# Patient Record
Sex: FEMALE | Race: WHITE | NOT HISPANIC OR LATINO | Employment: OTHER | URBAN - METROPOLITAN AREA
[De-identification: names, ages, dates, MRNs, and addresses within clinical notes are randomized per-mention and may not be internally consistent; named-entity substitution may affect disease eponyms.]

---

## 2017-01-04 DIAGNOSIS — K51.90 ULCERATIVE COLITIS WITHOUT COMPLICATIONS, UNSPECIFIED LOCATION (HCC): Primary | ICD-10-CM

## 2017-01-25 ENCOUNTER — OFFICE VISIT (OUTPATIENT)
Dept: ENDOCRINOLOGY | Facility: CLINIC | Age: 52
End: 2017-01-25

## 2017-01-25 ENCOUNTER — OFFICE VISIT (OUTPATIENT)
Dept: INTERNAL MEDICINE | Facility: CLINIC | Age: 52
End: 2017-01-25

## 2017-01-25 VITALS
SYSTOLIC BLOOD PRESSURE: 100 MMHG | HEIGHT: 67 IN | OXYGEN SATURATION: 96 % | HEART RATE: 88 BPM | DIASTOLIC BLOOD PRESSURE: 80 MMHG | WEIGHT: 254 LBS | BODY MASS INDEX: 39.87 KG/M2

## 2017-01-25 VITALS
SYSTOLIC BLOOD PRESSURE: 100 MMHG | BODY MASS INDEX: 39.91 KG/M2 | HEART RATE: 88 BPM | OXYGEN SATURATION: 96 % | WEIGHT: 254.8 LBS | DIASTOLIC BLOOD PRESSURE: 80 MMHG

## 2017-01-25 DIAGNOSIS — E06.3 HYPOTHYROIDISM DUE TO HASHIMOTO'S THYROIDITIS: ICD-10-CM

## 2017-01-25 DIAGNOSIS — E04.0 SIMPLE GOITER: ICD-10-CM

## 2017-01-25 DIAGNOSIS — E06.3 LYMPHOCYTIC THYROIDITIS: Primary | ICD-10-CM

## 2017-01-25 DIAGNOSIS — E55.9 VITAMIN D DEFICIENCY: ICD-10-CM

## 2017-01-25 DIAGNOSIS — F41.1 GENERALIZED ANXIETY DISORDER: ICD-10-CM

## 2017-01-25 DIAGNOSIS — R73.03 PREDIABETES: Primary | ICD-10-CM

## 2017-01-25 DIAGNOSIS — E03.8 HYPOTHYROIDISM DUE TO HASHIMOTO'S THYROIDITIS: ICD-10-CM

## 2017-01-25 DIAGNOSIS — Z11.59 NEED FOR HEPATITIS C SCREENING TEST: ICD-10-CM

## 2017-01-25 DIAGNOSIS — R73.9 HYPERGLYCEMIA: ICD-10-CM

## 2017-01-25 DIAGNOSIS — K51.20 ULCERATIVE PROCTITIS WITHOUT COMPLICATION (HCC): ICD-10-CM

## 2017-01-25 DIAGNOSIS — E66.01 MORBID OBESITY DUE TO EXCESS CALORIES (HCC): ICD-10-CM

## 2017-01-25 DIAGNOSIS — K21.9 GASTROESOPHAGEAL REFLUX DISEASE, ESOPHAGITIS PRESENCE NOT SPECIFIED: ICD-10-CM

## 2017-01-25 DIAGNOSIS — R73.03 PREDIABETES: ICD-10-CM

## 2017-01-25 LAB
GLUCOSE BLDC GLUCOMTR-MCNC: 115 MG/DL (ref 70–130)
HBA1C MFR BLD: 5.7 %

## 2017-01-25 PROCEDURE — 99213 OFFICE O/P EST LOW 20 MIN: CPT | Performed by: INTERNAL MEDICINE

## 2017-01-25 PROCEDURE — 90714 TD VACC NO PRESV 7 YRS+ IM: CPT | Performed by: INTERNAL MEDICINE

## 2017-01-25 PROCEDURE — 82947 ASSAY GLUCOSE BLOOD QUANT: CPT | Performed by: INTERNAL MEDICINE

## 2017-01-25 PROCEDURE — 76536 US EXAM OF HEAD AND NECK: CPT | Performed by: INTERNAL MEDICINE

## 2017-01-25 PROCEDURE — 83036 HEMOGLOBIN GLYCOSYLATED A1C: CPT | Performed by: INTERNAL MEDICINE

## 2017-01-25 PROCEDURE — 90471 IMMUNIZATION ADMIN: CPT | Performed by: INTERNAL MEDICINE

## 2017-01-25 NOTE — MR AVS SNAPSHOT
Brigid Coelho   1/25/2017 8:45 AM   Office Visit    Dept Phone:  [phone]   Encounter #:  [id number]    Provider:  Shellie Moore DO   Department:  [facility] INTERNAL MEDICINE AND ENDOCRINOLOGY [place]                Your Full Care Plan              Today's Medication Changes          These changes are accurate as of: 1/25/17  9:33 AM.  If you have any questions, ask your nurse or doctor.               Medication(s)that have changed:     * VENTOLIN  (90 BASE) MCG/ACT inhaler   Generic drug:  albuterol   Ventolin  (90 Base) MCG/ACT Inhalation Aerosol Solution; Patient Sig: Ventolin  (90 Base) MCG/ACT Inhalation Aerosol Solution INL 2 PUFFS PO Q 4 H PRN; 18; 0; 11-Apr-2012; Active   What changed:  Another medication with the same name was removed. Continue taking this medication, and follow the directions you see here.   Changed by:  Shellie Moore DO       * albuterol 108 (90 BASE) MCG/ACT inhaler   Commonly known as:  PROVENTIL HFA;VENTOLIN HFA   Inhale 2 puffs Every 6 (Six) Hours As Needed for wheezing.   What changed:  Another medication with the same name was removed. Continue taking this medication, and follow the directions you see here.       XANAX 0.25 MG tablet   Generic drug:  ALPRAZolam   Take  by mouth.   What changed:  Another medication with the same name was removed. Continue taking this medication, and follow the directions you see here.   Changed by:  Shellie Moore DO       * Notice:  This list has 2 medication(s) that are the same as other medications prescribed for you. Read the directions carefully, and ask your doctor or other care provider to review them with you.      Stop taking medication(s)listed here:     benzonatate 200 MG capsule   Commonly known as:  TESSALON   Stopped by:  Shellie Moore DO           cefdinir 300 MG capsule   Commonly known as:  OMNICEF   Stopped by:  Shellie Moore DO           LIALDA 1.2 G EC  tablet   Generic drug:  mesalamine   Stopped by:  Shellie Moore, DO           promethazine-dextromethorphan 6.25-15 MG/5ML syrup   Commonly known as:  PROMETHAZINE-DM   Stopped by:  Shellie Moore DO                      Your Updated Medication List          This list is accurate as of: 1/25/17  9:33 AM.  Always use your most recent med list.                balsalazide 750 MG capsule   Commonly known as:  COLAZAL       CLARITIN 10 MG capsule   Generic drug:  Loratadine       doxycycline 100 MG capsule   Commonly known as:  MONODOX   Take 1 capsule by mouth 2 (Two) Times a Day.       levothyroxine 50 MCG tablet   Commonly known as:  SYNTHROID, LEVOTHROID   Take 1 tablet by mouth daily.       metFORMIN  MG 24 hr tablet   Commonly known as:  GLUCOPHAGE-XR   TAKE ONE TABLET BY MOUTH TWICE A DAY       pantoprazole 40 MG EC tablet   Commonly known as:  PROTONIX       SINGULAIR 10 MG tablet   Generic drug:  montelukast       * VENTOLIN  (90 BASE) MCG/ACT inhaler   Generic drug:  albuterol       * albuterol 108 (90 BASE) MCG/ACT inhaler   Commonly known as:  PROVENTIL HFA;VENTOLIN HFA   Inhale 2 puffs Every 6 (Six) Hours As Needed for wheezing.       XANAX 0.25 MG tablet   Generic drug:  ALPRAZolam       ZOLOFT 50 MG tablet   Generic drug:  sertraline       * Notice:  This list has 2 medication(s) that are the same as other medications prescribed for you. Read the directions carefully, and ask your doctor or other care provider to review them with you.            We Performed the Following     Ambulatory Referral to Gastroenterology     Hepatitis C Antibody     POC Glucose Fingerstick     POC Glycated Hemoglobin, Total     Td Vaccine Greater Than or Equal To 6yo With Preservative IM       You Were Diagnosed With        Codes Comments    Prediabetes    -  Primary ICD-10-CM: R73.03  ICD-9-CM: 790.29     Hyperglycemia     ICD-10-CM: R73.9  ICD-9-CM: 790.29     Ulcerative proctitis without complication      ICD-10-CM: K51.20  ICD-9-CM: 556.2     Gastroesophageal reflux disease, esophagitis presence not specified     ICD-10-CM: K21.9  ICD-9-CM: 530.81     Generalized anxiety disorder     ICD-10-CM: F41.1  ICD-9-CM: 300.02     Need for hepatitis C screening test     ICD-10-CM: Z11.59  ICD-9-CM: V73.89       Instructions     None    Patient Instructions History      Upcoming Appointments     Visit Type Date Time Department    FOLLOW UP + US 2017 10:30 AM [facility]    FOLLOW UP 2017  8:45 AM [facility] PC [facility]    SUBSEQUENT MEDICARE WELLNESS 2017 10:00 AM [facility]  [facility]      MyChart Signup     [facility] [employer] allows you to send messages to your doctor, view your test results, renew your prescriptions, schedule appointments, and more. To sign up, go to [employer] and click on the Sign Up Now link in the New User? box. Enter your [facility] Activation Code exactly as it appears below along with the last four digits of your Social Security Number and your Date of Birth () to complete the sign-up process. If you do not sign up before the expiration date, you must request a new code.    [facility] Activation Code: [email or web address]  Expires: 2017  9:33 AM    If you have questions, you can email [email or web address] or call [phone] to talk to our [facility] staff. Remember, [facility] is NOT to be used for urgent needs. For medical emergencies, dial 911.               Other Info from Your Visit           Your Appointments     2017 10:30 AM EST   Follow up + US with Leann BA MD   [facility] INTERNAL MEDICINE AND ENDOCRINOLOGY (--)    [address] [facility] [place]. [address] [place] [address]   [phone]            2017 10:00 AM EDT   Subsequent Medicare Wellness with Shellie Moore DO   [facility] INTERNAL MEDICINE AND ENDOCRINOLOGY [place] (--)    [address] [address] [phone]              Allergies      Epinephrine      Erythromycin      Nitroglycerin        Reason for Visit     Heartburn     Prediabetes     Anemia           Vital Signs     Blood Pressure Pulse Weight Oxygen Saturation Body Mass Index Smoking Status    100/80 88 254 lb 12.8 oz (116 kg) 96% 39.91 kg/m2 Never Smoker      Problems and Diagnoses Noted     Acid reflux disease    Generalized anxiety disorder    Prediabetes    Inflammatory bowel disease (ulcerative colitis)    Hyperglycemia        Need for hepatitis C screening test          Results     POC Glucose Fingerstick      Component Value Standard Range & Units    Glucose 115 70 - 130 mg/dL                POC Glycated Hemoglobin, Total      Component Value Standard Range & Units    Hemoglobin A1C 5.7 %

## 2017-01-25 NOTE — PROGRESS NOTES
Subjective   Brigid Coelho is a 51 y.o. female.   Chief Complaint   Patient presents with   • Heartburn   • Prediabetes   • Anemia     Chief Complaint   Patient presents with   • Heartburn   • Prediabetes   • Anemia       Heartburn   She reports no abdominal pain, no chest pain, no coughing, no nausea, no sore throat or no wheezing. This is a chronic problem. Pertinent negatives include no fatigue.   Anemia   Presents for follow-up visit. There has been no abdominal pain, confusion, fever, pallor or palpitations.      Prediabetes has been stable.   Needs ref to new GI doctor for UC  The following portions of the patient's history were reviewed and updated as appropriate: allergies, current medications, past family history, past medical history, past social history, past surgical history and problem list.    Review of Systems   Constitutional: Negative for activity change, appetite change, chills, diaphoresis, fatigue, fever and unexpected weight change.   HENT: Negative for congestion, ear discharge, ear pain, mouth sores, nosebleeds, sinus pressure, sneezing and sore throat.    Eyes: Negative for pain, discharge and itching.   Respiratory: Negative for cough, chest tightness, shortness of breath and wheezing.    Cardiovascular: Negative for chest pain, palpitations and leg swelling.   Gastrointestinal: Negative for abdominal pain, constipation, diarrhea, nausea and vomiting.   Endocrine: Negative for cold intolerance, heat intolerance, polydipsia and polyphagia.   Genitourinary: Negative for dysuria, flank pain, frequency, hematuria and urgency.   Musculoskeletal: Negative for arthralgias, back pain, gait problem, myalgias, neck pain and neck stiffness.   Skin: Negative for color change, pallor and rash.   Neurological: Negative for seizures, speech difficulty, numbness and headaches.   Psychiatric/Behavioral: Negative for agitation, confusion, decreased concentration and sleep disturbance. The patient is not  nervous/anxious.      Visit Vitals   • /80   • Pulse 88   • Wt 254 lb 12.8 oz (116 kg)   • SpO2 96%   • BMI 39.91 kg/m2       Objective   Physical Exam   Constitutional: She is oriented to person, place, and time. She appears well-developed.   HENT:   Head: Normocephalic.   Right Ear: External ear normal.   Left Ear: External ear normal.   Nose: Nose normal.   Mouth/Throat: Oropharynx is clear and moist.   Eyes: Conjunctivae are normal. Pupils are equal, round, and reactive to light.   Neck: No JVD present. No thyromegaly present.   Cardiovascular: Normal rate, regular rhythm and normal heart sounds.  Exam reveals no friction rub.    No murmur heard.  Pulmonary/Chest: Effort normal and breath sounds normal. No respiratory distress. She has no wheezes. She has no rales.   Abdominal: Soft. Bowel sounds are normal. She exhibits no distension. There is no tenderness. There is no guarding.   Musculoskeletal: She exhibits no edema or tenderness.   Lymphadenopathy:     She has no cervical adenopathy.   Neurological: She is alert and oriented to person, place, and time. She has normal reflexes. She displays normal reflexes. No cranial nerve deficit.   Skin: No rash noted.   Psychiatric: She has a normal mood and affect. Her behavior is normal.   Nursing note and vitals reviewed.      Assessment/Plan   Brigid was seen today for heartburn, prediabetes and anemia.    Diagnoses and all orders for this visit:    Prediabetes  5.7 today  Hyperglycemia  -     POC Glucose Fingerstick  -     POC Glycated Hemoglobin, Total    Ulcerative proctitis without complication  -     Ambulatory Referral to Gastroenterology    Gastroesophageal reflux disease, esophagitis presence not specified  stable  Generalized anxiety disorder  stable  Need for hepatitis C screening test  -     Hepatitis C Antibody

## 2017-01-25 NOTE — MR AVS SNAPSHOT
Brigid Coelho   1/25/2017 10:30 AM   Office Visit    Dept Phone:  [phone]   Encounter #:  [id number]    Provider:  Leann BA MD   Department:  [facility] INTERNAL MEDICINE AND ENDOCRINOLOGY                Your Full Care Plan              Today's Medication Changes          These changes are accurate as of: 1/25/17 10:01 AM.  If you have any questions, ask your nurse or doctor.               Medication(s)that have changed:     * VENTOLIN  (90 BASE) MCG/ACT inhaler   Generic drug:  albuterol   Ventolin  (90 Base) MCG/ACT Inhalation Aerosol Solution; Patient Sig: Ventolin  (90 Base) MCG/ACT Inhalation Aerosol Solution INL 2 PUFFS PO Q 4 H PRN; 18; 0; 11-Apr-2012; Active   What changed:  Another medication with the same name was removed. Continue taking this medication, and follow the directions you see here.   Changed by:  Shellie Moore DO       * albuterol 108 (90 BASE) MCG/ACT inhaler   Commonly known as:  PROVENTIL HFA;VENTOLIN HFA   Inhale 2 puffs Every 6 (Six) Hours As Needed for wheezing.   What changed:  Another medication with the same name was removed. Continue taking this medication, and follow the directions you see here.       XANAX 0.25 MG tablet   Generic drug:  ALPRAZolam   Take  by mouth.   What changed:  Another medication with the same name was removed. Continue taking this medication, and follow the directions you see here.   Changed by:  Shellie Moore DO       * Notice:  This list has 2 medication(s) that are the same as other medications prescribed for you. Read the directions carefully, and ask your doctor or other care provider to review them with you.      Stop taking medication(s)listed here:     benzonatate 200 MG capsule   Commonly known as:  TESSALON   Stopped by:  Shellie Moore DO           cefdinir 300 MG capsule   Commonly known as:  OMNICEF   Stopped by:  Shellie Moore DO           LIALDA  1.2 G EC tablet   Generic drug:  mesalamine   Stopped by:  Shellie Moore DO           promethazine-dextromethorphan 6.25-15 MG/5ML syrup   Commonly known as:  PROMETHAZINE-DM   Stopped by:  Shellie Moore DO                      Your Updated Medication List          This list is accurate as of: 1/25/17 10:01 AM.  Always use your most recent med list.                balsalazide 750 MG capsule   Commonly known as:  COLAZAL       CLARITIN 10 MG capsule   Generic drug:  Loratadine       doxycycline 100 MG capsule   Commonly known as:  MONODOX   Take 1 capsule by mouth 2 (Two) Times a Day.       levothyroxine 50 MCG tablet   Commonly known as:  SYNTHROID, LEVOTHROID   Take 1 tablet by mouth daily.       metFORMIN  MG 24 hr tablet   Commonly known as:  GLUCOPHAGE-XR   TAKE ONE TABLET BY MOUTH TWICE A DAY       pantoprazole 40 MG EC tablet   Commonly known as:  PROTONIX       SINGULAIR 10 MG tablet   Generic drug:  montelukast       * VENTOLIN  (90 BASE) MCG/ACT inhaler   Generic drug:  albuterol       * albuterol 108 (90 BASE) MCG/ACT inhaler   Commonly known as:  PROVENTIL HFA;VENTOLIN HFA   Inhale 2 puffs Every 6 (Six) Hours As Needed for wheezing.       XANAX 0.25 MG tablet   Generic drug:  ALPRAZolam       ZOLOFT 50 MG tablet   Generic drug:  sertraline       * Notice:  This list has 2 medication(s) that are the same as other medications prescribed for you. Read the directions carefully, and ask your doctor or other care provider to review them with you.            We Performed the Following     Comprehensive Metabolic Panel     Hepatitis C Antibody     T4, Free     TSH     US Thyroid     Vitamin D 25 Hydroxy       You Were Diagnosed With        Codes Comments    Lymphocytic thyroiditis    -  Primary ICD-10-CM: E06.3  ICD-9-CM: 245.2     Simple goiter     ICD-10-CM: E04.0  ICD-9-CM: 240.0     Hypothyroidism due to Hashimoto's thyroiditis     ICD-10-CM: E03.8, E06.3  ICD-9-CM: 244.8, 245.2      Morbid obesity due to excess calories     ICD-10-CM: E66.01  ICD-9-CM: 278.01     Prediabetes     ICD-10-CM: R73.03  ICD-9-CM: 790.29     Vitamin D deficiency     ICD-10-CM: E55.9  ICD-9-CM: 268.9       Instructions     None    Patient Instructions History      Upcoming Appointments     Visit Type Date Time Department    FOLLOW UP + US 2017 10:30 AM [facility]    FOLLOW UP 2017  8:45 AM [facility]    US THYROID 2017  8:00 AM [facility]    FOLLOW UP 2017 10:45 AM [facility]    FOLLOW UP 2017 11:15 AM [facility]    SUBSEQUENT MEDICARE WELLNESS 2017 10:00 AM [facility] PC [facility]      MyChart Signup     [facility] [employer] allows you to send messages to your doctor, view your test results, renew your prescriptions, schedule appointments, and more. To sign up, go to [employer] and click on the Sign Up Now link in the New User? box. Enter your [employer] Activation Code exactly as it appears below along with the last four digits of your Social Security Number and your Date of Birth () to complete the sign-up process. If you do not sign up before the expiration date, you must request a new code.    [employer] Activation Code: [email or web address]  Expires: 2017  9:33 AM    If you have questions, you can email [email or web address] or call [phone] to talk to our [employer] staff. Remember, [employer] is NOT to be used for urgent needs. For medical emergencies, dial 911.               Other Info from Your Visit           Your Appointments     2017 10:30 AM EST   Follow up + US with Leann BA MD   [facility] INTERNAL MEDICINE AND ENDOCRINOLOGY (--)    [address] [place] [address]   [phone]            2017 10:45 AM EDT   Follow Up with Shellie Moore DO   [facility] INTERNAL MEDICINE AND ENDOCRINOLOGY [place] (--)    [address] [address] [phone]        "    Arrive 15 minutes prior to appointment.            Jun 27, 2017 11:15 AM EDT   Follow Up with Leann BA MD   [facility] INTERNAL MEDICINE AND ENDOCRINOLOGY (--)    [address] [place] [address]. [address] [place] [id number]   [phone]           Arrive 15 minutes prior to appointment.            Jul 05, 2017 10:00 AM EDT   Subsequent Medicare Wellness with Shellie Moore DO   [facility] INTERNAL MEDICINE AND ENDOCRINOLOGY [facility] (--)    [address] [address]   [phone]              Allergies     Epinephrine      Erythromycin      Nitroglycerin        Reason for Visit     Follow-up thyroid ultrasound      Vital Signs     Blood Pressure Pulse Height Weight Oxygen Saturation Body Mass Index    100/80 88 67\" (170.2 cm) 254 lb (115 kg) 96% 39.78 kg/m2    Smoking Status                   Never Smoker           Problems and Diagnoses Noted     Hypothyroidism due to Hashimoto's thyroiditis    Severe obesity    Prediabetes    Goiter    Vitamin D deficiency      Immunizations Administered     Name Date    Td       Results     US Thyroid               "

## 2017-01-25 NOTE — PROGRESS NOTES
Chief complaint  Follow-up (thyroid ultrasound)    Subjective   Brigid Coelho is a 51 y.o. female is here today for follow-up.  Follow-up for hypothyroidism,  goiter and small thyroid nodules, discovered on the ultrasound in 05/2013. Jan 2016 her TFT were low and she was started on levothyroxine 50 mcg a day with symptomatic improvement.   Thyroid u/s 1/2016 -Heterogeneous thyroid gland with small 6 mm isthmus nodule.     Patient also has a history of ulcerative colitis and iron deficiency anemia, anxiety. At initial endocrine evaluation she was found to have elevated Hgb A1C, I have started her on metformin.     She has cravings for food and stress eating. She complains of fatigue, cold and heat intolerance, dysphagia, weight gain and neck swelling. Contrave in the past tried and triggered increased anxiety.     Medications    Current Outpatient Prescriptions:   •  albuterol (PROVENTIL HFA;VENTOLIN HFA) 108 (90 BASE) MCG/ACT inhaler, Inhale 2 puffs Every 6 (Six) Hours As Needed for wheezing., Disp: 1 inhaler, Rfl: 0  •  albuterol (VENTOLIN HFA) 108 (90 BASE) MCG/ACT inhaler, Ventolin  (90 Base) MCG/ACT Inhalation Aerosol Solution; Patient Sig: Ventolin  (90 Base) MCG/ACT Inhalation Aerosol Solution INL 2 PUFFS PO Q 4 H PRN; 18; 0; 11-Apr-2012; Active, Disp: , Rfl:   •  ALPRAZolam (XANAX) 0.25 MG tablet, Take  by mouth., Disp: , Rfl:   •  balsalazide (COLAZAL) 750 MG capsule, Take 2,250 mg by mouth 3 (Three) Times a Day., Disp: , Rfl:   •  doxycycline (MONODOX) 100 MG capsule, Take 1 capsule by mouth 2 (Two) Times a Day., Disp: 14 capsule, Rfl: 0  •  levothyroxine (SYNTHROID, LEVOTHROID) 50 MCG tablet, Take 1 tablet by mouth daily., Disp: 30 tablet, Rfl: 11  •  Loratadine (CLARITIN) 10 MG capsule, Take 1 tablet by mouth daily., Disp: , Rfl:   •  metFORMIN XR (GLUCOPHAGE-XR) 500 MG 24 hr tablet, TAKE ONE TABLET BY MOUTH TWICE A DAY (Patient taking differently: TAKE ONE TABLET BY MOUTH DAILY), Disp:  "60 tablet, Rfl: 3  •  montelukast (SINGULAIR) 10 MG tablet, Take  by mouth., Disp: , Rfl:   •  pantoprazole (PROTONIX) 40 MG EC tablet, Take  by mouth., Disp: , Rfl:   •  sertraline (ZOLOFT) 50 MG tablet, Take  by mouth daily., Disp: , Rfl:     PMH  The following portions of the patient's history were reviewed and updated as appropriate: allergies, current medications, past family history, past medical history, past social history, past surgical history and problem list.    Review of systems  Review of Systems   Constitutional: Positive for fatigue and unexpected weight change (weight gain). Negative for activity change, appetite change, chills and diaphoresis.   HENT: Positive for trouble swallowing and voice change. Negative for congestion, ear pain, facial swelling, hearing loss, postnasal drip and sore throat.    Eyes: Negative.  Negative for redness, itching and visual disturbance.   Respiratory: Negative for cough and shortness of breath.    Cardiovascular: Negative for chest pain, palpitations and leg swelling.   Gastrointestinal: Negative for abdominal distention, abdominal pain, constipation, diarrhea, nausea and vomiting.   Endocrine:        As listed in HPI   Genitourinary: Negative.    Musculoskeletal: Positive for arthralgias. Negative for back pain, joint swelling, myalgias, neck pain and neck stiffness.   Skin: Negative.    Allergic/Immunologic: Negative.    Neurological: Negative for dizziness, tremors, syncope, weakness, light-headedness and headaches.   Hematological: Negative.    Psychiatric/Behavioral: Negative.    All other systems reviewed and are negative.      Physical exam  Objective   Blood pressure 100/80, pulse 88, height 67\" (170.2 cm), weight 254 lb (115 kg), SpO2 96 %.   Physical Exam   Constitutional: She is oriented to person, place, and time. She appears well-developed and well-nourished.   HENT:   Head: Normocephalic and atraumatic.   Eyes: Conjunctivae are normal.   Neck: Normal " range of motion. No muscular tenderness present. Carotid bruit is not present. Thyromegaly present. No thyroid mass present.   The neck is supple and no assimetry visualized   Cardiovascular: Normal rate, regular rhythm and normal heart sounds.    Pulmonary/Chest: Effort normal and breath sounds normal.   Lymphadenopathy:     She has no cervical adenopathy.   Neurological: She is alert and oriented to person, place, and time.   Skin: Skin is warm and dry.   Psychiatric: She has a normal mood and affect. Thought content normal.   Vitals reviewed.        LABS AND IMAGING  Office Visit on 01/25/2017   Component Date Value Ref Range Status   • Glucose 01/25/2017 115  70 - 130 mg/dL Final   • Hemoglobin A1C 01/25/2017 5.7  % Final   Thyroid ultrasound      Real time high resolution imaging of the thyroid gland was performed in transverse and longitudinal planes.      The right lobe measured 4.81 cm L x 1.42 cm AP x 2.54 cm in TV dimension.    The isthmus measured 0.19 cm in thickness.    The left thyroid lobe measured 4.48 cm L x 1.35 cm AP x 1.72 cm in TV dimension.    Thyroid gland is heterogeneous and contains multiple small pseudonodules and cysts. No dominant nodule appreciated. Previously seen isthmus nodule is no longer visualized.     No pathologic lymph nodes were seen.      Assessment: Heterogeneous thyroid gland, typical for Hashimoto thyroiditis.         Assessment  Assessment/Plan   Problem List Items Addressed This Visit        Digestive    Morbid obesity    Relevant Orders    US Thyroid (Completed)    Comprehensive Metabolic Panel    TSH    T4, Free    Vitamin D 25 Hydroxy    Hepatitis C Antibody    Vitamin D deficiency    Relevant Orders    US Thyroid (Completed)    Comprehensive Metabolic Panel    TSH    T4, Free    Vitamin D 25 Hydroxy    Hepatitis C Antibody       Endocrine    Simple goiter    Relevant Orders    US Thyroid (Completed)    Comprehensive Metabolic Panel    TSH    T4, Free    Vitamin D 25  Hydroxy    Hepatitis C Antibody    Hypothyroidism due to Hashimoto's thyroiditis - Primary    Relevant Orders    US Thyroid (Completed)    Comprehensive Metabolic Panel    TSH    T4, Free    Vitamin D 25 Hydroxy    Hepatitis C Antibody       Other    Prediabetes    Relevant Orders    US Thyroid (Completed)    Comprehensive Metabolic Panel    TSH    T4, Free    Vitamin D 25 Hydroxy    Hepatitis C Antibody          Plan  Thyroid u/s 1/2017 - heterogeneous gland with no nodules.     -repeat thyroid function tests on levothyroxine 50 mcg, adjust the dose accordingly.      -Continue metformin 500 mg a day is beneficial for insulin resistance opposition.      The healthy eating instructions and tips on weight loss were provided to the patient and all questions were answered. Patient was recommended to start exercise activity at least 30 min a day and monitor calorie intake. I recommended to try using [employer] or other high protein low glycemic index diet program. She achieved some success already with decreasing the portions and trying to avoid snacking when not hungry. I would reserve med initiation until she started the program    Cont with the same and consider Victoza if glucose worsens in the future.     F/u in 4-6 months.

## 2017-01-26 LAB
25(OH)D3+25(OH)D2 SERPL-MCNC: 34 NG/ML
ALBUMIN SERPL-MCNC: 4.1 G/DL (ref 3.2–4.8)
ALBUMIN/GLOB SERPL: 1.4 G/DL (ref 1.5–2.5)
ALP SERPL-CCNC: 79 U/L (ref 25–100)
ALT SERPL-CCNC: 47 U/L (ref 7–40)
AST SERPL-CCNC: 46 U/L (ref 0–33)
BILIRUB SERPL-MCNC: 0.2 MG/DL (ref 0.3–1.2)
BUN SERPL-MCNC: 9 MG/DL (ref 9–23)
BUN/CREAT SERPL: 11.3 (ref 7–25)
CALCIUM SERPL-MCNC: 9.4 MG/DL (ref 8.7–10.4)
CHLORIDE SERPL-SCNC: 108 MMOL/L (ref 99–109)
CO2 SERPL-SCNC: 29 MMOL/L (ref 20–31)
CREAT SERPL-MCNC: 0.8 MG/DL (ref 0.6–1.3)
GLOBULIN SER CALC-MCNC: 2.9 GM/DL
GLUCOSE SERPL-MCNC: 93 MG/DL (ref 70–100)
HCV AB S/CO SERPL IA: <0.1 S/CO RATIO (ref 0–0.9)
POTASSIUM SERPL-SCNC: 4.2 MMOL/L (ref 3.5–5.5)
PROT SERPL-MCNC: 7 G/DL (ref 5.7–8.2)
SODIUM SERPL-SCNC: 144 MMOL/L (ref 132–146)
T4 FREE SERPL-MCNC: 1.06 NG/DL (ref 0.89–1.76)
TSH SERPL DL<=0.005 MIU/L-ACNC: 2.12 MIU/ML (ref 0.35–5.35)

## 2017-03-23 DIAGNOSIS — R73.03 PREDIABETES: Primary | ICD-10-CM

## 2017-03-23 RX ORDER — METFORMIN HYDROCHLORIDE 500 MG/1
500 TABLET, EXTENDED RELEASE ORAL 2 TIMES DAILY
Qty: 60 TABLET | Refills: 3 | Status: SHIPPED | OUTPATIENT
Start: 2017-03-23 | End: 2017-09-22 | Stop reason: SDUPTHER

## 2017-04-07 RX ORDER — PANTOPRAZOLE SODIUM 40 MG/1
40 TABLET, DELAYED RELEASE ORAL DAILY
Qty: 30 TABLET | Refills: 3 | Status: SHIPPED | OUTPATIENT
Start: 2017-04-07 | End: 2017-04-20 | Stop reason: SDUPTHER

## 2017-04-20 RX ORDER — PANTOPRAZOLE SODIUM 40 MG/1
40 TABLET, DELAYED RELEASE ORAL DAILY
Qty: 90 TABLET | Refills: 0 | Status: SHIPPED | OUTPATIENT
Start: 2017-04-20 | End: 2017-09-22 | Stop reason: SDUPTHER

## 2017-04-21 ENCOUNTER — TELEPHONE (OUTPATIENT)
Dept: INTERNAL MEDICINE | Facility: CLINIC | Age: 52
End: 2017-04-21

## 2017-04-21 NOTE — TELEPHONE ENCOUNTER
LVM FOR PT THAT SHE NEEDS TO BE SEEN  OR AT LEAST DO A UA, LVM THAT SHE MAY NEED TO GO TO [facility] SINCE IT IS GETTING LATE IN THE DAY

## 2017-04-21 NOTE — TELEPHONE ENCOUNTER
----- Message from Shellie Moore DO sent at 4/21/2017 12:45 PM EDT -----  Contact: PT  Needs to be seen or at least a ua  ----- Message -----     From: Zenia BA MA     Sent: 4/21/2017  10:45 AM       To: DO Dr. Oscar Whittington pls advise.    Didi Benítez MA       Caller: PATIENT (Today,  9:49 AM)        PATIENT JUST CALLED ABOUT URINARY TRACK INFECTION ISSUES AND YEAST INFECTION. SHE WANTED TO REMIND US SHE CANT TAKE ANYTHING WITH ERYTHROMYCIN. SHE IS ALLERGIC TO THIS TYPE OF MEDICATION. IF YOU HAVE QUESTIONS YOU CAN CALL HER BACK [phone]       ----- Message -----     From: Kristin Mckinley     Sent: 4/21/2017   9:29 AM       To: Maryann Benítez MA    PT HAS AN APPT IN June, BUT SHE HAS DEVELOPED A UTI AND YEAST INFECTION AND SHE WANTS TO KNOW IF DR MOORE WILL CALL HER IN A PRESCRIPTION TO TAKE CARE OF THOSE ISSUES.  SHE IS PRETTY SURE HER METFORMIN HAS CAUSED THE UTI AND YEAST INFECTION.   IF NEEDED, PLEASE CALL THE PATIENT [phone]    [facility]RE[facility] ON [facility]

## 2017-04-26 RX ORDER — LEVOTHYROXINE SODIUM 0.05 MG/1
50 TABLET ORAL DAILY
Qty: 90 TABLET | Refills: 0 | Status: SHIPPED | OUTPATIENT
Start: 2017-04-26 | End: 2017-09-22 | Stop reason: SDUPTHER

## 2017-04-28 ENCOUNTER — OFFICE VISIT (OUTPATIENT)
Dept: INTERNAL MEDICINE | Facility: CLINIC | Age: 52
End: 2017-04-28

## 2017-04-28 VITALS
BODY MASS INDEX: 39.69 KG/M2 | HEART RATE: 90 BPM | SYSTOLIC BLOOD PRESSURE: 108 MMHG | OXYGEN SATURATION: 99 % | WEIGHT: 253.4 LBS | DIASTOLIC BLOOD PRESSURE: 76 MMHG

## 2017-04-28 DIAGNOSIS — R31.9 URINARY TRACT INFECTION WITH HEMATURIA, SITE UNSPECIFIED: Primary | ICD-10-CM

## 2017-04-28 DIAGNOSIS — N39.0 URINARY TRACT INFECTION WITH HEMATURIA, SITE UNSPECIFIED: Primary | ICD-10-CM

## 2017-04-28 DIAGNOSIS — I83.93 VARICOSE VEINS OF BOTH LOWER EXTREMITIES: ICD-10-CM

## 2017-04-28 DIAGNOSIS — N39.0 ACUTE UTI: ICD-10-CM

## 2017-04-28 DIAGNOSIS — M79.671 RIGHT FOOT PAIN: ICD-10-CM

## 2017-04-28 LAB
BILIRUB BLD-MCNC: ABNORMAL MG/DL
CLARITY, POC: ABNORMAL
COLOR UR: YELLOW
GLUCOSE UR STRIP-MCNC: NEGATIVE MG/DL
KETONES UR QL: NEGATIVE
LEUKOCYTE EST, POC: ABNORMAL
NITRITE UR-MCNC: NEGATIVE MG/ML
PH UR: 5 [PH] (ref 5–8)
PROT UR STRIP-MCNC: NEGATIVE MG/DL
RBC # UR STRIP: ABNORMAL /UL
SP GR UR: 1.03 (ref 1–1.03)
UROBILINOGEN UR QL: NORMAL

## 2017-04-28 PROCEDURE — 81003 URINALYSIS AUTO W/O SCOPE: CPT | Performed by: INTERNAL MEDICINE

## 2017-04-28 PROCEDURE — 99213 OFFICE O/P EST LOW 20 MIN: CPT | Performed by: INTERNAL MEDICINE

## 2017-04-28 RX ORDER — FLUCONAZOLE 150 MG/1
150 TABLET ORAL ONCE
Qty: 2 TABLET | Refills: 1 | Status: SHIPPED | OUTPATIENT
Start: 2017-04-28 | End: 2017-04-28

## 2017-04-28 RX ORDER — SULFAMETHOXAZOLE AND TRIMETHOPRIM 800; 160 MG/1; MG/1
1 TABLET ORAL 2 TIMES DAILY
Qty: 10 TABLET | Refills: 0 | Status: SHIPPED | OUTPATIENT
Start: 2017-04-28 | End: 2017-05-24 | Stop reason: ALTCHOICE

## 2017-04-28 NOTE — PROGRESS NOTES
Subjective   Brigid Coelho is a 51 y.o. female.   Chief Complaint   Patient presents with   • Same Day     Yeast Infection       History of Present Illness   Painful urination and frequency 1 week. No fever or chills. No discharge. No itching. Not sexually active  The following portions of the patient's history were reviewed and updated as appropriate: allergies, current medications, past family history, past medical history, past social history, past surgical history and problem list.    Review of Systems   Constitutional: Negative for activity change, appetite change, chills, diaphoresis, fatigue, fever and unexpected weight change.   HENT: Negative for congestion, ear discharge, ear pain, mouth sores, nosebleeds, sinus pressure, sneezing and sore throat.    Eyes: Negative for pain, discharge and itching.   Respiratory: Negative for cough, chest tightness, shortness of breath and wheezing.    Cardiovascular: Negative for chest pain, palpitations and leg swelling.   Gastrointestinal: Negative for abdominal pain, constipation, diarrhea, nausea and vomiting.   Endocrine: Negative for cold intolerance, heat intolerance, polydipsia and polyphagia.   Genitourinary: Positive for dysuria and frequency. Negative for flank pain, hematuria and urgency.   Musculoskeletal: Negative for arthralgias, back pain, gait problem, myalgias, neck pain and neck stiffness.        Right foot pain   Skin: Negative for color change, pallor and rash.   Neurological: Negative for seizures, speech difficulty, numbness and headaches.   Psychiatric/Behavioral: Negative for agitation, confusion, decreased concentration and sleep disturbance. The patient is not nervous/anxious.      /76  Pulse 90  Wt 253 lb 6.4 oz (115 kg)  SpO2 99%  BMI 39.69 kg/m2    Objective   Physical Exam   Constitutional: She is oriented to person, place, and time. She appears well-developed.   HENT:   Head: Normocephalic.   Right Ear: External ear normal.    Left Ear: External ear normal.   Nose: Nose normal.   Mouth/Throat: Oropharynx is clear and moist.   Eyes: Conjunctivae are normal. Pupils are equal, round, and reactive to light.   Neck: No JVD present. No thyromegaly present.   Cardiovascular: Normal rate, regular rhythm and normal heart sounds.  Exam reveals no friction rub.    No murmur heard.  Pulmonary/Chest: Effort normal and breath sounds normal. No respiratory distress. She has no wheezes. She has no rales.   Abdominal: Soft. She exhibits no distension. There is no tenderness. There is no guarding.   Musculoskeletal: She exhibits no edema or tenderness.   Varicose veins b/l   Lymphadenopathy:     She has no cervical adenopathy.   Neurological: She is oriented to person, place, and time. She displays normal reflexes. No cranial nerve deficit.   Skin: No rash noted.   Psychiatric: Her behavior is normal.   Nursing note and vitals reviewed.      Assessment/Plan   Brigid was seen today for same day.    Diagnoses and all orders for this visit:    Urinary tract infection with hematuria, site unspecified  -     POCT urinalysis dipstick, automated  -     Urine Culture    Acute UTI  -     sulfamethoxazole-trimethoprim (BACTRIM DS) 800-160 MG per tablet; Take 1 tablet by mouth 2 (Two) Times a Day.    50% of visit spent counseling.

## 2017-05-02 ENCOUNTER — TELEPHONE (OUTPATIENT)
Dept: INTERNAL MEDICINE | Facility: CLINIC | Age: 52
End: 2017-05-02

## 2017-05-02 RX ORDER — AMOXICILLIN 875 MG/1
875 TABLET, COATED ORAL 2 TIMES DAILY
Qty: 20 TABLET | Refills: 0 | Status: SHIPPED | OUTPATIENT
Start: 2017-05-02 | End: 2017-05-24 | Stop reason: ALTCHOICE

## 2017-05-24 ENCOUNTER — HOSPITAL ENCOUNTER (EMERGENCY)
Facility: HOSPITAL | Age: 52
Discharge: HOME | End: 2017-05-24
Attending: EMERGENCY MEDICINE | Admitting: EMERGENCY MEDICINE

## 2017-05-24 ENCOUNTER — APPOINTMENT (OUTPATIENT)
Dept: GENERAL RADIOLOGY | Facility: HOSPITAL | Age: 52
End: 2017-05-24

## 2017-05-24 ENCOUNTER — TELEPHONE (OUTPATIENT)
Dept: INTERNAL MEDICINE | Facility: CLINIC | Age: 52
End: 2017-05-24

## 2017-05-24 ENCOUNTER — OFFICE VISIT (OUTPATIENT)
Dept: GASTROENTEROLOGY | Facility: CLINIC | Age: 52
End: 2017-05-24

## 2017-05-24 VITALS
OXYGEN SATURATION: 99 % | DIASTOLIC BLOOD PRESSURE: 98 MMHG | HEIGHT: 67 IN | TEMPERATURE: 97.2 F | HEART RATE: 86 BPM | WEIGHT: 268.8 LBS | BODY MASS INDEX: 42.19 KG/M2 | SYSTOLIC BLOOD PRESSURE: 128 MMHG

## 2017-05-24 VITALS
OXYGEN SATURATION: 96 % | HEIGHT: 67 IN | DIASTOLIC BLOOD PRESSURE: 95 MMHG | WEIGHT: 268 LBS | BODY MASS INDEX: 42.06 KG/M2 | HEART RATE: 81 BPM | RESPIRATION RATE: 20 BRPM | TEMPERATURE: 97.8 F | SYSTOLIC BLOOD PRESSURE: 138 MMHG

## 2017-05-24 DIAGNOSIS — J20.9 ACUTE BRONCHITIS, UNSPECIFIED ORGANISM: ICD-10-CM

## 2017-05-24 DIAGNOSIS — K51.80 OTHER ULCERATIVE COLITIS WITHOUT COMPLICATION (HCC): Primary | ICD-10-CM

## 2017-05-24 DIAGNOSIS — J40 BRONCHITIS: Primary | ICD-10-CM

## 2017-05-24 LAB
ALBUMIN SERPL-MCNC: 4.1 G/DL (ref 3.2–4.8)
ALBUMIN/GLOB SERPL: 1.2 G/DL (ref 1.5–2.5)
ALP SERPL-CCNC: 94 U/L (ref 25–100)
ALT SERPL W P-5'-P-CCNC: 27 U/L (ref 7–40)
ANION GAP SERPL CALCULATED.3IONS-SCNC: 2 MMOL/L (ref 3–11)
AST SERPL-CCNC: 21 U/L (ref 0–33)
BASOPHILS # BLD AUTO: 0.09 10*3/MM3 (ref 0–0.2)
BASOPHILS NFR BLD AUTO: 1 % (ref 0–1)
BILIRUB SERPL-MCNC: 0.1 MG/DL (ref 0.3–1.2)
BNP SERPL-MCNC: 14 PG/ML (ref 0–100)
BUN BLD-MCNC: 10 MG/DL (ref 9–23)
BUN/CREAT SERPL: 16.7 (ref 7–25)
CALCIUM SPEC-SCNC: 9.3 MG/DL (ref 8.7–10.4)
CHLORIDE SERPL-SCNC: 106 MMOL/L (ref 99–109)
CO2 SERPL-SCNC: 31 MMOL/L (ref 20–31)
CREAT BLD-MCNC: 0.6 MG/DL (ref 0.6–1.3)
DEPRECATED RDW RBC AUTO: 47.4 FL (ref 37–54)
EOSINOPHIL # BLD AUTO: 1.05 10*3/MM3 (ref 0.1–0.3)
EOSINOPHIL NFR BLD AUTO: 11.4 % (ref 0–3)
ERYTHROCYTE [DISTWIDTH] IN BLOOD BY AUTOMATED COUNT: 15.2 % (ref 11.3–14.5)
GFR SERPL CREATININE-BSD FRML MDRD: 105 ML/MIN/1.73
GLOBULIN UR ELPH-MCNC: 3.3 GM/DL
GLUCOSE BLD-MCNC: 98 MG/DL (ref 70–100)
HCT VFR BLD AUTO: 39 % (ref 34.5–44)
HGB BLD-MCNC: 12.2 G/DL (ref 11.5–15.5)
HOLD SPECIMEN: NORMAL
HOLD SPECIMEN: NORMAL
IMM GRANULOCYTES # BLD: 0.03 10*3/MM3 (ref 0–0.03)
IMM GRANULOCYTES NFR BLD: 0.3 % (ref 0–0.6)
LYMPHOCYTES # BLD AUTO: 1.72 10*3/MM3 (ref 0.6–4.8)
LYMPHOCYTES NFR BLD AUTO: 18.7 % (ref 24–44)
MCH RBC QN AUTO: 26.7 PG (ref 27–31)
MCHC RBC AUTO-ENTMCNC: 31.3 G/DL (ref 32–36)
MCV RBC AUTO: 85.3 FL (ref 80–99)
MONOCYTES # BLD AUTO: 0.68 10*3/MM3 (ref 0–1)
MONOCYTES NFR BLD AUTO: 7.4 % (ref 0–12)
NEUTROPHILS # BLD AUTO: 5.62 10*3/MM3 (ref 1.5–8.3)
NEUTROPHILS NFR BLD AUTO: 61.2 % (ref 41–71)
PLATELET # BLD AUTO: 339 10*3/MM3 (ref 150–450)
PMV BLD AUTO: 9.9 FL (ref 6–12)
POTASSIUM BLD-SCNC: 3.8 MMOL/L (ref 3.5–5.5)
PROT SERPL-MCNC: 7.4 G/DL (ref 5.7–8.2)
RBC # BLD AUTO: 4.57 10*6/MM3 (ref 3.89–5.14)
SODIUM BLD-SCNC: 139 MMOL/L (ref 132–146)
TROPONIN I SERPL-MCNC: 0 NG/ML (ref 0–0.07)
WBC NRBC COR # BLD: 9.19 10*3/MM3 (ref 3.5–10.8)
WHOLE BLOOD HOLD SPECIMEN: NORMAL
WHOLE BLOOD HOLD SPECIMEN: NORMAL

## 2017-05-24 PROCEDURE — 85025 COMPLETE CBC W/AUTO DIFF WBC: CPT | Performed by: EMERGENCY MEDICINE

## 2017-05-24 PROCEDURE — 94760 N-INVAS EAR/PLS OXIMETRY 1: CPT

## 2017-05-24 PROCEDURE — 83880 ASSAY OF NATRIURETIC PEPTIDE: CPT | Performed by: EMERGENCY MEDICINE

## 2017-05-24 PROCEDURE — 84484 ASSAY OF TROPONIN QUANT: CPT

## 2017-05-24 PROCEDURE — 99204 OFFICE O/P NEW MOD 45 MIN: CPT | Performed by: INTERNAL MEDICINE

## 2017-05-24 PROCEDURE — 93005 ELECTROCARDIOGRAM TRACING: CPT | Performed by: EMERGENCY MEDICINE

## 2017-05-24 PROCEDURE — 63710000001 PREDNISONE PER 1 MG: Performed by: NURSE PRACTITIONER

## 2017-05-24 PROCEDURE — 80053 COMPREHEN METABOLIC PANEL: CPT | Performed by: EMERGENCY MEDICINE

## 2017-05-24 PROCEDURE — 94640 AIRWAY INHALATION TREATMENT: CPT

## 2017-05-24 PROCEDURE — 71010 HC CHEST PA OR AP: CPT

## 2017-05-24 PROCEDURE — 99284 EMERGENCY DEPT VISIT MOD MDM: CPT

## 2017-05-24 RX ORDER — SODIUM CHLORIDE 0.9 % (FLUSH) 0.9 %
10 SYRINGE (ML) INJECTION AS NEEDED
Status: DISCONTINUED | OUTPATIENT
Start: 2017-05-24 | End: 2017-05-24 | Stop reason: HOSPADM

## 2017-05-24 RX ORDER — CETIRIZINE HYDROCHLORIDE 10 MG/1
10 TABLET ORAL DAILY
Qty: 20 TABLET | Refills: 0 | Status: SHIPPED | OUTPATIENT
Start: 2017-05-24 | End: 2017-09-22 | Stop reason: SDUPTHER

## 2017-05-24 RX ORDER — IPRATROPIUM BROMIDE AND ALBUTEROL SULFATE 2.5; .5 MG/3ML; MG/3ML
3 SOLUTION RESPIRATORY (INHALATION) ONCE
Status: COMPLETED | OUTPATIENT
Start: 2017-05-24 | End: 2017-05-24

## 2017-05-24 RX ORDER — PREDNISONE 20 MG/1
60 TABLET ORAL ONCE
Status: COMPLETED | OUTPATIENT
Start: 2017-05-24 | End: 2017-05-24

## 2017-05-24 RX ORDER — ALBUTEROL SULFATE 90 UG/1
2 AEROSOL, METERED RESPIRATORY (INHALATION) EVERY 6 HOURS PRN
Qty: 1 INHALER | Refills: 1 | Status: SHIPPED | OUTPATIENT
Start: 2017-05-24 | End: 2018-10-18 | Stop reason: SDUPTHER

## 2017-05-24 RX ORDER — PREDNISONE 20 MG/1
60 TABLET ORAL DAILY
Qty: 12 TABLET | Refills: 0 | Status: SHIPPED | OUTPATIENT
Start: 2017-05-24 | End: 2017-06-16

## 2017-05-24 RX ADMIN — PREDNISONE 60 MG: 20 TABLET ORAL at 10:06

## 2017-05-24 RX ADMIN — IPRATROPIUM BROMIDE AND ALBUTEROL SULFATE 3 ML: .5; 3 SOLUTION RESPIRATORY (INHALATION) at 10:43

## 2017-05-25 LAB — CRP SERPL-MCNC: 1.04 MG/DL (ref 0–1)

## 2017-05-26 ENCOUNTER — TELEPHONE (OUTPATIENT)
Dept: GASTROENTEROLOGY | Facility: CLINIC | Age: 52
End: 2017-05-26

## 2017-06-06 ENCOUNTER — TELEPHONE (OUTPATIENT)
Dept: INTERNAL MEDICINE | Facility: CLINIC | Age: 52
End: 2017-06-06

## 2017-06-06 NOTE — TELEPHONE ENCOUNTER
PATIENT CALLED BACK TO REQUEST THAT SHE BE CALLED ONCE THIS LETTER HAS BEEN SENT TO [facility]    CALL BACK #[phone]

## 2017-06-06 NOTE — TELEPHONE ENCOUNTER
PATIENT NEEDS A LETTER SENT TO  TO KEEP HER ELECTRICITY. IT HAS TO SAY THAT IT WILL AGGRAVATE HER CONDITION. HER ACCOUNT # [id number]

## 2017-06-06 NOTE — TELEPHONE ENCOUNTER
Pt called back to make sure someone would be calling her today about this letter she needs for [facility] she also wanted to say that we could fax letter in the AM to [facility]    Please call [phone]

## 2017-06-16 ENCOUNTER — OFFICE VISIT (OUTPATIENT)
Dept: INTERNAL MEDICINE | Facility: CLINIC | Age: 52
End: 2017-06-16

## 2017-06-16 VITALS
OXYGEN SATURATION: 97 % | WEIGHT: 269 LBS | DIASTOLIC BLOOD PRESSURE: 72 MMHG | BODY MASS INDEX: 42.22 KG/M2 | HEART RATE: 76 BPM | SYSTOLIC BLOOD PRESSURE: 114 MMHG | HEIGHT: 67 IN

## 2017-06-16 DIAGNOSIS — K21.9 GASTROESOPHAGEAL REFLUX DISEASE, ESOPHAGITIS PRESENCE NOT SPECIFIED: ICD-10-CM

## 2017-06-16 DIAGNOSIS — F41.9 ANXIETY: ICD-10-CM

## 2017-06-16 DIAGNOSIS — R73.03 PREDIABETES: Primary | ICD-10-CM

## 2017-06-16 LAB
ALBUMIN SERPL-MCNC: 4.2 G/DL (ref 3.2–4.8)
ALBUMIN/GLOB SERPL: 1.4 G/DL (ref 1.5–2.5)
ALP SERPL-CCNC: 87 U/L (ref 25–100)
ALT SERPL-CCNC: 26 U/L (ref 7–40)
AST SERPL-CCNC: 26 U/L (ref 0–33)
BILIRUB SERPL-MCNC: 0.2 MG/DL (ref 0.3–1.2)
BUN SERPL-MCNC: 11 MG/DL (ref 9–23)
BUN/CREAT SERPL: 15.7 (ref 7–25)
CALCIUM SERPL-MCNC: 9.8 MG/DL (ref 8.7–10.4)
CHLORIDE SERPL-SCNC: 104 MMOL/L (ref 99–109)
CO2 SERPL-SCNC: 30 MMOL/L (ref 20–31)
CREAT SERPL-MCNC: 0.7 MG/DL (ref 0.6–1.3)
GLOBULIN SER CALC-MCNC: 2.9 GM/DL
GLUCOSE BLDC GLUCOMTR-MCNC: 132 MG/DL (ref 70–130)
GLUCOSE SERPL-MCNC: 102 MG/DL (ref 70–100)
HBA1C MFR BLD: 5.8 %
POTASSIUM SERPL-SCNC: 4.4 MMOL/L (ref 3.5–5.5)
PROT SERPL-MCNC: 7.1 G/DL (ref 5.7–8.2)
SODIUM SERPL-SCNC: 141 MMOL/L (ref 132–146)

## 2017-06-16 PROCEDURE — 83036 HEMOGLOBIN GLYCOSYLATED A1C: CPT | Performed by: INTERNAL MEDICINE

## 2017-06-16 PROCEDURE — 99214 OFFICE O/P EST MOD 30 MIN: CPT | Performed by: INTERNAL MEDICINE

## 2017-06-16 PROCEDURE — 82947 ASSAY GLUCOSE BLOOD QUANT: CPT | Performed by: INTERNAL MEDICINE

## 2017-06-16 RX ORDER — ALPRAZOLAM 0.25 MG/1
0.25 TABLET ORAL NIGHTLY PRN
Qty: 60 TABLET | Refills: 0 | Status: SHIPPED | OUTPATIENT
Start: 2017-06-16 | End: 2017-09-22 | Stop reason: SDUPTHER

## 2017-06-16 NOTE — PROGRESS NOTES
Subjective   Brigid Coelho is a 52 y.o. female.   Chief Complaint   Patient presents with   • Follow-up   • psychiatrist referral       Heartburn   She reports no abdominal pain, no chest pain, no coughing, no nausea, no sore throat or no wheezing. This is a chronic problem. The current episode started more than 1 year ago. The symptoms are aggravated by certain foods. Pertinent negatives include no fatigue.   Anxiety   Presents for follow-up visit. Patient reports no chest pain, confusion, decreased concentration, nausea, nervous/anxious behavior, palpitations or shortness of breath.     Compliance with medications is %.      Prediabetes x years and is stable.  The following portions of the patient's history were reviewed and updated as appropriate: allergies, current medications, past family history, past medical history, past social history, past surgical history and problem list.    Review of Systems   Constitutional: Negative for activity change, appetite change, chills, diaphoresis, fatigue, fever and unexpected weight change.   HENT: Negative for congestion, ear discharge, ear pain, mouth sores, nosebleeds, sinus pressure, sneezing and sore throat.    Eyes: Negative for pain, discharge and itching.   Respiratory: Negative for cough, chest tightness, shortness of breath and wheezing.    Cardiovascular: Negative for chest pain, palpitations and leg swelling.   Gastrointestinal: Negative for abdominal pain, constipation, diarrhea, nausea and vomiting.   Endocrine: Negative for cold intolerance, heat intolerance, polydipsia and polyphagia.   Genitourinary: Negative for dysuria, flank pain, frequency, hematuria and urgency.   Musculoskeletal: Negative for arthralgias, back pain, gait problem, myalgias, neck pain and neck stiffness.   Skin: Negative for color change, pallor and rash.   Neurological: Negative for seizures, speech difficulty, numbness and headaches.   Psychiatric/Behavioral: Negative for  "agitation, confusion, decreased concentration and sleep disturbance. The patient is not nervous/anxious.      /72  Pulse 76  Ht 67\" (170.2 cm)  Wt 269 lb (122 kg)  SpO2 97%  BMI 42.13 kg/m2    Objective   Physical Exam   Constitutional: She is oriented to person, place, and time. She appears well-developed.   HENT:   Head: Normocephalic.   Right Ear: External ear normal.   Left Ear: External ear normal.   Nose: Nose normal.   Mouth/Throat: Oropharynx is clear and moist.   Eyes: Conjunctivae are normal. Pupils are equal, round, and reactive to light.   Neck: No JVD present. No thyromegaly present.   Cardiovascular: Normal rate, regular rhythm and normal heart sounds.  Exam reveals no friction rub.    No murmur heard.  Pulmonary/Chest: Effort normal and breath sounds normal. No respiratory distress. She has no wheezes. She has no rales.   Abdominal: Soft. Bowel sounds are normal. She exhibits no distension. There is no tenderness. There is no guarding.   Musculoskeletal: She exhibits no edema or tenderness.   Lymphadenopathy:     She has no cervical adenopathy.   Neurological: She is oriented to person, place, and time. She displays normal reflexes. No cranial nerve deficit.   Skin: No rash noted.   Psychiatric: Her behavior is normal.   Nursing note and vitals reviewed.      Assessment/Plan   Brigid was seen today for follow-up and psychiatrist referral.    Diagnoses and all orders for this visit:    Prediabetes  -     POC Glycosylated Hemoglobin (Hb A1C)  -     POC Glucose Fingerstick  -     Comprehensive Metabolic Panel    Anxiety  -     Ambulatory Referral to Psychiatry  -     ALPRAZolam (XANAX) 0.25 MG tablet; Take 1 tablet by mouth At Night As Needed for Anxiety.  Xanax one time script to bridge to new [facility]. Her last one just moved out of [place]. Refilled zoloft 50 mg one  A day  50% of visit spent counseling  Gastroesophageal reflux disease, esophagitis presence not specified  Stable  The patient " has read and signed the [facility] Controlled Substance Contract.  I will continue to see patient for regular follow up appointments.  They are well controlled on their medication.  ES is updated every 3 months. The patient is aware of the potential for addiction and dependence.    The patient has read and signed the [facility] Controlled Substance Contract.  I will continue to see patient for regular follow up appointments.  They are well controlled on their medication.  ES is updated every 3 months. The patient is aware of the potential for addiction. The patient has read and signed the [facility] Controlled Substance Contract.  I will continue to see patient for regular follow up appointments.  They are well controlled on their medication.  ES is updated every 3 months. The patient is aware of the potential for addiction and dependence.on and dependence.

## 2017-06-20 ENCOUNTER — OUTSIDE FACILITY SERVICE (OUTPATIENT)
Dept: GASTROENTEROLOGY | Facility: CLINIC | Age: 52
End: 2017-06-20

## 2017-06-20 ENCOUNTER — LAB REQUISITION (OUTPATIENT)
Dept: LAB | Facility: HOSPITAL | Age: 52
End: 2017-06-20

## 2017-06-20 DIAGNOSIS — K51.00 ULCERATIVE CHRONIC PANCOLITIS WITHOUT COMPLICATIONS (HCC): ICD-10-CM

## 2017-06-20 PROCEDURE — 45380 COLONOSCOPY AND BIOPSY: CPT | Performed by: INTERNAL MEDICINE

## 2017-06-20 PROCEDURE — 88305 TISSUE EXAM BY PATHOLOGIST: CPT | Performed by: INTERNAL MEDICINE

## 2017-06-21 LAB
CYTO UR: NORMAL
LAB AP CASE REPORT: NORMAL
LAB AP CLINICAL INFORMATION: NORMAL
LAB AP DIAGNOSIS COMMENT: NORMAL
Lab: NORMAL
PATH REPORT.FINAL DX SPEC: NORMAL
PATH REPORT.GROSS SPEC: NORMAL

## 2017-06-23 ENCOUNTER — TELEPHONE (OUTPATIENT)
Dept: GASTROENTEROLOGY | Facility: CLINIC | Age: 52
End: 2017-06-23

## 2017-06-23 NOTE — TELEPHONE ENCOUNTER
I called and left a message regarding the pathology report.  There was no microscopic evidence of active colitis.

## 2017-07-14 ENCOUNTER — TELEPHONE (OUTPATIENT)
Dept: INTERNAL MEDICINE | Facility: CLINIC | Age: 52
End: 2017-07-14

## 2017-07-14 NOTE — TELEPHONE ENCOUNTER
FAX: [phone]  PATIENT NEEDS ANOTHER LETTER LIKE LAST MONTH SO THEY WONT TURN OF HER ELECTRIC.

## 2017-07-18 NOTE — TELEPHONE ENCOUNTER
Faxed letter to provided number below, called and informed pt via detailed vm, left call back number if any more questions or concerns.

## 2017-07-18 NOTE — TELEPHONE ENCOUNTER
MS ANDERSON CALLED TO FOLLOW UP ON THE REQUEST  FOR A LETTER TO KEEP HER ELECTRICITY FROM BEING TURNED OFF. SHE STATES THAT TODAY IS THE LAST DAY SHE HAS TO GET IT TURNED IN.

## 2017-09-15 DIAGNOSIS — F41.9 ANXIETY: ICD-10-CM

## 2017-09-15 NOTE — TELEPHONE ENCOUNTER
PATIENT CALLED TO REQUEST A REFILL ON XANAX. SHE STATES THAT DR JESUS FILLED THIS FOR HER SEVERAL MONTHS AGO AFTER HER THERAPIST LEFT. SHE IS HAVING TROUBLE FINDING A REPLACEMENT DUE TO HER INSURANCE AND IS ALMOST OUT OF THE MEDICATION.    CALL BACK [phone]

## 2017-09-15 NOTE — TELEPHONE ENCOUNTER
LOV: 6/16  LR: 6/16    Has canceled last 4 appointment with you.     Mitesh gagnon  Does not have CSA.

## 2017-09-18 RX ORDER — ALPRAZOLAM 0.25 MG/1
0.25 TABLET ORAL NIGHTLY PRN
Qty: 60 TABLET | Refills: 0 | OUTPATIENT
Start: 2017-09-18

## 2017-09-22 ENCOUNTER — OFFICE VISIT (OUTPATIENT)
Dept: INTERNAL MEDICINE | Facility: CLINIC | Age: 52
End: 2017-09-22

## 2017-09-22 VITALS
HEIGHT: 67 IN | SYSTOLIC BLOOD PRESSURE: 120 MMHG | OXYGEN SATURATION: 99 % | WEIGHT: 260 LBS | BODY MASS INDEX: 40.81 KG/M2 | RESPIRATION RATE: 16 BRPM | DIASTOLIC BLOOD PRESSURE: 82 MMHG | HEART RATE: 67 BPM

## 2017-09-22 DIAGNOSIS — J20.9 ACUTE BRONCHITIS, UNSPECIFIED ORGANISM: ICD-10-CM

## 2017-09-22 DIAGNOSIS — E06.3 HYPOTHYROIDISM DUE TO HASHIMOTO'S THYROIDITIS: ICD-10-CM

## 2017-09-22 DIAGNOSIS — R73.03 PREDIABETES: ICD-10-CM

## 2017-09-22 DIAGNOSIS — J30.2 SEASONAL ALLERGIC RHINITIS, UNSPECIFIED ALLERGIC RHINITIS TRIGGER: Primary | ICD-10-CM

## 2017-09-22 DIAGNOSIS — E03.8 HYPOTHYROIDISM DUE TO HASHIMOTO'S THYROIDITIS: ICD-10-CM

## 2017-09-22 DIAGNOSIS — Z79.899 MEDICATION MANAGEMENT: ICD-10-CM

## 2017-09-22 DIAGNOSIS — K21.9 GASTROESOPHAGEAL REFLUX DISEASE, ESOPHAGITIS PRESENCE NOT SPECIFIED: ICD-10-CM

## 2017-09-22 DIAGNOSIS — F41.9 ANXIETY: ICD-10-CM

## 2017-09-22 PROCEDURE — 90686 IIV4 VACC NO PRSV 0.5 ML IM: CPT | Performed by: NURSE PRACTITIONER

## 2017-09-22 PROCEDURE — 90471 IMMUNIZATION ADMIN: CPT | Performed by: NURSE PRACTITIONER

## 2017-09-22 PROCEDURE — 99214 OFFICE O/P EST MOD 30 MIN: CPT | Performed by: NURSE PRACTITIONER

## 2017-09-22 RX ORDER — LEVOTHYROXINE SODIUM 0.05 MG/1
50 TABLET ORAL DAILY
Qty: 90 TABLET | Refills: 0 | Status: SHIPPED | OUTPATIENT
Start: 2017-09-22 | End: 2017-11-17 | Stop reason: SDUPTHER

## 2017-09-22 RX ORDER — METFORMIN HYDROCHLORIDE 500 MG/1
500 TABLET, EXTENDED RELEASE ORAL 2 TIMES DAILY
Qty: 60 TABLET | Refills: 3 | Status: SHIPPED | OUTPATIENT
Start: 2017-09-22 | End: 2017-11-17 | Stop reason: SDUPTHER

## 2017-09-22 RX ORDER — ALPRAZOLAM 0.25 MG/1
TABLET ORAL
Qty: 60 TABLET | Refills: 0 | Status: SHIPPED | OUTPATIENT
Start: 2017-09-22 | End: 2018-10-12

## 2017-09-22 RX ORDER — PANTOPRAZOLE SODIUM 40 MG/1
40 TABLET, DELAYED RELEASE ORAL DAILY
Qty: 90 TABLET | Refills: 0 | Status: SHIPPED | OUTPATIENT
Start: 2017-09-22 | End: 2017-12-15 | Stop reason: SDUPTHER

## 2017-09-22 RX ORDER — CETIRIZINE HYDROCHLORIDE 10 MG/1
10 TABLET ORAL DAILY
Qty: 20 TABLET | Refills: 0 | Status: SHIPPED | OUTPATIENT
Start: 2017-09-22 | End: 2018-02-23 | Stop reason: HOSPADM

## 2017-09-22 NOTE — PROGRESS NOTES
Chief complaint  Med Refill (All medications, xanax)      Subjective   Brigid Coelho is a 52 y.o. female is here today for follow-up for anxiety which is uncontrolled.  She has lost her therapist, who usually prescribes her anxiety medication, but she is having trouble finding one that takes insurance.  She would like to get a refill on her alprazolam and sertraline while she continues to search for someone who accepts her insurance.    Prediabetes, controlled, needs refills on medication; denies increased thirst, hunger, urination.    Hypothyroidism is controlled on levothyroxine; denies heat/cold intolerance, palpitations    GERD is controlled on pantoprazole; denies symptoms of reflux, heartburn, epigastric pain, nausea.        Past Medical History:   Diagnosis Date   • Acute bronchitis    • Anxiety    • Chest pain    • Cholelithiasis    • GERD (gastroesophageal reflux disease)    • Hashimoto's thyroiditis    • Hemorrhoids    • Hypothyroidism    • Metrorrhagia    • Palpitations    • Polycystic ovary syndrome    • Polyp of sigmoid colon    • Upper respiratory infection    • UTI (urinary tract infection)        Past Surgical History:   Procedure Laterality Date   • APPENDECTOMY     •  SECTION     • CHOLECYSTECTOMY     • DILATATION AND CURETTAGE      Of Cervical Stump   • MYOMECTOMY     • SMALL INTESTINE SURGERY     • TUBAL ABDOMINAL LIGATION         Family History   Problem Relation Age of Onset   • Hyperlipidemia Mother    • CHAD disease Mother    • Rheum arthritis Father    • Hyperlipidemia Father    • Diabetes Father    • Ovarian cancer Maternal Aunt    • Diabetes Paternal Grandmother    • Hypertension Paternal Grandmother    • Obesity Paternal Grandmother    • Colon cancer Other    • Arthritis Other    • Cancer Other      uncle; liver, lung    • Thyroid disease Cousin    • Thyroid cancer Cousin    • CHAD disease Daughter        Social History     Social History   • Marital status:      Spouse  name: N/A   • Number of children: N/A   • Years of education: N/A     Occupational History   • Not on file.     Social History Main Topics   • Smoking status: Never Smoker   • Smokeless tobacco: Not on file   • Alcohol use No   • Drug use: No   • Sexual activity: Not on file     Other Topics Concern   • Not on file     Social History Narrative    single       Medications    Current Outpatient Prescriptions:   •  albuterol (PROVENTIL HFA;VENTOLIN HFA) 108 (90 BASE) MCG/ACT inhaler, Inhale 2 puffs Every 6 (Six) Hours As Needed for Wheezing., Disp: 1 inhaler, Rfl: 1  •  ALPRAZolam (XANAX) 0.25 MG tablet, Take twice daily as needed, Disp: 60 tablet, Rfl: 0  •  balsalazide (COLAZAL) 750 MG capsule, Take 2,250 mg by mouth 3 (Three) Times a Day., Disp: , Rfl:   •  cetirizine (zyrTEC) 10 MG tablet, Take 1 tablet by mouth Daily., Disp: 20 tablet, Rfl: 0  •  levothyroxine (SYNTHROID, LEVOTHROID) 50 MCG tablet, Take 1 tablet by mouth Daily., Disp: 90 tablet, Rfl: 0  •  Loratadine (CLARITIN) 10 MG capsule, Take 1 tablet by mouth daily., Disp: , Rfl:   •  metFORMIN ER (GLUCOPHAGE-XR) 500 MG 24 hr tablet, Take 1 tablet by mouth 2 (Two) Times a Day., Disp: 60 tablet, Rfl: 3  •  montelukast (SINGULAIR) 10 MG tablet, Take  by mouth., Disp: , Rfl:   •  pantoprazole (PROTONIX) 40 MG EC tablet, Take 1 tablet by mouth Daily., Disp: 90 tablet, Rfl: 0  •  sertraline (ZOLOFT) 50 MG tablet, Take 1 tablet by mouth Daily., Disp: 30 tablet, Rfl: 0    PMH  The following portions of the patient's history were reviewed and updated as appropriate: allergies, current medications, past family history, past medical history, past social history, past surgical history and problem list.    Review of Systems   Constitutional: Negative for activity change, appetite change and fatigue.   Neurological: Negative for dizziness and headaches.   Psychiatric/Behavioral: The patient is nervous/anxious.    All other systems reviewed and are negative.      BP  "120/82  Pulse 67  Resp 16  Ht 67\" (170.2 cm)  Wt 260 lb (118 kg)  SpO2 99%  BMI 40.72 kg/m2      Physical Exam   Constitutional: She is oriented to person, place, and time. She appears well-developed and well-nourished.   HENT:   Head: Normocephalic and atraumatic.   Right Ear: Hearing and external ear normal.   Left Ear: Hearing and external ear normal. Left ear middle ear effusion: mild clear; light reflex present.   Nose: Nose normal.   Mouth/Throat: Uvula is midline.   Eyes: Conjunctivae are normal.   Neck: Trachea normal and normal range of motion. Neck supple. No JVD present. No thyromegaly present.   Cardiovascular: Normal rate, regular rhythm and normal heart sounds.    No murmur heard.  Pulmonary/Chest: Effort normal.   Abdominal: There is no tenderness.   Neurological: She is alert and oriented to person, place, and time.   Skin: Skin is warm, dry and intact.   Psychiatric: Her speech is normal and behavior is normal. Thought content normal. Her mood appears anxious.   Vitals reviewed.    Judicious and appropriate use of alprazolam.  ES was reviewed and appropriate.  The patient has read and signed the [facility] Controlled Substance Contract on 9/22/17. Patient has been counseled and is aware of side effects, risks, potential for addiction/tolerance, interactions, and how to take medication correctly. Patient verbalizes understanding of all instructions and contract.     1. Anxiety  -continue medication  - ALPRAZolam (XANAX) 0.25 MG tablet; Take twice daily as needed  Dispense: 60 tablet; Refill: 0  - sertraline (ZOLOFT) 50 MG tablet; Take 1 tablet by mouth Daily.  Dispense: 30 tablet; Refill: 0    -Will need to see Dr. Moore for continued prescription    2. Prediabetes  -continue medication   - metFORMIN ER (GLUCOPHAGE-XR) 500 MG 24 hr tablet; Take 1 tablet by mouth 2 (Two) Times a Day.  Dispense: 60 tablet; Refill: 3    4. Seasonal allergic rhinitis, unspecified allergic rhinitis " trigger  -Continue medication  - cetirizine (zyrTEC) 10 MG tablet; Take 1 tablet by mouth Daily.  Dispense: 20 tablet; Refill: 0    5. Hypothyroidism due to Hashimoto's thyroiditis  -continue medication and regular f/u with Dr. Chavez  - levothyroxine (SYNTHROID, LEVOTHROID) 50 MCG tablet; Take 1 tablet by mouth Daily.  Dispense: 90 tablet; Refill: 0    6. Gastroesophageal reflux disease, esophagitis presence not specified  -continue medication  - pantoprazole (PROTONIX) 40 MG EC tablet; Take 1 tablet by mouth Daily.  Dispense: 90 tablet; Refill: 0    F/U with Dr. Moore at next scheduled f/u in December or sooner if needed.    Plan of care reviewed with patient at the conclusion of today's visit. Education was provided in regards to diagnosis, management and any prescribed or recommended OTC medications.  Patient verbalizes Understanding of and agreement with management plan.    JATIN Ramey

## 2017-10-27 ENCOUNTER — TELEPHONE (OUTPATIENT)
Dept: INTERNAL MEDICINE | Facility: CLINIC | Age: 52
End: 2017-10-27

## 2017-11-17 ENCOUNTER — OFFICE VISIT (OUTPATIENT)
Dept: ENDOCRINOLOGY | Facility: CLINIC | Age: 52
End: 2017-11-17

## 2017-11-17 VITALS
DIASTOLIC BLOOD PRESSURE: 76 MMHG | WEIGHT: 254.8 LBS | SYSTOLIC BLOOD PRESSURE: 122 MMHG | BODY MASS INDEX: 39.99 KG/M2 | HEIGHT: 67 IN | HEART RATE: 70 BPM | OXYGEN SATURATION: 97 %

## 2017-11-17 DIAGNOSIS — E03.8 HYPOTHYROIDISM DUE TO HASHIMOTO'S THYROIDITIS: ICD-10-CM

## 2017-11-17 DIAGNOSIS — R73.03 PREDIABETES: Primary | ICD-10-CM

## 2017-11-17 DIAGNOSIS — E06.3 HYPOTHYROIDISM DUE TO HASHIMOTO'S THYROIDITIS: ICD-10-CM

## 2017-11-17 LAB
ALBUMIN SERPL-MCNC: 4.3 G/DL (ref 3.2–4.8)
ALBUMIN/GLOB SERPL: 1.6 G/DL (ref 1.5–2.5)
ALP SERPL-CCNC: 92 U/L (ref 25–100)
ALT SERPL-CCNC: 30 U/L (ref 7–40)
AST SERPL-CCNC: 21 U/L (ref 0–33)
BILIRUB SERPL-MCNC: 0.2 MG/DL (ref 0.3–1.2)
BUN SERPL-MCNC: 12 MG/DL (ref 9–23)
BUN/CREAT SERPL: 17.1 (ref 7–25)
CALCIUM SERPL-MCNC: 9.4 MG/DL (ref 8.7–10.4)
CHLORIDE SERPL-SCNC: 103 MMOL/L (ref 99–109)
CO2 SERPL-SCNC: 27 MMOL/L (ref 20–31)
CREAT SERPL-MCNC: 0.7 MG/DL (ref 0.6–1.3)
GFR SERPLBLD CREATININE-BSD FMLA CKD-EPI: 106 ML/MIN/1.73
GFR SERPLBLD CREATININE-BSD FMLA CKD-EPI: 88 ML/MIN/1.73
GLOBULIN SER CALC-MCNC: 2.7 GM/DL
GLUCOSE BLDC GLUCOMTR-MCNC: 92 MG/DL (ref 70–130)
GLUCOSE SERPL-MCNC: 86 MG/DL (ref 70–100)
HBA1C MFR BLD: 5.8 %
POTASSIUM SERPL-SCNC: 4.2 MMOL/L (ref 3.5–5.5)
PROT SERPL-MCNC: 7 G/DL (ref 5.7–8.2)
SODIUM SERPL-SCNC: 139 MMOL/L (ref 132–146)
T4 FREE SERPL-MCNC: 0.84 NG/DL (ref 0.89–1.76)
TSH SERPL DL<=0.005 MIU/L-ACNC: 5.42 MIU/ML (ref 0.35–5.35)

## 2017-11-17 PROCEDURE — 82947 ASSAY GLUCOSE BLOOD QUANT: CPT | Performed by: INTERNAL MEDICINE

## 2017-11-17 PROCEDURE — 83036 HEMOGLOBIN GLYCOSYLATED A1C: CPT | Performed by: INTERNAL MEDICINE

## 2017-11-17 PROCEDURE — 99213 OFFICE O/P EST LOW 20 MIN: CPT | Performed by: INTERNAL MEDICINE

## 2017-11-17 RX ORDER — LEVOTHYROXINE SODIUM 0.05 MG/1
50 TABLET ORAL DAILY
Qty: 30 TABLET | Refills: 11 | Status: SHIPPED | OUTPATIENT
Start: 2017-11-17 | End: 2017-11-20 | Stop reason: ALTCHOICE

## 2017-11-17 RX ORDER — METFORMIN HYDROCHLORIDE 500 MG/1
500 TABLET, EXTENDED RELEASE ORAL 2 TIMES DAILY
Qty: 60 TABLET | Refills: 11 | Status: SHIPPED | OUTPATIENT
Start: 2017-11-17 | End: 2018-02-21 | Stop reason: SDUPTHER

## 2017-11-17 NOTE — PROGRESS NOTES
Chief complaint  Hypothyroidism (F/u for Hashimoto's Thyroiditis. No recent labs, no new sx's)    Subjective   Brigid Coelho is a 52 y.o. female is here today for follow-up.  Follow-up for hypothyroidism,  goiter and small thyroid nodules, discovered on the ultrasound in 05/2013. Jan 2016 her TFT were low and she was started on levothyroxine 50 mcg a day with symptomatic improvement.   Thyroid u/s 1/2016 -Heterogeneous thyroid gland with small 6 mm isthmus nodule.     Patient also has a history of ulcerative colitis and iron deficiency anemia, anxiety. At initial endocrine evaluation she was found to have elevated Hgb A1C, I have started her on metformin.     She has cravings for food and stress eating. She complains of fatigue, cold and heat intolerance, dysphagia, weight gain and neck swelling. Contrave and phentermine tried in the past and triggered increased anxiety.   She would like to be referred for bariatric surgery. Her daughter started process with bariatric surgeon in [facility].     Medications    Current Outpatient Prescriptions:   •  albuterol (PROVENTIL HFA;VENTOLIN HFA) 108 (90 BASE) MCG/ACT inhaler, Inhale 2 puffs Every 6 (Six) Hours As Needed for Wheezing., Disp: 1 inhaler, Rfl: 1  •  ALPRAZolam (XANAX) 0.25 MG tablet, Take twice daily as needed, Disp: 60 tablet, Rfl: 0  •  balsalazide (COLAZAL) 750 MG capsule, Take 2,250 mg by mouth 3 (Three) Times a Day., Disp: , Rfl:   •  cetirizine (zyrTEC) 10 MG tablet, Take 1 tablet by mouth Daily., Disp: 20 tablet, Rfl: 0  •  cyclobenzaprine (FLEXERIL) 10 MG tablet, Take 1 tablet by mouth 3 (Three) Times a Day As Needed for Muscle Spasms., Disp: 20 tablet, Rfl: 0  •  levothyroxine (SYNTHROID, LEVOTHROID) 50 MCG tablet, Take 1 tablet by mouth Daily., Disp: 30 tablet, Rfl: 11  •  Loratadine (CLARITIN) 10 MG capsule, Take 1 tablet by mouth daily., Disp: , Rfl:   •  metFORMIN ER (GLUCOPHAGE-XR) 500 MG 24 hr tablet, Take 1 tablet by mouth 2 (Two)  "Times a Day., Disp: 60 tablet, Rfl: 11  •  montelukast (SINGULAIR) 10 MG tablet, Take  by mouth., Disp: , Rfl:   •  pantoprazole (PROTONIX) 40 MG EC tablet, Take 1 tablet by mouth Daily., Disp: 90 tablet, Rfl: 0  •  sertraline (ZOLOFT) 50 MG tablet, Take 1 tablet by mouth Daily., Disp: 30 tablet, Rfl: 0    PMH  The following portions of the patient's history were reviewed and updated as appropriate: allergies, current medications, past family history, past medical history, past social history, past surgical history and problem list.    Review of systems  Review of Systems   Constitutional: Positive for fatigue and unexpected weight change (weight gain). Negative for activity change, appetite change, chills and diaphoresis.   HENT: Positive for trouble swallowing and voice change. Negative for congestion, ear pain, facial swelling, hearing loss, postnasal drip and sore throat.    Eyes: Negative.  Negative for redness, itching and visual disturbance.   Respiratory: Negative for cough and shortness of breath.    Cardiovascular: Negative for chest pain, palpitations and leg swelling.   Gastrointestinal: Negative for abdominal distention, abdominal pain, constipation, diarrhea, nausea and vomiting.   Endocrine:        As listed in HPI   Genitourinary: Negative.    Musculoskeletal: Positive for arthralgias. Negative for back pain, joint swelling, myalgias, neck pain and neck stiffness.   Skin: Negative.    Allergic/Immunologic: Negative.    Neurological: Negative for dizziness, tremors, syncope, weakness, light-headedness and headaches.   Hematological: Negative.    Psychiatric/Behavioral: Negative.    All other systems reviewed and are negative.      Physical exam  Objective   Blood pressure 122/76, pulse 70, height 67\" (170.2 cm), weight 254 lb 12.8 oz (116 kg), SpO2 97 %. Body mass index is 39.91 kg/(m^2).    Physical Exam   Constitutional: She is oriented to person, place, and time. She appears well-developed and " well-nourished.   HENT:   Head: Normocephalic and atraumatic.   Eyes: Conjunctivae are normal.   Neck: Normal range of motion. No muscular tenderness present. Carotid bruit is not present. Thyromegaly present. No thyroid mass present.   The neck is supple and no assimetry visualized   Cardiovascular: Normal rate, regular rhythm and normal heart sounds.    Pulmonary/Chest: Effort normal and breath sounds normal.   Lymphadenopathy:     She has no cervical adenopathy.   Neurological: She is alert and oriented to person, place, and time.   Skin: Skin is warm and dry.   Psychiatric: She has a normal mood and affect. Thought content normal.   Vitals reviewed.        LABS AND IMAGING  Office Visit on 11/17/2017   Component Date Value Ref Range Status   • Glucose 11/17/2017 92  70 - 130 mg/dL Final   • Hemoglobin A1C 11/17/2017 5.8  % Final   Admission on 10/31/2017, Discharged on 10/31/2017   Component Date Value Ref Range Status   • Color 10/31/2017 Yellow  Yellow, Straw, Dark Yellow, [place] Final   • Clarity, UA 10/31/2017 Clear  Clear Final   • Glucose, UA 10/31/2017 Negative  Negative, 1000 mg/dL (3+) mg/dL Final   • Bilirubin 10/31/2017 Negative  Negative Final   • Ketones, UA 10/31/2017 Negative  Negative Final   • Specific Gravity  10/31/2017 1.020  1.005 - 1.030 Final   • Blood, UA 10/31/2017 Negative  Negative Final   • pH, Urine 10/31/2017 6.5  5.0 - 8.0 Final   • Protein, POC 10/31/2017 Negative  Negative mg/dL Final   • Urobilinogen, UA 10/31/2017 Normal  Normal Final   • Leukocytes 10/31/2017 Negative  Negative Final   • Nitrite, UA 10/31/2017 Negative  Negative Final       Assessment  Assessment/Plan   Problem List Items Addressed This Visit        Endocrine    Hypothyroidism due to Hashimoto's thyroiditis    Relevant Medications    levothyroxine (SYNTHROID, LEVOTHROID) 50 MCG tablet       Other    Prediabetes - Primary    Relevant Medications    metFORMIN ER (GLUCOPHAGE-XR) 500 MG 24 hr tablet    Other  Relevant Orders    POC Glucose Fingerstick (Completed)    POC Glycosylated Hemoglobin (Hb A1C) (Completed)    Ambulatory Referral to Bariatric Surgery    Comprehensive Metabolic Panel    TSH    T4, Free          Plan  Thyroid u/s 1/2017 - heterogeneous gland with no nodules.     -repeat thyroid function tests on levothyroxine 50 mcg, adjust the dose accordingly.      -Continue metformin 500 mg a day is beneficial for insulin resistance opposition.      -refer to bariatric surgeon.     F/u in 4-6 months.

## 2017-11-20 ENCOUNTER — TELEPHONE (OUTPATIENT)
Dept: ENDOCRINOLOGY | Facility: CLINIC | Age: 52
End: 2017-11-20

## 2017-11-20 RX ORDER — LEVOTHYROXINE SODIUM 0.07 MG/1
75 TABLET ORAL DAILY
Qty: 30 TABLET | Refills: 5 | Status: SHIPPED | OUTPATIENT
Start: 2017-11-20 | End: 2018-02-21 | Stop reason: SDUPTHER

## 2017-11-20 NOTE — TELEPHONE ENCOUNTER
----- Message from Leann BA MD sent at 11/17/2017  4:47 PM EST -----  Please schedule her for f/u in 6 months

## 2017-11-20 NOTE — TELEPHONE ENCOUNTER
----- Message from Leann BA MD sent at 11/19/2017 10:56 PM EST -----  Please call the patient regarding her lab results. THyroid function is low - increase levothyroxine dose to 75 mcg, change rx. ELectrolytes and liver enzymes, renal function are normal.

## 2017-12-15 ENCOUNTER — OFFICE VISIT (OUTPATIENT)
Dept: INTERNAL MEDICINE | Facility: CLINIC | Age: 52
End: 2017-12-15

## 2017-12-15 ENCOUNTER — TRANSCRIBE ORDERS (OUTPATIENT)
Dept: ADMINISTRATIVE | Facility: HOSPITAL | Age: 52
End: 2017-12-15

## 2017-12-15 VITALS
OXYGEN SATURATION: 98 % | BODY MASS INDEX: 39.91 KG/M2 | HEART RATE: 77 BPM | WEIGHT: 254.8 LBS | DIASTOLIC BLOOD PRESSURE: 80 MMHG | SYSTOLIC BLOOD PRESSURE: 100 MMHG

## 2017-12-15 DIAGNOSIS — Z00.00 MEDICARE ANNUAL WELLNESS VISIT, SUBSEQUENT: Primary | ICD-10-CM

## 2017-12-15 DIAGNOSIS — Z12.31 VISIT FOR SCREENING MAMMOGRAM: Primary | ICD-10-CM

## 2017-12-15 DIAGNOSIS — K21.9 GASTROESOPHAGEAL REFLUX DISEASE, ESOPHAGITIS PRESENCE NOT SPECIFIED: ICD-10-CM

## 2017-12-15 LAB
BASOPHILS # BLD AUTO: 0.08 10*3/MM3 (ref 0–0.2)
BASOPHILS NFR BLD AUTO: 0.9 % (ref 0–1)
CHOLEST SERPL-MCNC: 187 MG/DL (ref 0–200)
EOSINOPHIL # BLD AUTO: 0.21 10*3/MM3 (ref 0–0.3)
EOSINOPHIL NFR BLD AUTO: 2.3 % (ref 0–3)
ERYTHROCYTE [DISTWIDTH] IN BLOOD BY AUTOMATED COUNT: 14.5 % (ref 11.3–14.5)
HCT VFR BLD AUTO: 43.1 % (ref 34.5–44)
HDLC SERPL-MCNC: 40 MG/DL (ref 40–60)
HGB BLD-MCNC: 13.2 G/DL (ref 11.5–15.5)
IMM GRANULOCYTES # BLD: 0.03 10*3/MM3 (ref 0–0.03)
IMM GRANULOCYTES NFR BLD: 0.3 % (ref 0–0.6)
LDLC SERPL CALC-MCNC: 93 MG/DL (ref 0–100)
LYMPHOCYTES # BLD AUTO: 1.58 10*3/MM3 (ref 0.6–4.8)
LYMPHOCYTES NFR BLD AUTO: 17.4 % (ref 24–44)
MCH RBC QN AUTO: 26.3 PG (ref 27–31)
MCHC RBC AUTO-ENTMCNC: 30.6 G/DL (ref 32–36)
MCV RBC AUTO: 86 FL (ref 80–99)
MONOCYTES # BLD AUTO: 0.69 10*3/MM3 (ref 0–1)
MONOCYTES NFR BLD AUTO: 7.6 % (ref 0–12)
NEUTROPHILS # BLD AUTO: 6.47 10*3/MM3 (ref 1.5–8.3)
NEUTROPHILS NFR BLD AUTO: 71.5 % (ref 41–71)
PLATELET # BLD AUTO: 327 10*3/MM3 (ref 150–450)
RBC # BLD AUTO: 5.01 10*6/MM3 (ref 3.89–5.14)
TRIGL SERPL-MCNC: 272 MG/DL (ref 0–150)
VLDLC SERPL CALC-MCNC: 54.4 MG/DL
WBC # BLD AUTO: 9.06 10*3/MM3 (ref 3.5–10.8)

## 2017-12-15 PROCEDURE — 96160 PT-FOCUSED HLTH RISK ASSMT: CPT | Performed by: INTERNAL MEDICINE

## 2017-12-15 PROCEDURE — G0439 PPPS, SUBSEQ VISIT: HCPCS | Performed by: INTERNAL MEDICINE

## 2017-12-15 PROCEDURE — 99396 PREV VISIT EST AGE 40-64: CPT | Performed by: INTERNAL MEDICINE

## 2017-12-15 RX ORDER — MONTELUKAST SODIUM 10 MG/1
10 TABLET ORAL NIGHTLY
Qty: 30 TABLET | Refills: 5 | Status: SHIPPED | OUTPATIENT
Start: 2017-12-15 | End: 2018-02-21 | Stop reason: SDUPTHER

## 2017-12-15 RX ORDER — PANTOPRAZOLE SODIUM 40 MG/1
40 TABLET, DELAYED RELEASE ORAL DAILY
Qty: 30 TABLET | Refills: 5 | Status: SHIPPED | OUTPATIENT
Start: 2017-12-15 | End: 2018-02-21 | Stop reason: SDUPTHER

## 2017-12-15 NOTE — PROGRESS NOTES
QUICK REFERENCE INFORMATION:  The ABCs of the Annual Wellness Visit    Subsequent Medicare Wellness Visit    HEALTH RISK ASSESSMENT    1965    Recent Hospitalizations:  No hospitalization(s) within the last year..        Current Medical Providers:  Patient Care Team:  Shellie Moore DO as PCP - General  Shellie Moore DO as PCP - Family Medicine  Rigo Wilder MD as Consulting Physician (Gastroenterology)        Smoking Status:  History   Smoking Status   • Never Smoker   Smokeless Tobacco   • Never Used       Alcohol Consumption:  History   Alcohol Use No       Depression Screen:   PHQ-2/PHQ-9 Depression Screening 12/15/2017   Little interest or pleasure in doing things 0   Feeling down, depressed, or hopeless 0   Total Score 0       Health Habits and Functional and Cognitive Screening:  Functional & Cognitive Status 12/15/2017   Do you have difficulty preparing food and eating? No   Do you have difficulty bathing yourself, getting dressed or grooming yourself? No   Do you have difficulty using the toilet? No   Do you have difficulty moving around from place to place? No   Do you have trouble with steps or getting out of a bed or a chair? No   In the past year have you fallen or experienced a near fall? No   Current Diet Limited Junk Food   Dental Exam Not up to date   Eye Exam Up to date   Exercise (times per week) 2 times per week   Current Exercise Activities Include Walking   Do you need help using the phone?  No   Are you deaf or do you have serious difficulty hearing?  No   Do you need help with transportation? No   Do you need help shopping? No   Do you need help preparing meals?  No   Do you need help with housework?  No   Do you need help with laundry? No   Do you need help taking your medications? No   Do you need help managing money? No   Have you felt unusual stress, anger or loneliness in the last month? No   Who do you live with? Child   If you need help, do you have trouble finding  someone available to you? No   Have you been bothered in the last four weeks by sexual problems? No   Do you have difficulty concentrating, remembering or making decisions? No           Does the patient have evidence of cognitive impairment? No    Aspirin use counseling: Contraindicated from taking ASA      Recent Lab Results:  CMP:  Lab Results   Component Value Date    GLU 86 11/17/2017    BUN 12 11/17/2017    CREATININE 0.70 11/17/2017    EGFRIFNONA 88 11/17/2017    EGFRIFAFRI 106 11/17/2017    BCR 17.1 11/17/2017     11/17/2017    K 4.2 11/17/2017    CO2 27.0 11/17/2017    CALCIUM 9.4 11/17/2017    PROTENTOTREF 7.0 11/17/2017    ALBUMIN 4.30 11/17/2017    LABGLOBREF 2.7 11/17/2017    LABIL2 1.6 11/17/2017    BILITOT 0.2 (L) 11/17/2017    ALKPHOS 92 11/17/2017    AST 21 11/17/2017    ALT 30 11/17/2017     Lipid Panel:  Lab Results   Component Value Date    TRIG 201 (H) 01/26/2016    HDL 37 (L) 01/26/2016    VLDL 40 01/26/2016     HbA1c:  Lab Results   Component Value Date    HGBA1C 5.8 11/17/2017       Visual Acuity:  No exam data present    Age-appropriate Screening Schedule:  Refer to the list below for future screening recommendations based on patient's age, sex and/or medical conditions. Orders for these recommended tests are listed in the plan section. The patient has been provided with a written plan.    Health Maintenance   Topic Date Due   • MAMMOGRAM  05/27/2016   • PAP SMEAR  05/27/2016   • COLONOSCOPY  06/26/2027   • TDAP/TD VACCINES (2 - Td) 09/05/2027   • INFLUENZA VACCINE  Completed        Subjective   History of Present Illness    Brigid Coelho is a 52 y.o. female who presents for an Subsequent Wellness Visit.    The following portions of the patient's history were reviewed and updated as appropriate: allergies, current medications, past family history, past medical history, past social history, past surgical history and problem list.    Outpatient Medications Prior to Visit   Medication  Sig Dispense Refill   • ALPRAZolam (XANAX) 0.25 MG tablet Take twice daily as needed 60 tablet 0   • balsalazide (COLAZAL) 750 MG capsule Take 2,250 mg by mouth 3 (Three) Times a Day.     • levothyroxine (SYNTHROID) 75 MCG tablet Take 1 tablet by mouth Daily. 30 tablet 5   • Loratadine (CLARITIN) 10 MG capsule Take 1 tablet by mouth daily.     • metFORMIN ER (GLUCOPHAGE-XR) 500 MG 24 hr tablet Take 1 tablet by mouth 2 (Two) Times a Day. (Patient taking differently: Take 500 mg by mouth. 1 po with evening meal) 60 tablet 11   • sertraline (ZOLOFT) 50 MG tablet Take 1 tablet by mouth Daily. 30 tablet 0   • montelukast (SINGULAIR) 10 MG tablet Take  by mouth.     • pantoprazole (PROTONIX) 40 MG EC tablet Take 1 tablet by mouth Daily. 90 tablet 0   • albuterol (PROVENTIL HFA;VENTOLIN HFA) 108 (90 BASE) MCG/ACT inhaler Inhale 2 puffs Every 6 (Six) Hours As Needed for Wheezing. 1 inhaler 1   • cetirizine (zyrTEC) 10 MG tablet Take 1 tablet by mouth Daily. 20 tablet 0   • cyclobenzaprine (FLEXERIL) 10 MG tablet Take 1 tablet by mouth 3 (Three) Times a Day As Needed for Muscle Spasms. 20 tablet 0     No facility-administered medications prior to visit.        Patient Active Problem List   Diagnosis   • Ulcerative colitis without complications   • Seasonal allergies   • Anemia   • Esophageal reflux   • Generalized anxiety disorder   • Morbid obesity   • Post-menopausal   • Vertigo   • Simple goiter   • Prediabetes   • Hypothyroidism due to Hashimoto's thyroiditis   • Vitamin D deficiency       Advance Care Planning:  has NO advance directive - not interested in additional information    Identification of Risk Factors:  Risk factors include: weight , unhealthy diet, cardiovascular risk and inactivity.    Review of Systems   Constitutional: Negative for activity change, appetite change, chills, diaphoresis, fatigue, fever and unexpected weight change.   HENT: Negative for congestion, ear discharge, ear pain, mouth sores,  nosebleeds, sinus pressure, sneezing and sore throat.    Eyes: Negative for pain, discharge and itching.   Respiratory: Negative for cough, chest tightness, shortness of breath and wheezing.    Cardiovascular: Negative for chest pain, palpitations and leg swelling.   Gastrointestinal: Negative for abdominal pain, constipation, diarrhea, nausea and vomiting.   Endocrine: Negative for cold intolerance, heat intolerance, polydipsia and polyphagia.   Genitourinary: Negative for dysuria, flank pain, frequency, hematuria and urgency.   Musculoskeletal: Negative for arthralgias, back pain, gait problem, myalgias, neck pain and neck stiffness.   Skin: Negative for color change, pallor and rash.   Neurological: Negative for seizures, speech difficulty, numbness and headaches.   Psychiatric/Behavioral: Negative for agitation, confusion, decreased concentration and sleep disturbance. The patient is not nervous/anxious.        Compared to one year ago, the patient feels her physical health is the same.  Compared to one year ago, the patient feels her mental health is the same.    Objective     Physical Exam   Constitutional: She is oriented to person, place, and time.   obese   HENT:   Head: Normocephalic.   Right Ear: External ear normal.   Left Ear: External ear normal.   Nose: Nose normal.   Mouth/Throat: Oropharynx is clear and moist.   Eyes: Conjunctivae are normal. Pupils are equal, round, and reactive to light.   Neck: No JVD present. No thyromegaly present.   Cardiovascular: Normal rate, regular rhythm and normal heart sounds.  Exam reveals no friction rub.    No murmur heard.  Pulmonary/Chest: Effort normal and breath sounds normal. No respiratory distress. She has no wheezes. She has no rales.   Abdominal: Soft. Bowel sounds are normal. She exhibits no distension. There is no tenderness. There is no guarding.   Musculoskeletal: She exhibits no edema or tenderness.   Lymphadenopathy:     She has no cervical adenopathy.    Neurological: She is oriented to person, place, and time. She displays normal reflexes. No cranial nerve deficit.   Skin: No rash noted.        Psychiatric: Her behavior is normal.   Nursing note and vitals reviewed.      Vitals:    12/15/17 0917   BP: 100/80   Pulse: 77   SpO2: 98%   Weight: 116 kg (254 lb 12.8 oz)       Body mass index is 39.91 kg/(m^2).  Discussed the patient's BMI with her. BMI is above normal parameters. Follow-up plan includes:  exercise counseling.    Assessment/Plan   Patient Self-Management and Personalized Health Advice  The patient has been provided with information about: diet, exercise and weight management and preventive services including:   · Colorectal cancer screening, fecal occult blood test, Diabetes screening, see lab orders, Screening mammography, referral placed.    Visit Diagnoses:    ICD-10-CM ICD-9-CM   1. Gastroesophageal reflux disease, esophagitis presence not specified K21.9 530.81       No orders of the defined types were placed in this encounter.      Outpatient Encounter Prescriptions as of 12/15/2017   Medication Sig Dispense Refill   • ALPRAZolam (XANAX) 0.25 MG tablet Take twice daily as needed 60 tablet 0   • balsalazide (COLAZAL) 750 MG capsule Take 2,250 mg by mouth 3 (Three) Times a Day.     • levothyroxine (SYNTHROID) 75 MCG tablet Take 1 tablet by mouth Daily. 30 tablet 5   • Loratadine (CLARITIN) 10 MG capsule Take 1 tablet by mouth daily.     • metFORMIN ER (GLUCOPHAGE-XR) 500 MG 24 hr tablet Take 1 tablet by mouth 2 (Two) Times a Day. (Patient taking differently: Take 500 mg by mouth. 1 po with evening meal) 60 tablet 11   • montelukast (SINGULAIR) 10 MG tablet Take 1 tablet by mouth Every Night. 30 tablet 5   • pantoprazole (PROTONIX) 40 MG EC tablet Take 1 tablet by mouth Daily. 30 tablet 5   • sertraline (ZOLOFT) 50 MG tablet Take 1 tablet by mouth Daily. 30 tablet 0   • [DISCONTINUED] montelukast (SINGULAIR) 10 MG tablet Take  by mouth.     •  [DISCONTINUED] pantoprazole (PROTONIX) 40 MG EC tablet Take 1 tablet by mouth Daily. 90 tablet 0   • albuterol (PROVENTIL HFA;VENTOLIN HFA) 108 (90 BASE) MCG/ACT inhaler Inhale 2 puffs Every 6 (Six) Hours As Needed for Wheezing. 1 inhaler 1   • cetirizine (zyrTEC) 10 MG tablet Take 1 tablet by mouth Daily. 20 tablet 0   • [DISCONTINUED] cyclobenzaprine (FLEXERIL) 10 MG tablet Take 1 tablet by mouth 3 (Three) Times a Day As Needed for Muscle Spasms. 20 tablet 0     No facility-administered encounter medications on file as of 12/15/2017.        Reviewed use of high risk medication in the elderly: yes  Reviewed for potential of harmful drug interactions in the elderly: yes    Follow Up:  No Follow-up on file.   Number for mammogram center provided.  An After Visit Summary and PPPS with all of these plans were given to the patient.

## 2018-01-22 ENCOUNTER — HOSPITAL ENCOUNTER (EMERGENCY)
Facility: HOSPITAL | Age: 53
Discharge: HOME | End: 2018-01-22
Attending: EMERGENCY MEDICINE | Admitting: EMERGENCY MEDICINE

## 2018-01-22 ENCOUNTER — APPOINTMENT (OUTPATIENT)
Dept: GENERAL RADIOLOGY | Facility: HOSPITAL | Age: 53
End: 2018-01-22

## 2018-01-22 VITALS
RESPIRATION RATE: 16 BRPM | OXYGEN SATURATION: 95 % | TEMPERATURE: 98.4 F | HEIGHT: 67 IN | SYSTOLIC BLOOD PRESSURE: 124 MMHG | DIASTOLIC BLOOD PRESSURE: 67 MMHG | HEART RATE: 81 BPM | WEIGHT: 240 LBS | BODY MASS INDEX: 37.67 KG/M2

## 2018-01-22 DIAGNOSIS — R07.89 ATYPICAL CHEST PAIN: Primary | ICD-10-CM

## 2018-01-22 LAB
ALBUMIN SERPL-MCNC: 4.1 G/DL (ref 3.2–4.8)
ALBUMIN/GLOB SERPL: 1.4 G/DL (ref 1.5–2.5)
ALP SERPL-CCNC: 93 U/L (ref 25–100)
ALT SERPL W P-5'-P-CCNC: 28 U/L (ref 7–40)
ANION GAP SERPL CALCULATED.3IONS-SCNC: 8 MMOL/L (ref 3–11)
AST SERPL-CCNC: 26 U/L (ref 0–33)
BASOPHILS # BLD AUTO: 0.06 10*3/MM3 (ref 0–0.2)
BASOPHILS NFR BLD AUTO: 0.6 % (ref 0–1)
BILIRUB SERPL-MCNC: 0.1 MG/DL (ref 0.3–1.2)
BNP SERPL-MCNC: 7 PG/ML (ref 0–100)
BUN BLD-MCNC: 10 MG/DL (ref 9–23)
BUN/CREAT SERPL: 14.3 (ref 7–25)
CALCIUM SPEC-SCNC: 9.2 MG/DL (ref 8.7–10.4)
CHLORIDE SERPL-SCNC: 108 MMOL/L (ref 99–109)
CO2 SERPL-SCNC: 24 MMOL/L (ref 20–31)
CREAT BLD-MCNC: 0.7 MG/DL (ref 0.6–1.3)
DEPRECATED RDW RBC AUTO: 43.4 FL (ref 37–54)
EOSINOPHIL # BLD AUTO: 0.26 10*3/MM3 (ref 0–0.3)
EOSINOPHIL NFR BLD AUTO: 2.6 % (ref 0–3)
ERYTHROCYTE [DISTWIDTH] IN BLOOD BY AUTOMATED COUNT: 14.4 % (ref 11.3–14.5)
GFR SERPL CREATININE-BSD FRML MDRD: 88 ML/MIN/1.73
GLOBULIN UR ELPH-MCNC: 2.9 GM/DL
GLUCOSE BLD-MCNC: 118 MG/DL (ref 70–100)
HCT VFR BLD AUTO: 37.8 % (ref 34.5–44)
HGB BLD-MCNC: 12.4 G/DL (ref 11.5–15.5)
HOLD SPECIMEN: NORMAL
HOLD SPECIMEN: NORMAL
IMM GRANULOCYTES # BLD: 0.03 10*3/MM3 (ref 0–0.03)
IMM GRANULOCYTES NFR BLD: 0.3 % (ref 0–0.6)
LIPASE SERPL-CCNC: 41 U/L (ref 6–51)
LYMPHOCYTES # BLD AUTO: 2.41 10*3/MM3 (ref 0.6–4.8)
LYMPHOCYTES NFR BLD AUTO: 24.5 % (ref 24–44)
MCH RBC QN AUTO: 27.1 PG (ref 27–31)
MCHC RBC AUTO-ENTMCNC: 32.8 G/DL (ref 32–36)
MCV RBC AUTO: 82.7 FL (ref 80–99)
MONOCYTES # BLD AUTO: 0.68 10*3/MM3 (ref 0–1)
MONOCYTES NFR BLD AUTO: 6.9 % (ref 0–12)
NEUTROPHILS # BLD AUTO: 6.41 10*3/MM3 (ref 1.5–8.3)
NEUTROPHILS NFR BLD AUTO: 65.1 % (ref 41–71)
PLATELET # BLD AUTO: 324 10*3/MM3 (ref 150–450)
PMV BLD AUTO: 10.4 FL (ref 6–12)
POTASSIUM BLD-SCNC: 3.6 MMOL/L (ref 3.5–5.5)
PROT SERPL-MCNC: 7 G/DL (ref 5.7–8.2)
RBC # BLD AUTO: 4.57 10*6/MM3 (ref 3.89–5.14)
SODIUM BLD-SCNC: 140 MMOL/L (ref 132–146)
TROPONIN I SERPL-MCNC: 0 NG/ML (ref 0–0.07)
WBC NRBC COR # BLD: 9.85 10*3/MM3 (ref 3.5–10.8)
WHOLE BLOOD HOLD SPECIMEN: NORMAL
WHOLE BLOOD HOLD SPECIMEN: NORMAL

## 2018-01-22 PROCEDURE — 80053 COMPREHEN METABOLIC PANEL: CPT | Performed by: EMERGENCY MEDICINE

## 2018-01-22 PROCEDURE — 83690 ASSAY OF LIPASE: CPT | Performed by: EMERGENCY MEDICINE

## 2018-01-22 PROCEDURE — 71045 X-RAY EXAM CHEST 1 VIEW: CPT

## 2018-01-22 PROCEDURE — 85025 COMPLETE CBC W/AUTO DIFF WBC: CPT | Performed by: EMERGENCY MEDICINE

## 2018-01-22 PROCEDURE — 99283 EMERGENCY DEPT VISIT LOW MDM: CPT

## 2018-01-22 PROCEDURE — 83880 ASSAY OF NATRIURETIC PEPTIDE: CPT | Performed by: EMERGENCY MEDICINE

## 2018-01-22 PROCEDURE — 84484 ASSAY OF TROPONIN QUANT: CPT

## 2018-01-22 PROCEDURE — 93005 ELECTROCARDIOGRAM TRACING: CPT

## 2018-01-22 RX ORDER — ASPIRIN 81 MG/1
324 TABLET, CHEWABLE ORAL ONCE
Status: DISCONTINUED | OUTPATIENT
Start: 2018-01-22 | End: 2018-01-22 | Stop reason: HOSPADM

## 2018-01-22 RX ORDER — SODIUM CHLORIDE 0.9 % (FLUSH) 0.9 %
10 SYRINGE (ML) INJECTION AS NEEDED
Status: DISCONTINUED | OUTPATIENT
Start: 2018-01-22 | End: 2018-01-22 | Stop reason: HOSPADM

## 2018-01-22 NOTE — ED PROVIDER NOTES
"Subjective   History of Present Illness  Female presents the emergency department with complaints of chest discomfort.  The patient's recent medical history is significant for an upper respiratory illness treated as a \"bronchitis\" with antibiotics by the urgent care center.  She's had persistent cough which is gradually improving but is developed a sharp pleuritic type chest pain over the preceding 24-48 hours.  He was initially located in her right chest and is radiated to both shoulders.  It is worsened by breathing coughing and certain movements.  This evening she was having an argument with her 27-year-old son when her discomfort seemed to worsen.  She presents for evaluation.    Past medical history is significant for history of hypothyroidism, polycystic ovarian syndrome, recurrent chest pain, and gastroesophageal reflux disease    Current medications are as noted on the chart    Social history does not smoke drink or utilize drugs  Review of Systems   Constitutional: Negative for chills and fever.   HENT: Positive for congestion.    Respiratory: Positive for cough. Negative for chest tightness and shortness of breath.    Cardiovascular: Positive for chest pain. Negative for palpitations and leg swelling.   Gastrointestinal: Negative for abdominal pain, diarrhea, nausea and vomiting.   Genitourinary: Negative for dysuria, frequency and urgency.   All other systems reviewed and are negative.      Past Medical History:   Diagnosis Date   • Acute bronchitis    • Anxiety    • Chest pain    • Cholelithiasis    • GERD (gastroesophageal reflux disease)    • Hashimoto's thyroiditis    • Hemorrhoids    • Hypothyroidism    • Metrorrhagia    • Palpitations    • Polycystic ovary syndrome    • Polyp of sigmoid colon    • Upper respiratory infection    • UTI (urinary tract infection)        Allergies   Allergen Reactions   • Adhesive Tape      Rash     • Erythromycin Other (See Comments)     Sores in mouth   • Epinephrine " Palpitations   • Nitroglycerin Palpitations       Past Surgical History:   Procedure Laterality Date   • APPENDECTOMY     •  SECTION     • CHOLECYSTECTOMY     • DILATATION AND CURETTAGE      Of Cervical Stump   • MYOMECTOMY     • SMALL INTESTINE SURGERY     • TUBAL ABDOMINAL LIGATION         Family History   Problem Relation Age of Onset   • Hyperlipidemia Mother    • CHAD disease Mother    • Rheum arthritis Father    • Hyperlipidemia Father    • Diabetes Father    • Ovarian cancer Maternal Aunt    • Diabetes Paternal Grandmother    • Hypertension Paternal Grandmother    • Obesity Paternal Grandmother    • Colon cancer Other    • Arthritis Other    • Cancer Other      uncle; liver, lung    • Thyroid disease Cousin    • Thyroid cancer Cousin    • CHAD disease Daughter        Social History     Social History   • Marital status:      Spouse name: N/A   • Number of children: N/A   • Years of education: N/A     Social History Main Topics   • Smoking status: Never Smoker   • Smokeless tobacco: Never Used   • Alcohol use No   • Drug use: No   • Sexual activity: Not on file     Other Topics Concern   • Not on file     Social History Narrative    single           Objective   Physical Exam   Constitutional: She is oriented to person, place, and time. She appears well-developed and well-nourished. No distress.   HENT:   Head: Normocephalic and atraumatic.   Mouth/Throat: Oropharynx is clear and moist.   Eyes: Pupils are equal, round, and reactive to light. No scleral icterus.   Neck: Neck supple. No JVD present.   Cardiovascular: Normal rate, regular rhythm and normal heart sounds.    Pulmonary/Chest: Effort normal and breath sounds normal. No respiratory distress.   Abdominal: Soft. Bowel sounds are normal. She exhibits no distension. There is no tenderness.   Musculoskeletal: She exhibits no edema or tenderness.   Lymphadenopathy:     She has no cervical adenopathy.   Neurological: She is alert and oriented to  person, place, and time. No cranial nerve deficit. Coordination normal.   Skin: Skin is warm and dry. No rash noted. She is not diaphoretic.   Psychiatric: She has a normal mood and affect. Her behavior is normal.   Nursing note and vitals reviewed.    The patient's heart score is 1 for age only.    Palpation the patient's chest wall exactly completely reproduces her discomfort.  There is no crepitus with palpation.  There is no bruising noted.  Her graft assessment atypical chest pain likely simple chest wall pain secondary to the patient's frequent coughing over the past several weeks    Plan we will reassure the patient and have her follow-up with regular physician.  Procedures         ED Course  ED Course                  MDM    Final diagnoses:   Atypical chest pain            Alcides Brennan MD  01/22/18 0355

## 2018-01-22 NOTE — DISCHARGE INSTRUCTIONS
Nonspecific Chest Pain  Chest pain can be caused by many different conditions. There is always a chance that your pain could be related to something serious, such as a heart attack or a blood clot in your lungs. Chest pain can also be caused by conditions that are not life-threatening. If you have chest pain, it is very important to follow up with your health care provider.  What are the causes?  Causes of this condition include:  · Heartburn.  · Pneumonia or bronchitis.  · Anxiety or stress.  · Inflammation around your heart (pericarditis) or lung (pleuritis or pleurisy).  · A blood clot in your lung.  · A collapsed lung (pneumothorax). This can develop suddenly on its own (spontaneous pneumothorax) or from trauma to the chest.  · Shingles infection (varicella-zoster virus).  · Heart attack.  · Damage to the bones, muscles, and cartilage that make up your chest wall. This can include:  ¨ Bruised bones due to injury.  ¨ Strained muscles or cartilage due to frequent or repeated coughing or overwork.  ¨ Fracture to one or more ribs.  ¨ Sore cartilage due to inflammation (costochondritis).  What increases the risk?  Risk factors for this condition may include:  · Activities that increase your risk for trauma or injury to your chest.  · Respiratory infections or conditions that cause frequent coughing.  · Medical conditions or overeating that can cause heartburn.  · Heart disease or family history of heart disease.  · Conditions or health behaviors that increase your risk of developing a blood clot.  · Having had chicken pox (varicella zoster).  What are the signs or symptoms?  Chest pain can feel like:  · Burning or tingling on the surface of your chest or deep in your chest.  · Crushing, pressure, aching, or squeezing pain.  · Dull or sharp pain that is worse when you move, cough, or take a deep breath.  · Pain that is also felt in your back, neck, shoulder, or arm, or pain that spreads to any of these areas.  Your  chest pain may come and go, or it may stay constant.  How is this diagnosed?  Lab tests or other studies may be needed to find the cause of your pain. Your health care provider may have you take a test called an ECG (electrocardiogram). An ECG records your heartbeat patterns at the time the test is performed. You may also have other tests, such as:  · Transthoracic echocardiogram (TTE). In this test, sound waves are used to create a picture of the heart structures and to look at how blood flows through your heart.  · Transesophageal echocardiogram (DEANNA). This is a more advanced imaging test that takes images from inside your body. It allows your health care provider to see your heart in finer detail.  · Cardiac monitoring. This allows your health care provider to monitor your heart rate and rhythm in real time.  · Holter monitor. This is a portable device that records your heartbeat and can help to diagnose abnormal heartbeats. It allows your health care provider to track your heart activity for several days, if needed.  · Stress tests. These can be done through exercise or by taking medicine that makes your heart beat more quickly.  · Blood tests.  · Other imaging tests.  How is this treated?  Treatment depends on what is causing your chest pain. Treatment may include:  · Medicines. These may include:  ¨ Acid blockers for heartburn.  ¨ Anti-inflammatory medicine.  ¨ Pain medicine for inflammatory conditions.  ¨ Antibiotic medicine, if an infection is present.  ¨ Medicines to dissolve blood clots.  ¨ Medicines to treat coronary artery disease (CAD).  · Supportive care for conditions that do not require medicines. This may include:  ¨ Resting.  ¨ Applying heat or cold packs to injured areas.  ¨ Limiting activities until pain decreases.  Follow these instructions at home:  Medicines  · If you were prescribed an antibiotic, take it as told by your health care provider. Do not stop taking the antibiotic even if you  start to feel better.  · Take over-the-counter and prescription medicines only as told by your health care provider.  Lifestyle  · Do not use any products that contain nicotine or tobacco, such as cigarettes and e-cigarettes. If you need help quitting, ask your health care provider.  · Do not drink alcohol.  · Make lifestyle changes as directed by your health care provider. These may include:  ¨ Getting regular exercise. Ask your health care provider to suggest some activities that are safe for you.  ¨ Eating a heart-healthy diet. A registered dietitian can help you to learn healthy eating options.  ¨ Maintaining a healthy weight.  ¨ Managing diabetes, if necessary.  ¨ Reducing stress, such as with yoga or relaxation techniques.  General instructions  · Avoid any activities that bring on chest pain.  · If heartburn is the cause for your chest pain, raise (elevate) the head of your bed about 6 inches (15 cm) by putting blocks under the legs. Sleeping with more pillows does not effectively relieve heartburn because it only changes the position of your head.  · Keep all follow-up visits as told by your health care provider. This is important. This includes any further testing if your chest pain does not go away.  Contact a health care provider if:  · Your chest pain does not go away.  · You have a rash with blisters on your chest.  · You have a fever.  · You have chills.  Get help right away if:  · Your chest pain is worse.  · You have a cough that gets worse, or you cough up blood.  · You have severe pain in your abdomen.  · You have severe weakness.  · You faint.  · You have sudden, unexplained chest discomfort.  · You have sudden, unexplained discomfort in your arms, back, neck, or jaw.  · You have shortness of breath at any time.  · You suddenly start to sweat, or your skin gets clammy.  · You feel nauseous or you vomit.  · You suddenly feel light-headed or dizzy.  · Your heart begins to beat quickly, or it feels  like it is skipping beats.  These symptoms may represent a serious problem that is an emergency. Do not wait to see if the symptoms will go away. Get medical help right away. Call your local emergency services (911 in the [place] Do not drive yourself to the hospital.   This information is not intended to replace advice given to you by your health care provider. Make sure you discuss any questions you have with your health care provider.  Document Released: 09/27/2006 Document Revised: 09/11/2017 Document Reviewed: 09/11/2017  [employer] Patient Education © 2017 [employer]  Please review the medications you are supposed to be taking according to prior physician recommendations. I have not changed your home medications during this visit. If your discharge instructions indicate that I have changed your home medications, this is not the case, and you should disregard. If you have any questions about the medication you should be taking at home, please call your physician.

## 2018-01-26 ENCOUNTER — APPOINTMENT (OUTPATIENT)
Dept: MAMMOGRAPHY | Facility: HOSPITAL | Age: 53
End: 2018-01-26
Attending: INTERNAL MEDICINE

## 2018-01-30 DIAGNOSIS — F41.9 ANXIETY: ICD-10-CM

## 2018-02-21 DIAGNOSIS — K21.9 GASTROESOPHAGEAL REFLUX DISEASE, ESOPHAGITIS PRESENCE NOT SPECIFIED: ICD-10-CM

## 2018-02-21 DIAGNOSIS — F41.9 ANXIETY: ICD-10-CM

## 2018-02-21 DIAGNOSIS — R73.03 PREDIABETES: ICD-10-CM

## 2018-02-21 RX ORDER — METFORMIN HYDROCHLORIDE 500 MG/1
500 TABLET, EXTENDED RELEASE ORAL
Qty: 90 TABLET | Refills: 1 | Status: SHIPPED | OUTPATIENT
Start: 2018-02-21 | End: 2019-02-14 | Stop reason: SDUPTHER

## 2018-02-21 RX ORDER — MONTELUKAST SODIUM 10 MG/1
10 TABLET ORAL NIGHTLY
Qty: 90 TABLET | Refills: 1 | Status: SHIPPED | OUTPATIENT
Start: 2018-02-21 | End: 2018-08-12 | Stop reason: SDUPTHER

## 2018-02-21 RX ORDER — PANTOPRAZOLE SODIUM 40 MG/1
40 TABLET, DELAYED RELEASE ORAL DAILY
Qty: 90 TABLET | Refills: 1 | Status: SHIPPED | OUTPATIENT
Start: 2018-02-21 | End: 2018-08-12 | Stop reason: SDUPTHER

## 2018-02-21 RX ORDER — LEVOTHYROXINE SODIUM 0.07 MG/1
75 TABLET ORAL DAILY
Qty: 90 TABLET | Refills: 0 | Status: SHIPPED | OUTPATIENT
Start: 2018-02-21 | End: 2018-12-13 | Stop reason: SDUPTHER

## 2018-02-23 ENCOUNTER — OFFICE VISIT (OUTPATIENT)
Dept: GASTROENTEROLOGY | Facility: CLINIC | Age: 53
End: 2018-02-23

## 2018-02-23 ENCOUNTER — APPOINTMENT (OUTPATIENT)
Dept: MAMMOGRAPHY | Facility: HOSPITAL | Age: 53
End: 2018-02-23
Attending: INTERNAL MEDICINE

## 2018-02-23 VITALS
HEART RATE: 74 BPM | HEIGHT: 67 IN | RESPIRATION RATE: 18 BRPM | BODY MASS INDEX: 40.71 KG/M2 | TEMPERATURE: 97.8 F | DIASTOLIC BLOOD PRESSURE: 78 MMHG | SYSTOLIC BLOOD PRESSURE: 118 MMHG | WEIGHT: 259.4 LBS | OXYGEN SATURATION: 90 %

## 2018-02-23 DIAGNOSIS — K51.80 OTHER ULCERATIVE COLITIS WITHOUT COMPLICATION (HCC): Primary | ICD-10-CM

## 2018-02-23 PROCEDURE — 99214 OFFICE O/P EST MOD 30 MIN: CPT | Performed by: INTERNAL MEDICINE

## 2018-02-23 RX ORDER — METRONIDAZOLE 500 MG/1
TABLET ORAL
COMMUNITY
Start: 2018-02-21 | End: 2018-04-10

## 2018-02-23 RX ORDER — CIPROFLOXACIN 500 MG/1
TABLET, FILM COATED ORAL
COMMUNITY
Start: 2018-02-21 | End: 2018-04-10

## 2018-02-23 RX ORDER — ONDANSETRON 4 MG/1
TABLET, ORALLY DISINTEGRATING ORAL
COMMUNITY
Start: 2018-02-21 | End: 2018-07-10

## 2018-02-23 NOTE — PROGRESS NOTES
PCP: Shellie Moore DO    Chief Complaint   Patient presents with   • Ulcerative Colitis       History of Present Illness:   HPI  Ms. Coelho returns for a follow-up office visit.  The patient states that about a week ago she began experiencing more frequent bowel movements.  She went to the emergency room at [facility].  The patient was given a prescription for Cipro and Flagyl 2 days ago.  She was told there was some evidence for colitis.  The patient denies any nausea or vomiting.  She will generally have a bowel movement after each meal.  The patient denies any nocturnal diarrhea.  There is no stacy blood in the stool.  She denies any night sweats, fever or chills.  The patient has taken some nonsteroidal medication periodically over the last month.  Past Medical History:   Diagnosis Date   • Acute bronchitis    • Anxiety    • Chest pain    • Cholelithiasis    • GERD (gastroesophageal reflux disease)    • Hashimoto's thyroiditis    • Hemorrhoids    • Hypothyroidism    • Metrorrhagia    • Palpitations    • Polycystic ovary syndrome    • Polyp of sigmoid colon    • Upper respiratory infection    • UTI (urinary tract infection)        Past Surgical History:   Procedure Laterality Date   • APPENDECTOMY     •  SECTION     • CHOLECYSTECTOMY     • DILATATION AND CURETTAGE      Of Cervical Stump   • MYOMECTOMY     • SMALL INTESTINE SURGERY     • TUBAL ABDOMINAL LIGATION           Current Outpatient Prescriptions:   •  albuterol (PROVENTIL HFA;VENTOLIN HFA) 108 (90 BASE) MCG/ACT inhaler, Inhale 2 puffs Every 6 (Six) Hours As Needed for Wheezing., Disp: 1 inhaler, Rfl: 1  •  ALPRAZolam (XANAX) 0.25 MG tablet, Take twice daily as needed, Disp: 60 tablet, Rfl: 0  •  balsalazide (COLAZAL) 750 MG capsule, Take 2,250 mg by mouth 3 (Three) Times a Day., Disp: , Rfl:   •  ciprofloxacin (CIPRO) 500 MG tablet, , Disp: , Rfl:   •  levothyroxine (SYNTHROID) 75 MCG tablet, Take 1 tablet by mouth Daily., Disp: 90  tablet, Rfl: 0  •  Loratadine (CLARITIN) 10 MG capsule, Take 1 tablet by mouth daily., Disp: , Rfl:   •  metFORMIN ER (GLUCOPHAGE-XR) 500 MG 24 hr tablet, Take 1 tablet by mouth Daily With Breakfast., Disp: 90 tablet, Rfl: 1  •  metroNIDAZOLE (FLAGYL) 500 MG tablet, , Disp: , Rfl:   •  montelukast (SINGULAIR) 10 MG tablet, Take 1 tablet by mouth Every Night., Disp: 90 tablet, Rfl: 1  •  ondansetron ODT (ZOFRAN-ODT) 4 MG disintegrating tablet, , Disp: , Rfl:   •  pantoprazole (PROTONIX) 40 MG EC tablet, Take 1 tablet by mouth Daily., Disp: 90 tablet, Rfl: 1  •  sertraline (ZOLOFT) 50 MG tablet, Take 1 tablet by mouth Daily., Disp: 90 tablet, Rfl: 1    Allergies   Allergen Reactions   • Adhesive Tape      Rash     • Erythromycin Other (See Comments)     Sores in mouth   • Epinephrine Palpitations   • Nitroglycerin Palpitations       Family History   Problem Relation Age of Onset   • Hyperlipidemia Mother    • CHAD disease Mother    • Rheum arthritis Father    • Hyperlipidemia Father    • Diabetes Father    • Ovarian cancer Maternal Aunt    • Diabetes Paternal Grandmother    • Hypertension Paternal Grandmother    • Obesity Paternal Grandmother    • Colon cancer Other    • Arthritis Other    • Cancer Other      uncle; liver, lung    • Thyroid disease Cousin    • Thyroid cancer Cousin    • CHAD disease Daughter        Social History     Social History   • Marital status:      Spouse name: N/A   • Number of children: N/A   • Years of education: N/A     Occupational History   • Not on file.     Social History Main Topics   • Smoking status: Never Smoker   • Smokeless tobacco: Never Used   • Alcohol use No   • Drug use: No   • Sexual activity: Not on file     Other Topics Concern   • Not on file     Social History Narrative    single       Review of Systems   Constitutional: Positive for fatigue. Negative for appetite change, fever and unexpected weight change.   HENT: Negative for dental problem, mouth sores,  postnasal drip, sneezing, trouble swallowing and voice change.    Eyes: Negative for pain, redness and itching.   Respiratory: Negative for cough, shortness of breath and wheezing.    Cardiovascular: Negative for chest pain, palpitations and leg swelling.   Gastrointestinal: Positive for abdominal distention, abdominal pain and diarrhea. Negative for anal bleeding, blood in stool, constipation, nausea, rectal pain and vomiting.   Endocrine: Negative for cold intolerance, heat intolerance, polydipsia and polyuria.   Genitourinary: Negative for dysuria, enuresis, flank pain, hematuria and urgency.   Musculoskeletal: Negative for arthralgias, back pain, joint swelling and myalgias.   Skin: Positive for rash. Negative for color change and pallor.   Allergic/Immunologic: Negative for environmental allergies, food allergies and immunocompromised state.   Neurological: Negative for dizziness, tremors, seizures, facial asymmetry, numbness and headaches.   Psychiatric/Behavioral: Negative for behavioral problems, dysphoric mood, hallucinations and self-injury.       Vitals:    02/23/18 0945   BP: 118/78   Pulse: 74   Resp: 18   Temp: 97.8 °F (36.6 °C)   SpO2: 90%       Physical Exam   Constitutional: She is oriented to person, place, and time. She appears well-nourished. No distress.   HENT:   Head: Normocephalic and atraumatic.   Mouth/Throat: Oropharynx is clear and moist. No oropharyngeal exudate.   Eyes: EOM are normal. Pupils are equal, round, and reactive to light. No scleral icterus.   Neck: Neck supple. No thyromegaly present.   Cardiovascular: Normal rate, regular rhythm and normal heart sounds.  Exam reveals no gallop.    No murmur heard.  Pulmonary/Chest: Effort normal and breath sounds normal. She has no wheezes. She has no rales.   Abdominal: Soft. Bowel sounds are normal. There is no tenderness. There is no guarding.   Large pannus   Musculoskeletal: Normal range of motion. She exhibits no edema or tenderness.    Lymphadenopathy:     She has no cervical adenopathy.   Neurological: She is alert and oriented to person, place, and time. She exhibits normal muscle tone.   Skin: Skin is dry. No rash noted. No erythema.   Psychiatric: She has a normal mood and affect. Her behavior is normal. Thought content normal.   Vitals reviewed.      Brigid was seen today for ulcerative colitis.    Diagnoses and all orders for this visit:    Other ulcerative colitis without complication  The patient had a colonoscopy in June 2017 which was unremarkable for any active disease.  She  certainly could have issues given the recent use of nonsteroidal medication.  The history does not suggest a severe exacerbation or flare at this time.  The patient states she cannot tolerate the capsules of mesalamine as well now.      Plan: Instructed the patient to complete the course of Cipro and Flagyl that was prescribed in the emergency room.           Will ask for a copy of the labs and CT scan performed at [facility] emergency room.           Will consider change to a different mesalamine product.           Follow up in 5 months.      I spent over 50% of the office visit counseling and answering questions from the patient.

## 2018-03-01 ENCOUNTER — TELEPHONE (OUTPATIENT)
Dept: GASTROENTEROLOGY | Facility: CLINIC | Age: 53
End: 2018-03-01

## 2018-03-01 DIAGNOSIS — K51.911 ULCERATIVE COLITIS WITH RECTAL BLEEDING, UNSPECIFIED LOCATION (HCC): Primary | ICD-10-CM

## 2018-03-01 RX ORDER — MESALAMINE 1.2 G/1
TABLET, DELAYED RELEASE ORAL
Qty: 120 TABLET | Refills: 3 | Status: SHIPPED | OUTPATIENT
Start: 2018-03-01 | End: 2018-12-13 | Stop reason: SDUPTHER

## 2018-03-01 NOTE — TELEPHONE ENCOUNTER
Patient called to see when Lialda would be sent in. Patient states she is still bleeding a lot and has diarrhea. Patient was unable to go to work today. She would also like to know if you had the chance to look at her CT scan.

## 2018-04-10 ENCOUNTER — OFFICE VISIT (OUTPATIENT)
Dept: INTERNAL MEDICINE | Facility: CLINIC | Age: 53
End: 2018-04-10

## 2018-04-10 VITALS
OXYGEN SATURATION: 98 % | HEART RATE: 104 BPM | SYSTOLIC BLOOD PRESSURE: 120 MMHG | WEIGHT: 255.1 LBS | BODY MASS INDEX: 39.95 KG/M2 | DIASTOLIC BLOOD PRESSURE: 90 MMHG

## 2018-04-10 DIAGNOSIS — F41.9 ANXIETY: ICD-10-CM

## 2018-04-10 DIAGNOSIS — B07.0 PLANTAR WART OF RIGHT FOOT: Primary | ICD-10-CM

## 2018-04-10 PROCEDURE — 99214 OFFICE O/P EST MOD 30 MIN: CPT | Performed by: INTERNAL MEDICINE

## 2018-04-10 NOTE — PROGRESS NOTES
Subjective   Brigid Coelho is a 52 y.o. female.     Chief Complaint   Patient presents with   • Other     plantar wart       History of Present Illness   Place on foot x months. Hurts to walk or stand on it.   The following portions of the patient's history were reviewed and updated as appropriate: allergies, current medications, past family history, past medical history, past social history, past surgical history and problem list.    Review of Systems   Constitutional: Negative for activity change, appetite change, chills, diaphoresis, fatigue, fever and unexpected weight change.   HENT: Negative for congestion, ear discharge, ear pain, mouth sores, nosebleeds, sinus pressure, sneezing and sore throat.    Eyes: Negative for pain, discharge and itching.   Respiratory: Negative for cough, chest tightness, shortness of breath and wheezing.    Cardiovascular: Negative for chest pain, palpitations and leg swelling.   Gastrointestinal: Negative for abdominal pain, constipation, diarrhea, nausea and vomiting.   Endocrine: Negative for cold intolerance, heat intolerance, polydipsia and polyphagia.   Genitourinary: Negative for dysuria, flank pain, frequency, hematuria and urgency.   Musculoskeletal: Negative for arthralgias, back pain, gait problem, myalgias, neck pain and neck stiffness.        Foot pain   Skin: Negative for color change, pallor and rash.   Neurological: Negative for seizures, speech difficulty, numbness and headaches.   Psychiatric/Behavioral: Negative for agitation, confusion, decreased concentration and sleep disturbance. The patient is not nervous/anxious.      /90   Pulse 104   Wt 116 kg (255 lb 1.6 oz)   SpO2 98%   BMI 39.95 kg/m²     Objective   Physical Exam   Constitutional: She appears well-developed.   HENT:   Head: Normocephalic.   Right Ear: External ear normal.   Left Ear: External ear normal.   Nose: Nose normal.   Mouth/Throat: Oropharynx is clear and moist.   Eyes:  Conjunctivae are normal. Pupils are equal, round, and reactive to light.   Neck: No JVD present. No thyromegaly present.   Cardiovascular: Normal rate, regular rhythm and normal heart sounds.  Exam reveals no friction rub.    No murmur heard.  Pulmonary/Chest: Effort normal and breath sounds normal. No respiratory distress. She has no wheezes. She has no rales.   Abdominal: Soft. Bowel sounds are normal. She exhibits no distension. There is no tenderness. There is no guarding.   Musculoskeletal: She exhibits no edema or tenderness.        Lymphadenopathy:     She has no cervical adenopathy.   Neurological: She displays normal reflexes. No cranial nerve deficit.   Skin: No rash noted.   Psychiatric: Her behavior is normal.   Nursing note and vitals reviewed.      Assessment/Plan   Brigid was seen today for other.    Diagnoses and all orders for this visit:    Plantar wart of right foot  -     Ambulatory Referral to Dermatology    Anxiety  -     sertraline (ZOLOFT) 50 MG tablet; Take 1 tablet by mouth Daily.

## 2018-06-29 ENCOUNTER — APPOINTMENT (OUTPATIENT)
Dept: CT IMAGING | Facility: HOSPITAL | Age: 53
End: 2018-06-29

## 2018-06-29 ENCOUNTER — APPOINTMENT (OUTPATIENT)
Dept: ULTRASOUND IMAGING | Facility: HOSPITAL | Age: 53
End: 2018-06-29

## 2018-06-29 ENCOUNTER — HOSPITAL ENCOUNTER (EMERGENCY)
Facility: HOSPITAL | Age: 53
Discharge: HOME | End: 2018-06-29
Attending: EMERGENCY MEDICINE | Admitting: NURSE PRACTITIONER

## 2018-06-29 VITALS
TEMPERATURE: 98.5 F | HEART RATE: 80 BPM | SYSTOLIC BLOOD PRESSURE: 116 MMHG | RESPIRATION RATE: 16 BRPM | OXYGEN SATURATION: 94 % | WEIGHT: 245 LBS | HEIGHT: 67 IN | BODY MASS INDEX: 38.45 KG/M2 | DIASTOLIC BLOOD PRESSURE: 56 MMHG

## 2018-06-29 DIAGNOSIS — D25.9 UTERINE LEIOMYOMA, UNSPECIFIED LOCATION: Primary | ICD-10-CM

## 2018-06-29 LAB
ALBUMIN SERPL-MCNC: 4.16 G/DL (ref 3.2–4.8)
ALP SERPL-CCNC: 100 U/L (ref 25–100)
ALT SERPL W P-5'-P-CCNC: 22 U/L (ref 7–40)
ANION GAP SERPL CALCULATED.3IONS-SCNC: 10 MMOL/L (ref 3–11)
AST SERPL-CCNC: 20 U/L (ref 0–33)
BACTERIA UR QL AUTO: ABNORMAL /HPF
BASOPHILS # BLD AUTO: 0.07 10*3/MM3 (ref 0–0.2)
BASOPHILS NFR BLD AUTO: 0.6 % (ref 0–1)
BILIRUB CONJ SERPL-MCNC: 0.1 MG/DL (ref 0–0.2)
BILIRUB INDIRECT SERPL-MCNC: 0.3 MG/DL (ref 0.1–1.1)
BILIRUB SERPL-MCNC: 0.4 MG/DL (ref 0.3–1.2)
BILIRUB UR QL STRIP: NEGATIVE
BUN BLD-MCNC: 8 MG/DL (ref 9–23)
BUN/CREAT SERPL: 11.4 (ref 7–25)
CALCIUM SPEC-SCNC: 9.2 MG/DL (ref 8.7–10.4)
CHLORIDE SERPL-SCNC: 105 MMOL/L (ref 99–109)
CLARITY UR: CLEAR
CO2 SERPL-SCNC: 26 MMOL/L (ref 20–31)
COLOR UR: YELLOW
CREAT BLD-MCNC: 0.7 MG/DL (ref 0.6–1.3)
DEPRECATED RDW RBC AUTO: 45.2 FL (ref 37–54)
EOSINOPHIL # BLD AUTO: 0.2 10*3/MM3 (ref 0–0.3)
EOSINOPHIL NFR BLD AUTO: 1.7 % (ref 0–3)
ERYTHROCYTE [DISTWIDTH] IN BLOOD BY AUTOMATED COUNT: 14.5 % (ref 11.3–14.5)
GFR SERPL CREATININE-BSD FRML MDRD: 87 ML/MIN/1.73
GLUCOSE BLD-MCNC: 105 MG/DL (ref 70–100)
GLUCOSE UR STRIP-MCNC: NEGATIVE MG/DL
HCT VFR BLD AUTO: 40 % (ref 34.5–44)
HGB BLD-MCNC: 12.7 G/DL (ref 11.5–15.5)
HGB UR QL STRIP.AUTO: ABNORMAL
HOLD SPECIMEN: NORMAL
HOLD SPECIMEN: NORMAL
HYALINE CASTS UR QL AUTO: ABNORMAL /LPF
IMM GRANULOCYTES # BLD: 0.03 10*3/MM3 (ref 0–0.03)
IMM GRANULOCYTES NFR BLD: 0.3 % (ref 0–0.6)
KETONES UR QL STRIP: NEGATIVE
LEUKOCYTE ESTERASE UR QL STRIP.AUTO: NEGATIVE
LIPASE SERPL-CCNC: 32 U/L (ref 6–51)
LYMPHOCYTES # BLD AUTO: 1.73 10*3/MM3 (ref 0.6–4.8)
LYMPHOCYTES NFR BLD AUTO: 14.7 % (ref 24–44)
MCH RBC QN AUTO: 26.8 PG (ref 27–31)
MCHC RBC AUTO-ENTMCNC: 31.8 G/DL (ref 32–36)
MCV RBC AUTO: 84.4 FL (ref 80–99)
MONOCYTES # BLD AUTO: 0.89 10*3/MM3 (ref 0–1)
MONOCYTES NFR BLD AUTO: 7.6 % (ref 0–12)
NEUTROPHILS # BLD AUTO: 8.86 10*3/MM3 (ref 1.5–8.3)
NEUTROPHILS NFR BLD AUTO: 75.1 % (ref 41–71)
NITRITE UR QL STRIP: NEGATIVE
PH UR STRIP.AUTO: <=5 [PH] (ref 5–8)
PLATELET # BLD AUTO: 337 10*3/MM3 (ref 150–450)
PMV BLD AUTO: 10.2 FL (ref 6–12)
POTASSIUM BLD-SCNC: 3.4 MMOL/L (ref 3.5–5.5)
PROT SERPL-MCNC: 7 G/DL (ref 5.7–8.2)
PROT UR QL STRIP: ABNORMAL
RBC # BLD AUTO: 4.74 10*6/MM3 (ref 3.89–5.14)
RBC # UR: ABNORMAL /HPF
REF LAB TEST METHOD: ABNORMAL
SODIUM BLD-SCNC: 141 MMOL/L (ref 132–146)
SP GR UR STRIP: 1.04 (ref 1–1.03)
SQUAMOUS #/AREA URNS HPF: ABNORMAL /HPF
UROBILINOGEN UR QL STRIP: ABNORMAL
WBC NRBC COR # BLD: 11.78 10*3/MM3 (ref 3.5–10.8)
WBC UR QL AUTO: ABNORMAL /HPF
WHOLE BLOOD HOLD SPECIMEN: NORMAL
WHOLE BLOOD HOLD SPECIMEN: NORMAL

## 2018-06-29 PROCEDURE — 25010000002 IOPAMIDOL 61 % SOLUTION: Performed by: EMERGENCY MEDICINE

## 2018-06-29 PROCEDURE — 83690 ASSAY OF LIPASE: CPT | Performed by: NURSE PRACTITIONER

## 2018-06-29 PROCEDURE — 99284 EMERGENCY DEPT VISIT MOD MDM: CPT

## 2018-06-29 PROCEDURE — 25010000002 MORPHINE PER 10 MG: Performed by: EMERGENCY MEDICINE

## 2018-06-29 PROCEDURE — 76830 TRANSVAGINAL US NON-OB: CPT

## 2018-06-29 PROCEDURE — 96376 TX/PRO/DX INJ SAME DRUG ADON: CPT

## 2018-06-29 PROCEDURE — 81001 URINALYSIS AUTO W/SCOPE: CPT | Performed by: NURSE PRACTITIONER

## 2018-06-29 PROCEDURE — 74177 CT ABD & PELVIS W/CONTRAST: CPT

## 2018-06-29 PROCEDURE — 80076 HEPATIC FUNCTION PANEL: CPT | Performed by: NURSE PRACTITIONER

## 2018-06-29 PROCEDURE — 96375 TX/PRO/DX INJ NEW DRUG ADDON: CPT

## 2018-06-29 PROCEDURE — 96361 HYDRATE IV INFUSION ADD-ON: CPT

## 2018-06-29 PROCEDURE — 25010000002 ONDANSETRON PER 1 MG: Performed by: EMERGENCY MEDICINE

## 2018-06-29 PROCEDURE — 85025 COMPLETE CBC W/AUTO DIFF WBC: CPT | Performed by: EMERGENCY MEDICINE

## 2018-06-29 PROCEDURE — 80048 BASIC METABOLIC PNL TOTAL CA: CPT | Performed by: EMERGENCY MEDICINE

## 2018-06-29 PROCEDURE — 96374 THER/PROPH/DIAG INJ IV PUSH: CPT

## 2018-06-29 RX ORDER — MORPHINE SULFATE 4 MG/ML
4 INJECTION, SOLUTION INTRAMUSCULAR; INTRAVENOUS ONCE
Status: COMPLETED | OUTPATIENT
Start: 2018-06-29 | End: 2018-06-29

## 2018-06-29 RX ORDER — SODIUM CHLORIDE 0.9 % (FLUSH) 0.9 %
10 SYRINGE (ML) INJECTION AS NEEDED
Status: DISCONTINUED | OUTPATIENT
Start: 2018-06-29 | End: 2018-06-29 | Stop reason: HOSPADM

## 2018-06-29 RX ORDER — ONDANSETRON 2 MG/ML
4 INJECTION INTRAMUSCULAR; INTRAVENOUS ONCE
Status: COMPLETED | OUTPATIENT
Start: 2018-06-29 | End: 2018-06-29

## 2018-06-29 RX ORDER — HYDROCODONE BITARTRATE AND ACETAMINOPHEN 5; 325 MG/1; MG/1
1 TABLET ORAL EVERY 6 HOURS PRN
Qty: 12 TABLET | Refills: 0 | Status: SHIPPED | OUTPATIENT
Start: 2018-06-29 | End: 2018-09-24

## 2018-06-29 RX ADMIN — ONDANSETRON 4 MG: 2 INJECTION, SOLUTION INTRAMUSCULAR; INTRAVENOUS at 16:32

## 2018-06-29 RX ADMIN — MORPHINE SULFATE 4 MG: 4 INJECTION, SOLUTION INTRAMUSCULAR; INTRAVENOUS at 16:32

## 2018-06-29 RX ADMIN — Medication 10 ML: at 16:22

## 2018-06-29 RX ADMIN — IOPAMIDOL 95 ML: 612 INJECTION, SOLUTION INTRAVENOUS at 19:25

## 2018-06-29 RX ADMIN — SODIUM CHLORIDE 1000 ML: 9 INJECTION, SOLUTION INTRAVENOUS at 16:32

## 2018-06-29 RX ADMIN — Medication 10 ML: at 19:14

## 2018-06-29 RX ADMIN — MORPHINE SULFATE 4 MG: 4 INJECTION, SOLUTION INTRAMUSCULAR; INTRAVENOUS at 19:13

## 2018-06-30 PROBLEM — R30.0 DYSURIA: Status: ACTIVE | Noted: 2018-06-30

## 2018-06-30 PROCEDURE — 87086 URINE CULTURE/COLONY COUNT: CPT | Performed by: NURSE PRACTITIONER

## 2018-06-30 PROCEDURE — 87147 CULTURE TYPE IMMUNOLOGIC: CPT | Performed by: NURSE PRACTITIONER

## 2018-07-02 ENCOUNTER — TELEPHONE (OUTPATIENT)
Dept: URGENT CARE | Facility: CLINIC | Age: 53
End: 2018-07-02

## 2018-07-02 NOTE — TELEPHONE ENCOUNTER
Pt was informed of results. She states she is no better. Also informed that Dr. Charan ESPARZA was going to send RX to [facility].

## 2018-07-03 ENCOUNTER — TELEPHONE (OUTPATIENT)
Dept: URGENT CARE | Facility: CLINIC | Age: 53
End: 2018-07-03

## 2018-07-03 DIAGNOSIS — B37.9 YEAST INFECTION: Primary | ICD-10-CM

## 2018-07-03 RX ORDER — FLUCONAZOLE 150 MG/1
150 TABLET ORAL ONCE
Qty: 1 TABLET | Refills: 1 | Status: SHIPPED | OUTPATIENT
Start: 2018-07-03 | End: 2018-07-03

## 2018-07-10 ENCOUNTER — OFFICE VISIT (OUTPATIENT)
Dept: INTERNAL MEDICINE | Facility: CLINIC | Age: 53
End: 2018-07-10

## 2018-07-10 ENCOUNTER — TELEPHONE (OUTPATIENT)
Dept: INTERNAL MEDICINE | Facility: CLINIC | Age: 53
End: 2018-07-10

## 2018-07-10 VITALS
OXYGEN SATURATION: 98 % | HEART RATE: 89 BPM | DIASTOLIC BLOOD PRESSURE: 90 MMHG | BODY MASS INDEX: 41.21 KG/M2 | WEIGHT: 263.1 LBS | SYSTOLIC BLOOD PRESSURE: 120 MMHG

## 2018-07-10 DIAGNOSIS — K21.9 GASTROESOPHAGEAL REFLUX DISEASE, ESOPHAGITIS PRESENCE NOT SPECIFIED: ICD-10-CM

## 2018-07-10 DIAGNOSIS — E87.6 HYPOKALEMIA: ICD-10-CM

## 2018-07-10 DIAGNOSIS — R73.03 PREDIABETES: ICD-10-CM

## 2018-07-10 DIAGNOSIS — K76.0 FATTY LIVER: Primary | ICD-10-CM

## 2018-07-10 DIAGNOSIS — F41.9 ANXIETY: ICD-10-CM

## 2018-07-10 LAB
BUN SERPL-MCNC: 11 MG/DL (ref 9–23)
BUN/CREAT SERPL: 14.3 (ref 7–25)
CALCIUM SERPL-MCNC: 9.5 MG/DL (ref 8.7–10.4)
CHLORIDE SERPL-SCNC: 105 MMOL/L (ref 99–109)
CO2 SERPL-SCNC: 28 MMOL/L (ref 20–31)
CREAT SERPL-MCNC: 0.77 MG/DL (ref 0.6–1.3)
GLUCOSE SERPL-MCNC: 96 MG/DL (ref 70–100)
POTASSIUM SERPL-SCNC: 4.4 MMOL/L (ref 3.5–5.5)
SODIUM SERPL-SCNC: 139 MMOL/L (ref 132–146)

## 2018-07-10 PROCEDURE — 90471 IMMUNIZATION ADMIN: CPT | Performed by: INTERNAL MEDICINE

## 2018-07-10 PROCEDURE — 99214 OFFICE O/P EST MOD 30 MIN: CPT | Performed by: INTERNAL MEDICINE

## 2018-07-10 PROCEDURE — 90632 HEPA VACCINE ADULT IM: CPT | Performed by: INTERNAL MEDICINE

## 2018-07-10 NOTE — PROGRESS NOTES
Subjective   Brigid Coelho is a 53 y.o. female.   Chief Complaint   Patient presents with   • Anxiety       Heartburn   She reports no abdominal pain, no chest pain, no coughing, no nausea, no sore throat or no wheezing. This is a chronic problem. The current episode started more than 1 year ago. The symptoms are aggravated by certain foods. Pertinent negatives include no fatigue.   Anxiety   Presents for follow-up visit. Patient reports no chest pain, confusion, decreased concentration, nausea, nervous/anxious behavior, palpitations or shortness of breath.     Compliance with medications is %.      Prediabetes x years and is stable.    On antibiotics for a UTI  Seen in ER for pelvic pain. CT results below  Us Non-ob Transvaginal    Result Date: 7/2/2018  Examination is difficult due to technical factors, but there is no evidence of uterine mass. The endometrium is mildly prominent measuring 1.3 cm in AP dimension. There is no evidence of adnexal mass or cul-de-sac fluid.  DICTATED:   6/29/2018 EDITED/ls :   6/29/2018    This report was finalized on 7/2/2018 4:27 PM by Dr. Rajesh Shannon MD.      Ct Abdomen Pelvis With Contrast    Result Date: 6/29/2018    Enlarged fibroid uterus with complex LEFT adnexal/ovarian predominantly cystic nodule likely representing a hemorrhagic cyst. Recommend sonography followup.   Hepatomegaly and diffuse FATTY infiltration of the liver. Recommend correlation with liver function tests.   Other nonemergent/incidental findings as described. THIS DOCUMENT HAS BEEN ELECTRONICALLY SIGNED BY MIGUEL ANGEL LUNA MD  Has gyn f/u for this on July 16th  The following portions of the patient's history were reviewed and updated as appropriate: allergies, current medications, past family history, past medical history, past social history, past surgical history and problem list.    Review of Systems   Constitutional: Negative for activity change, appetite change, chills, diaphoresis, fatigue,  fever and unexpected weight change.   HENT: Negative for congestion, ear discharge, ear pain, mouth sores, nosebleeds, sinus pressure, sneezing and sore throat.    Eyes: Negative for pain, discharge and itching.   Respiratory: Negative for cough, chest tightness, shortness of breath and wheezing.    Cardiovascular: Negative for chest pain, palpitations and leg swelling.   Gastrointestinal: Negative for abdominal pain, constipation, diarrhea, nausea and vomiting.   Endocrine: Negative for cold intolerance, heat intolerance, polydipsia and polyphagia.   Genitourinary: Negative for dysuria, flank pain, frequency, hematuria and urgency.   Musculoskeletal: Negative for arthralgias, back pain, gait problem, myalgias, neck pain and neck stiffness.   Skin: Negative for color change, pallor and rash.   Neurological: Negative for seizures, speech difficulty, numbness and headaches.   Psychiatric/Behavioral: Negative for agitation, confusion, decreased concentration and sleep disturbance. The patient is not nervous/anxious.      /90   Pulse 89   Wt 119 kg (263 lb 1.6 oz)   LMP 06/26/2018   SpO2 98%   BMI 41.21 kg/m²     Objective   Physical Exam   Constitutional: She is oriented to person, place, and time. She appears well-developed.   HENT:   Head: Normocephalic.   Right Ear: External ear normal.   Left Ear: External ear normal.   Nose: Nose normal.   Mouth/Throat: Oropharynx is clear and moist.   Eyes: Conjunctivae are normal. Pupils are equal, round, and reactive to light.   Neck: No JVD present. No thyromegaly present.   Cardiovascular: Normal rate, regular rhythm and normal heart sounds.  Exam reveals no friction rub.    No murmur heard.  Pulmonary/Chest: Effort normal and breath sounds normal. No respiratory distress. She has no wheezes. She has no rales.   Abdominal: Soft. Bowel sounds are normal. She exhibits no distension. There is no tenderness. There is no guarding.   Musculoskeletal: She exhibits no  edema or tenderness.   Lymphadenopathy:     She has no cervical adenopathy.   Neurological: She is oriented to person, place, and time. She displays normal reflexes. No cranial nerve deficit.   Skin: No rash noted.   Psychiatric: Her behavior is normal.   Nursing note and vitals reviewed.      Assessment/Plan   Brigid was seen today for follow-up and psychiatrist referral.    Diagnoses and all orders for this visit:    Prediabetes  -     POC Glycosylated Hemoglobin (Hb A1C)  -     POC Glucose Fingerstick  -     Comprehensive Metabolic Panel    Anxiety  -     Ambulatory Referral to Psychiatry  -     ALPRAZolam (XANAX) 0.25 MG tablet; Take 1 tablet by mouth At Night As Needed for Anxiety.  Xanax one time script to bridge to [facility]. Her last one just moved out of [place]. Refilled zoloft 50 mg one  A day  Fatty Liver  Weight loss. Hep A vac given today  Gastroesophageal reflux disease, esophagitis presence not specified  Stable  The patient has read and signed the [facility] Controlled Substance Contract.  I will continue to see patient for regular follow up appointments.  They are well controlled on their medication.  ES is updated every 3 months. The patient is aware of the potential for addiction and dependence.    The patient has read and signed the [facility] Controlled Substance Contract.  I will continue to see patient for regular follow up appointments.  They are well controlled on their medication.  ES is updated every 3 months. The patient is aware of the potential for addiction. The patient has read and signed the [facility] Controlled Substance Contract.  I will continue to see patient for regular follow up appointments.  They are well controlled on their medication.  ES is updated every 3 months. The patient is aware of the potential for addiction and dependence.on and dependence.

## 2018-07-10 NOTE — TELEPHONE ENCOUNTER
PATIENT WOULD LIKE TO KNOW IF SHE CAN MOVE HER SEPT APPT WITH DR HYLTON UP. SHE IS CURRENTLY HAVING ISSUES WITH FATTY LIVER AND THIS IS CONCERNING HER. SHE ALSO REPORTS WEIGHT ISSUES AND THINKS THIS MAY BE THYROID RELATED.    CALL BACK [phone]

## 2018-07-12 ENCOUNTER — TELEPHONE (OUTPATIENT)
Dept: INTERNAL MEDICINE | Facility: CLINIC | Age: 53
End: 2018-07-12

## 2018-07-12 NOTE — TELEPHONE ENCOUNTER
PATIENT  HAD TO CANCEL HER APPOINTMENT DUE TO HER CAR NOT STARTING THIS MORNING.   SHE WOULD LIKE TO KNOW IF SHE CAN GET A CALL IF THERE ARE ANY CANCELLATIONS IN THE NEXT FEW WEEKS.

## 2018-08-12 DIAGNOSIS — K21.9 GASTROESOPHAGEAL REFLUX DISEASE, ESOPHAGITIS PRESENCE NOT SPECIFIED: ICD-10-CM

## 2018-08-12 RX ORDER — PANTOPRAZOLE SODIUM 40 MG/1
TABLET, DELAYED RELEASE ORAL
Qty: 90 TABLET | Refills: 0 | Status: SHIPPED | OUTPATIENT
Start: 2018-08-12 | End: 2018-10-12

## 2018-08-12 RX ORDER — MONTELUKAST SODIUM 10 MG/1
TABLET ORAL
Qty: 90 TABLET | Refills: 0 | Status: SHIPPED | OUTPATIENT
Start: 2018-08-12 | End: 2018-12-03 | Stop reason: SDUPTHER

## 2018-09-24 ENCOUNTER — TELEPHONE (OUTPATIENT)
Dept: INTERNAL MEDICINE | Facility: CLINIC | Age: 53
End: 2018-09-24

## 2018-09-24 ENCOUNTER — OFFICE VISIT (OUTPATIENT)
Dept: ENDOCRINOLOGY | Facility: CLINIC | Age: 53
End: 2018-09-24

## 2018-09-24 VITALS
DIASTOLIC BLOOD PRESSURE: 78 MMHG | HEART RATE: 65 BPM | BODY MASS INDEX: 41.21 KG/M2 | HEIGHT: 67 IN | SYSTOLIC BLOOD PRESSURE: 120 MMHG | WEIGHT: 262.6 LBS | OXYGEN SATURATION: 98 %

## 2018-09-24 DIAGNOSIS — R73.03 PREDIABETES: ICD-10-CM

## 2018-09-24 DIAGNOSIS — E06.3 HYPOTHYROIDISM DUE TO HASHIMOTO'S THYROIDITIS: Primary | ICD-10-CM

## 2018-09-24 DIAGNOSIS — E03.8 HYPOTHYROIDISM DUE TO HASHIMOTO'S THYROIDITIS: Primary | ICD-10-CM

## 2018-09-24 DIAGNOSIS — E66.01 MORBID OBESITY (HCC): ICD-10-CM

## 2018-09-24 DIAGNOSIS — K76.0 FATTY LIVER: ICD-10-CM

## 2018-09-24 PROBLEM — E03.9 HYPOTHYROIDISM: Status: ACTIVE | Noted: 2018-09-24

## 2018-09-24 LAB
ALBUMIN SERPL-MCNC: 4.23 G/DL (ref 3.2–4.8)
ALBUMIN/GLOB SERPL: 1.8 G/DL (ref 1.5–2.5)
ALP SERPL-CCNC: 80 U/L (ref 25–100)
ALT SERPL-CCNC: 34 U/L (ref 7–40)
AST SERPL-CCNC: 27 U/L (ref 0–33)
BILIRUB SERPL-MCNC: 0.3 MG/DL (ref 0.3–1.2)
BUN SERPL-MCNC: 8 MG/DL (ref 9–23)
BUN/CREAT SERPL: 10.3 (ref 7–25)
CALCIUM SERPL-MCNC: 9.3 MG/DL (ref 8.7–10.4)
CHLORIDE SERPL-SCNC: 105 MMOL/L (ref 99–109)
CHOLEST SERPL-MCNC: 179 MG/DL (ref 0–200)
CO2 SERPL-SCNC: 26 MMOL/L (ref 20–31)
CREAT SERPL-MCNC: 0.78 MG/DL (ref 0.6–1.3)
GLOBULIN SER CALC-MCNC: 2.4 GM/DL
GLUCOSE SERPL-MCNC: 101 MG/DL (ref 70–100)
HBA1C MFR BLD: 6.2 % (ref 4.8–5.6)
HDLC SERPL-MCNC: 42 MG/DL (ref 40–60)
LDLC SERPL CALC-MCNC: 101 MG/DL (ref 0–100)
POTASSIUM SERPL-SCNC: 4.3 MMOL/L (ref 3.5–5.5)
PROT SERPL-MCNC: 6.6 G/DL (ref 5.7–8.2)
SODIUM SERPL-SCNC: 140 MMOL/L (ref 132–146)
T4 FREE SERPL-MCNC: 0.99 NG/DL (ref 0.89–1.76)
TRIGL SERPL-MCNC: 178 MG/DL (ref 0–150)
TSH SERPL DL<=0.005 MIU/L-ACNC: 3.71 MIU/ML (ref 0.35–5.35)
VLDLC SERPL CALC-MCNC: 35.6 MG/DL

## 2018-09-24 PROCEDURE — 99214 OFFICE O/P EST MOD 30 MIN: CPT | Performed by: INTERNAL MEDICINE

## 2018-09-24 NOTE — TELEPHONE ENCOUNTER
DR GARY ROSSI AT [facility] IS THE PHYSICIAN SHE WOULD LIKE TO BE REFERRED TO PER HER CONVERSATION WITH DR HYLTON.

## 2018-09-24 NOTE — PROGRESS NOTES
Chief complaint  Hashimoto's Thyroiditis (Follow UP, having a little more anxiety, )    Subjective   Brigid Coelho is a 53 y.o. female is here today for follow-up.  Follow-up for hypothyroidism,  goiter and small thyroid nodules, discovered on the ultrasound in 05/2013. Jan 2016 her TFT were low and she was started on levothyroxine 50 mcg a day with symptomatic improvement.   Thyroid u/s 1/2016 -Heterogeneous thyroid gland with small 6 mm isthmus nodule.     Patient also has a history of ulcerative colitis and iron deficiency anemia, anxiety. At initial endocrine evaluation she was found to have elevated Hgb A1C, I have started her on metformin.     She was diagnosed with fatty liver infiltration in summer. Concerned about weigh gain.    Her daughter underwent bariatric surgery with great results and she is still considering it.     She has cravings for food and stress eating. She complains of fatigue, cold and heat intolerance, dysphagia, weight gain and neck swelling. Contrave and phentermine was tried in the past and triggered increased anxiety.     Medications    Current Outpatient Prescriptions:   •  albuterol (PROVENTIL HFA;VENTOLIN HFA) 108 (90 BASE) MCG/ACT inhaler, Inhale 2 puffs Every 6 (Six) Hours As Needed for Wheezing., Disp: 1 inhaler, Rfl: 1  •  levothyroxine (SYNTHROID) 75 MCG tablet, Take 1 tablet by mouth Daily., Disp: 90 tablet, Rfl: 0  •  Loratadine (CLARITIN) 10 MG capsule, Take 1 tablet by mouth daily., Disp: , Rfl:   •  mesalamine (LIALDA) 1.2 g EC tablet, TAKE 4 TABLETS ONCE DAILY, Disp: 120 tablet, Rfl: 3  •  metFORMIN ER (GLUCOPHAGE-XR) 500 MG 24 hr tablet, Take 1 tablet by mouth Daily With Breakfast., Disp: 90 tablet, Rfl: 1  •  montelukast (SINGULAIR) 10 MG tablet, TAKE ONE TABLET BY MOUTH EVERY NIGHT AT BEDTIME, Disp: 90 tablet, Rfl: 0  •  pantoprazole (PROTONIX) 40 MG EC tablet, TAKE ONE TABLET BY MOUTH DAILY, Disp: 90 tablet, Rfl: 0  •  sertraline (ZOLOFT) 50 MG tablet, Take 1  "tablet by mouth Daily., Disp: 30 tablet, Rfl: 2  •  ALPRAZolam (XANAX) 0.25 MG tablet, Take twice daily as needed, Disp: 60 tablet, Rfl: 0    PMH  The following portions of the patient's history were reviewed and updated as appropriate: allergies, current medications, past family history, past medical history, past social history, past surgical history and problem list.    Review of systems  Review of Systems   Constitutional: Positive for fatigue and unexpected weight change (weight gain). Negative for activity change, appetite change, chills and diaphoresis.   HENT: Positive for trouble swallowing and voice change. Negative for congestion, ear pain, facial swelling, hearing loss, postnasal drip and sore throat.    Eyes: Negative.  Negative for redness, itching and visual disturbance.   Respiratory: Negative for cough and shortness of breath.    Cardiovascular: Negative for chest pain, palpitations and leg swelling.   Gastrointestinal: Negative for abdominal distention, abdominal pain, constipation, diarrhea, nausea and vomiting.   Endocrine:        As listed in HPI   Genitourinary: Negative.    Musculoskeletal: Positive for arthralgias. Negative for back pain, joint swelling, myalgias, neck pain and neck stiffness.   Skin: Negative.    Allergic/Immunologic: Negative.    Neurological: Negative for dizziness, tremors, syncope, weakness, light-headedness and headaches.   Hematological: Negative.    Psychiatric/Behavioral: Negative.    All other systems reviewed and are negative.      Physical exam  Objective   Blood pressure 120/78, pulse 65, height 170.2 cm (67\"), weight 119 kg (262 lb 9.6 oz), SpO2 98 %. Body mass index is 41.13 kg/m².    Physical Exam   Constitutional: She is oriented to person, place, and time. She appears well-developed and well-nourished.   HENT:   Head: Normocephalic and atraumatic.   Eyes: Conjunctivae are normal.   Neck: Normal range of motion. No muscular tenderness present. Carotid bruit " is not present. Thyromegaly present. No thyroid mass present.   The neck is supple and no assimetry visualized   Cardiovascular: Normal rate, regular rhythm and normal heart sounds.    Pulmonary/Chest: Effort normal and breath sounds normal.   Lymphadenopathy:     She has no cervical adenopathy.   Neurological: She is alert and oriented to person, place, and time.   Skin: Skin is warm and dry.   Psychiatric: She has a normal mood and affect. Thought content normal.   Vitals reviewed.        LABS AND IMAGING  No visits with results within 1 Month(s) from this visit.   Latest known visit with results is:   Admission on 07/26/2018, Discharged on 07/26/2018   Component Date Value Ref Range Status   • Color 07/26/2018 Yellow  Yellow, Straw, Dark Yellow, [place] Final   • Clarity, UA 07/26/2018 Clear  Clear Final   • Glucose, UA 07/26/2018 Negative  Negative, 1000 mg/dL (3+) mg/dL Final   • Bilirubin 07/26/2018 1 mg/dL* Negative Final   • Ketones, UA 07/26/2018 Negative  Negative Final   • Specific Gravity  07/26/2018 1.025  1.005 - 1.030 Final   • Blood, UA 07/26/2018 50 Patrcik/ul* Negative Final   • pH, Urine 07/26/2018 5.0  5.0 - 8.0 Final   • Protein, POC 07/26/2018 Negative  Negative mg/dL Final   • Urobilinogen, UA 07/26/2018 Normal  Normal Final   • Nitrite, UA 07/26/2018 Negative  Negative Final   • Leukocytes 07/26/2018 Negative  Negative Final       Assessment  Assessment/Plan   Problem List Items Addressed This Visit        Digestive    Morbid obesity (CMS/HCC)    Fatty liver       Endocrine    Hypothyroidism due to Hashimoto's thyroiditis - Primary       Other    Prediabetes          Plan  Thyroid u/s 1/2017 - heterogeneous gland with no nodules.     -repeat thyroid function tests on levothyroxine 75 mcg, adjust the dose accordingly.      -Continue metformin 500 mg a day is beneficial for insulin resistance opposition.   [employer] information given and she would check if covered. Return for quick training if it is  covered by her insurance.      -refer to bariatric surgeon, she would call with the surgeon name .   -fasting labs today.   F/u in 4-6 months.

## 2018-09-26 DIAGNOSIS — E66.01 MORBID OBESITY (HCC): Primary | ICD-10-CM

## 2018-10-02 DIAGNOSIS — F41.9 ANXIETY: ICD-10-CM

## 2018-10-12 ENCOUNTER — OFFICE VISIT (OUTPATIENT)
Dept: INTERNAL MEDICINE | Facility: CLINIC | Age: 53
End: 2018-10-12

## 2018-10-12 VITALS
WEIGHT: 264.6 LBS | SYSTOLIC BLOOD PRESSURE: 120 MMHG | HEART RATE: 96 BPM | DIASTOLIC BLOOD PRESSURE: 84 MMHG | BODY MASS INDEX: 41.44 KG/M2

## 2018-10-12 DIAGNOSIS — F41.9 ANXIETY: Primary | ICD-10-CM

## 2018-10-12 DIAGNOSIS — K21.9 GASTROESOPHAGEAL REFLUX DISEASE, ESOPHAGITIS PRESENCE NOT SPECIFIED: ICD-10-CM

## 2018-10-12 DIAGNOSIS — J30.2 SEASONAL ALLERGIES: ICD-10-CM

## 2018-10-12 PROCEDURE — 90674 CCIIV4 VAC NO PRSV 0.5 ML IM: CPT | Performed by: INTERNAL MEDICINE

## 2018-10-12 PROCEDURE — 99213 OFFICE O/P EST LOW 20 MIN: CPT | Performed by: INTERNAL MEDICINE

## 2018-10-12 PROCEDURE — G0008 ADMIN INFLUENZA VIRUS VAC: HCPCS | Performed by: INTERNAL MEDICINE

## 2018-10-12 NOTE — PROGRESS NOTES
Subjective   Brigid Coelho is a 53 y.o. female.   Chief Complaint   Patient presents with   • Follow-up   • Allergies     would like a script for inhaler and a referral to allergist   • Flu Vaccine     needs to make sure oK to take today       Anxiety   Presents for follow-up visit. Patient reports no chest pain, confusion, decreased concentration, nausea, nervous/anxious behavior, palpitations or shortness of breath.     Compliance with medications is %.   Heartburn   She reports no abdominal pain, no chest pain, no coughing, no nausea, no sore throat or no wheezing. This is a chronic problem. The current episode started more than 1 year ago. The symptoms are aggravated by certain foods. Pertinent negatives include no fatigue.   Allergies   Pertinent negatives include no abdominal pain, arthralgias, chest pain, chills, congestion, coughing, diaphoresis, fatigue, fever, headaches, myalgias, nausea, neck pain, numbness, rash, sore throat or vomiting.      .            The following portions of the patient's history were reviewed and updated as appropriate: allergies, current medications, past family history, past medical history, past social history, past surgical history and problem list.    Review of Systems   Constitutional: Negative for activity change, appetite change, chills, diaphoresis, fatigue, fever and unexpected weight change.   HENT: Negative for congestion, ear discharge, ear pain, mouth sores, nosebleeds, sinus pressure, sneezing and sore throat.    Eyes: Negative for pain, discharge and itching.   Respiratory: Negative for cough, chest tightness, shortness of breath and wheezing.    Cardiovascular: Negative for chest pain, palpitations and leg swelling.   Gastrointestinal: Negative for abdominal pain, constipation, diarrhea, nausea and vomiting.   Endocrine: Negative for cold intolerance, heat intolerance, polydipsia and polyphagia.   Genitourinary: Negative for dysuria, flank pain,  frequency, hematuria and urgency.   Musculoskeletal: Negative for arthralgias, back pain, gait problem, myalgias, neck pain and neck stiffness.   Skin: Negative for color change, pallor and rash.   Neurological: Negative for seizures, speech difficulty, numbness and headaches.   Psychiatric/Behavioral: Negative for agitation, confusion, decreased concentration and sleep disturbance. The patient is not nervous/anxious.      There were no vitals taken for this visit.    Objective   Physical Exam   Constitutional: She is oriented to person, place, and time. She appears well-developed.   HENT:   Head: Normocephalic.   Right Ear: External ear normal.   Left Ear: External ear normal.   Nose: Nose normal.   Mouth/Throat: Oropharynx is clear and moist.   Eyes: Pupils are equal, round, and reactive to light. Conjunctivae are normal.   Neck: No JVD present. No thyromegaly present.   Cardiovascular: Normal rate, regular rhythm and normal heart sounds.  Exam reveals no friction rub.    No murmur heard.  Pulmonary/Chest: Effort normal and breath sounds normal. No respiratory distress. She has no wheezes. She has no rales.   Abdominal: Soft. Bowel sounds are normal. She exhibits no distension. There is no tenderness. There is no guarding.   Musculoskeletal: She exhibits no edema or tenderness.   Lymphadenopathy:     She has no cervical adenopathy.   Neurological: She is oriented to person, place, and time. She displays normal reflexes. No cranial nerve deficit.   Skin: No rash noted.   Psychiatric: Her behavior is normal.   Nursing note and vitals reviewed.      Assessment/Plan   Brigid was seen today for follow-up and psychiatrist referral.    Diagnoses and all orders for this visit:    Prediabetes  -     POC Glycosylated Hemoglobin (Hb A1C)  -     POC Glucose Fingerstick  -     Comprehensive Metabolic Panel    Anxiety  -     Ambulatory Referral to Psychiatry    Fatty Liver  Weight loss. Hep A vac given  today  Gastroesophageal reflux disease, esophagitis presence not specified  Stable    Flu vac given  At visit

## 2018-10-18 DIAGNOSIS — J20.9 ACUTE BRONCHITIS, UNSPECIFIED ORGANISM: ICD-10-CM

## 2018-10-18 RX ORDER — ALBUTEROL SULFATE 90 UG/1
2 AEROSOL, METERED RESPIRATORY (INHALATION) EVERY 6 HOURS PRN
Qty: 1 INHALER | Refills: 1 | Status: SHIPPED | OUTPATIENT
Start: 2018-10-18 | End: 2018-10-23 | Stop reason: SDUPTHER

## 2018-10-18 NOTE — TELEPHONE ENCOUNTER
PT STATED THAT SHE SPOKE TO  AT LAST APPT ON 10/12 ABOUT REFILLING HER ALBUTEROL INHALER; THIS WAS NOT DONE AND SHE WOULD LIKE TO KNOW IF A NEW PRESCRIPTION CAN BE SENT TO [facility]

## 2018-10-22 DIAGNOSIS — J20.9 ACUTE BRONCHITIS, UNSPECIFIED ORGANISM: ICD-10-CM

## 2018-10-22 DIAGNOSIS — J30.2 SEASONAL ALLERGIES: Primary | ICD-10-CM

## 2018-10-23 RX ORDER — ALBUTEROL SULFATE 90 UG/1
2 AEROSOL, METERED RESPIRATORY (INHALATION) EVERY 6 HOURS PRN
Qty: 1 INHALER | Refills: 1 | Status: SHIPPED | OUTPATIENT
Start: 2018-10-23 | End: 2019-01-26

## 2018-10-23 NOTE — TELEPHONE ENCOUNTER
Pt advised referral was created  She is also asking about ventolin rx  Was suppose to be [facility]  Advised her it was printed instead of eprescribed however I will send it to [facility] today

## 2018-10-29 PROBLEM — R93.89 THICKENED ENDOMETRIUM: Status: ACTIVE | Noted: 2018-10-29

## 2018-11-08 ENCOUNTER — APPOINTMENT (OUTPATIENT)
Dept: GENERAL RADIOLOGY | Facility: HOSPITAL | Age: 53
End: 2018-11-08

## 2018-11-08 ENCOUNTER — HOSPITAL ENCOUNTER (EMERGENCY)
Facility: HOSPITAL | Age: 53
Discharge: HOME | End: 2018-11-08
Attending: EMERGENCY MEDICINE | Admitting: EMERGENCY MEDICINE

## 2018-11-08 VITALS
TEMPERATURE: 98.2 F | WEIGHT: 245 LBS | RESPIRATION RATE: 20 BRPM | OXYGEN SATURATION: 98 % | HEART RATE: 78 BPM | SYSTOLIC BLOOD PRESSURE: 140 MMHG | DIASTOLIC BLOOD PRESSURE: 80 MMHG | BODY MASS INDEX: 38.45 KG/M2 | HEIGHT: 67 IN

## 2018-11-08 DIAGNOSIS — M17.12 OSTEOARTHRITIS OF LEFT KNEE, UNSPECIFIED OSTEOARTHRITIS TYPE: ICD-10-CM

## 2018-11-08 DIAGNOSIS — M25.562 ARTHRALGIA OF LEFT KNEE: Primary | ICD-10-CM

## 2018-11-08 PROCEDURE — 99283 EMERGENCY DEPT VISIT LOW MDM: CPT

## 2018-11-08 PROCEDURE — 73560 X-RAY EXAM OF KNEE 1 OR 2: CPT

## 2018-11-08 NOTE — ED PROVIDER NOTES
Subjective   This patient is a nice 53-year-old female who comes in today with left-sided knee pain.  She tells me she is hearing a very bizarre sound in her left knee when she walks.  Approximately 3-4 days ago is when she noticed the symptoms.  She spent a great deal of time on her feet during the hollowing season according to her own report.  She did not fall down and did not directly injure her knee.  She denies any appreciable swelling.  She denies any nausea, vomiting, generalized feeling of illness, fevers, chills, or redness.  The sound she is hearing in the left knee is a clicking-like sensation.  She has no history of tendinitis, ligamentous, or meniscal injury.  She tells me her father had a history of knee degenerative changes.  The patient does not have a primary care physician.  She has hypothyroidism and sees an endocrinologist as well as a primary care physician.  Her primary care physician is Dr. Shellie Moore.  The patient came in today to get an x-ray and to determine whether or not she needed outpatient follow-up.  She has been able to ambulate and walk without incredible difficulty.  She has been able to work.  She she denies any specific measure including over-the-counter medications or Ace wrap.  Using Epsom salts.  In summary, we have a 53-year-old female without trauma but with left-sided knee pain and comes in today for evaluation.    Past Medical History : Hashimoto's    Past Family History : Father had knee surgery around her age.        History provided by:  Patient  Lower Extremity Issue   Location:  Knee  Injury: no    Knee location:  L knee  Pain details:     Severity:  Mild    Duration:  3 days  Chronicity:  New  Associated symptoms: swelling        Review of Systems   Musculoskeletal: Positive for arthralgias (Left knee pain).       Past Medical History:   Diagnosis Date   • Acute bronchitis    • Anxiety    • Chest pain    • Cholelithiasis    • GERD (gastroesophageal reflux  disease)    • Hashimoto's thyroiditis    • Hemorrhoids    • Hypothyroidism    • Metrorrhagia    • Palpitations    • Polycystic ovary syndrome    • Polyp of sigmoid colon    • Ulcerative colitis (CMS/HCC)    • Upper respiratory infection    • UTI (urinary tract infection)        Allergies   Allergen Reactions   • Adhesive Tape      Rash     • Erythromycin Other (See Comments)     Sores in mouth   • Epinephrine Palpitations   • Nitroglycerin Palpitations       Past Surgical History:   Procedure Laterality Date   • APPENDECTOMY     •  SECTION     • CHOLECYSTECTOMY     • DILATATION AND CURETTAGE      Of Cervical Stump   • MYOMECTOMY     • SMALL INTESTINE SURGERY     • TUBAL ABDOMINAL LIGATION         Family History   Problem Relation Age of Onset   • Hyperlipidemia Mother    • CHAD disease Mother    • Rheum arthritis Father    • Hyperlipidemia Father    • Diabetes Father    • Ovarian cancer Maternal Aunt    • Diabetes Paternal Grandmother    • Hypertension Paternal Grandmother    • Obesity Paternal Grandmother    • Colon cancer Other    • Arthritis Other    • Cancer Other         uncle; liver, lung    • Thyroid disease Cousin    • Thyroid cancer Cousin    • CHAD disease Daughter        Social History     Social History   • Marital status:      Social History Main Topics   • Smoking status: Never Smoker   • Smokeless tobacco: Never Used   • Alcohol use No   • Drug use: No     Other Topics Concern   • Not on file     Social History Narrative    single         Objective   Physical Exam   Constitutional: She is oriented to person, place, and time. She appears well-developed. No distress.   HENT:   Head: Normocephalic and atraumatic.   Right Ear: Tympanic membrane, external ear and ear canal normal.   Left Ear: Tympanic membrane, external ear and ear canal normal.   Nose: Nose normal. No nasal septal hematoma.   Mouth/Throat: Mucous membranes are not dry. No oral lesions. No trismus in the jaw. No dental  abscesses or uvula swelling. No posterior oropharyngeal erythema or tonsillar abscesses. No tonsillar exudate.   Eyes: Pupils are equal, round, and reactive to light. EOM are normal. Right conjunctiva is not injected. Left conjunctiva is not injected.   Neck: Normal range of motion. Neck supple. No JVD present. No tracheal tenderness present. No neck rigidity. Normal range of motion present.   Cardiovascular: Normal rate, regular rhythm and normal heart sounds.  Exam reveals no gallop and no friction rub.    Pulmonary/Chest: Effort normal and breath sounds normal. She has no wheezes. She has no rales. She exhibits no tenderness.   Abdominal: Soft. Bowel sounds are normal. She exhibits no distension, no pulsatile midline mass and no mass. There is no tenderness. There is no rigidity, no rebound, no guarding and no tenderness at McBurney's point.   No signs of acute abdomen.  No pain at McBurney's point.  No pulsatile abdominal mass   Musculoskeletal: Normal range of motion. She exhibits tenderness. She exhibits no edema or deformity.   Mild tenderness to medial aspect of left knee. No bony deformity. Full ROM of left knee.   Lymphadenopathy:     She has no cervical adenopathy.   Neurological: She is alert and oriented to person, place, and time. She has normal strength. She displays no tremor. No cranial nerve deficit or sensory deficit. She exhibits normal muscle tone.   5/5 strength bilaterally with flexion and extension of fingers, wrist, elbows, knees and hips as well as plantar and dorsiflexion of the foot.   Skin: Skin is warm and dry. No rash noted. No erythema. No pallor.   No signs of infection to left knee.   Psychiatric: She has a normal mood and affect. Her speech is normal and behavior is normal. Judgment and thought content normal. Cognition and memory are normal. She is attentive.   Nursing note and vitals reviewed.      Procedures         ED Course  ED Course as of Nov 08 1536   Thu Nov 08, 2018  "  1248 I had a good conversation with the patient.  Left knee x-ray shows degenerative joint disease only.  I talked to the patient about the need for outpatient orthopedic follow-up.  We will have her follow-up with Dr. Reginald Castro with the [facility]opedic group.  I talked about weightbearing as tolerated.  We talked about heat and Motrin both of which we have recommended.  I would like an Ace wrap to be placed on the patient's knee prior to discharge home.  Patient was very appreciative for care and had multiple questions, all of which were answered.  Patient will be discharged home with an impression that includes left knee arthralgia, degenerative joint disease of the knee.  Plan will include rest, Ace wrap, heat, and outpatient orthopedic follow-up.  Motrin as I discussed at bedside.  Return immediately for new or changing concerns.  We will also have her follow-up with her PCP, Dr. Moore in 1 week.  Patient was appreciative for care and will be discharged home accordingly.  [KELVIN]      ED Course User Index  [KELVIN] José Manuel Montesinos MD     No results found for this or any previous visit (from the past 24 hour(s)).  Note: In addition to lab results from this visit, the labs listed above may include labs taken at another facility or during a different encounter within the last 24 hours. Please correlate lab times with ED admission and discharge times for further clarification of the services performed during this visit.    XR Knee 1 or 2 View Left   Preliminary Result   Mild tricompartmental DJD without acute osseous abnormality   specifically tibial plateau is preserved. No significant effusion.       D:  11/08/2018   E:  11/08/2018                Vitals:    11/08/18 1133 11/08/18 1320   BP: 135/83 140/80   BP Location: Left arm    Patient Position: Sitting    Pulse: 76 78   Resp: 18 20   Temp: 98.2 °F (36.8 °C)    TempSrc: Oral    SpO2: 97% 98%   Weight: 111 kg (245 lb)    Height: 170.2 cm (67\")  "     Medications - No data to display  ECG/EMG Results (last 24 hours)     ** No results found for the last 24 hours. **                        [facility]    Final diagnoses:   Arthralgia of left knee   Osteoarthritis of left knee, unspecified osteoarthritis type       Documentation assistance provided by freddie Casey.  Information recorded by the freddie was done at my direction and has been verified and validated by me.     Emily Casey  11/08/18 1250       Emily Casey  11/08/18 1311       José Manuel Montesinos MD  11/08/18 9342

## 2018-11-08 NOTE — DISCHARGE INSTRUCTIONS
Take Motrin and utilize heat as discussed.  Use the Ace wrap that was provided for urine place today in the emergency department as we discussed at the bedside.  Keep leg elevated.  Use heat.    Weight bearing as tolerated.    Follow up with Dr. Castro, orthopedics, at next available.     Follow up with your primary care provider tomorrow or Monday.    Return to the ER if you develop any acute or worsening symptoms.    Please review the medications you are supposed to be taking according to prior physician recommendations. I have not changed your home medications during this visit. If your discharge instructions indicate that I have changed your home medications, this is not the case, and you should disregard. If you have any questions about the medication you should be taking at home, please call your physician.

## 2018-11-08 NOTE — ED PROVIDER NOTES
Subjective   History of Present Illness    Review of Systems    Past Medical History:   Diagnosis Date   • Acute bronchitis    • Anxiety    • Chest pain    • Cholelithiasis    • GERD (gastroesophageal reflux disease)    • Hashimoto's thyroiditis    • Hemorrhoids    • Hypothyroidism    • Metrorrhagia    • Palpitations    • Polycystic ovary syndrome    • Polyp of sigmoid colon    • Ulcerative colitis (CMS/HCC)    • Upper respiratory infection    • UTI (urinary tract infection)        Allergies   Allergen Reactions   • Adhesive Tape      Rash     • Erythromycin Other (See Comments)     Sores in mouth   • Epinephrine Palpitations   • Nitroglycerin Palpitations       Past Surgical History:   Procedure Laterality Date   • APPENDECTOMY     •  SECTION     • CHOLECYSTECTOMY     • DILATATION AND CURETTAGE      Of Cervical Stump   • MYOMECTOMY     • SMALL INTESTINE SURGERY     • TUBAL ABDOMINAL LIGATION         Family History   Problem Relation Age of Onset   • Hyperlipidemia Mother    • CHAD disease Mother    • Rheum arthritis Father    • Hyperlipidemia Father    • Diabetes Father    • Ovarian cancer Maternal Aunt    • Diabetes Paternal Grandmother    • Hypertension Paternal Grandmother    • Obesity Paternal Grandmother    • Colon cancer Other    • Arthritis Other    • Cancer Other         uncle; liver, lung    • Thyroid disease Cousin    • Thyroid cancer Cousin    • CHAD disease Daughter        Social History     Social History   • Marital status:      Social History Main Topics   • Smoking status: Never Smoker   • Smokeless tobacco: Never Used   • Alcohol use No   • Drug use: No     Other Topics Concern   • Not on file     Social History Narrative    single           Objective   Physical Exam    Procedures           ED Course  ED Course as of  1250   Thu 2018   1248 I had a good conversation with the patient.  Left knee x-ray shows degenerative joint disease only.  I talked to the patient about  the need for outpatient orthopedic follow-up.  We will have her follow-up with Dr. Reginald Castro with the [facility] orthopedic group.  I talked about weightbearing as tolerated.  We talked about heat and Motrin both of which we have recommended.  I would like an Ace wrap to be placed on the patient's knee prior to discharge home.  Patient was very appreciative for care and had multiple questions, all of which were answered.  Patient will be discharged home with an impression that includes left knee arthralgia, degenerative joint disease of the knee.  Plan will include rest, Ace wrap, heat, and outpatient orthopedic follow-up.  Motrin as I discussed at bedside.  Return immediately for new or changing concerns.  We will also have her follow-up with her PCP, Dr. Moore in 1 week.  Patient was appreciative for care and will be discharged home accordingly.  [KELVIN]      ED Course User Index  [KELVIN] José Manuel Montesinos MD                  [facility]      Final diagnoses:   Arthralgia of left knee

## 2018-11-21 PROBLEM — R09.89 CHEST CONGESTION: Status: ACTIVE | Noted: 2018-11-21

## 2018-11-27 ENCOUNTER — TELEPHONE (OUTPATIENT)
Dept: URGENT CARE | Facility: CLINIC | Age: 53
End: 2018-11-27

## 2018-11-27 DIAGNOSIS — B37.31 CANDIDA VAGINITIS: Primary | ICD-10-CM

## 2018-11-27 RX ORDER — FLUCONAZOLE 150 MG/1
150 TABLET ORAL ONCE
Qty: 1 TABLET | Refills: 0 | Status: SHIPPED | OUTPATIENT
Start: 2018-11-27 | End: 2018-11-27

## 2018-12-03 DIAGNOSIS — F41.9 ANXIETY: ICD-10-CM

## 2018-12-03 RX ORDER — MONTELUKAST SODIUM 10 MG/1
10 TABLET ORAL
Qty: 90 TABLET | Refills: 0 | Status: SHIPPED | OUTPATIENT
Start: 2018-12-03 | End: 2019-07-02 | Stop reason: SDUPTHER

## 2018-12-03 NOTE — TELEPHONE ENCOUNTER
PT WOULD LIKE A REFILL ON HER SINGULAIR AND ZOLOFT. [facility]. PAT CAN BE REACHED [phone]

## 2018-12-13 DIAGNOSIS — K51.911 ULCERATIVE COLITIS WITH RECTAL BLEEDING, UNSPECIFIED LOCATION (HCC): ICD-10-CM

## 2018-12-14 RX ORDER — MESALAMINE 1.2 G/1
TABLET, DELAYED RELEASE ORAL
Qty: 120 TABLET | Refills: 0 | Status: SHIPPED | OUTPATIENT
Start: 2018-12-14 | End: 2019-07-02 | Stop reason: SDUPTHER

## 2018-12-14 RX ORDER — LEVOTHYROXINE SODIUM 0.07 MG/1
TABLET ORAL
Qty: 30 TABLET | Refills: 0 | Status: SHIPPED | OUTPATIENT
Start: 2018-12-14 | End: 2019-01-10 | Stop reason: SDUPTHER

## 2019-01-10 RX ORDER — LEVOTHYROXINE SODIUM 0.07 MG/1
TABLET ORAL
Qty: 30 TABLET | Refills: 0 | Status: SHIPPED | OUTPATIENT
Start: 2019-01-10 | End: 2019-04-11 | Stop reason: SDUPTHER

## 2019-01-28 ENCOUNTER — TELEPHONE (OUTPATIENT)
Dept: URGENT CARE | Facility: CLINIC | Age: 54
End: 2019-01-28

## 2019-01-28 DIAGNOSIS — N76.0 ACUTE VAGINITIS: Primary | ICD-10-CM

## 2019-01-28 RX ORDER — FLUCONAZOLE 150 MG/1
150 TABLET ORAL ONCE
Qty: 1 TABLET | Refills: 0 | Status: SHIPPED | OUTPATIENT
Start: 2019-01-28 | End: 2019-01-28

## 2019-01-28 NOTE — TELEPHONE ENCOUNTER
Pt called to request Diflucan because she reports getting yeast infections with antibiotics. [facility], [facility]

## 2019-02-14 DIAGNOSIS — R73.03 PREDIABETES: ICD-10-CM

## 2019-02-14 RX ORDER — METFORMIN HYDROCHLORIDE 500 MG/1
TABLET, EXTENDED RELEASE ORAL
Qty: 180 TABLET | Refills: 0 | Status: SHIPPED | OUTPATIENT
Start: 2019-02-14 | End: 2019-05-31 | Stop reason: SDUPTHER

## 2019-02-21 ENCOUNTER — TELEPHONE (OUTPATIENT)
Dept: INTERNAL MEDICINE | Facility: CLINIC | Age: 54
End: 2019-02-21

## 2019-02-21 NOTE — TELEPHONE ENCOUNTER
PT WANTS TO SEE IF  CAN REFER HER TO A NEW [facility]; THE  SHE WAS SEEING IS EXTREMELY DIFFICULT TO GET IN WITH; SHE WOULD LIKE TO SEE DR. TERE JARQUIN WITH [facility]; THERE ADDRESS IS:    [address] [place] [address]    PHONE NUMBER     PLEASE CALL PT. ONCE REFERRAL IS COMPLETE

## 2019-03-07 DIAGNOSIS — K51.00 ULCERATIVE PANCOLITIS WITHOUT COMPLICATION (HCC): Primary | ICD-10-CM

## 2019-03-08 ENCOUNTER — TELEPHONE (OUTPATIENT)
Dept: INTERNAL MEDICINE | Facility: CLINIC | Age: 54
End: 2019-03-08

## 2019-03-08 NOTE — TELEPHONE ENCOUNTER
pts gastro appt was scheduled with the wrongdr   She wanted to see Dr Dixon    Please reschedule her appt and call her   pts number [phone]

## 2019-03-21 ENCOUNTER — TELEPHONE (OUTPATIENT)
Dept: GASTROENTEROLOGY | Facility: CLINIC | Age: 54
End: 2019-03-21

## 2019-04-08 DIAGNOSIS — F41.9 ANXIETY: ICD-10-CM

## 2019-04-11 RX ORDER — LEVOTHYROXINE SODIUM 0.07 MG/1
TABLET ORAL
Qty: 30 TABLET | Refills: 2 | Status: SHIPPED | OUTPATIENT
Start: 2019-04-11 | End: 2019-04-18 | Stop reason: SDUPTHER

## 2019-04-18 RX ORDER — LEVOTHYROXINE SODIUM 0.07 MG/1
75 TABLET ORAL DAILY
Qty: 30 TABLET | Refills: 5 | Status: SHIPPED | OUTPATIENT
Start: 2019-04-18 | End: 2019-05-31 | Stop reason: SDUPTHER

## 2019-04-18 NOTE — TELEPHONE ENCOUNTER
PATIENT WOULD LIKE TO GET A REFILL ON HER LEVOTHYROXINE MEDICATION AND SHE WOULD LIKE FOR IT TO BE SENT TO [facility] ON FILE.

## 2019-05-17 ENCOUNTER — HOSPITAL ENCOUNTER (EMERGENCY)
Facility: HOSPITAL | Age: 54
Discharge: HOME | End: 2019-05-17
Attending: EMERGENCY MEDICINE | Admitting: EMERGENCY MEDICINE

## 2019-05-17 VITALS
DIASTOLIC BLOOD PRESSURE: 64 MMHG | RESPIRATION RATE: 20 BRPM | OXYGEN SATURATION: 93 % | SYSTOLIC BLOOD PRESSURE: 129 MMHG | HEIGHT: 67 IN | WEIGHT: 245 LBS | TEMPERATURE: 98.4 F | HEART RATE: 95 BPM | BODY MASS INDEX: 38.45 KG/M2

## 2019-05-17 DIAGNOSIS — J06.9 VIRAL URI: Primary | ICD-10-CM

## 2019-05-17 DIAGNOSIS — R53.81 MALAISE: ICD-10-CM

## 2019-05-17 DIAGNOSIS — J02.9 PHARYNGITIS, UNSPECIFIED ETIOLOGY: ICD-10-CM

## 2019-05-17 LAB
ALBUMIN SERPL-MCNC: 3.9 G/DL (ref 3.5–5.2)
ALBUMIN/GLOB SERPL: 1.1 G/DL
ALP SERPL-CCNC: 85 U/L (ref 39–117)
ALT SERPL W P-5'-P-CCNC: 28 U/L (ref 1–33)
ANION GAP SERPL CALCULATED.3IONS-SCNC: 11 MMOL/L
AST SERPL-CCNC: 26 U/L (ref 1–32)
BACTERIA UR QL AUTO: ABNORMAL /HPF
BASOPHILS # BLD AUTO: 0.06 10*3/MM3 (ref 0–0.2)
BASOPHILS NFR BLD AUTO: 0.7 % (ref 0–1.5)
BILIRUB SERPL-MCNC: <0.2 MG/DL (ref 0.2–1.2)
BILIRUB UR QL STRIP: NEGATIVE
BUN BLD-MCNC: 10 MG/DL (ref 6–20)
BUN/CREAT SERPL: 12 (ref 7–25)
CALCIUM SPEC-SCNC: 8.8 MG/DL (ref 8.6–10.5)
CHLORIDE SERPL-SCNC: 102 MMOL/L (ref 98–107)
CLARITY UR: CLEAR
CO2 SERPL-SCNC: 25 MMOL/L (ref 22–29)
COLOR UR: YELLOW
CREAT BLD-MCNC: 0.83 MG/DL (ref 0.57–1)
DEPRECATED RDW RBC AUTO: 45.1 FL (ref 37–54)
EOSINOPHIL # BLD AUTO: 0.3 10*3/MM3 (ref 0–0.4)
EOSINOPHIL NFR BLD AUTO: 3.4 % (ref 0.3–6.2)
ERYTHROCYTE [DISTWIDTH] IN BLOOD BY AUTOMATED COUNT: 15.4 % (ref 12.3–15.4)
GFR SERPL CREATININE-BSD FRML MDRD: 71 ML/MIN/1.73
GLOBULIN UR ELPH-MCNC: 3.5 GM/DL
GLUCOSE BLD-MCNC: 90 MG/DL (ref 65–99)
GLUCOSE UR STRIP-MCNC: NEGATIVE MG/DL
HCT VFR BLD AUTO: 37.8 % (ref 34–46.6)
HGB BLD-MCNC: 11.7 G/DL (ref 12–15.9)
HGB UR QL STRIP.AUTO: ABNORMAL
HYALINE CASTS UR QL AUTO: ABNORMAL /LPF
IMM GRANULOCYTES # BLD AUTO: 0.02 10*3/MM3 (ref 0–0.05)
IMM GRANULOCYTES NFR BLD AUTO: 0.2 % (ref 0–0.5)
KETONES UR QL STRIP: NEGATIVE
LEUKOCYTE ESTERASE UR QL STRIP.AUTO: ABNORMAL
LYMPHOCYTES # BLD AUTO: 1.86 10*3/MM3 (ref 0.7–3.1)
LYMPHOCYTES NFR BLD AUTO: 21.3 % (ref 19.6–45.3)
MCH RBC QN AUTO: 24.4 PG (ref 26.6–33)
MCHC RBC AUTO-ENTMCNC: 31 G/DL (ref 31.5–35.7)
MCV RBC AUTO: 78.8 FL (ref 79–97)
MONOCYTES # BLD AUTO: 0.58 10*3/MM3 (ref 0.1–0.9)
MONOCYTES NFR BLD AUTO: 6.6 % (ref 5–12)
NEUTROPHILS # BLD AUTO: 5.94 10*3/MM3 (ref 1.7–7)
NEUTROPHILS NFR BLD AUTO: 68 % (ref 42.7–76)
NITRITE UR QL STRIP: NEGATIVE
PH UR STRIP.AUTO: 6 [PH] (ref 5–8)
PLATELET # BLD AUTO: 385 10*3/MM3 (ref 140–450)
PMV BLD AUTO: 10.3 FL (ref 6–12)
POTASSIUM BLD-SCNC: 4.3 MMOL/L (ref 3.5–5.2)
PROT SERPL-MCNC: 7.4 G/DL (ref 6–8.5)
PROT UR QL STRIP: NEGATIVE
RBC # BLD AUTO: 4.8 10*6/MM3 (ref 3.77–5.28)
RBC # UR: ABNORMAL /HPF
REF LAB TEST METHOD: ABNORMAL
S PYO AG THROAT QL: NEGATIVE
SODIUM BLD-SCNC: 138 MMOL/L (ref 136–145)
SP GR UR STRIP: 1.02 (ref 1–1.03)
SQUAMOUS #/AREA URNS HPF: ABNORMAL /HPF
UROBILINOGEN UR QL STRIP: ABNORMAL
WBC NRBC COR # BLD: 8.74 10*3/MM3 (ref 3.4–10.8)
WBC UR QL AUTO: ABNORMAL /HPF

## 2019-05-17 PROCEDURE — 99283 EMERGENCY DEPT VISIT LOW MDM: CPT

## 2019-05-17 PROCEDURE — 81001 URINALYSIS AUTO W/SCOPE: CPT | Performed by: EMERGENCY MEDICINE

## 2019-05-17 PROCEDURE — 80053 COMPREHEN METABOLIC PANEL: CPT | Performed by: EMERGENCY MEDICINE

## 2019-05-17 PROCEDURE — 85025 COMPLETE CBC W/AUTO DIFF WBC: CPT | Performed by: EMERGENCY MEDICINE

## 2019-05-17 PROCEDURE — 87880 STREP A ASSAY W/OPTIC: CPT | Performed by: EMERGENCY MEDICINE

## 2019-05-17 PROCEDURE — 87081 CULTURE SCREEN ONLY: CPT | Performed by: EMERGENCY MEDICINE

## 2019-05-19 LAB — BACTERIA SPEC AEROBE CULT: NORMAL

## 2019-05-31 ENCOUNTER — OFFICE VISIT (OUTPATIENT)
Dept: FAMILY MEDICINE CLINIC | Facility: CLINIC | Age: 54
End: 2019-05-31

## 2019-05-31 VITALS
HEART RATE: 77 BPM | HEIGHT: 67 IN | BODY MASS INDEX: 42.06 KG/M2 | TEMPERATURE: 96.9 F | SYSTOLIC BLOOD PRESSURE: 130 MMHG | DIASTOLIC BLOOD PRESSURE: 80 MMHG | WEIGHT: 268 LBS | RESPIRATION RATE: 18 BRPM | OXYGEN SATURATION: 94 %

## 2019-05-31 DIAGNOSIS — F41.9 ANXIETY: Primary | ICD-10-CM

## 2019-05-31 DIAGNOSIS — E66.01 OBESITY, CLASS III, BMI 40-49.9 (MORBID OBESITY) (HCC): ICD-10-CM

## 2019-05-31 DIAGNOSIS — R73.03 PREDIABETES: ICD-10-CM

## 2019-05-31 PROBLEM — E28.2 POLYCYSTIC OVARIES: Status: ACTIVE | Noted: 2019-05-31

## 2019-05-31 PROCEDURE — 99214 OFFICE O/P EST MOD 30 MIN: CPT | Performed by: FAMILY MEDICINE

## 2019-05-31 PROCEDURE — 36415 COLL VENOUS BLD VENIPUNCTURE: CPT | Performed by: FAMILY MEDICINE

## 2019-05-31 RX ORDER — ALPRAZOLAM 0.5 MG/1
0.5 TABLET ORAL 2 TIMES DAILY PRN
Qty: 30 TABLET | Refills: 0 | Status: SHIPPED | OUTPATIENT
Start: 2019-05-31 | End: 2019-07-19 | Stop reason: SDUPTHER

## 2019-05-31 RX ORDER — LEVOTHYROXINE SODIUM 0.07 MG/1
75 TABLET ORAL DAILY
Qty: 30 TABLET | Refills: 5 | Status: SHIPPED | OUTPATIENT
Start: 2019-05-31 | End: 2020-02-10 | Stop reason: SDUPTHER

## 2019-05-31 RX ORDER — METFORMIN HYDROCHLORIDE 500 MG/1
500 TABLET, EXTENDED RELEASE ORAL 2 TIMES DAILY
Qty: 60 TABLET | Refills: 3 | Status: SHIPPED | OUTPATIENT
Start: 2019-05-31 | End: 2019-08-01 | Stop reason: SDUPTHER

## 2019-05-31 RX ORDER — PANTOPRAZOLE SODIUM 40 MG/1
40 TABLET, DELAYED RELEASE ORAL DAILY
Qty: 30 TABLET | Refills: 3 | Status: SHIPPED | OUTPATIENT
Start: 2019-05-31 | End: 2020-01-15

## 2019-05-31 NOTE — PROGRESS NOTES
Subjective   Brigid Coelho is a 54 y.o. female.   Establish care- Used to be seen by Dr Moore  1. Has a history of anxiety; used to be seen by a counselor but has problem with insurance now. Needs a rf of meds. Uses Zoloft some relief. Also uses xanax prn.  2. Concerned about weight; not currently interested in weight loss surgery. Has a history of UC and follows with GI.   3. Pt is prediabetic. Pt is on metformin. Last labs in 9/18.  Follows with Endocrine regarding thyroid.  Anxiety   Presents for follow-up visit. Symptoms include insomnia. Patient reports no chest pain, depressed mood, dizziness, excessive worry, irritability, nausea, nervous/anxious behavior, palpitations, restlessness, shortness of breath or suicidal ideas. Symptoms occur rarely. The severity of symptoms is mild. The patient sleeps 6 hours per night. The quality of sleep is good. Nighttime awakenings: occasional.     Compliance with medications is %.        The following portions of the patient's history were reviewed and updated as appropriate: allergies, current medications, past family history, past medical history, past social history, past surgical history and problem list.  Vitals:    05/31/19 1047   BP: 130/80   Pulse: 77   Resp: 18   Temp: 96.9 °F (36.1 °C)   SpO2: 94%     Body mass index is 41.97 kg/m².  Review of Systems   Constitutional: Negative.  Negative for activity change, fatigue, fever, irritability, unexpected weight gain and unexpected weight loss.   HENT: Negative.  Negative for congestion, sneezing and sore throat.    Eyes: Negative.  Negative for blurred vision, double vision and visual disturbance.   Respiratory: Negative.  Negative for cough, chest tightness, shortness of breath and wheezing.    Cardiovascular: Negative.  Negative for chest pain, palpitations and leg swelling.   Gastrointestinal: Negative.  Negative for abdominal distention, abdominal pain, blood in stool, constipation, diarrhea and  nausea.   Endocrine: Negative.  Negative for cold intolerance and heat intolerance.   Genitourinary: Negative.  Negative for urinary incontinence, dysuria, frequency and urgency.   Musculoskeletal: Negative.  Negative for arthralgias and myalgias.   Skin: Negative.  Negative for rash.   Allergic/Immunologic: Negative.    Neurological: Negative.  Negative for dizziness, syncope, numbness and memory problem.   Hematological: Negative.  Negative for adenopathy.   Psychiatric/Behavioral: Negative for suicidal ideas and depressed mood. The patient has insomnia. The patient is not nervous/anxious.    All other systems reviewed and are negative.      Objective   Physical Exam   Constitutional: She is oriented to person, place, and time. She appears well-developed and well-nourished. No distress.   HENT:   Right Ear: External ear normal.   Left Ear: External ear normal.   Nose: Nose normal.   Mouth/Throat: Oropharynx is clear and moist.   Eyes: Conjunctivae and EOM are normal. Pupils are equal, round, and reactive to light.   Neck: Normal range of motion. Neck supple. No thyromegaly present.   Cardiovascular: Normal rate, regular rhythm and normal heart sounds.   No murmur heard.  Pulmonary/Chest: Effort normal and breath sounds normal. No respiratory distress. She has no wheezes.   Abdominal: Soft. Bowel sounds are normal. She exhibits no distension and no mass. There is no tenderness. There is no rebound and no guarding. No hernia.   Musculoskeletal: Normal range of motion. She exhibits no edema or tenderness.   Lymphadenopathy:     She has no cervical adenopathy.   Neurological: She is alert and oriented to person, place, and time. She has normal reflexes.   Skin: Skin is warm and dry. No rash noted. She is not diaphoretic. No erythema. No pallor.   Psychiatric: She has a normal mood and affect. Her behavior is normal. Judgment and thought content normal.   Nursing note and vitals reviewed.          Assessment/Plan    Brigid was seen today for establish care.    Diagnoses and all orders for this visit:    Anxiety  -     sertraline (ZOLOFT) 50 MG tablet; Take 1 tablet by mouth Daily.  -     Comprehensive Metabolic Panel    Obesity, Class III, BMI 40-49.9 (morbid obesity) (CMS/[facility])    Prediabetes  -     metFORMIN ER (GLUCOPHAGE-XR) 500 MG 24 hr tablet; Take 1 tablet by mouth 2 (Two) Times a Day.  -     CBC & Differential  -     Insulin, Total  -     Hemoglobin A1c    Other orders  -     pantoprazole (PROTONIX) 40 MG EC tablet; Take 1 tablet by mouth Daily.  -     ALPRAZolam (XANAX) 0.5 MG tablet; Take 1 tablet by mouth 2 (Two) Times a Day As Needed for Anxiety.  -     levothyroxine (SYNTHROID, LEVOTHROID) 75 MCG tablet; Take 1 tablet by mouth Daily.      Discussed with patient counseling on nutrition. Discussed in detail recommendations regarding decreasing animal fat and dairy and increasing fruits and vegetables. Discussed cutting refined sugars and white breads. Recommend beans, legumes, whole grains, nuts and seeds. Recommend cutting all processed foods. Given handouts from physicians committee on responsible medicine.     Rec. Counseling for anxiety.

## 2019-06-01 LAB
ALBUMIN SERPL-MCNC: 4.3 G/DL (ref 3.5–5.2)
ALBUMIN/GLOB SERPL: 1.4 G/DL
ALP SERPL-CCNC: 89 U/L (ref 39–117)
ALT SERPL-CCNC: 23 U/L (ref 1–33)
AST SERPL-CCNC: 17 U/L (ref 1–32)
BASOPHILS # BLD AUTO: 0.09 10*3/MM3 (ref 0–0.2)
BASOPHILS NFR BLD AUTO: 1.1 % (ref 0–1.5)
BILIRUB SERPL-MCNC: <0.2 MG/DL (ref 0.2–1.2)
BUN SERPL-MCNC: 10 MG/DL (ref 6–20)
BUN/CREAT SERPL: 16.4 (ref 7–25)
CALCIUM SERPL-MCNC: 9.7 MG/DL (ref 8.6–10.5)
CHLORIDE SERPL-SCNC: 104 MMOL/L (ref 98–107)
CO2 SERPL-SCNC: 26.4 MMOL/L (ref 22–29)
CREAT SERPL-MCNC: 0.61 MG/DL (ref 0.57–1)
EOSINOPHIL # BLD AUTO: 0.26 10*3/MM3 (ref 0–0.4)
EOSINOPHIL NFR BLD AUTO: 3.3 % (ref 0.3–6.2)
ERYTHROCYTE [DISTWIDTH] IN BLOOD BY AUTOMATED COUNT: 16 % (ref 12.3–15.4)
GLOBULIN SER CALC-MCNC: 3 GM/DL
GLUCOSE SERPL-MCNC: 93 MG/DL (ref 65–99)
HBA1C MFR BLD: 5.8 % (ref 4.8–5.6)
HCT VFR BLD AUTO: 38 % (ref 34–46.6)
HGB BLD-MCNC: 11 G/DL (ref 12–15.9)
IMM GRANULOCYTES # BLD AUTO: 0.03 10*3/MM3 (ref 0–0.05)
IMM GRANULOCYTES NFR BLD AUTO: 0.4 % (ref 0–0.5)
INSULIN SERPL-ACNC: 36.4 UIU/ML (ref 2.6–24.9)
LYMPHOCYTES # BLD AUTO: 1.68 10*3/MM3 (ref 0.7–3.1)
LYMPHOCYTES NFR BLD AUTO: 21.1 % (ref 19.6–45.3)
MCH RBC QN AUTO: 23.8 PG (ref 26.6–33)
MCHC RBC AUTO-ENTMCNC: 28.9 G/DL (ref 31.5–35.7)
MCV RBC AUTO: 82.3 FL (ref 79–97)
MONOCYTES # BLD AUTO: 0.58 10*3/MM3 (ref 0.1–0.9)
MONOCYTES NFR BLD AUTO: 7.3 % (ref 5–12)
NEUTROPHILS # BLD AUTO: 5.31 10*3/MM3 (ref 1.7–7)
NEUTROPHILS NFR BLD AUTO: 66.8 % (ref 42.7–76)
NRBC BLD AUTO-RTO: 0 /100 WBC (ref 0–0.2)
PLATELET # BLD AUTO: 381 10*3/MM3 (ref 140–450)
POTASSIUM SERPL-SCNC: 4.1 MMOL/L (ref 3.5–5.2)
PROT SERPL-MCNC: 7.3 G/DL (ref 6–8.5)
RBC # BLD AUTO: 4.62 10*6/MM3 (ref 3.77–5.28)
SODIUM SERPL-SCNC: 140 MMOL/L (ref 136–145)
WBC # BLD AUTO: 7.95 10*3/MM3 (ref 3.4–10.8)

## 2019-06-03 ENCOUNTER — TELEPHONE (OUTPATIENT)
Dept: FAMILY MEDICINE CLINIC | Facility: CLINIC | Age: 54
End: 2019-06-03

## 2019-06-03 NOTE — TELEPHONE ENCOUNTER
LVMOM for pt call back.    ----- Message from Jesus Wood sent at 6/3/2019  2:29 PM EDT -----  Regarding: METFORMIN  Contact: [phone]  PLEASE CALL PT METFORMIN NEEDS TO BE ADJUSTED AND ALSO SHE WAS CURIOUS ABOUT HER LAB RESULTS.

## 2019-06-10 RX ORDER — MONTELUKAST SODIUM 10 MG/1
TABLET ORAL
Qty: 90 TABLET | Refills: 0 | OUTPATIENT
Start: 2019-06-10

## 2019-07-02 DIAGNOSIS — K51.911 ULCERATIVE COLITIS WITH RECTAL BLEEDING, UNSPECIFIED LOCATION (HCC): ICD-10-CM

## 2019-07-02 RX ORDER — MONTELUKAST SODIUM 10 MG/1
TABLET ORAL
Qty: 90 TABLET | Refills: 0 | Status: SHIPPED | OUTPATIENT
Start: 2019-07-02 | End: 2019-09-27 | Stop reason: SDUPTHER

## 2019-07-02 RX ORDER — MESALAMINE 1.2 G/1
TABLET, DELAYED RELEASE ORAL
Qty: 120 TABLET | Refills: 0 | Status: SHIPPED | OUTPATIENT
Start: 2019-07-02 | End: 2020-03-16

## 2019-07-17 PROBLEM — N30.01 ACUTE CYSTITIS WITH HEMATURIA: Status: ACTIVE | Noted: 2019-07-17

## 2019-07-17 PROCEDURE — 87086 URINE CULTURE/COLONY COUNT: CPT | Performed by: NURSE PRACTITIONER

## 2019-07-17 PROCEDURE — 87147 CULTURE TYPE IMMUNOLOGIC: CPT | Performed by: NURSE PRACTITIONER

## 2019-07-19 ENCOUNTER — OFFICE VISIT (OUTPATIENT)
Dept: FAMILY MEDICINE CLINIC | Facility: CLINIC | Age: 54
End: 2019-07-19

## 2019-07-19 VITALS
OXYGEN SATURATION: 98 % | SYSTOLIC BLOOD PRESSURE: 110 MMHG | DIASTOLIC BLOOD PRESSURE: 62 MMHG | HEART RATE: 100 BPM | BODY MASS INDEX: 41.75 KG/M2 | WEIGHT: 266 LBS | TEMPERATURE: 97.5 F | HEIGHT: 67 IN | RESPIRATION RATE: 18 BRPM

## 2019-07-19 DIAGNOSIS — F41.9 ANXIETY: Primary | ICD-10-CM

## 2019-07-19 PROCEDURE — 99214 OFFICE O/P EST MOD 30 MIN: CPT | Performed by: FAMILY MEDICINE

## 2019-07-19 RX ORDER — ALPRAZOLAM 0.5 MG/1
0.5 TABLET ORAL 2 TIMES DAILY PRN
Qty: 30 TABLET | Refills: 0 | Status: SHIPPED | OUTPATIENT
Start: 2019-07-19 | End: 2019-09-16 | Stop reason: SDUPTHER

## 2019-07-19 NOTE — PROGRESS NOTES
Subjective   Brigid Coelho is a 54 y.o. female.     Anxiety   Presents for follow-up (on Zoloft; still has anxiety, uses xanax prn) visit. Symptoms include insomnia. Patient reports no chest pain, depressed mood, dizziness, excessive worry, irritability, nausea, nervous/anxious behavior, palpitations, restlessness, shortness of breath or suicidal ideas. Symptoms occur rarely. The severity of symptoms is mild. The patient sleeps 6 hours per night. The quality of sleep is good. Nighttime awakenings: occasional.     Compliance with medications is %.   Takes metformin and feels that her BS is lowering.    The following portions of the patient's history were reviewed and updated as appropriate: allergies, current medications, past family history, past medical history, past social history, past surgical history and problem list.  Vitals:    07/19/19 1207   BP: 110/62   Pulse: 100   Resp: 18   Temp: 97.5 °F (36.4 °C)   SpO2: 98%     Body mass index is 41.66 kg/m².  Review of Systems   Constitutional: Negative.  Negative for activity change, fatigue, fever, irritability, unexpected weight gain and unexpected weight loss.   HENT: Negative.  Negative for congestion, sneezing and sore throat.    Eyes: Negative.  Negative for blurred vision, double vision and visual disturbance.   Respiratory: Negative.  Negative for cough, chest tightness, shortness of breath and wheezing.    Cardiovascular: Negative.  Negative for chest pain, palpitations and leg swelling.   Gastrointestinal: Negative.  Negative for abdominal distention, abdominal pain, blood in stool, constipation, diarrhea and nausea.   Endocrine: Negative.  Negative for cold intolerance and heat intolerance.   Genitourinary: Negative.  Negative for urinary incontinence, dysuria, frequency and urgency.   Musculoskeletal: Negative.  Negative for arthralgias and myalgias.   Skin: Negative.  Negative for rash.   Allergic/Immunologic: Negative.    Neurological:  Negative.  Negative for dizziness, syncope, numbness and memory problem.   Hematological: Negative.  Negative for adenopathy.   Psychiatric/Behavioral: Negative for suicidal ideas and depressed mood. The patient has insomnia. The patient is not nervous/anxious.    All other systems reviewed and are negative.      Objective   Physical Exam   Constitutional: She is oriented to person, place, and time. She appears well-developed and well-nourished. No distress.   HENT:   Right Ear: External ear normal.   Left Ear: External ear normal.   Nose: Nose normal.   Mouth/Throat: Oropharynx is clear and moist.   Eyes: Conjunctivae and EOM are normal. Pupils are equal, round, and reactive to light.   Neck: Normal range of motion. Neck supple. No thyromegaly present.   Cardiovascular: Normal rate, regular rhythm and normal heart sounds.   No murmur heard.  Pulmonary/Chest: Effort normal and breath sounds normal. No respiratory distress. She has no wheezes.   Abdominal: Soft. Bowel sounds are normal. She exhibits no distension and no mass. There is no tenderness. There is no rebound and no guarding. No hernia.   Musculoskeletal: Normal range of motion. She exhibits no edema or tenderness.   Lymphadenopathy:     She has no cervical adenopathy.   Neurological: She is alert and oriented to person, place, and time. She has normal reflexes.   Skin: Skin is warm and dry. No rash noted. She is not diaphoretic. No erythema. No pallor.   Psychiatric: She has a normal mood and affect. Her behavior is normal. Judgment and thought content normal.   Nursing note and vitals reviewed.          Assessment/Plan   Brigid was seen today for anxiety.    Diagnoses and all orders for this visit:    Anxiety  -     sertraline (ZOLOFT) 50 MG tablet; Take 1 and 1/2 tab qd  -     ALPRAZolam (XANAX) 0.5 MG tablet; Take 1 tablet by mouth 2 (Two) Times a Day As Needed for Anxiety.    Increase Zoloft to 75 mg qd

## 2019-07-22 ENCOUNTER — TELEPHONE (OUTPATIENT)
Dept: URGENT CARE | Facility: CLINIC | Age: 54
End: 2019-07-22

## 2019-07-23 ENCOUNTER — TELEPHONE (OUTPATIENT)
Dept: URGENT CARE | Facility: CLINIC | Age: 54
End: 2019-07-23

## 2019-07-24 ENCOUNTER — TELEPHONE (OUTPATIENT)
Dept: URGENT CARE | Facility: CLINIC | Age: 54
End: 2019-07-24

## 2019-07-30 ENCOUNTER — APPOINTMENT (OUTPATIENT)
Dept: CT IMAGING | Facility: HOSPITAL | Age: 54
End: 2019-07-30

## 2019-07-30 ENCOUNTER — APPOINTMENT (OUTPATIENT)
Dept: GENERAL RADIOLOGY | Facility: HOSPITAL | Age: 54
End: 2019-07-30

## 2019-07-30 ENCOUNTER — HOSPITAL ENCOUNTER (EMERGENCY)
Facility: HOSPITAL | Age: 54
Discharge: HOME | End: 2019-07-31
Attending: EMERGENCY MEDICINE | Admitting: EMERGENCY MEDICINE

## 2019-07-30 DIAGNOSIS — Z86.39 HISTORY OF HASHIMOTO THYROIDITIS: ICD-10-CM

## 2019-07-30 DIAGNOSIS — Z86.59 HISTORY OF ANXIETY: ICD-10-CM

## 2019-07-30 DIAGNOSIS — R07.89 ATYPICAL CHEST PAIN: Primary | ICD-10-CM

## 2019-07-30 DIAGNOSIS — Z87.42 HISTORY OF PCOS: ICD-10-CM

## 2019-07-30 DIAGNOSIS — E66.01 MORBID OBESITY (HCC): ICD-10-CM

## 2019-07-30 DIAGNOSIS — R91.1 PULMONARY NODULE: ICD-10-CM

## 2019-07-30 LAB
ALBUMIN SERPL-MCNC: 3.8 G/DL (ref 3.5–5.2)
ALBUMIN/GLOB SERPL: 1.2 G/DL
ALP SERPL-CCNC: 89 U/L (ref 39–117)
ALT SERPL W P-5'-P-CCNC: 23 U/L (ref 1–33)
ANION GAP SERPL CALCULATED.3IONS-SCNC: 12 MMOL/L (ref 5–15)
AST SERPL-CCNC: 22 U/L (ref 1–32)
BASOPHILS # BLD AUTO: 0.07 10*3/MM3 (ref 0–0.2)
BASOPHILS NFR BLD AUTO: 0.8 % (ref 0–1.5)
BILIRUB SERPL-MCNC: <0.2 MG/DL (ref 0.2–1.2)
BUN BLD-MCNC: 12 MG/DL (ref 6–20)
BUN/CREAT SERPL: 17.4 (ref 7–25)
CALCIUM SPEC-SCNC: 8.6 MG/DL (ref 8.6–10.5)
CHLORIDE SERPL-SCNC: 103 MMOL/L (ref 98–107)
CO2 SERPL-SCNC: 26 MMOL/L (ref 22–29)
CREAT BLD-MCNC: 0.69 MG/DL (ref 0.57–1)
DEPRECATED RDW RBC AUTO: 44.5 FL (ref 37–54)
EOSINOPHIL # BLD AUTO: 0.27 10*3/MM3 (ref 0–0.4)
EOSINOPHIL NFR BLD AUTO: 2.9 % (ref 0.3–6.2)
ERYTHROCYTE [DISTWIDTH] IN BLOOD BY AUTOMATED COUNT: 16 % (ref 12.3–15.4)
GFR SERPL CREATININE-BSD FRML MDRD: 88 ML/MIN/1.73
GLOBULIN UR ELPH-MCNC: 3.3 GM/DL
GLUCOSE BLD-MCNC: 89 MG/DL (ref 65–99)
HCT VFR BLD AUTO: 35.5 % (ref 34–46.6)
HGB BLD-MCNC: 10.5 G/DL (ref 12–15.9)
HOLD SPECIMEN: NORMAL
HOLD SPECIMEN: NORMAL
IMM GRANULOCYTES # BLD AUTO: 0.03 10*3/MM3 (ref 0–0.05)
IMM GRANULOCYTES NFR BLD AUTO: 0.3 % (ref 0–0.5)
LIPASE SERPL-CCNC: 27 U/L (ref 13–60)
LYMPHOCYTES # BLD AUTO: 2.18 10*3/MM3 (ref 0.7–3.1)
LYMPHOCYTES NFR BLD AUTO: 23.8 % (ref 19.6–45.3)
MCH RBC QN AUTO: 23 PG (ref 26.6–33)
MCHC RBC AUTO-ENTMCNC: 29.6 G/DL (ref 31.5–35.7)
MCV RBC AUTO: 77.7 FL (ref 79–97)
MONOCYTES # BLD AUTO: 0.6 10*3/MM3 (ref 0.1–0.9)
MONOCYTES NFR BLD AUTO: 6.5 % (ref 5–12)
NEUTROPHILS # BLD AUTO: 6.02 10*3/MM3 (ref 1.7–7)
NEUTROPHILS NFR BLD AUTO: 65.7 % (ref 42.7–76)
NRBC BLD AUTO-RTO: 0 /100 WBC (ref 0–0.2)
NT-PROBNP SERPL-MCNC: 155.7 PG/ML (ref 5–900)
PLATELET # BLD AUTO: 390 10*3/MM3 (ref 140–450)
PMV BLD AUTO: 10.1 FL (ref 6–12)
POTASSIUM BLD-SCNC: 3.7 MMOL/L (ref 3.5–5.2)
PROT SERPL-MCNC: 7.1 G/DL (ref 6–8.5)
RBC # BLD AUTO: 4.57 10*6/MM3 (ref 3.77–5.28)
SODIUM BLD-SCNC: 141 MMOL/L (ref 136–145)
TROPONIN T SERPL-MCNC: <0.01 NG/ML (ref 0–0.03)
TROPONIN T SERPL-MCNC: <0.01 NG/ML (ref 0–0.03)
WBC NRBC COR # BLD: 9.17 10*3/MM3 (ref 3.4–10.8)
WHOLE BLOOD HOLD SPECIMEN: NORMAL
WHOLE BLOOD HOLD SPECIMEN: NORMAL

## 2019-07-30 PROCEDURE — 96375 TX/PRO/DX INJ NEW DRUG ADDON: CPT

## 2019-07-30 PROCEDURE — 85025 COMPLETE CBC W/AUTO DIFF WBC: CPT | Performed by: EMERGENCY MEDICINE

## 2019-07-30 PROCEDURE — 25010000002 MORPHINE PER 10 MG: Performed by: EMERGENCY MEDICINE

## 2019-07-30 PROCEDURE — 96374 THER/PROPH/DIAG INJ IV PUSH: CPT

## 2019-07-30 PROCEDURE — 71275 CT ANGIOGRAPHY CHEST: CPT

## 2019-07-30 PROCEDURE — 83880 ASSAY OF NATRIURETIC PEPTIDE: CPT | Performed by: EMERGENCY MEDICINE

## 2019-07-30 PROCEDURE — 0 IOPAMIDOL PER 1 ML: Performed by: EMERGENCY MEDICINE

## 2019-07-30 PROCEDURE — 71045 X-RAY EXAM CHEST 1 VIEW: CPT

## 2019-07-30 PROCEDURE — 80053 COMPREHEN METABOLIC PANEL: CPT | Performed by: EMERGENCY MEDICINE

## 2019-07-30 PROCEDURE — 99284 EMERGENCY DEPT VISIT MOD MDM: CPT

## 2019-07-30 PROCEDURE — 84484 ASSAY OF TROPONIN QUANT: CPT | Performed by: EMERGENCY MEDICINE

## 2019-07-30 PROCEDURE — 93005 ELECTROCARDIOGRAM TRACING: CPT | Performed by: EMERGENCY MEDICINE

## 2019-07-30 PROCEDURE — 25010000002 KETOROLAC TROMETHAMINE PER 15 MG: Performed by: PHYSICIAN ASSISTANT

## 2019-07-30 PROCEDURE — 25010000002 ONDANSETRON PER 1 MG: Performed by: EMERGENCY MEDICINE

## 2019-07-30 PROCEDURE — 83690 ASSAY OF LIPASE: CPT | Performed by: EMERGENCY MEDICINE

## 2019-07-30 RX ORDER — ONDANSETRON 2 MG/ML
4 INJECTION INTRAMUSCULAR; INTRAVENOUS ONCE
Status: COMPLETED | OUTPATIENT
Start: 2019-07-30 | End: 2019-07-30

## 2019-07-30 RX ORDER — ASPIRIN 81 MG/1
324 TABLET, CHEWABLE ORAL ONCE
Status: DISCONTINUED | OUTPATIENT
Start: 2019-07-30 | End: 2019-07-30

## 2019-07-30 RX ORDER — MORPHINE SULFATE 4 MG/ML
2 INJECTION, SOLUTION INTRAMUSCULAR; INTRAVENOUS ONCE
Status: COMPLETED | OUTPATIENT
Start: 2019-07-30 | End: 2019-07-30

## 2019-07-30 RX ORDER — SODIUM CHLORIDE 0.9 % (FLUSH) 0.9 %
10 SYRINGE (ML) INJECTION AS NEEDED
Status: DISCONTINUED | OUTPATIENT
Start: 2019-07-30 | End: 2019-07-31 | Stop reason: HOSPADM

## 2019-07-30 RX ORDER — KETOROLAC TROMETHAMINE 15 MG/ML
15 INJECTION, SOLUTION INTRAMUSCULAR; INTRAVENOUS ONCE
Status: COMPLETED | OUTPATIENT
Start: 2019-07-30 | End: 2019-07-30

## 2019-07-30 RX ADMIN — MORPHINE SULFATE 2 MG: 4 INJECTION INTRAVENOUS at 21:02

## 2019-07-30 RX ADMIN — IOPAMIDOL 71 ML: 755 INJECTION, SOLUTION INTRAVENOUS at 21:13

## 2019-07-30 RX ADMIN — KETOROLAC TROMETHAMINE 15 MG: 15 INJECTION, SOLUTION INTRAMUSCULAR; INTRAVENOUS at 23:07

## 2019-07-30 RX ADMIN — ONDANSETRON 4 MG: 2 INJECTION INTRAMUSCULAR; INTRAVENOUS at 21:00

## 2019-07-31 VITALS
HEART RATE: 74 BPM | SYSTOLIC BLOOD PRESSURE: 123 MMHG | DIASTOLIC BLOOD PRESSURE: 74 MMHG | RESPIRATION RATE: 16 BRPM | HEIGHT: 67 IN | OXYGEN SATURATION: 95 % | TEMPERATURE: 97.9 F | BODY MASS INDEX: 39.24 KG/M2 | WEIGHT: 250 LBS

## 2019-08-01 ENCOUNTER — TELEPHONE (OUTPATIENT)
Dept: PULMONOLOGY | Facility: CLINIC | Age: 54
End: 2019-08-01

## 2019-08-01 DIAGNOSIS — R73.03 PREDIABETES: ICD-10-CM

## 2019-08-01 NOTE — TELEPHONE ENCOUNTER
I received a voicemail from Ms. Coelho regarding the scheduling of her new patient appointment in our office. She stated she was still very short of breath with extreme chest pain above her right breast; she stated she was very short of breath after vacuuming her house and had to stop. I returned her call this morning and explained why I had scheduled her with Dr. Hogan, and not an APRN, due to the referral diagnosis of a lung nodule. After discussing her symptoms with my clinic lead, Kendy Lowry, I advised the patient to keep her current scheduled appointments (8/2/2019 PFT & 8/9/2019 DR) but that if her symptoms continued to worsen, she would need to go to the ER. She verbalized understanding and agreed to our plan.

## 2019-08-02 ENCOUNTER — OFFICE VISIT (OUTPATIENT)
Dept: FAMILY MEDICINE CLINIC | Facility: CLINIC | Age: 54
End: 2019-08-02

## 2019-08-02 ENCOUNTER — OFFICE VISIT (OUTPATIENT)
Dept: PULMONOLOGY | Facility: CLINIC | Age: 54
End: 2019-08-02

## 2019-08-02 VITALS
DIASTOLIC BLOOD PRESSURE: 64 MMHG | SYSTOLIC BLOOD PRESSURE: 125 MMHG | HEART RATE: 104 BPM | TEMPERATURE: 96.8 F | HEIGHT: 67 IN | WEIGHT: 270.2 LBS | OXYGEN SATURATION: 98 % | BODY MASS INDEX: 42.41 KG/M2 | RESPIRATION RATE: 18 BRPM

## 2019-08-02 DIAGNOSIS — R91.1 LUNG NODULE: Primary | ICD-10-CM

## 2019-08-02 DIAGNOSIS — R05.9 COUGH: Primary | ICD-10-CM

## 2019-08-02 LAB
HCT VFR BLDA CALC: 34.9 % (ref 38–51)
HGB BLDA-MCNC: 11.2 G/DL (ref 12–17)
MCH, POC: 23.6
MCHC, POC: 32.1
MCV, POC: 73.7
PLATELET # BLD AUTO: 372 10*3/MM3
PMV BLD: 8.1 FL
RBC, POC: 4.74
RDW, POC: 16.8
WBC # BLD: 9.1 10*3/UL

## 2019-08-02 PROCEDURE — 94729 DIFFUSING CAPACITY: CPT | Performed by: INTERNAL MEDICINE

## 2019-08-02 PROCEDURE — 85027 COMPLETE CBC AUTOMATED: CPT | Performed by: NURSE PRACTITIONER

## 2019-08-02 PROCEDURE — 99213 OFFICE O/P EST LOW 20 MIN: CPT | Performed by: NURSE PRACTITIONER

## 2019-08-02 PROCEDURE — 94375 RESPIRATORY FLOW VOLUME LOOP: CPT | Performed by: INTERNAL MEDICINE

## 2019-08-02 PROCEDURE — 94726 PLETHYSMOGRAPHY LUNG VOLUMES: CPT | Performed by: INTERNAL MEDICINE

## 2019-08-02 RX ORDER — ALBUTEROL SULFATE 90 UG/1
2 AEROSOL, METERED RESPIRATORY (INHALATION) EVERY 6 HOURS PRN
Qty: 1 INHALER | Refills: 1 | Status: SHIPPED | OUTPATIENT
Start: 2019-08-02 | End: 2020-03-20 | Stop reason: SDUPTHER

## 2019-08-02 RX ORDER — METFORMIN HYDROCHLORIDE 500 MG/1
500 TABLET, EXTENDED RELEASE ORAL 2 TIMES DAILY
Qty: 60 TABLET | Refills: 1 | Status: SHIPPED | OUTPATIENT
Start: 2019-08-02 | End: 2019-08-05 | Stop reason: SDUPTHER

## 2019-08-02 NOTE — PATIENT INSTRUCTIONS
Pulmonary Nodule  A pulmonary nodule is tissue that has grown on your lung. A nodule may be cancer, but most nodules are not cancer.  Follow these instructions at home:    · Take over-the-counter and prescription medicines only as told by your doctor.  · Do not use any products that have nicotine or tobacco, such as cigarettes and e-cigarettes. If you need help quitting, ask your doctor.  · Keep all follow-up visits as told by your doctor. This is important.  Contact a doctor if:  · You have trouble breathing when doing activities.  · You feel sick.  · You feel more tired than normal.  · You do not feel like eating.  · You lose weight without trying.  · You have chills.  · You have night sweats.  Get help right away if:  · You cannot catch your breath.  · You start making whistling sounds when breathing (wheezing).  · You cannot stop coughing.  · You cough up blood.  · You get dizzy.  · You feel like you are going to pass out (faint).  · You have sudden chest pain.  · You have a fever or symptoms for more than 2-3 days.  · You have a fever and your symptoms suddenly get worse.  Summary  · A pulmonary nodule is tissue that has grown on your lung.  · Most nodules are not cancer.  · Your doctor will do tests to know what kind of nodule you have, and whether you need treatment for it.  This information is not intended to replace advice given to you by your health care provider. Make sure you discuss any questions you have with your health care provider.  Document Released: 01/20/2012 Document Revised: 01/16/2018 Document Reviewed: 01/16/2018  [employer] Interactive Patient Education © 2019 [employer]  Living With Anxiety    After being diagnosed with an anxiety disorder, you may be relieved to know why you have felt or behaved a certain way. It is natural to also feel overwhelmed about the treatment ahead and what it will mean for your life. With care and support, you can manage this condition and recover from it.  How  to cope with anxiety  Dealing with stress  Stress is your body’s reaction to life changes and events, both good and bad. Stress can last just a few hours or it can be ongoing. Stress can play a major role in anxiety, so it is important to learn both how to cope with stress and how to think about it differently.  Talk with your health care provider or a counselor to learn more about stress reduction. He or she may suggest some stress reduction techniques, such as:  · Music therapy. This can include creating or listening to music that you enjoy and that inspires you.  · Mindfulness-based meditation. This involves being aware of your normal breaths, rather than trying to control your breathing. It can be done while sitting or walking.  · Centering prayer. This is a kind of meditation that involves focusing on a word, phrase, or sacred image that is meaningful to you and that brings you peace.  · Deep breathing. To do this, expand your stomach and inhale slowly through your nose. Hold your breath for 3-5 seconds. Then exhale slowly, allowing your stomach muscles to relax.  · Self-talk. This is a skill where you identify thought patterns that lead to anxiety reactions and correct those thoughts.  · Muscle relaxation. This involves tensing muscles then relaxing them.  Choose a stress reduction technique that fits your lifestyle and personality. Stress reduction techniques take time and practice. Set aside 5-15 minutes a day to do them. Therapists can offer training in these techniques. The training may be covered by some insurance plans. Other things you can do to manage stress include:  · Keeping a stress diary. This can help you learn what triggers your stress and ways to control your response.  · Thinking about how you respond to certain situations. You may not be able to control everything, but you can control your reaction.  · Making time for activities that help you relax, and not feeling guilty about spending your  time in this way.  Therapy combined with coping and stress-reduction skills provides the best chance for successful treatment.  Medicines  Medicines can help ease symptoms. Medicines for anxiety include:  · Anti-anxiety drugs.  · Antidepressants.  · Beta-blockers.  Medicines may be used as the main treatment for anxiety disorder, along with therapy, or if other treatments are not working. Medicines should be prescribed by a health care provider.  Relationships  Relationships can play a big part in helping you recover. Try to spend more time connecting with trusted friends and family members. Consider going to couples counseling, taking family education classes, or going to family therapy. Therapy can help you and others better understand the condition.  How to recognize changes in your condition  Everyone has a different response to treatment for anxiety. Recovery from anxiety happens when symptoms decrease and stop interfering with your daily activities at home or work. This may mean that you will start to:  · Have better concentration and focus.  · Sleep better.  · Be less irritable.  · Have more energy.  · Have improved memory.  It is important to recognize when your condition is getting worse. Contact your health care provider if your symptoms interfere with home or work and you do not feel like your condition is improving.  Where to find help and support:  You can get help and support from these sources:  · Self-help groups.  · Online and community organizations.  · A trusted spiritual leader.  · Couples counseling.  · Family education classes.  · Family therapy.  Follow these instructions at home:  · Eat a healthy diet that includes plenty of vegetables, fruits, whole grains, low-fat dairy products, and lean protein. Do not eat a lot of foods that are high in solid fats, added sugars, or salt.  · Exercise. Most adults should do the following:  ? Exercise for at least 150 minutes each week. The exercise should  increase your heart rate and make you sweat (moderate-intensity exercise).  ? Strengthening exercises at least twice a week.  · Cut down on caffeine, tobacco, alcohol, and other potentially harmful substances.  · Get the right amount and quality of sleep. Most adults need 7-9 hours of sleep each night.  · Make choices that simplify your life.  · Take over-the-counter and prescription medicines only as told by your health care provider.  · Avoid caffeine, alcohol, and certain over-the-counter cold medicines. These may make you feel worse. Ask your pharmacist which medicines to avoid.  · Keep all follow-up visits as told by your health care provider. This is important.  Questions to ask your health care provider  · Would I benefit from therapy?  · How often should I follow up with a health care provider?  · How long do I need to take medicine?  · Are there any long-term side effects of my medicine?  · Are there any alternatives to taking medicine?  Contact a health care provider if:  · You have a hard time staying focused or finishing daily tasks.  · You spend many hours a day feeling worried about everyday life.  · You become exhausted by worry.  · You start to have headaches, feel tense, or have nausea.  · You urinate more than normal.  · You have diarrhea.  Get help right away if:  · You have a racing heart and shortness of breath.  · You have thoughts of hurting yourself or others.  If you ever feel like you may hurt yourself or others, or have thoughts about taking your own life, get help right away. You can go to your nearest emergency department or call:  · Your local emergency services (911 in the [place]  · A suicide crisis helpline, such as the National Suicide Prevention Lifeline at 1-873.699.1058. This is open 24-hours a day.  Summary  · Taking steps to deal with stress can help calm you.  · Medicines cannot cure anxiety disorders, but they can help ease symptoms.  · Family, friends, and partners can play a  big part in helping you recover from an anxiety disorder.  This information is not intended to replace advice given to you by your health care provider. Make sure you discuss any questions you have with your health care provider.  Document Released: 12/12/2017 Document Revised: 12/12/2017 Document Reviewed: 12/12/2017  [employer] Interactive Patient Education © 2019 [employer]

## 2019-08-02 NOTE — PROGRESS NOTES
Subjective   Brigid Coelho is a 54 y.o. female.   Chief Complaint   Patient presents with   • Lung Nodule     Nodule was found in lung, patient is scheduled to see pulmanologist friday but thinks this is just pneumonia       History of Present Illness   Patient is here. Concerned that the nodule found was really a pneumonia. She was seen on 07/30/19 had both a CT scan and CXR. Both revealed no pneumonia. Continues to have an occasional cough, non productive. Occasional feeling of chest tightness - shortness of air. Has used albuterol inhaler in the past for asthmatic bronchitis. Her inhaler is empty.   She denies any fever, chills.   Her  wbc was within normal limits. On 07/30/19  Will recheck today.     Reviewed the results of her CT of the chest and her CXR from 07/30/19.   She went today for testing for pulmonary. She has appointment to see pulmonologist on 08/09/19.  Will reorder her albuterol inhaler. POCT WBC results was normal today.   Ongoing support given to patient regarding her fear of the nodule being cancerous.     The following portions of the patient's history were reviewed and updated as appropriate: allergies, current medications, past family history, past medical history, past social history, past surgical history and problem list.    Review of Systems   Constitutional: Positive for activity change and appetite change. Negative for chills and fever.   HENT: Negative.    Eyes: Negative.    Respiratory: Positive for cough, chest tightness and wheezing.    Cardiovascular: Negative.    Gastrointestinal: Negative.    Musculoskeletal: Negative.    Allergic/Immunologic: Positive for environmental allergies.   Neurological: Negative.    Psychiatric/Behavioral: Negative for self-injury, sleep disturbance and suicidal ideas. The patient is nervous/anxious. The patient is not hyperactive.         Having an increase in anxiety - thinking it may be cancer.        Past Surgical History:   Procedure  "Laterality Date   • APPENDECTOMY     •  SECTION     • CHOLECYSTECTOMY     • DILATATION AND CURETTAGE      Of Cervical Stump   • MYOMECTOMY     • SMALL INTESTINE SURGERY     • TUBAL ABDOMINAL LIGATION       Past Medical History:   Diagnosis Date   • Acute bronchitis    • Anxiety    • Chest pain    • Cholelithiasis    • GERD (gastroesophageal reflux disease)    • Hashimoto's thyroiditis    • Hemorrhoids    • Hypothyroidism    • Metrorrhagia    • Palpitations    • Polycystic ovary syndrome    • Polyp of sigmoid colon    • Ulcerative colitis (CMS/HCC)    • Upper respiratory infection    • UTI (urinary tract infection)        Objective   Allergies   Allergen Reactions   • Adhesive Tape      Rash     • Erythromycin Other (See Comments)     Sores in mouth   • Epinephrine Palpitations   • Nitroglycerin Palpitations     Visit Vitals  /64   Pulse 104   Temp 96.8 °F (36 °C)   Resp 18   Ht 170.2 cm (67\")   Wt 123 kg (270 lb 3.2 oz)   SpO2 98%   BMI 42.32 kg/m²       Physical Exam   Constitutional: She is oriented to person, place, and time. Vital signs are normal. She appears well-developed and well-nourished.   HENT:   Head: Normocephalic.   Eyes: Pupils are equal, round, and reactive to light.   Cardiovascular: Normal rate and regular rhythm.   Pulmonary/Chest: Effort normal and breath sounds normal. She has no wheezes. She exhibits no tenderness.   Lymphadenopathy:     She has no cervical adenopathy.   Neurological: She is alert and oriented to person, place, and time.   Skin: Skin is warm, dry and intact. Capillary refill takes less than 2 seconds.   Psychiatric: She has a normal mood and affect. Her behavior is normal.   Vitals reviewed.      Assessment/Plan   Brigid was seen today for lung nodule.    Diagnoses and all orders for this visit:    Cough  -     POCT CBC  -     albuterol sulfate  (90 Base) MCG/ACT inhaler; Inhale 2 puffs Every 6 (Six) Hours As Needed for Wheezing or Shortness of Air (or " cough you cannot get under control).    POCT CBC: WBC is normal  H/H 11.2 34.9 improved since her h/h on 07/30/19  Hx of colitis     Follow up with your PCP as needed  Keep appointments with specialist.          Patient Instructions   Pulmonary Nodule  A pulmonary nodule is tissue that has grown on your lung. A nodule may be cancer, but most nodules are not cancer.  Follow these instructions at home:    · Take over-the-counter and prescription medicines only as told by your doctor.  · Do not use any products that have nicotine or tobacco, such as cigarettes and e-cigarettes. If you need help quitting, ask your doctor.  · Keep all follow-up visits as told by your doctor. This is important.  Contact a doctor if:  · You have trouble breathing when doing activities.  · You feel sick.  · You feel more tired than normal.  · You do not feel like eating.  · You lose weight without trying.  · You have chills.  · You have night sweats.  Get help right away if:  · You cannot catch your breath.  · You start making whistling sounds when breathing (wheezing).  · You cannot stop coughing.  · You cough up blood.  · You get dizzy.  · You feel like you are going to pass out (faint).  · You have sudden chest pain.  · You have a fever or symptoms for more than 2-3 days.  · You have a fever and your symptoms suddenly get worse.  Summary  · A pulmonary nodule is tissue that has grown on your lung.  · Most nodules are not cancer.  · Your doctor will do tests to know what kind of nodule you have, and whether you need treatment for it.  This information is not intended to replace advice given to you by your health care provider. Make sure you discuss any questions you have with your health care provider.  Document Released: 01/20/2012 Document Revised: 01/16/2018 Document Reviewed: 01/16/2018  [employer] Interactive Patient Education © 2019 [employer]  Living With Anxiety    After being diagnosed with an anxiety disorder, you may be  relieved to know why you have felt or behaved a certain way. It is natural to also feel overwhelmed about the treatment ahead and what it will mean for your life. With care and support, you can manage this condition and recover from it.  How to cope with anxiety  Dealing with stress  Stress is your body’s reaction to life changes and events, both good and bad. Stress can last just a few hours or it can be ongoing. Stress can play a major role in anxiety, so it is important to learn both how to cope with stress and how to think about it differently.  Talk with your health care provider or a counselor to learn more about stress reduction. He or she may suggest some stress reduction techniques, such as:  · Music therapy. This can include creating or listening to music that you enjoy and that inspires you.  · Mindfulness-based meditation. This involves being aware of your normal breaths, rather than trying to control your breathing. It can be done while sitting or walking.  · Centering prayer. This is a kind of meditation that involves focusing on a word, phrase, or sacred image that is meaningful to you and that brings you peace.  · Deep breathing. To do this, expand your stomach and inhale slowly through your nose. Hold your breath for 3-5 seconds. Then exhale slowly, allowing your stomach muscles to relax.  · Self-talk. This is a skill where you identify thought patterns that lead to anxiety reactions and correct those thoughts.  · Muscle relaxation. This involves tensing muscles then relaxing them.  Choose a stress reduction technique that fits your lifestyle and personality. Stress reduction techniques take time and practice. Set aside 5-15 minutes a day to do them. Therapists can offer training in these techniques. The training may be covered by some insurance plans. Other things you can do to manage stress include:  · Keeping a stress diary. This can help you learn what triggers your stress and ways to control  your response.  · Thinking about how you respond to certain situations. You may not be able to control everything, but you can control your reaction.  · Making time for activities that help you relax, and not feeling guilty about spending your time in this way.  Therapy combined with coping and stress-reduction skills provides the best chance for successful treatment.  Medicines  Medicines can help ease symptoms. Medicines for anxiety include:  · Anti-anxiety drugs.  · Antidepressants.  · Beta-blockers.  Medicines may be used as the main treatment for anxiety disorder, along with therapy, or if other treatments are not working. Medicines should be prescribed by a health care provider.  Relationships  Relationships can play a big part in helping you recover. Try to spend more time connecting with trusted friends and family members. Consider going to couples counseling, taking family education classes, or going to family therapy. Therapy can help you and others better understand the condition.  How to recognize changes in your condition  Everyone has a different response to treatment for anxiety. Recovery from anxiety happens when symptoms decrease and stop interfering with your daily activities at home or work. This may mean that you will start to:  · Have better concentration and focus.  · Sleep better.  · Be less irritable.  · Have more energy.  · Have improved memory.  It is important to recognize when your condition is getting worse. Contact your health care provider if your symptoms interfere with home or work and you do not feel like your condition is improving.  Where to find help and support:  You can get help and support from these sources:  · Self-help groups.  · Online and community organizations.  · A trusted spiritual leader.  · Couples counseling.  · Family education classes.  · Family therapy.  Follow these instructions at home:  · Eat a healthy diet that includes plenty of vegetables, fruits, whole  grains, low-fat dairy products, and lean protein. Do not eat a lot of foods that are high in solid fats, added sugars, or salt.  · Exercise. Most adults should do the following:  ? Exercise for at least 150 minutes each week. The exercise should increase your heart rate and make you sweat (moderate-intensity exercise).  ? Strengthening exercises at least twice a week.  · Cut down on caffeine, tobacco, alcohol, and other potentially harmful substances.  · Get the right amount and quality of sleep. Most adults need 7-9 hours of sleep each night.  · Make choices that simplify your life.  · Take over-the-counter and prescription medicines only as told by your health care provider.  · Avoid caffeine, alcohol, and certain over-the-counter cold medicines. These may make you feel worse. Ask your pharmacist which medicines to avoid.  · Keep all follow-up visits as told by your health care provider. This is important.  Questions to ask your health care provider  · Would I benefit from therapy?  · How often should I follow up with a health care provider?  · How long do I need to take medicine?  · Are there any long-term side effects of my medicine?  · Are there any alternatives to taking medicine?  Contact a health care provider if:  · You have a hard time staying focused or finishing daily tasks.  · You spend many hours a day feeling worried about everyday life.  · You become exhausted by worry.  · You start to have headaches, feel tense, or have nausea.  · You urinate more than normal.  · You have diarrhea.  Get help right away if:  · You have a racing heart and shortness of breath.  · You have thoughts of hurting yourself or others.  If you ever feel like you may hurt yourself or others, or have thoughts about taking your own life, get help right away. You can go to your nearest emergency department or call:  · Your local emergency services (911 in the [place]  · A suicide crisis helpline, such as the National Suicide  Prevention Lifeline at 1-410.389.3315. This is open 24-hours a day.  Summary  · Taking steps to deal with stress can help calm you.  · Medicines cannot cure anxiety disorders, but they can help ease symptoms.  · Family, friends, and partners can play a big part in helping you recover from an anxiety disorder.  This information is not intended to replace advice given to you by your health care provider. Make sure you discuss any questions you have with your health care provider.  Document Released: 12/12/2017 Document Revised: 12/12/2017 Document Reviewed: 12/12/2017  [employer] Interactive Patient Education © 2019 [employer]        Ashley Palacio, APRN

## 2019-08-05 DIAGNOSIS — R73.03 PREDIABETES: ICD-10-CM

## 2019-08-05 RX ORDER — METFORMIN HYDROCHLORIDE 500 MG/1
500 TABLET, EXTENDED RELEASE ORAL 2 TIMES DAILY
Qty: 60 TABLET | Refills: 1 | Status: SHIPPED | OUTPATIENT
Start: 2019-08-05 | End: 2020-01-15

## 2019-08-10 ENCOUNTER — OFFICE VISIT (OUTPATIENT)
Dept: FAMILY MEDICINE CLINIC | Facility: CLINIC | Age: 54
End: 2019-08-10

## 2019-08-10 VITALS
TEMPERATURE: 97.1 F | OXYGEN SATURATION: 97 % | HEIGHT: 67 IN | RESPIRATION RATE: 18 BRPM | WEIGHT: 269 LBS | BODY MASS INDEX: 42.22 KG/M2 | DIASTOLIC BLOOD PRESSURE: 80 MMHG | SYSTOLIC BLOOD PRESSURE: 130 MMHG | HEART RATE: 74 BPM

## 2019-08-10 DIAGNOSIS — J06.9 ACUTE URI: Primary | ICD-10-CM

## 2019-08-10 DIAGNOSIS — J40 BRONCHITIS: ICD-10-CM

## 2019-08-10 PROCEDURE — 99213 OFFICE O/P EST LOW 20 MIN: CPT | Performed by: FAMILY MEDICINE

## 2019-08-10 RX ORDER — DOXYCYCLINE HYCLATE 100 MG/1
100 CAPSULE ORAL 2 TIMES DAILY
Qty: 20 CAPSULE | Refills: 0 | Status: SHIPPED | OUTPATIENT
Start: 2019-08-10 | End: 2019-09-16

## 2019-08-14 ENCOUNTER — OFFICE VISIT (OUTPATIENT)
Dept: CARDIOLOGY | Facility: HOSPITAL | Age: 54
End: 2019-08-14

## 2019-08-14 ENCOUNTER — HOSPITAL ENCOUNTER (OUTPATIENT)
Dept: CARDIOLOGY | Facility: HOSPITAL | Age: 54
Discharge: HOME | End: 2019-08-14

## 2019-08-14 ENCOUNTER — HOSPITAL ENCOUNTER (OUTPATIENT)
Dept: CARDIOLOGY | Facility: HOSPITAL | Age: 54
Discharge: HOME | End: 2019-08-14
Admitting: NURSE PRACTITIONER

## 2019-08-14 VITALS
WEIGHT: 271 LBS | HEIGHT: 67 IN | SYSTOLIC BLOOD PRESSURE: 124 MMHG | RESPIRATION RATE: 20 BRPM | BODY MASS INDEX: 42.53 KG/M2 | HEART RATE: 75 BPM | TEMPERATURE: 96.4 F | DIASTOLIC BLOOD PRESSURE: 77 MMHG | OXYGEN SATURATION: 97 %

## 2019-08-14 DIAGNOSIS — R07.9 CHEST PAIN, UNSPECIFIED TYPE: Primary | ICD-10-CM

## 2019-08-14 DIAGNOSIS — R10.13 DYSPEPSIA: ICD-10-CM

## 2019-08-14 DIAGNOSIS — F41.9 ANXIETY: ICD-10-CM

## 2019-08-14 DIAGNOSIS — E66.01 MORBID OBESITY (HCC): ICD-10-CM

## 2019-08-14 DIAGNOSIS — R06.02 SHORTNESS OF BREATH: ICD-10-CM

## 2019-08-14 DIAGNOSIS — R07.9 CHEST PAIN, UNSPECIFIED TYPE: ICD-10-CM

## 2019-08-14 LAB
BH CV ECHO MEAS - AO ROOT AREA (BSA CORRECTED): 1.3
BH CV ECHO MEAS - AO ROOT AREA: 6 CM^2
BH CV ECHO MEAS - AO ROOT DIAM: 2.8 CM
BH CV ECHO MEAS - BSA(HAYCOCK): 2.2 M^2
BH CV ECHO MEAS - BSA: 2.1 M^2
BH CV ECHO MEAS - BZI_BMI: 34 KILOGRAMS/M^2
BH CV ECHO MEAS - BZI_METRIC_HEIGHT: 170.2 CM
BH CV ECHO MEAS - BZI_METRIC_WEIGHT: 98.4 KG
BH CV ECHO MEAS - EDV(CUBED): 102.5 ML
BH CV ECHO MEAS - EDV(TEICH): 101.4 ML
BH CV ECHO MEAS - EF(CUBED): 71.1 %
BH CV ECHO MEAS - EF(TEICH): 62.7 %
BH CV ECHO MEAS - ESV(CUBED): 29.7 ML
BH CV ECHO MEAS - ESV(TEICH): 37.8 ML
BH CV ECHO MEAS - FS: 33.9 %
BH CV ECHO MEAS - IVS/LVPW: 1
BH CV ECHO MEAS - IVSD: 1.2 CM
BH CV ECHO MEAS - LA DIMENSION: 3.8 CM
BH CV ECHO MEAS - LA/AO: 1.4
BH CV ECHO MEAS - LAD MAJOR: 4.4 CM
BH CV ECHO MEAS - LAT PEAK E' VEL: 8.6 CM/SEC
BH CV ECHO MEAS - LATERAL E/E' RATIO: 5.5
BH CV ECHO MEAS - LV MASS(C)D: 197.7 GRAMS
BH CV ECHO MEAS - LV MASS(C)DI: 94.5 GRAMS/M^2
BH CV ECHO MEAS - LVIDD: 4.7 CM
BH CV ECHO MEAS - LVIDS: 3.1 CM
BH CV ECHO MEAS - LVPWD: 1.1 CM
BH CV ECHO MEAS - MED PEAK E' VEL: 6.5 CM/SEC
BH CV ECHO MEAS - MEDIAL E/E' RATIO: 7.2
BH CV ECHO MEAS - MV A MAX VEL: 70.1 CM/SEC
BH CV ECHO MEAS - MV DEC SLOPE: 456.6 CM/SEC^2
BH CV ECHO MEAS - MV DEC TIME: 0.23 SEC
BH CV ECHO MEAS - MV E MAX VEL: 47.9 CM/SEC
BH CV ECHO MEAS - MV E/A: 0.68
BH CV ECHO MEAS - MV P1/2T MAX VEL: 73.3 CM/SEC
BH CV ECHO MEAS - MV P1/2T: 47 MSEC
BH CV ECHO MEAS - MVA P1/2T LCG: 3 CM^2
BH CV ECHO MEAS - MVA(P1/2T): 4.7 CM^2
BH CV ECHO MEAS - PA ACC SLOPE: 503.7 CM/SEC^2
BH CV ECHO MEAS - PA ACC TIME: 0.16 SEC
BH CV ECHO MEAS - PA PR(ACCEL): 8.9 MMHG
BH CV ECHO MEAS - RV MAX PG: 1.5 MMHG
BH CV ECHO MEAS - RV V1 MAX: 60.2 CM/SEC
BH CV ECHO MEAS - SI(CUBED): 34.8 ML/M^2
BH CV ECHO MEAS - SI(TEICH): 30.4 ML/M^2
BH CV ECHO MEAS - SV(CUBED): 72.9 ML
BH CV ECHO MEAS - SV(TEICH): 63.6 ML
BH CV ECHO MEAS - TAPSE (>1.6): 3.5 CM2
BH CV ECHO MEASUREMENTS AVERAGE E/E' RATIO: 6.34
BH CV STRESS BP STAGE 1: NORMAL
BH CV STRESS BP STAGE 2: NORMAL
BH CV STRESS BP STAGE 3: NORMAL
BH CV STRESS DURATION MIN STAGE 1: 3
BH CV STRESS DURATION MIN STAGE 2: 3
BH CV STRESS DURATION SEC STAGE 1: 0
BH CV STRESS DURATION SEC STAGE 2: 0
BH CV STRESS DURATION SEC STAGE 3: 18
BH CV STRESS GRADE STAGE 1: 10
BH CV STRESS GRADE STAGE 2: 12
BH CV STRESS GRADE STAGE 3: 14
BH CV STRESS HR STAGE 2: 153
BH CV STRESS HR STAGE 3: 157
BH CV STRESS METS STAGE 1: 5
BH CV STRESS METS STAGE 2: 7.5
BH CV STRESS METS STAGE 3: 10
BH CV STRESS O2 STAGE 1: 96
BH CV STRESS O2 STAGE 2: 96
BH CV STRESS O2 STAGE 3: 97
BH CV STRESS PROTOCOL 1: NORMAL
BH CV STRESS RECOVERY BP: NORMAL MMHG
BH CV STRESS RECOVERY HR: 100 BPM
BH CV STRESS SPEED STAGE 1: 1.7
BH CV STRESS SPEED STAGE 2: 2.5
BH CV STRESS SPEED STAGE 3: 3.4
BH CV STRESS STAGE 1: 1
BH CV STRESS STAGE 2: 2
BH CV STRESS STAGE 3: 3
BH CV XLRA - RV BASE: 3.6 CM
BH CV XLRA - RV LENGTH: 6.2 CM
BH CV XLRA - RV MID: 2.8 CM
BH CV XLRA - TDI S': 17.3 CM/SEC
LEFT ATRIUM VOLUME INDEX: 12.4 ML/M^2
LEFT ATRIUM VOLUME: 26 ML
LV EF 2D ECHO EST: 65 %
MAXIMAL PREDICTED HEART RATE: 165 BPM
MAXIMAL PREDICTED HEART RATE: 165 BPM
PERCENT MAX PREDICTED HR: 95.15 %
STRESS BASELINE BP: NORMAL MMHG
STRESS BASELINE HR: 98 BPM
STRESS O2 SAT REST: 96 %
STRESS PERCENT HR: 112 %
STRESS POST ESTIMATED WORKLOAD: 7.4 METS
STRESS POST EXERCISE DUR MIN: 6 MIN
STRESS POST EXERCISE DUR SEC: 18 SEC
STRESS POST O2 SAT PEAK: 97 %
STRESS POST PEAK BP: NORMAL MMHG
STRESS POST PEAK HR: 157 BPM
STRESS TARGET HR: 140 BPM
STRESS TARGET HR: 140 BPM

## 2019-08-14 PROCEDURE — 93018 CV STRESS TEST I&R ONLY: CPT | Performed by: INTERNAL MEDICINE

## 2019-08-14 PROCEDURE — 93017 CV STRESS TEST TRACING ONLY: CPT

## 2019-08-14 PROCEDURE — 93306 TTE W/DOPPLER COMPLETE: CPT

## 2019-08-14 PROCEDURE — 93306 TTE W/DOPPLER COMPLETE: CPT | Performed by: INTERNAL MEDICINE

## 2019-08-14 PROCEDURE — 99214 OFFICE O/P EST MOD 30 MIN: CPT | Performed by: NURSE PRACTITIONER

## 2019-08-14 RX ORDER — UREA 10 %
140 LOTION (ML) TOPICAL
COMMUNITY

## 2019-08-14 RX ORDER — CYANOCOBALAMIN (VITAMIN B-12) 500 MCG
1 LOZENGE ORAL DAILY
COMMUNITY
End: 2020-04-30

## 2019-08-14 RX ORDER — LANOLIN ALCOHOL/MO/W.PET/CERES
400 CREAM (GRAM) TOPICAL DAILY
COMMUNITY

## 2019-08-14 RX ORDER — LANOLIN ALCOHOL/MO/W.PET/CERES
500 CREAM (GRAM) TOPICAL DAILY
COMMUNITY
End: 2020-06-05

## 2019-08-14 RX ORDER — KELP 150 MCG
1 TABLET ORAL DAILY
COMMUNITY
End: 2020-04-30

## 2019-08-14 NOTE — PROGRESS NOTES
[facility]      Encounter Date:08/13/2019     Brigid Marino1 DAV[address] ZELAYA [facility] [address]  [unfilled]    1965    Debby Kennedy DO    Brigid Coelho is a 54 y.o. female.      Subjective:     Chief Complaint:  Chest Pain and Establish Care       Chest Pain    Associated symptoms include a cough and shortness of breath.        54-year-old female presented to [facility] emergency department on 7/30/2019 with complaints of chest pain.  Right-sided chest pain radiating into substernal chest.  Described as stabbing pressure.  Associated shortness of breath, progressive for 3 weeks. Hx of cholecystectomy.  Reports a history of stress test 20 years ago after the birth of her second child which was normal.  Reports having extensive cardiac work-up and diagnosed with Prinzmetal angina, but improved after a divorce and appropriate weight gain at that time (patient reports she was extremely underweight at that time due to stress and anxiety).  Denies history of hypertension, hyperlipidemia.  History of hypothyroidism, prediabetes currently on metformin.  History of ulcerative colitis and GERD followed by Dr. Wilder, GI. Worsening dyspepsia recently with associated CP and worsening dyspnea.   Patient has a history of lung nodule (newly diagnosed) scheduled with pulmonary.  Patient reports a history of asthmatic bronchitis with use of inhalers as needed.  Patient reports having a cough since July.  Hx of seasonal allergies and seasonal asthma.  Continues to have shortness of breath for greater than 4 weeks.   Patient has noted wheezing, improved with inhaler.   Patient recently started on antibiotic.  Reports occasional palpitations that are short duration, associated with anxiety and panic attacks.  Reports her current chest discomfort is not similar to her symptoms with asthma and panic attacks.    Patient Active Problem List    Diagnosis Date Noted   •  Acute cystitis with hematuria 2019   • Polycystic ovaries 2019   • Chest congestion 2018   • Thickened endometrium 10/29/2018     Note Last Updated: 10/29/2018     2018: EMS 1.3cm     • Hypothyroidism 2018   • Fatty liver 07/10/2018   • Anxiety 07/10/2018   • Dysuria 2018   • Hypothyroidism due to Hashimoto's thyroiditis 2016   • Vitamin D deficiency 2016   • Anemia 2016   • Esophageal reflux 2016   • Generalized anxiety disorder 2016   • Morbid obesity (CMS/HCC) 2016   • Post-menopausal 2016   • Vertigo 2016   • Simple goiter 2016     Note Last Updated: 2016     Due for u/s 2017     • Prediabetes 2016   • Ulcerative colitis without complications (CMS/[facility]) 2016   • Seasonal allergies 2016         Past Surgical History:   Procedure Laterality Date   • APPENDECTOMY     •  SECTION     • CHOLECYSTECTOMY     • DILATATION AND CURETTAGE      Of Cervical Stump   • MYOMECTOMY     • SMALL INTESTINE SURGERY     • TUBAL ABDOMINAL LIGATION         Allergies   Allergen Reactions   • Adhesive Tape      Rash     • Erythromycin Other (See Comments)     Sores in mouth   • Epinephrine Palpitations   • Nitroglycerin Palpitations         Current Outpatient Medications:   •  albuterol sulfate  (90 Base) MCG/ACT inhaler, Inhale 2 puffs Every 6 (Six) Hours As Needed for Wheezing or Shortness of Air (or cough you cannot get under control)., Disp: 1 inhaler, Rfl: 1  •  ALPRAZolam (XANAX) 0.5 MG tablet, Take 1 tablet by mouth 2 (Two) Times a Day As Needed for Anxiety., Disp: 30 tablet, Rfl: 0  •  doxycycline (VIBRAMYCIN) 100 MG capsule, Take 1 capsule by mouth 2 (Two) Times a Day., Disp: 20 capsule, Rfl: 0  •  ferrous sulfate 140 (45 Fe) MG tablet controlled-release tablet, Take 140 mg by mouth Daily With Breakfast., Disp: , Rfl:   •  folic acid (FOLVITE) 400 MCG tablet, Take 400 mcg by mouth Daily., Disp: , Rfl:   •   "Kelp 150 MCG tablet, Take 1 tablet by mouth Daily., Disp: , Rfl:   •  L-Glutamine 500 MG tablet, Take 1 tablet by mouth Daily., Disp: , Rfl:   •  levothyroxine (SYNTHROID, LEVOTHROID) 75 MCG tablet, Take 1 tablet by mouth Daily., Disp: 30 tablet, Rfl: 5  •  Loratadine (CLARITIN) 10 MG capsule, Take 1 tablet by mouth daily., Disp: , Rfl:   •  mesalamine (LIALDA) 1.2 g EC tablet, TAKE FOUR TABLETS BY MOUTH DAILY, Disp: 120 tablet, Rfl: 0  •  metFORMIN ER (GLUCOPHAGE-XR) 500 MG 24 hr tablet, Take 1 tablet by mouth 2 (Two) Times a Day. (Patient taking differently: Take 500 mg by mouth Daily.), Disp: 60 tablet, Rfl: 1  •  montelukast (SINGULAIR) 10 MG tablet, TAKE ONE TABLET BY MOUTH EVERY NIGHT AT BEDTIME, Disp: 90 tablet, Rfl: 0  •  pantoprazole (PROTONIX) 40 MG EC tablet, Take 1 tablet by mouth Daily., Disp: 30 tablet, Rfl: 3  •  sertraline (ZOLOFT) 50 MG tablet, Take 1 and 1/2 tab qd (Patient taking differently: Take 50 mg by mouth Daily.), Disp: 45 tablet, Rfl: 3  •  vitamin B-12 (CYANOCOBALAMIN) 1000 MCG tablet, Take 500 mcg by mouth Daily., Disp: , Rfl:     The following portions of the patient's history were reviewed and updated today during office visit as appropriate: allergies, current medications, past family history, past medical history, past social history, past surgical history and problem list.    Review of Systems   Cardiovascular: Positive for chest pain and dyspnea on exertion.   Respiratory: Positive for cough, shortness of breath and wheezing.    All other systems reviewed and are negative.      Objective:     Vitals:    08/14/19 0839 08/14/19 0846 08/14/19 0847   BP: 137/74 133/74 124/77   BP Location: Right arm Left arm Left arm   Patient Position: Sitting Sitting Standing   Cuff Size: Large Adult Adult Large Adult   Pulse: 75 74 75   Resp: 20     Temp: 96.4 °F (35.8 °C)     TempSrc: Temporal     SpO2: 97% 97%    Weight: 123 kg (271 lb)     Height: 170.2 cm (67\")           Physical Exam "   Constitutional: She is oriented to person, place, and time. She appears well-developed and well-nourished. No distress.   HENT:   Mouth/Throat: Oropharynx is clear and moist.   Eyes: Conjunctivae are normal. No scleral icterus.   Neck: No hepatojugular reflux and no JVD present. Carotid bruit is not present. No tracheal deviation present. No thyromegaly present.   Cardiovascular: Normal rate, regular rhythm, normal heart sounds and intact distal pulses. Exam reveals no friction rub.   No murmur heard.  Pulmonary/Chest: Effort normal and breath sounds normal.   Abdominal: Soft. Bowel sounds are normal. She exhibits no distension. There is no tenderness.   Musculoskeletal: She exhibits no edema.   Lymphadenopathy:     She has no cervical adenopathy.   Neurological: She is alert and oriented to person, place, and time.   Skin: Skin is warm, dry and intact. No rash noted. No cyanosis or erythema. No pallor.   Psychiatric: She has a normal mood and affect. Her behavior is normal. Thought content normal.   Vitals reviewed.      Lab and Diagnostic Review:  Office Visit on 08/02/2019   Component Date Value Ref Range Status   • Hematocrit 08/02/2019 34.9* 38 - 51 % Final   • Hemoglobin 08/02/2019 11.2* 12.0 - 17.0 g/dL Final   • RBC 08/02/2019 4.74   Final   • WBC 08/02/2019 9.1   Final   • MCV 08/02/2019 73.7   Final   • MCH 08/02/2019 23.6   Final   • MCHC 08/02/2019 32.1   Final   • RDW-CV 08/02/2019 16.8   Final   • Platelets 08/02/2019 372  10*3/mm3 Final   • MPV 08/02/2019 8.1   Final     Lab Results   Component Value Date    GLUCOSE 89 07/30/2019    CALCIUM 8.6 07/30/2019     07/30/2019    K 3.7 07/30/2019    CO2 26.0 07/30/2019     07/30/2019    BUN 12 07/30/2019    CREATININE 0.69 07/30/2019    EGFRIFAFRI 123 05/31/2019    EGFRIFNONA 88 07/30/2019    BCR 17.4 07/30/2019    ANIONGAP 12.0 07/30/2019     Lab Results   Component Value Date    CHLPL 179 09/24/2018    CHLPL 187 12/15/2017    CHLPL 181  01/26/2016     Lab Results   Component Value Date    TRIG 178 (H) 09/24/2018    TRIG 272 (H) 12/15/2017    TRIG 201 (H) 01/26/2016     Lab Results   Component Value Date    HDL 42 09/24/2018    HDL 40 12/15/2017    HDL 37 (L) 01/26/2016     Lab Results   Component Value Date     (H) 09/24/2018    LDL 93 12/15/2017     (H) 01/26/2016     Lab Results   Component Value Date    TSH 3.710 09/24/2018     EKG 7/31/2019: Normal sinus rhythm 66 bpm    CT angiogram of chest 7/30/2019: No evidence of PE.  Small 6 mm indeterminate right middle lobe pulmonary nodule.    Chest x-ray 7/30/2019: No acute cardiopulmonary process.    Assessment and Plan:         1. Chest pain, unspecified type  GXT  (insurance denied Cardiolite. Pt not willing to attempt PET (BMI 42 due to GXT)      2. Shortness of breath  Echo      3. Dyspepsia  Continue PPI and f/u with GI    4. Morbid obesity (CMS/HCC)  Discussed diet and exercise modification for weight loss    5. Anxiety  F/u with PCP for continued management.    F/u pending stress results.         *Please note that portions of this note were completed with a voice recognition program. Efforts were made to edit the dictations, but occasionally words are mistranscribed.

## 2019-08-15 ENCOUNTER — TELEPHONE (OUTPATIENT)
Dept: CARDIOLOGY | Facility: HOSPITAL | Age: 54
End: 2019-08-15

## 2019-08-15 PROBLEM — R06.02 SHORTNESS OF BREATH: Status: ACTIVE | Noted: 2019-08-15

## 2019-08-15 PROBLEM — R07.9 CHEST PAIN: Status: ACTIVE | Noted: 2019-08-15

## 2019-08-15 NOTE — TELEPHONE ENCOUNTER
Meliton Brigid Marcella   Female, 54 y.o., 1965  Weight:   122.9 kg (271 lb)  Phone:   [phone] (H)  PCP:   Debby Kennedy DO [facility]  MRN:   [id number]  MyChart:   Pending  Next Appt:   Today  Message   Received: Today      Call with results   Message Contents   Jenny Rodriguez APRN Morrison, Ashley M, ANDRAE             I have attempted to call patient this morning with no answer, message to call back.     Please call to discuss test results.  Stress test is normal.  No evidence of a heart blockage, normal blood pressure response, no irregular heart rhythms.     Echocardiogram normal.  Normal heart function, no valvular heart disease.     Patient to follow-up with primary care on a routine basis.  Call with any questions or concerns    Comments     8/15/19 Left message for patient to call.    8/15/19 Patient notified of results and verbalized understanding.

## 2019-08-15 NOTE — TELEPHONE ENCOUNTER
Attempted to call and review normal stress test results (no ischemia) and normal echo  Results.     No answer. Left message to call back.

## 2019-08-21 ENCOUNTER — OFFICE VISIT (OUTPATIENT)
Dept: PULMONOLOGY | Facility: CLINIC | Age: 54
End: 2019-08-21

## 2019-08-21 VITALS
HEART RATE: 73 BPM | HEIGHT: 67 IN | SYSTOLIC BLOOD PRESSURE: 112 MMHG | WEIGHT: 270.2 LBS | OXYGEN SATURATION: 97 % | TEMPERATURE: 98.1 F | BODY MASS INDEX: 42.41 KG/M2 | DIASTOLIC BLOOD PRESSURE: 70 MMHG

## 2019-08-21 DIAGNOSIS — R06.02 SHORTNESS OF BREATH: ICD-10-CM

## 2019-08-21 DIAGNOSIS — R91.1 LUNG NODULE, SOLITARY: Primary | ICD-10-CM

## 2019-08-21 DIAGNOSIS — F41.1 GENERALIZED ANXIETY DISORDER: ICD-10-CM

## 2019-08-21 PROCEDURE — 99214 OFFICE O/P EST MOD 30 MIN: CPT | Performed by: INTERNAL MEDICINE

## 2019-08-21 NOTE — PROGRESS NOTES
New Patient Pulmonary Office Visit      Patient Name: Brigid Coelho    Referring Physician: Virginia Caruso PA-C    Chief Complaint:    Chief Complaint   Patient presents with   • Lung Nodule       History of Present Illness: Brigid Coelho is a 54 y.o. female who is here today to establish care with Pulmonary.     Lung Nodule  She presents for evaluation and treatment of a lung nodule. The CT showed a 6 mm sub pleural RML nodule. The patient reports that the imaging was performed to evaluate symptoms of chest pain which had been present for 1 week at the time of the scan and are stable. Symptoms are exacerbated by exercise and running and relieved by supine position. The patient denies other associated symptoms. She has never smoked. The patient has no known exposure to tuberculosis. The patient does not have a history of cancer.    Dyspnea  Patient complains of shortness of breath. Symptoms occur with more than one block walking. Symptoms began 1 month ago, unchanged since. Associated symptoms include  chest pain, located right chest and tightness in chest. She denies dry cough and dyspnea when laying down.  Symptoms are exacerbated by nothing. Symptoms are alleviated by nothing. She notes she has been told she may have asthma and has been given albuterol with resp infections which she feels does make her feel better. She notes she has allergic rhinitis as well and use to see allergy, currently she takes Loratadine with good control of symptoms. She notes her breathing problems usually occur in the summer months when it get very hot.    Review of Systems:   Review of Systems   Constitutional: Negative for activity change, appetite change, chills and diaphoresis.   HENT: Negative for congestion, postnasal drip, sinus pressure and voice change.    Eyes: Negative for blurred vision.   Respiratory: Positive for shortness of breath. Negative for cough and wheezing.    Cardiovascular: Positive  for chest pain.   Gastrointestinal: Negative for abdominal pain.   Musculoskeletal: Negative for myalgias.   Skin: Negative for color change and dry skin.   Allergic/Immunologic: Negative for environmental allergies.   Neurological: Negative for weakness and confusion.   Hematological: Negative for adenopathy.   Psychiatric/Behavioral: Negative for sleep disturbance and depressed mood.       Past Medical History:   Past Medical History:   Diagnosis Date   • Acute bronchitis    • Anxiety    • Chest pain    • Cholelithiasis    • GERD (gastroesophageal reflux disease)    • Hashimoto's thyroiditis    • Hemorrhoids    • Hypothyroidism    • Metrorrhagia    • Palpitations    • Polycystic ovary syndrome    • Polyp of sigmoid colon    • Ulcerative colitis (CMS/HCC)    • Upper respiratory infection    • UTI (urinary tract infection)        Past Surgical History:   Past Surgical History:   Procedure Laterality Date   • APPENDECTOMY     •  SECTION     • CHOLECYSTECTOMY     • DILATATION AND CURETTAGE      Of Cervical Stump   • MYOMECTOMY     • SMALL INTESTINE SURGERY     • TUBAL ABDOMINAL LIGATION         Family History:   Family History   Problem Relation Age of Onset   • Hyperlipidemia Mother    • CHAD disease Mother    • Cancer Mother    • Diabetes Mother    • Heart disease Mother    • Rheum arthritis Father    • Hyperlipidemia Father    • Heart attack Father 72   • Ovarian cancer Maternal Aunt    • Diabetes Paternal Grandmother    • Hypertension Paternal Grandmother    • Obesity Paternal Grandmother    • Colon cancer Other    • Arthritis Other    • Cancer Other         uncle; liver, lung    • Thyroid disease Cousin    • Thyroid cancer Cousin    • CHAD disease Daughter    • Obesity Brother    • Sleep apnea Brother        Social History:   Social History     Socioeconomic History   • Marital status:      Spouse name: Not on file   • Number of children: Not on file   • Years of education: Not on file   • Highest  education level: Not on file   Tobacco Use   • Smoking status: Never Smoker   • Smokeless tobacco: Never Used   Substance and Sexual Activity   • Alcohol use: No   • Drug use: No   • Sexual activity: Defer   Social History Narrative    Single    Caffeine: 3 sodas daily       Medications:     Current Outpatient Medications:   •  albuterol sulfate  (90 Base) MCG/ACT inhaler, Inhale 2 puffs Every 6 (Six) Hours As Needed for Wheezing or Shortness of Air (or cough you cannot get under control)., Disp: 1 inhaler, Rfl: 1  •  ALPRAZolam (XANAX) 0.5 MG tablet, Take 1 tablet by mouth 2 (Two) Times a Day As Needed for Anxiety., Disp: 30 tablet, Rfl: 0  •  doxycycline (VIBRAMYCIN) 100 MG capsule, Take 1 capsule by mouth 2 (Two) Times a Day., Disp: 20 capsule, Rfl: 0  •  ferrous sulfate 140 (45 Fe) MG tablet controlled-release tablet, Take 140 mg by mouth Daily With Breakfast., Disp: , Rfl:   •  folic acid (FOLVITE) 400 MCG tablet, Take 400 mcg by mouth Daily., Disp: , Rfl:   •  Kelp 150 MCG tablet, Take 1 tablet by mouth Daily., Disp: , Rfl:   •  L-Glutamine 500 MG tablet, Take 1 tablet by mouth Daily., Disp: , Rfl:   •  levothyroxine (SYNTHROID, LEVOTHROID) 75 MCG tablet, Take 1 tablet by mouth Daily., Disp: 30 tablet, Rfl: 5  •  Loratadine (CLARITIN) 10 MG capsule, Take 1 tablet by mouth daily., Disp: , Rfl:   •  mesalamine (LIALDA) 1.2 g EC tablet, TAKE FOUR TABLETS BY MOUTH DAILY, Disp: 120 tablet, Rfl: 0  •  metFORMIN ER (GLUCOPHAGE-XR) 500 MG 24 hr tablet, Take 1 tablet by mouth 2 (Two) Times a Day. (Patient taking differently: Take 500 mg by mouth Daily.), Disp: 60 tablet, Rfl: 1  •  montelukast (SINGULAIR) 10 MG tablet, TAKE ONE TABLET BY MOUTH EVERY NIGHT AT BEDTIME, Disp: 90 tablet, Rfl: 0  •  pantoprazole (PROTONIX) 40 MG EC tablet, Take 1 tablet by mouth Daily., Disp: 30 tablet, Rfl: 3  •  sertraline (ZOLOFT) 50 MG tablet, Take 1 and 1/2 tab qd (Patient taking differently: Take 50 mg by mouth Daily.), Disp:  "45 tablet, Rfl: 3  •  vitamin B-12 (CYANOCOBALAMIN) 1000 MCG tablet, Take 500 mcg by mouth Daily., Disp: , Rfl:     Allergies:   Allergies   Allergen Reactions   • Adhesive Tape      Rash     • Erythromycin Other (See Comments)     Sores in mouth   • Epinephrine Palpitations   • Nitroglycerin Palpitations       Physical Exam:  Vital Signs:   Vitals:    08/21/19 1319   BP: 112/70   BP Location: Right arm   Patient Position: Sitting   Pulse: 73   Temp: 98.1 °F (36.7 °C)   SpO2: 97%  Comment: resting at room air   Weight: 123 kg (270 lb 3.2 oz)   Height: 170.2 cm (67\")       Physical Exam   Constitutional: She is oriented to person, place, and time. She appears well-developed.   HENT:   Head: Normocephalic and atraumatic.   Nose: Nose normal.   Mouth/Throat: Oropharynx is clear and moist.   Eyes: Conjunctivae are normal. Pupils are equal, round, and reactive to light.   Neck: Normal range of motion. Neck supple.   Cardiovascular: Normal rate and regular rhythm. Exam reveals no gallop and no friction rub.   No murmur heard.  Pulmonary/Chest: Effort normal and breath sounds normal. She has no wheezes. She has no rales.   Abdominal: Soft. Bowel sounds are normal.   Musculoskeletal: Normal range of motion. She exhibits no edema.   Neurological: She is alert and oriented to person, place, and time.   Skin: Skin is warm and dry. No erythema.   Psychiatric: She has a normal mood and affect. Her behavior is normal.   Nursing note and vitals reviewed.      Results Review:   - I personally reviewed the pts imaging from 7/30/2019 showed a 6 mm noncalcified subpleural nodule in the right middle lobe.  - I personally reviewed the pts PFT from 8/2/2019 showed no obstruction or restriction with a mildly reduced DLCO  - I personally reviewed the pts Echo report from 8/14/2019 left ventricular function greater than 65%.  Grade 1 diastolic dysfunction and normal valves  - I personally reviewed the pts stress test results 8/14/2019  " Negative for ischemic changes    Assessment / Plan:   53 yo female w/ a hx of anxiety who presented for evaluation of SOB and an incidentally found lung nodule 6 mm RML. I reviewed the pts imaging, PFTs, echo and stress test as noted in the results section. At this time the pt does not clearly have an explanation for her SOB, its possibly her SOA is 2/2 to asthma vs AR vs obesity. I will proceed with further testing with methacholine given allergic symptoms and possible benefit from albuterol to look for asthma. I have asked her not to use the albuterol 6 hours prior to the test. I also addressed her lung nodule which she is a low risk pt at 6 mm, I educated her with the Fleischner guidelines and informed her we would follow up the nodule in 6 months. I reviewed the images with her and she verbalized understanding.    Lung nodule, solitary  Shortness of breath  Generalized anxiety disorder  - CT reviewed in results  - PFT's reviewed in results  - Echo reviewed in results  - Stress test reviewed in results  - Methacholine challange ordered today  - Stop albuterol  - FU CT chest in 6 months for nodule     Follow Up:   Return in about 4 weeks (around 9/18/2019) for After methacholine challange.     JEAN-PAUL Hogan, DO  Pulmonary and Critical Care Medicine  Note Electronically Signed    Please note that portions of this note may have been completed with a voice recognition program. Efforts were made to edit the dictations, but occasionally words are mistranscribed.

## 2019-09-16 ENCOUNTER — OFFICE VISIT (OUTPATIENT)
Dept: FAMILY MEDICINE CLINIC | Facility: CLINIC | Age: 54
End: 2019-09-16

## 2019-09-16 VITALS
HEIGHT: 67 IN | RESPIRATION RATE: 20 BRPM | BODY MASS INDEX: 42.44 KG/M2 | HEART RATE: 81 BPM | OXYGEN SATURATION: 100 % | WEIGHT: 270.4 LBS | TEMPERATURE: 97.6 F | SYSTOLIC BLOOD PRESSURE: 128 MMHG | DIASTOLIC BLOOD PRESSURE: 84 MMHG

## 2019-09-16 DIAGNOSIS — J06.9 ACUTE URI: ICD-10-CM

## 2019-09-16 DIAGNOSIS — E06.3 HASHIMOTO'S THYROIDITIS: ICD-10-CM

## 2019-09-16 DIAGNOSIS — F41.9 ANXIETY: ICD-10-CM

## 2019-09-16 DIAGNOSIS — K64.5 THROMBOSED HEMORRHOIDS: Primary | ICD-10-CM

## 2019-09-16 PROCEDURE — 99214 OFFICE O/P EST MOD 30 MIN: CPT | Performed by: FAMILY MEDICINE

## 2019-09-16 RX ORDER — ALPRAZOLAM 0.5 MG/1
0.5 TABLET ORAL 2 TIMES DAILY PRN
Qty: 30 TABLET | Refills: 0 | Status: SHIPPED | OUTPATIENT
Start: 2019-09-16 | End: 2020-03-16 | Stop reason: SDUPTHER

## 2019-09-16 NOTE — PROGRESS NOTES
Subjective   Brigid Coelho is a 54 y.o. female.   1. Has increased hemorrhoids and has a current thrombosed hemorroid and needs a referral to colorectal.  2. Needs referred to endocrine for thyroid nodules/ hashimoto's  3. Needs rf of xanax. Uses prn for anxiety.   URI    This is a new problem. The current episode started in the past 7 days. The problem has been unchanged. There has been no fever. Associated symptoms include congestion, coughing, rhinorrhea, sneezing and wheezing. Pertinent negatives include no abdominal pain, chest pain, diarrhea, dysuria, nausea, rash, sinus pain or sore throat. Associated symptoms comments: dyspnea. Treatments tried: singulair, albuterol, claritin. The treatment provided mild relief.         The following portions of the patient's history were reviewed and updated as appropriate: allergies, current medications, past family history, past medical history, past social history, past surgical history and problem list.  Vitals:    09/16/19 1200   BP: 128/84   Pulse: 81   Resp: 20   Temp: 97.6 °F (36.4 °C)   SpO2: 100%     Body mass index is 42.35 kg/m².  Review of Systems   Constitutional: Negative.  Negative for activity change, fatigue, fever, unexpected weight gain and unexpected weight loss.   HENT: Positive for congestion, rhinorrhea and sneezing. Negative for sore throat.    Eyes: Negative.  Negative for blurred vision, double vision and visual disturbance.   Respiratory: Positive for cough and wheezing. Negative for chest tightness and shortness of breath.    Cardiovascular: Negative.  Negative for chest pain, palpitations and leg swelling.   Gastrointestinal: Negative.  Negative for abdominal distention, abdominal pain, blood in stool, constipation, diarrhea and nausea.   Endocrine: Negative.  Negative for cold intolerance and heat intolerance.   Genitourinary: Negative.  Negative for dysuria, frequency, urgency and urinary incontinence.   Musculoskeletal: Negative.   Negative for arthralgias and myalgias.   Skin: Negative.  Negative for rash.   Allergic/Immunologic: Negative.    Neurological: Negative.  Negative for dizziness, syncope, numbness and memory problem.   Hematological: Negative.  Negative for adenopathy.   Psychiatric/Behavioral: Negative.  Negative for suicidal ideas and depressed mood. The patient is not nervous/anxious.    All other systems reviewed and are negative.      Objective   Physical Exam   Constitutional: She is oriented to person, place, and time. She appears well-developed and well-nourished. No distress.   HENT:   Right Ear: External ear normal.   Left Ear: External ear normal.   Nose: Nose normal.   Mouth/Throat: Oropharynx is clear and moist.   Eyes: Conjunctivae and EOM are normal. Pupils are equal, round, and reactive to light.   Neck: Normal range of motion. Neck supple. No thyromegaly present.   Cardiovascular: Normal rate, regular rhythm and normal heart sounds.   No murmur heard.  Pulmonary/Chest: Effort normal and breath sounds normal. No respiratory distress. She has no wheezes.   Abdominal: Soft. Bowel sounds are normal. She exhibits no distension and no mass. There is no tenderness. There is no rebound and no guarding. No hernia.   Musculoskeletal: Normal range of motion. She exhibits no edema or tenderness.   Lymphadenopathy:     She has no cervical adenopathy.   Neurological: She is alert and oriented to person, place, and time. She has normal reflexes.   Skin: Skin is warm and dry. No rash noted. She is not diaphoretic. No erythema. No pallor.   Psychiatric: She has a normal mood and affect. Her behavior is normal. Judgment and thought content normal.   Nursing note and vitals reviewed.          Assessment/Plan   Brigid was seen today for anxiety and allergies.    Diagnoses and all orders for this visit:    Thrombosed hemorrhoids  -     Ambulatory Referral to Colorectal Surgery    Hashimoto's thyroiditis  -     Ambulatory Referral  to Endocrinology    Acute URI    Anxiety  -     ALPRAZolam (XANAX) 0.5 MG tablet; Take 1 tablet by mouth 2 (Two) Times a Day As Needed for Anxiety.

## 2019-09-27 RX ORDER — MONTELUKAST SODIUM 10 MG/1
TABLET ORAL
Qty: 90 TABLET | Refills: 0 | Status: SHIPPED | OUTPATIENT
Start: 2019-09-27 | End: 2020-03-16 | Stop reason: SDUPTHER

## 2019-10-11 DIAGNOSIS — F41.9 ANXIETY: ICD-10-CM

## 2019-10-11 RX ORDER — ALPRAZOLAM 0.5 MG/1
TABLET ORAL
Qty: 30 TABLET | Refills: 0 | OUTPATIENT
Start: 2019-10-11

## 2019-10-11 RX ORDER — MONTELUKAST SODIUM 10 MG/1
TABLET ORAL
Qty: 90 TABLET | Refills: 0 | OUTPATIENT
Start: 2019-10-11

## 2019-10-18 ENCOUNTER — TELEPHONE (OUTPATIENT)
Dept: FAMILY MEDICINE CLINIC | Facility: CLINIC | Age: 54
End: 2019-10-18

## 2019-10-18 NOTE — TELEPHONE ENCOUNTER
Called pt in regards to scheduling subsequent Medicare AWV. LVM and requested return call [phone].

## 2019-10-29 ENCOUNTER — TRANSCRIBE ORDERS (OUTPATIENT)
Dept: NUTRITION | Facility: HOSPITAL | Age: 54
End: 2019-10-29

## 2019-10-29 DIAGNOSIS — E66.01 MORBID OBESITY (HCC): Primary | ICD-10-CM

## 2019-11-04 ENCOUNTER — TELEPHONE (OUTPATIENT)
Dept: FAMILY MEDICINE CLINIC | Facility: CLINIC | Age: 54
End: 2019-11-04

## 2019-11-06 ENCOUNTER — TELEPHONE (OUTPATIENT)
Dept: INTERNAL MEDICINE | Facility: CLINIC | Age: 54
End: 2019-11-06

## 2019-11-06 NOTE — TELEPHONE ENCOUNTER
PT CALLED TO SEE IF SHE CAN COME IN FOR LABS SHE HAS AN APPT IN JANUARY   SHE IS SLUGGISH, TIRED, COLD AND HAS HAD WEIGHT GAIN    PLEASE CALL [phone]

## 2019-11-07 ENCOUNTER — RESULTS ENCOUNTER (OUTPATIENT)
Dept: ENDOCRINOLOGY | Facility: CLINIC | Age: 54
End: 2019-11-07

## 2019-11-07 DIAGNOSIS — E03.8 HYPOTHYROIDISM DUE TO HASHIMOTO'S THYROIDITIS: ICD-10-CM

## 2019-11-07 DIAGNOSIS — E06.3 HYPOTHYROIDISM DUE TO HASHIMOTO'S THYROIDITIS: Primary | ICD-10-CM

## 2019-11-07 DIAGNOSIS — E06.3 HYPOTHYROIDISM DUE TO HASHIMOTO'S THYROIDITIS: ICD-10-CM

## 2019-11-07 DIAGNOSIS — E03.8 HYPOTHYROIDISM DUE TO HASHIMOTO'S THYROIDITIS: Primary | ICD-10-CM

## 2019-11-08 ENCOUNTER — OFFICE VISIT (OUTPATIENT)
Dept: PULMONOLOGY | Facility: CLINIC | Age: 54
End: 2019-11-08

## 2019-11-08 DIAGNOSIS — R06.02 SHORTNESS OF BREATH: Primary | ICD-10-CM

## 2019-11-08 PROCEDURE — 95070 INHLJ BRNCL CHALLENGE TSTG: CPT | Performed by: INTERNAL MEDICINE

## 2019-11-08 PROCEDURE — 94070 EVALUATION OF WHEEZING: CPT | Performed by: INTERNAL MEDICINE

## 2019-11-08 RX ORDER — ALBUTEROL SULFATE 90 UG/1
4 AEROSOL, METERED RESPIRATORY (INHALATION) ONCE
Status: DISCONTINUED | OUTPATIENT
Start: 2019-11-08 | End: 2020-03-20

## 2019-11-08 RX ORDER — ALBUTEROL SULFATE 2.5 MG/3ML
2.5 SOLUTION RESPIRATORY (INHALATION) EVERY 6 HOURS PRN
Status: DISCONTINUED | OUTPATIENT
Start: 2019-11-08 | End: 2020-03-20

## 2019-11-08 RX ADMIN — ALBUTEROL SULFATE 2.5 MG: 2.5 SOLUTION RESPIRATORY (INHALATION) at 11:12

## 2019-11-21 ENCOUNTER — OFFICE VISIT (OUTPATIENT)
Dept: PULMONOLOGY | Facility: CLINIC | Age: 54
End: 2019-11-21

## 2019-11-21 VITALS
DIASTOLIC BLOOD PRESSURE: 88 MMHG | HEART RATE: 96 BPM | WEIGHT: 273 LBS | SYSTOLIC BLOOD PRESSURE: 122 MMHG | RESPIRATION RATE: 18 BRPM | BODY MASS INDEX: 42.85 KG/M2 | OXYGEN SATURATION: 96 % | TEMPERATURE: 97.4 F | HEIGHT: 67 IN

## 2019-11-21 DIAGNOSIS — R91.1 LUNG NODULE, SOLITARY: ICD-10-CM

## 2019-11-21 DIAGNOSIS — F41.1 GENERALIZED ANXIETY DISORDER: ICD-10-CM

## 2019-11-21 DIAGNOSIS — E66.01 MORBID OBESITY WITH BMI OF 40.0-44.9, ADULT (HCC): ICD-10-CM

## 2019-11-21 DIAGNOSIS — R06.02 SHORTNESS OF BREATH: Primary | ICD-10-CM

## 2019-11-21 DIAGNOSIS — J30.2 SEASONAL ALLERGIES: ICD-10-CM

## 2019-11-21 PROCEDURE — 99214 OFFICE O/P EST MOD 30 MIN: CPT | Performed by: INTERNAL MEDICINE

## 2019-11-21 NOTE — PROGRESS NOTES
Follow Up Office Note       Patient Name: Brigid Coelho    Referring Physician: Virginia Caruso PA-C    Chief Complaint:    Chief Complaint   Patient presents with   • Post CT     f/u       History of Present Illness: Brigid Coelho is a 54 y.o. female who is here today to follow-up care with Pulmonary.  She has a past medical history significant for ulcerative colitis, allergic rhinitis, anxiety disorder, hypothyroidism, and morbid obesity.  Who presents for follow-up secondary to shortness of breath and solitary lung nodule.  Her shortness of breath has resolved since last time that I saw her.  She also continues to have some drainage in the morning with rhinitis that is clear in nature, although she does not have any symptoms throughout the day.  She denies any chest pain, shortness of breath, fever, or chills.  She has plans to get a repeat CT scan of the chest in February with regards to her lung nodule.  She currently feels her anxiety is fairly well-controlled, she continues to need help with weight loss and we discussed potential options, including paleo, keto, and low-carb options.    Review of Systems:   Review of Systems   Constitutional: Negative for chills, fatigue and fever.   HENT: Negative for congestion and voice change.    Eyes: Negative for blurred vision.   Respiratory: Negative for cough, shortness of breath and wheezing.    Cardiovascular: Negative for chest pain.   Skin: Negative for dry skin.   Hematological: Negative for adenopathy.   Psychiatric/Behavioral: Negative for agitation and depressed mood.       The following portions of the patient's history were reviewed and updated as appropriate: allergies, current medications, past family history, past medical history, past social history, past surgical history and problem list.    Physical Exam:  Vital Signs:   Vitals:    11/21/19 1006   BP: 122/88   BP Location: Right arm   Patient Position: Sitting   Cuff Size: Adult  "  Pulse: 96   Resp: 18   Temp: 97.4 °F (36.3 °C)   SpO2: 96%  Comment: room air at rest   Weight: 124 kg (273 lb)   Height: 170.2 cm (67\")       Physical Exam   Constitutional: She is oriented to person, place, and time. She appears well-developed and well-nourished.   HENT:   Head: Normocephalic and atraumatic.   Right Ear: External ear normal.   Left Ear: External ear normal.   Mouth/Throat: Oropharynx is clear and moist.   Eyes: Conjunctivae are normal. Pupils are equal, round, and reactive to light.   Neck: Normal range of motion. Neck supple.   Cardiovascular: Normal rate and regular rhythm.   Pulmonary/Chest: Effort normal and breath sounds normal.   Abdominal: Soft. Bowel sounds are normal.   Musculoskeletal: Normal range of motion.   Neurological: She is alert and oriented to person, place, and time.   Psychiatric: She has a normal mood and affect. Her behavior is normal.   Nursing note and vitals reviewed.      Results Review:   -Personally reviewed the patient's methacholine challenge which was negative for broncho-reactivity.  - imaging from 7/30/2019 showed a 6 mm noncalcified subpleural nodule in the right middle lobe.  - PFT from 8/2/2019 showed no obstruction or restriction with a mildly reduced DLCO  - Echo report from 8/14/2019 left ventricular function greater than 65%.  Grade 1 diastolic dysfunction and normal valves  - stress test results 8/14/2019  Negative for ischemic changes    Assessment / Plan:   Shortness of breath  Allergic rhinitis  -Symptoms have resolved, suspect this was secondary to allergic rhinitis versus an upper respiratory infection.  -She should continue her Singulair, Claritin, and Flonase 1 puff per nostril nightly as needed for symptomatic relief.  -Possibly including his patient's weight, we extensively discussed weight loss options and potential need for exercise.    Lung nodule, solitary  -Follow-up CT scan to be done in February, I have informed her that I will review " the CT scan she has multiple causes for nodule and that if the nodule is resolved I will call her and tell her that no further follow-up would be necessary.  Although if the nodule still present I will call her and inform her how we will proceed.    Generalized anxiety disorder  -Currently well controlled with her current regimen, I recommend she continue this as this is possibly contributing to her shortness of breath symptoms that she previously had    Morbid obesity with BMI of 40.0-44.9, adult (CMS/[facility])  The patient was counseled on goals and the need for weight reduction. They were directed to the NIH's website on weight management, (https://www.niddk.nih.gov/health-information/weight-management/health-tips-adults) which addresses the risks of being overweight or obese, a healthy diet, tips for losing weight, and the benefit of physical activity/exercise.        Follow Up:   Return in about 3 months (around 2/21/2020) for CT of the chest February 2020.       JEAN-PAUL Hogan, DO  Pulmonary and Critical Care Medicine  Note Electronically Signed    Please note that portions of this note may have been completed with a voice recognition program. Efforts were made to edit the dictations, but occasionally words are mistranscribed.

## 2019-11-22 DIAGNOSIS — R91.1 LUNG NODULE, SOLITARY: Primary | ICD-10-CM

## 2019-12-30 ENCOUNTER — TELEPHONE (OUTPATIENT)
Dept: PULMONOLOGY | Facility: CLINIC | Age: 54
End: 2019-12-30

## 2019-12-30 NOTE — TELEPHONE ENCOUNTER
Pt called today c/o cough w/sputum/congestion/chest tightness/stabbing chest pain in RUQ going on for at least 4 weeeks. Pt denies fever/nausea. Pt is concern due to showing Lung Nodule on her CT. Pt has been treated with abx/steroids on 12/10 from  for URI and symptoms has not gotten any better. Pt admits to taking claritin, singulair, and flonase. Pt can be reached @ [phone]. Please advise.

## 2019-12-30 NOTE — TELEPHONE ENCOUNTER
Since she is currently just completed a course of antibiotics and steroid she does not need any further antibiotics.  She will likely need to be seen for further evaluation.  She is already seen Dr. Hogan regarding the lung nodule and has a follow-up CT scheduled in February.

## 2019-12-30 NOTE — TELEPHONE ENCOUNTER
Pt is scheduled with Dr. Hogan on 1/15/19 w/ Xray. Pt is trying to get her CT date moved sooner before her upcoming apt. Pt call transferred to Gabi to discuss further.

## 2020-01-14 DIAGNOSIS — R73.03 PREDIABETES: ICD-10-CM

## 2020-01-14 DIAGNOSIS — F41.9 ANXIETY: ICD-10-CM

## 2020-01-15 ENCOUNTER — APPOINTMENT (OUTPATIENT)
Dept: CT IMAGING | Facility: HOSPITAL | Age: 55
End: 2020-01-15

## 2020-01-15 ENCOUNTER — APPOINTMENT (OUTPATIENT)
Dept: GENERAL RADIOLOGY | Facility: HOSPITAL | Age: 55
End: 2020-01-15

## 2020-01-15 ENCOUNTER — HOSPITAL ENCOUNTER (EMERGENCY)
Facility: HOSPITAL | Age: 55
Discharge: HOME | End: 2020-01-15
Attending: EMERGENCY MEDICINE | Admitting: EMERGENCY MEDICINE

## 2020-01-15 ENCOUNTER — EPISODE CHANGES (OUTPATIENT)
Dept: CASE MANAGEMENT | Facility: OTHER | Age: 55
End: 2020-01-15

## 2020-01-15 VITALS
BODY MASS INDEX: 40.02 KG/M2 | TEMPERATURE: 98.7 F | SYSTOLIC BLOOD PRESSURE: 121 MMHG | OXYGEN SATURATION: 97 % | WEIGHT: 255 LBS | HEART RATE: 86 BPM | DIASTOLIC BLOOD PRESSURE: 77 MMHG | HEIGHT: 67 IN | RESPIRATION RATE: 16 BRPM

## 2020-01-15 DIAGNOSIS — R05.9 COUGH: ICD-10-CM

## 2020-01-15 DIAGNOSIS — J20.9 ACUTE BRONCHITIS, UNSPECIFIED ORGANISM: Primary | ICD-10-CM

## 2020-01-15 LAB
ALBUMIN SERPL-MCNC: 4 G/DL (ref 3.5–5.2)
ALBUMIN/GLOB SERPL: 1.4 G/DL
ALP SERPL-CCNC: 78 U/L (ref 39–117)
ALT SERPL W P-5'-P-CCNC: 27 U/L (ref 1–33)
ANION GAP SERPL CALCULATED.3IONS-SCNC: 11 MMOL/L (ref 5–15)
AST SERPL-CCNC: 22 U/L (ref 1–32)
BASOPHILS # BLD AUTO: 0.07 10*3/MM3 (ref 0–0.2)
BASOPHILS NFR BLD AUTO: 0.8 % (ref 0–1.5)
BILIRUB SERPL-MCNC: <0.2 MG/DL (ref 0.2–1.2)
BUN BLD-MCNC: 9 MG/DL (ref 6–20)
BUN/CREAT SERPL: 13.4 (ref 7–25)
CALCIUM SPEC-SCNC: 9.1 MG/DL (ref 8.6–10.5)
CHLORIDE SERPL-SCNC: 104 MMOL/L (ref 98–107)
CO2 SERPL-SCNC: 26 MMOL/L (ref 22–29)
CREAT BLD-MCNC: 0.67 MG/DL (ref 0.57–1)
DEPRECATED RDW RBC AUTO: 47.7 FL (ref 37–54)
EOSINOPHIL # BLD AUTO: 0.29 10*3/MM3 (ref 0–0.4)
EOSINOPHIL NFR BLD AUTO: 3.2 % (ref 0.3–6.2)
ERYTHROCYTE [DISTWIDTH] IN BLOOD BY AUTOMATED COUNT: 17.4 % (ref 12.3–15.4)
GFR SERPL CREATININE-BSD FRML MDRD: 91 ML/MIN/1.73
GLOBULIN UR ELPH-MCNC: 2.9 GM/DL
GLUCOSE BLD-MCNC: 97 MG/DL (ref 65–99)
HCT VFR BLD AUTO: 34.9 % (ref 34–46.6)
HGB BLD-MCNC: 10.2 G/DL (ref 12–15.9)
IMM GRANULOCYTES # BLD AUTO: 0.03 10*3/MM3 (ref 0–0.05)
IMM GRANULOCYTES NFR BLD AUTO: 0.3 % (ref 0–0.5)
LYMPHOCYTES # BLD AUTO: 1.72 10*3/MM3 (ref 0.7–3.1)
LYMPHOCYTES NFR BLD AUTO: 19.1 % (ref 19.6–45.3)
MCH RBC QN AUTO: 22.2 PG (ref 26.6–33)
MCHC RBC AUTO-ENTMCNC: 29.2 G/DL (ref 31.5–35.7)
MCV RBC AUTO: 75.9 FL (ref 79–97)
MONOCYTES # BLD AUTO: 0.6 10*3/MM3 (ref 0.1–0.9)
MONOCYTES NFR BLD AUTO: 6.7 % (ref 5–12)
NEUTROPHILS # BLD AUTO: 6.29 10*3/MM3 (ref 1.7–7)
NEUTROPHILS NFR BLD AUTO: 69.9 % (ref 42.7–76)
NRBC BLD AUTO-RTO: 0 /100 WBC (ref 0–0.2)
NT-PROBNP SERPL-MCNC: 100.1 PG/ML (ref 5–900)
PLATELET # BLD AUTO: 371 10*3/MM3 (ref 140–450)
PMV BLD AUTO: 10.1 FL (ref 6–12)
POTASSIUM BLD-SCNC: 4.1 MMOL/L (ref 3.5–5.2)
PROT SERPL-MCNC: 6.9 G/DL (ref 6–8.5)
RBC # BLD AUTO: 4.6 10*6/MM3 (ref 3.77–5.28)
SODIUM BLD-SCNC: 141 MMOL/L (ref 136–145)
WBC NRBC COR # BLD: 9 10*3/MM3 (ref 3.4–10.8)

## 2020-01-15 PROCEDURE — 71045 X-RAY EXAM CHEST 1 VIEW: CPT

## 2020-01-15 PROCEDURE — 71250 CT THORAX DX C-: CPT

## 2020-01-15 PROCEDURE — 80053 COMPREHEN METABOLIC PANEL: CPT | Performed by: PHYSICIAN ASSISTANT

## 2020-01-15 PROCEDURE — 83880 ASSAY OF NATRIURETIC PEPTIDE: CPT | Performed by: PHYSICIAN ASSISTANT

## 2020-01-15 PROCEDURE — 99284 EMERGENCY DEPT VISIT MOD MDM: CPT

## 2020-01-15 PROCEDURE — 85025 COMPLETE CBC W/AUTO DIFF WBC: CPT | Performed by: PHYSICIAN ASSISTANT

## 2020-01-15 RX ORDER — BROMPHENIRAMINE MALEATE, PSEUDOEPHEDRINE HYDROCHLORIDE, AND DEXTROMETHORPHAN HYDROBROMIDE 2; 30; 10 MG/5ML; MG/5ML; MG/5ML
5 SYRUP ORAL 4 TIMES DAILY PRN
Qty: 150 ML | Refills: 0 | Status: SHIPPED | OUTPATIENT
Start: 2020-01-15 | End: 2020-03-16

## 2020-01-15 RX ORDER — ALBUTEROL SULFATE 90 UG/1
2 AEROSOL, METERED RESPIRATORY (INHALATION) EVERY 4 HOURS PRN
Qty: 1 INHALER | Refills: 0 | Status: SHIPPED | OUTPATIENT
Start: 2020-01-15 | End: 2020-01-27 | Stop reason: SDUPTHER

## 2020-01-15 RX ORDER — METFORMIN HYDROCHLORIDE 500 MG/1
TABLET, EXTENDED RELEASE ORAL
Qty: 60 TABLET | Refills: 0 | Status: SHIPPED | OUTPATIENT
Start: 2020-01-15 | End: 2020-02-21 | Stop reason: SDUPTHER

## 2020-01-15 RX ORDER — DOXYCYCLINE 100 MG/1
100 CAPSULE ORAL 2 TIMES DAILY
Qty: 20 CAPSULE | Refills: 0 | OUTPATIENT
Start: 2020-01-15 | End: 2020-01-24

## 2020-01-15 RX ORDER — PANTOPRAZOLE SODIUM 40 MG/1
TABLET, DELAYED RELEASE ORAL
Qty: 90 TABLET | Refills: 1 | Status: SHIPPED | OUTPATIENT
Start: 2020-01-15 | End: 2020-06-05

## 2020-01-15 NOTE — ED PROVIDER NOTES
Subjective   54-year-old female presents emergency department with a cough for the past 3 to 4 days and chills this morning with scant amounts of purulent sputum.  No hemoptysis, no shortness of breath, no chest pain.  She denies sore throat rhinorrhea ear pain difficulty swallowing, nausea vomiting diarrhea abdominal pain dysuria hematuria pyuria.  She has had intermittent symptoms for the past 6weeks, states this all began with a chest cold around Thanksgiving, and seems that she has been ill ever since.  She does have a history of ulcerative colitis and Hashimoto's thyroiditis, she is not on immunosuppressants or immune modulators.          Review of Systems   Constitutional: Negative for chills, diaphoresis and fever.   HENT: Negative for congestion, rhinorrhea and sore throat.    Eyes: Negative for photophobia, redness and visual disturbance.   Respiratory: Negative for cough, choking, chest tightness, shortness of breath and wheezing.    Cardiovascular: Negative for chest pain and leg swelling.   Gastrointestinal: Negative for abdominal distention, abdominal pain, anal bleeding, blood in stool, constipation, diarrhea, nausea and vomiting.   Endocrine: Negative for cold intolerance, heat intolerance and polyuria.   Genitourinary: Negative for difficulty urinating, dysuria, flank pain, frequency, hematuria and urgency.   Musculoskeletal: Negative for arthralgias, back pain, joint swelling and neck stiffness.   Skin: Negative for color change, pallor, rash and wound.   Neurological: Negative for dizziness, seizures, syncope, speech difficulty, weakness, light-headedness, numbness and headaches.   Psychiatric/Behavioral: Negative for confusion.   All other systems reviewed and are negative.      Past Medical History:   Diagnosis Date   • Acute bronchitis    • Anxiety    • Chest pain    • Cholelithiasis    • GERD (gastroesophageal reflux disease)    • Hashimoto's thyroiditis    • Hemorrhoids    • Hypothyroidism     • Metrorrhagia    • Palpitations    • Polycystic ovary syndrome    • Polyp of sigmoid colon    • Ulcerative colitis (CMS/HCC)    • Upper respiratory infection    • UTI (urinary tract infection)        Allergies   Allergen Reactions   • Adhesive Tape      Rash     • Erythromycin Other (See Comments)     Sores in mouth   • Epinephrine Palpitations   • Nitroglycerin Palpitations       Past Surgical History:   Procedure Laterality Date   • APPENDECTOMY     •  SECTION     • CHOLECYSTECTOMY     • DILATATION AND CURETTAGE      Of Cervical Stump   • MYOMECTOMY     • SMALL INTESTINE SURGERY     • TUBAL ABDOMINAL LIGATION         Family History   Problem Relation Age of Onset   • Hyperlipidemia Mother    • CHAD disease Mother    • Cancer Mother    • Diabetes Mother    • Heart disease Mother    • Rheum arthritis Father    • Hyperlipidemia Father    • Heart attack Father 72   • Ovarian cancer Maternal Aunt    • Diabetes Paternal Grandmother    • Hypertension Paternal Grandmother    • Obesity Paternal Grandmother    • Colon cancer Other    • Arthritis Other    • Cancer Other         uncle; liver, lung    • Thyroid disease Cousin    • Thyroid cancer Cousin    • CHAD disease Daughter    • Obesity Brother    • Sleep apnea Brother        Social History     Socioeconomic History   • Marital status:      Spouse name: Not on file   • Number of children: Not on file   • Years of education: Not on file   • Highest education level: Not on file   Tobacco Use   • Smoking status: Never Smoker   • Smokeless tobacco: Never Used   Substance and Sexual Activity   • Alcohol use: No   • Drug use: No   • Sexual activity: Defer   Social History Narrative    Single    Caffeine: 3 sodas daily           Objective   Physical Exam   Constitutional: She is oriented to person, place, and time. She appears well-developed and well-nourished. No distress.   HENT:   Head: Normocephalic and atraumatic.   Right Ear: External ear normal.   Left  Ear: External ear normal.   Nose: Nose normal.   Mouth/Throat: Oropharynx is clear and moist. No oropharyngeal exudate.   Eyes: Pupils are equal, round, and reactive to light. Conjunctivae and EOM are normal. Right eye exhibits no discharge. Left eye exhibits no discharge. No scleral icterus.   Neck: Normal range of motion. Neck supple. No JVD present. No tracheal deviation present. No thyromegaly present.   Cardiovascular: Normal rate, regular rhythm, normal heart sounds and intact distal pulses. Exam reveals no gallop and no friction rub.   No murmur heard.  Pulmonary/Chest: Effort normal and breath sounds normal. No stridor. No respiratory distress. She has no wheezes. She has no rales. She exhibits no tenderness.   No tachypnea or respiratory distress or accessory muscle use.   Abdominal: Soft. Bowel sounds are normal. She exhibits no distension. There is no tenderness. There is no rebound and no guarding.   Musculoskeletal: Normal range of motion. She exhibits no edema, tenderness or deformity.   Neurological: She is alert and oriented to person, place, and time. No cranial nerve deficit. She exhibits normal muscle tone. Coordination normal.   Skin: Skin is warm and dry. No rash noted. She is not diaphoretic. No erythema. No pallor.   Psychiatric: She has a normal mood and affect. Her behavior is normal. Judgment and thought content normal.   Nursing note and vitals reviewed.      Procedures           ED Course  ED Course as of Huseyin 15 1643   Wed Huseyin 15, 2020   1501 IMPRESSION:  There are no acute findings. There is a 5 to 6 mm  pleural-based nodule in the right middle lobe which is unchanged when  compared with 07/30/2019. Furthermore a similar nodule is noted on a CT  scan of the abdomen and pelvis dated 08/05/2015 and is unchanged.       [TG]      ED Course User Index  [TG] Se Alcantar PA-C                      Cushing Coma Scale Score: 15                  Recent Results (from the past 24 hour(s))    Comprehensive Metabolic Panel    Collection Time: 01/15/20 11:59 AM   Result Value Ref Range    Glucose 97 65 - 99 mg/dL    BUN 9 6 - 20 mg/dL    Creatinine 0.67 0.57 - 1.00 mg/dL    Sodium 141 136 - 145 mmol/L    Potassium 4.1 3.5 - 5.2 mmol/L    Chloride 104 98 - 107 mmol/L    CO2 26.0 22.0 - 29.0 mmol/L    Calcium 9.1 8.6 - 10.5 mg/dL    Total Protein 6.9 6.0 - 8.5 g/dL    Albumin 4.00 3.50 - 5.20 g/dL    ALT (SGPT) 27 1 - 33 U/L    AST (SGOT) 22 1 - 32 U/L    Alkaline Phosphatase 78 39 - 117 U/L    Total Bilirubin <0.2 (L) 0.2 - 1.2 mg/dL    eGFR Non African Amer 91 >60 mL/min/1.73    Globulin 2.9 gm/dL    A/G Ratio 1.4 g/dL    BUN/Creatinine Ratio 13.4 7.0 - 25.0    Anion Gap 11.0 5.0 - 15.0 mmol/L   BNP    Collection Time: 01/15/20 11:59 AM   Result Value Ref Range    proBNP 100.1 5.0 - 900.0 pg/mL   CBC Auto Differential    Collection Time: 01/15/20 11:59 AM   Result Value Ref Range    WBC 9.00 3.40 - 10.80 10*3/mm3    RBC 4.60 3.77 - 5.28 10*6/mm3    Hemoglobin 10.2 (L) 12.0 - 15.9 g/dL    Hematocrit 34.9 34.0 - 46.6 %    MCV 75.9 (L) 79.0 - 97.0 fL    MCH 22.2 (L) 26.6 - 33.0 pg    MCHC 29.2 (L) 31.5 - 35.7 g/dL    RDW 17.4 (H) 12.3 - 15.4 %    RDW-SD 47.7 37.0 - 54.0 fl    MPV 10.1 6.0 - 12.0 fL    Platelets 371 140 - 450 10*3/mm3    Neutrophil % 69.9 42.7 - 76.0 %    Lymphocyte % 19.1 (L) 19.6 - 45.3 %    Monocyte % 6.7 5.0 - 12.0 %    Eosinophil % 3.2 0.3 - 6.2 %    Basophil % 0.8 0.0 - 1.5 %    Immature Grans % 0.3 0.0 - 0.5 %    Neutrophils, Absolute 6.29 1.70 - 7.00 10*3/mm3    Lymphocytes, Absolute 1.72 0.70 - 3.10 10*3/mm3    Monocytes, Absolute 0.60 0.10 - 0.90 10*3/mm3    Eosinophils, Absolute 0.29 0.00 - 0.40 10*3/mm3    Basophils, Absolute 0.07 0.00 - 0.20 10*3/mm3    Immature Grans, Absolute 0.03 0.00 - 0.05 10*3/mm3    nRBC 0.0 0.0 - 0.2 /100 WBC     Note: In addition to lab results from this visit, the labs listed above may include labs taken at another facility or during a different  encounter within the last 24 hours. Please correlate lab times with ED admission and discharge times for further clarification of the services performed during this visit.    CT Chest Without Contrast   ED Interpretation   There are no acute findings. There is a 5 to 6 mm   pleural-based nodule in the right middle lobe which is unchanged when   compared with 07/30/2019. Furthermore a similar nodule is noted on a CT   scan of the abdomen and pelvis dated 08/05/2015 and is unchanged.       D:  01/15/2020   E:  01/15/2020       This report was finalized on 1/15/2020 2:15 PM by Dr. Rajesh Shannon MD.          Final Result   There are no acute findings. There is a 5 to 6 mm   pleural-based nodule in the right middle lobe which is unchanged when   compared with 07/30/2019. Furthermore a similar nodule is noted on a CT   scan of the abdomen and pelvis dated 08/05/2015 and is unchanged.       D:  01/15/2020   E:  01/15/2020       This report was finalized on 1/15/2020 2:15 PM by Dr. Rajesh Shannon MD.          XR Chest 1 View   Final Result   There is mild central vascular congestion when compared to   previous examinations. There has otherwise been no change.       D:  01/15/2020   E:  01/15/2020       This report was finalized on 1/15/2020 2:15 PM by Dr. Rajesh Shannon MD.            Vitals:    01/15/20 1430 01/15/20 1445 01/15/20 1500 01/15/20 1534   BP: 114/75  121/77 121/77   BP Location:    Left arm   Patient Position:    Sitting   Pulse: 77 71 76 86   Resp:    16   Temp:    98.7 °F (37.1 °C)   TempSrc:    Oral   SpO2: 95% 94% 98% 97%   Weight:       Height:         Medications - No data to display  ECG/EMG Results (last 24 hours)     ** No results found for the last 24 hours. **        No orders to display             MDM    Final diagnoses:   Acute bronchitis, unspecified organism   Cough            Se Alcantar PA-C  01/15/20 6634

## 2020-01-15 NOTE — DISCHARGE INSTRUCTIONS
Rest, follow-up with your primary care provider in 2 days for recheck.  Follow-up with Dr. Bates for pulmonology consult regarding your chronic cough.  Return to the emergency department immediately if any change or worsening of symptoms.

## 2020-01-20 ENCOUNTER — EPISODE CHANGES (OUTPATIENT)
Dept: CASE MANAGEMENT | Facility: OTHER | Age: 55
End: 2020-01-20

## 2020-01-22 ENCOUNTER — EPISODE CHANGES (OUTPATIENT)
Dept: CASE MANAGEMENT | Facility: OTHER | Age: 55
End: 2020-01-22

## 2020-01-23 ENCOUNTER — TELEPHONE (OUTPATIENT)
Dept: PULMONOLOGY | Facility: CLINIC | Age: 55
End: 2020-01-23

## 2020-01-23 NOTE — TELEPHONE ENCOUNTER
Pt called today stating that she was recently in ED for bronchitis and need to schedule an apt. Pt c/o wheezing/cough and abx has not gotten any better. Pt denies fever/chest pain.  Pt see's Dr. Hogan and last seen on 11/21/19. Please schedule pt with  next week or Clarissa Faith for hospital f/u. Pt can be reached @ [phone].

## 2020-01-27 ENCOUNTER — OFFICE VISIT (OUTPATIENT)
Dept: PULMONOLOGY | Facility: CLINIC | Age: 55
End: 2020-01-27

## 2020-01-27 VITALS
HEIGHT: 67 IN | OXYGEN SATURATION: 95 % | WEIGHT: 265 LBS | DIASTOLIC BLOOD PRESSURE: 100 MMHG | HEART RATE: 86 BPM | SYSTOLIC BLOOD PRESSURE: 138 MMHG | RESPIRATION RATE: 16 BRPM | BODY MASS INDEX: 41.59 KG/M2 | TEMPERATURE: 97 F

## 2020-01-27 DIAGNOSIS — R91.1 LUNG NODULE, SOLITARY: Primary | ICD-10-CM

## 2020-01-27 DIAGNOSIS — E66.01 MORBID OBESITY WITH BMI OF 40.0-44.9, ADULT (HCC): ICD-10-CM

## 2020-01-27 DIAGNOSIS — R06.02 SHORTNESS OF BREATH: ICD-10-CM

## 2020-01-27 DIAGNOSIS — F41.1 GENERALIZED ANXIETY DISORDER: ICD-10-CM

## 2020-01-27 DIAGNOSIS — J30.2 SEASONAL ALLERGIES: ICD-10-CM

## 2020-01-27 PROCEDURE — 99214 OFFICE O/P EST MOD 30 MIN: CPT | Performed by: INTERNAL MEDICINE

## 2020-01-27 PROCEDURE — 94375 RESPIRATORY FLOW VOLUME LOOP: CPT | Performed by: INTERNAL MEDICINE

## 2020-01-27 NOTE — PROGRESS NOTES
Follow Up Office Note       Patient Name: Brigid Coelho    Referring Physician: No ref. provider found    Chief Complaint:    Chief Complaint   Patient presents with   • Bronchitis     f/u   • Cough   • Wheezing       History of Present Illness: Brigid Coelho is a 54 y.o. female who is here today to follow-up care with Pulmonary. She has a past medical history significant for ulcerative colitis, allergic rhinitis, anxiety disorder, hypothyroidism, and morbid obesity.  Since her last visit she has had multiple ER and urgent care treatment visits.  For concern for bronchitis.  During this time.  She was given antibiotics and prednisone multiple times and notes that she feels that she was wheezing during all those episodes.  She states the most recent one started roughly 2 weeks ago, and at that point she went to an urgent treatment center and was given antibiotics and felt like that helped her improved although she took the antibiotics for 10 days.  In addition to this she underwent a CT scan at that visit which showed no acute airspace disease, and a stable right middle lobe pulmonary nodule.  In addition to this states that she has been trying Claritin with no improvement in her symptoms, but is not been taking Flonase consistently.  She notes during her most recent concern for infection she thought she may have flu as she was having fevers and body aches.  Unfortunately they did test her for flu back in December, but they did not tested for anything in January.    Review of Systems:   Review of Systems   Constitutional: Positive for chills, fatigue and fever.   HENT: Positive for congestion. Negative for voice change.    Eyes: Negative for blurred vision.   Respiratory: Positive for cough, shortness of breath and wheezing.    Cardiovascular: Negative for chest pain.   Skin: Negative for dry skin.   Hematological: Negative for adenopathy.   Psychiatric/Behavioral: Negative for agitation and  "depressed mood.       The following portions of the patient's history were reviewed and updated as appropriate: allergies, current medications, past family history, past medical history, past social history, past surgical history and problem list.    Physical Exam:  Vital Signs:   Vitals:    01/27/20 1419   BP: 138/100   BP Location: Right arm   Patient Position: Sitting   Cuff Size: Adult   Pulse: 86   Resp: 16   Temp: 97 °F (36.1 °C)   SpO2: 95%  Comment: room air at rest   Weight: 120 kg (265 lb)   Height: 170.2 cm (67\")       Physical Exam   Constitutional: She is oriented to person, place, and time. She appears well-developed and well-nourished.   HENT:   Head: Normocephalic and atraumatic.   Right Ear: External ear normal.   Left Ear: External ear normal.   Mouth/Throat: Oropharynx is clear and moist.   Eyes: Pupils are equal, round, and reactive to light. Conjunctivae are normal.   Neck: Normal range of motion. Neck supple.   Cardiovascular: Normal rate and regular rhythm.   Pulmonary/Chest: Effort normal and breath sounds normal.   Abdominal: Soft. Bowel sounds are normal.   Musculoskeletal: Normal range of motion.   Neurological: She is alert and oriented to person, place, and time.   Psychiatric: She has a normal mood and affect. Her behavior is normal.   Nursing note and vitals reviewed.      Results Review:   -methacholine challenge which was negative for broncho-reactivity.  -Personally viewed the patient's CT scan from 1/15/2020 which shows no acute airspace disease, with a stable 6 mm nodule in the right middle lobe, that was also noted to be seen on a previous CT scan from 2015 on CT of the abdomen.    - imaging from 7/30/2019 showed a 6 mm noncalcified subpleural nodule in the right middle lobe.  -I personally viewed the patient's PFTs from 1/27/2020 which shows no obstruction or restriction.     -PFT from 8/2/2019 showed no obstruction or restriction with a mildly reduced DLCO  - " Echo report from 8/14/2019 left ventricular function greater than 65%.  Grade 1 diastolic dysfunction and normal valves  - stress test results 8/14/2019  Negative for ischemic changes    Assessment / Plan:   Lung nodule, solitary  -With regards to the patient's pulmonary nodule, it was again noticed to be 5 to 6 mm on her CT of the chest from 1/15/2020.  Notably radiology had also found a CT scan back from 2015 which also showed that the nodule was stable.  Therefore given the fact that she has not a smoker and the nodule was found incidentally and has now been stable since 2015 would not require any further CT scans.    Shortness of breath  -With regards to the pain and shortness of breath, it is possible that she has had 3 episodes of bronchitis or viruses over the last 2 to 3 months.  But given her testing, the patient does not have asthma.  She has a normal methacholine challenge, she has a spirometry during which she felt was a and worsening of her symptoms, and both are within normal limits.  In addition to this she also has spirometry when she was feeling good and it is no change from when she feels that she is having symptoms or wheezing.  In addition to this her CT scan from January 2, 2015 when she was acutely having issues shows no signs of airspace disease, making heart failure also very unlikely, with that the BNP from January was also negative.  And likely that this is any significant pulmonary disease.    - In addition to that we have an echo showing that she has very mild diastolic heart failure, but this would not explain her symptoms given the fact that she has no signs of heart failure on the CT of the chest and a normal BNP.  -For the most likely cause the patient's shortness of breath in general is secondary to anxiety, although during these acute episodes where she was having fevers and chills I suspect that she did have a virus during this point over the last few months.,  I tried to inform  her that she was not necessarily needing a antibiotic or prednisone for these viruses.  In addition to this I do not feel that she will also get any benefit out of albuterol inhalers as she does not have asthma.  -Given that she has concerns for ongoing wheezing the other differential would be vocal cord dysfunction in the setting of possible anxiety disorder, but she also does have significant sinus disease therefore I have referred the patient to ENT for sinus evaluation and laryngoscopy to look for signs of vocal cord dysfunction.    Generalized anxiety disorder  -Currently I feel the patient's anxiety is not greatly controlled, and she continues to be on Xanax.  I will ultimately defer to her PCP for better control, but at this point as noted above she does not have asthma and if anything her anxiety is likely driving her symptoms.    Seasonal allergies  -Recommend the patient take Flonase and Claritin.    Morbid obesity with BMI of 40.0-44.9, adult (CMS/[facility])  -The patient was counseled on goals and the need for weight reduction. They were directed to the NIH's website on weight management, (https://www.niddk.nih.gov/health-information/weight-management/health-tips-adults) which addresses the risks of being overweight or obese, a healthy diet, tips for losing weight, and the benefit of physical activity/exercise.      Follow Up:   Return if symptoms worsen or fail to improve.       JEAN-PAUL Hogan, DO  Pulmonary and Critical Care Medicine  Note Electronically Signed    Please note that portions of this note may have been completed with a voice recognition program. Efforts were made to edit the dictations, but occasionally words are mistranscribed.

## 2020-01-28 ENCOUNTER — EPISODE CHANGES (OUTPATIENT)
Dept: CASE MANAGEMENT | Facility: OTHER | Age: 55
End: 2020-01-28

## 2020-01-28 DIAGNOSIS — J38.3 VOCAL CORD DYSFUNCTION: Primary | ICD-10-CM

## 2020-01-29 ENCOUNTER — TRANSCRIBE ORDERS (OUTPATIENT)
Dept: PULMONOLOGY | Facility: CLINIC | Age: 55
End: 2020-01-29

## 2020-01-31 ENCOUNTER — EPISODE CHANGES (OUTPATIENT)
Dept: CASE MANAGEMENT | Facility: OTHER | Age: 55
End: 2020-01-31

## 2020-02-02 ENCOUNTER — EPISODE CHANGES (OUTPATIENT)
Dept: CASE MANAGEMENT | Facility: OTHER | Age: 55
End: 2020-02-02

## 2020-02-10 RX ORDER — LEVOTHYROXINE SODIUM 0.07 MG/1
75 TABLET ORAL DAILY
Qty: 90 TABLET | Refills: 0 | Status: SHIPPED | OUTPATIENT
Start: 2020-02-10 | End: 2020-05-13

## 2020-02-21 DIAGNOSIS — R73.03 PREDIABETES: ICD-10-CM

## 2020-02-21 RX ORDER — METFORMIN HYDROCHLORIDE 500 MG/1
500 TABLET, EXTENDED RELEASE ORAL 2 TIMES DAILY
Qty: 180 TABLET | Refills: 0 | Status: SHIPPED | OUTPATIENT
Start: 2020-02-21 | End: 2020-03-16 | Stop reason: SDUPTHER

## 2020-03-11 ENCOUNTER — TELEPHONE (OUTPATIENT)
Dept: FAMILY MEDICINE CLINIC | Facility: CLINIC | Age: 55
End: 2020-03-11

## 2020-03-11 NOTE — TELEPHONE ENCOUNTER
Pt called to reschedule appt due to corona virus concerns, she is requesting a 30 day supply of     ALPRAZolam (XANAX) 0.5 MG tablet    Pharmacy: [facility] [place]   PH: [phone]  FAX: [phone]      Patient contact   [phone]

## 2020-03-12 NOTE — TELEPHONE ENCOUNTER
Patient is waiting on a call back to see whether she can get her Xanax refilled.  She can be reached at [phone]    [address]

## 2020-03-16 ENCOUNTER — OFFICE VISIT (OUTPATIENT)
Dept: FAMILY MEDICINE CLINIC | Facility: CLINIC | Age: 55
End: 2020-03-16

## 2020-03-16 VITALS
HEART RATE: 88 BPM | BODY MASS INDEX: 42.69 KG/M2 | SYSTOLIC BLOOD PRESSURE: 138 MMHG | HEIGHT: 67 IN | WEIGHT: 272 LBS | DIASTOLIC BLOOD PRESSURE: 78 MMHG | OXYGEN SATURATION: 96 % | RESPIRATION RATE: 17 BRPM | TEMPERATURE: 97.4 F

## 2020-03-16 DIAGNOSIS — F41.9 ANXIETY: ICD-10-CM

## 2020-03-16 DIAGNOSIS — J30.1 NON-SEASONAL ALLERGIC RHINITIS DUE TO POLLEN: Primary | ICD-10-CM

## 2020-03-16 DIAGNOSIS — R73.03 PREDIABETES: ICD-10-CM

## 2020-03-16 PROBLEM — R30.0 DYSURIA: Status: RESOLVED | Noted: 2018-06-30 | Resolved: 2020-03-16

## 2020-03-16 PROBLEM — E03.9 HYPOTHYROIDISM: Status: RESOLVED | Noted: 2018-09-24 | Resolved: 2020-03-16

## 2020-03-16 PROBLEM — R07.9 CHEST PAIN: Status: RESOLVED | Noted: 2019-08-15 | Resolved: 2020-03-16

## 2020-03-16 PROBLEM — R06.02 SHORTNESS OF BREATH: Status: RESOLVED | Noted: 2019-08-15 | Resolved: 2020-03-16

## 2020-03-16 PROBLEM — N30.01 ACUTE CYSTITIS WITH HEMATURIA: Status: RESOLVED | Noted: 2019-07-17 | Resolved: 2020-03-16

## 2020-03-16 PROBLEM — R09.89 CHEST CONGESTION: Status: RESOLVED | Noted: 2018-11-21 | Resolved: 2020-03-16

## 2020-03-16 PROCEDURE — 99214 OFFICE O/P EST MOD 30 MIN: CPT | Performed by: FAMILY MEDICINE

## 2020-03-16 RX ORDER — MONTELUKAST SODIUM 10 MG/1
10 TABLET ORAL
Qty: 90 TABLET | Refills: 1 | Status: SHIPPED | OUTPATIENT
Start: 2020-03-16 | End: 2020-11-17

## 2020-03-16 RX ORDER — ALPRAZOLAM 0.5 MG/1
0.5 TABLET ORAL 2 TIMES DAILY PRN
Qty: 30 TABLET | Refills: 0 | Status: SHIPPED | OUTPATIENT
Start: 2020-03-16 | End: 2020-06-24 | Stop reason: SDUPTHER

## 2020-03-16 RX ORDER — METFORMIN HYDROCHLORIDE 500 MG/1
500 TABLET, EXTENDED RELEASE ORAL 2 TIMES DAILY
Qty: 180 TABLET | Refills: 0 | Status: SHIPPED | OUTPATIENT
Start: 2020-03-16 | End: 2020-06-05 | Stop reason: SDUPTHER

## 2020-03-16 NOTE — PROGRESS NOTES
Subjective   Brigid Coelho is a 54 y.o. female.     Anxiety   Presents for follow-up visit. Symptoms include excessive worry and nervous/anxious behavior. Patient reports no chest pain, depressed mood, dizziness, insomnia, irritability, malaise, muscle tension, nausea, obsessions, palpitations, panic, restlessness, shortness of breath or suicidal ideas. Symptoms occur rarely. The severity of symptoms is mild. The patient sleeps 7 hours per night. The quality of sleep is good. Nighttime awakenings: occasional.     Compliance with medications is %.      1. Has increased cough, congestion, no fever. Taking Mucinex some relief.  Has seen pulmonary recently due to following a lung nodule.  2. Needs rf of xanax for anxiety. Uses prn.    The following portions of the patient's history were reviewed and updated as appropriate: allergies, current medications, past family history, past medical history, past social history, past surgical history and problem list.  Vitals:    03/16/20 1521   BP: 138/78   Pulse: 88   Resp: 17   Temp: 97.4 °F (36.3 °C)   SpO2: 96%     Body mass index is 42.6 kg/m².  Review of Systems   Constitutional: Negative.  Negative for activity change, fatigue, fever, irritability, unexpected weight gain and unexpected weight loss.   HENT: Negative.  Negative for congestion, sneezing and sore throat.    Eyes: Negative.  Negative for blurred vision, double vision and visual disturbance.   Respiratory: Negative.  Negative for cough, chest tightness, shortness of breath and wheezing.    Cardiovascular: Negative.  Negative for chest pain, palpitations and leg swelling.   Gastrointestinal: Negative.  Negative for abdominal distention, abdominal pain, blood in stool, constipation, diarrhea and nausea.   Endocrine: Negative.  Negative for cold intolerance and heat intolerance.   Genitourinary: Negative.  Negative for dysuria, frequency, urgency and urinary incontinence.   Musculoskeletal:  Negative.  Negative for arthralgias and myalgias.   Skin: Negative.  Negative for rash.   Allergic/Immunologic: Negative.    Neurological: Negative.  Negative for dizziness, syncope, numbness and memory problem.   Hematological: Negative.  Negative for adenopathy.   Psychiatric/Behavioral: Negative for suicidal ideas and depressed mood. The patient is nervous/anxious. The patient does not have insomnia.    All other systems reviewed and are negative.      Objective   Physical Exam   Constitutional: She is oriented to person, place, and time. She appears well-developed and well-nourished. No distress.   HENT:   Right Ear: External ear normal.   Left Ear: External ear normal.   Nose: Nose normal.   Mouth/Throat: Oropharynx is clear and moist.   Eyes: Pupils are equal, round, and reactive to light. Conjunctivae and EOM are normal.   Neck: Normal range of motion. Neck supple. No thyromegaly present.   Cardiovascular: Normal rate, regular rhythm and normal heart sounds.   No murmur heard.  Pulmonary/Chest: Effort normal and breath sounds normal. No respiratory distress. She has no wheezes.   Abdominal: Soft. Bowel sounds are normal. She exhibits no distension and no mass. There is no tenderness. There is no rebound and no guarding. No hernia.   Musculoskeletal: Normal range of motion. She exhibits no edema or tenderness.   Lymphadenopathy:     She has no cervical adenopathy.   Neurological: She is alert and oriented to person, place, and time. She has normal reflexes.   Skin: Skin is warm and dry. No rash noted. She is not diaphoretic. No erythema. No pallor.   Psychiatric: She has a normal mood and affect. Her behavior is normal. Judgment and thought content normal.   Nursing note and vitals reviewed.          Assessment/Plan   Brigid was seen today for med refill.    Diagnoses and all orders for this visit:    Non-seasonal allergic rhinitis due to pollen    Anxiety  -     ALPRAZolam (XANAX) 0.5 MG tablet; Take 1  tablet by mouth 2 (Two) Times a Day As Needed for Anxiety.  -     sertraline (ZOLOFT) 50 MG tablet; TAKE ONE AND ONE-HALF (1 & 1/2) TABLET BY MOUTH DAILY    Prediabetes  -     metFORMIN ER (GLUCOPHAGE-XR) 500 MG 24 hr tablet; Take 1 tablet by mouth 2 (Two) Times a Day.    Other orders  -     montelukast (SINGULAIR) 10 MG tablet; Take 1 tablet by mouth every night at bedtime.

## 2020-03-18 ENCOUNTER — TELEPHONE (OUTPATIENT)
Dept: FAMILY MEDICINE CLINIC | Facility: CLINIC | Age: 55
End: 2020-03-18

## 2020-03-18 NOTE — TELEPHONE ENCOUNTER
PT CALLED IN STATED SHE WAS JUST SEEN IN THE OFFICE ON 03/16/20 FOR UPPER RESPIRATORY ISSUES PT FURTHER STATES SHE HAS A LOT OF YELLOW MUCUS COMING FROM THE SINUS INFECTION AND SHE IS REQUESTING AN ANTIBIOTIC BE SENT TO THE PHARM PLEASE ADVISE      PT CB NUMBER: [phone]  [facility] PH[facility]: [facility] [place]  FAX# [phone]

## 2020-03-18 NOTE — TELEPHONE ENCOUNTER
LVM for pt and advised per cheryle to go to [facility] to have testing for flu, strep, and covid if needed.

## 2020-03-27 ENCOUNTER — TELEPHONE (OUTPATIENT)
Dept: URGENT CARE | Facility: CLINIC | Age: 55
End: 2020-03-27

## 2020-03-27 NOTE — TELEPHONE ENCOUNTER
Pt called requesting difucan for yeast infection from abx for sinus infection. Diflucan 150 mg dispense 2. Take 1 daily for 2 days no refills.

## 2020-04-15 PROCEDURE — 87086 URINE CULTURE/COLONY COUNT: CPT | Performed by: FAMILY MEDICINE

## 2020-04-20 ENCOUNTER — TELEPHONE (OUTPATIENT)
Dept: URGENT CARE | Facility: CLINIC | Age: 55
End: 2020-04-20

## 2020-04-22 ENCOUNTER — OFFICE VISIT (OUTPATIENT)
Dept: FAMILY MEDICINE CLINIC | Facility: CLINIC | Age: 55
End: 2020-04-22

## 2020-04-22 VITALS
DIASTOLIC BLOOD PRESSURE: 72 MMHG | HEART RATE: 128 BPM | TEMPERATURE: 97.3 F | SYSTOLIC BLOOD PRESSURE: 138 MMHG | OXYGEN SATURATION: 97 % | BODY MASS INDEX: 41.59 KG/M2 | WEIGHT: 265 LBS | RESPIRATION RATE: 16 BRPM | HEIGHT: 67 IN

## 2020-04-22 DIAGNOSIS — R30.0 DYSURIA: ICD-10-CM

## 2020-04-22 DIAGNOSIS — D64.9 CHRONIC ANEMIA: ICD-10-CM

## 2020-04-22 DIAGNOSIS — E06.3 HYPOTHYROIDISM DUE TO HASHIMOTO'S THYROIDITIS: ICD-10-CM

## 2020-04-22 DIAGNOSIS — E03.8 HYPOTHYROIDISM DUE TO HASHIMOTO'S THYROIDITIS: ICD-10-CM

## 2020-04-22 DIAGNOSIS — R73.03 PREDIABETES: ICD-10-CM

## 2020-04-22 DIAGNOSIS — R00.0 TACHYCARDIA: ICD-10-CM

## 2020-04-22 DIAGNOSIS — R00.2 PALPITATIONS: Primary | ICD-10-CM

## 2020-04-22 LAB
BILIRUB BLD-MCNC: ABNORMAL MG/DL
CLARITY, POC: ABNORMAL
COLOR UR: ABNORMAL
GLUCOSE BLDC GLUCOMTR-MCNC: 171 MG/DL (ref 70–130)
GLUCOSE UR STRIP-MCNC: NEGATIVE MG/DL
HBA1C MFR BLD: 6 %
HCT VFR BLDA CALC: 34.9 % (ref 38–51)
HGB BLDA-MCNC: 10.5 G/DL (ref 12–17)
KETONES UR QL: ABNORMAL
LEUKOCYTE EST, POC: NEGATIVE
MCH, POC: 22.2
MCHC, POC: 30.1
MCV, POC: 73.7
NITRITE UR-MCNC: NEGATIVE MG/ML
PH UR: 5.5 [PH] (ref 5–8)
PLATELET # BLD: 447 10*3/MM3
PMV BLD: 7.7 FL
POC IMMATURE GRAN: 7.9 %
PROT UR STRIP-MCNC: ABNORMAL MG/DL
RBC # UR STRIP: ABNORMAL /UL
RBC, POC: 4.74
RDW, POC: 16.1
SP GR UR: 1.03 (ref 1–1.03)
UROBILINOGEN UR QL: NORMAL
WBC # BLD: 10.4 10*3/UL

## 2020-04-22 PROCEDURE — 83036 HEMOGLOBIN GLYCOSYLATED A1C: CPT | Performed by: NURSE PRACTITIONER

## 2020-04-22 PROCEDURE — 93000 ELECTROCARDIOGRAM COMPLETE: CPT | Performed by: NURSE PRACTITIONER

## 2020-04-22 PROCEDURE — 85025 COMPLETE CBC W/AUTO DIFF WBC: CPT | Performed by: NURSE PRACTITIONER

## 2020-04-22 PROCEDURE — 81003 URINALYSIS AUTO W/O SCOPE: CPT | Performed by: NURSE PRACTITIONER

## 2020-04-22 PROCEDURE — 82962 GLUCOSE BLOOD TEST: CPT | Performed by: NURSE PRACTITIONER

## 2020-04-22 PROCEDURE — 99215 OFFICE O/P EST HI 40 MIN: CPT | Performed by: NURSE PRACTITIONER

## 2020-04-22 NOTE — PROGRESS NOTES
"Subjective   Brigid Coelho is a 54 y.o. female.     Ms. Coelho presents today with c/o heart palpitations, heart \"racing\" x 2 days. She was advised to go to ER but she declined to go-wanted to come in to office for ECG. Patient was seen by cardiology 8/14/19 after an ER visit for chest pain 7/30/19 at which time she had a treadmill stress test and a transthoracic echo with no abnormal findings. Her cardiac enzymes were also normal at the ER visit. No evidence of PE.   Patient appears without distress at this time. She denies chest pain, SOA, dizziness, weakness, near syncope. She does states that her anxiety has been worse recently since the Covid pandemic.    Palpitations    This is a recurrent problem. Episode onset: 2 days. The problem occurs intermittently. The problem has been waxing and waning. Exacerbated by: anxiety. Associated symptoms include anxiety. Pertinent negatives include no chest fullness, chest pain, coughing, diaphoresis, dizziness, fever, malaise/fatigue, nausea, near-syncope, shortness of breath, syncope, vomiting or weakness. She has tried nothing for the symptoms. Risk factors include obesity, stress, diabetes mellitus, dyslipidemia, sedentary lifestyle and family history. Her past medical history is significant for anxiety.       The following portions of the patient's history were reviewed and updated as appropriate: allergies, current medications, past family history, past medical history, past social history, past surgical history and problem list.    Allergies as of 04/22/2020 - Reviewed 04/22/2020   Allergen Reaction Noted   • Adhesive tape  12/26/2017   • Erythromycin Other (See Comments) 05/27/2016   • Epinephrine Palpitations 05/27/2016   • Nitroglycerin Palpitations 05/27/2016       Current Outpatient Medications on File Prior to Visit   Medication Sig   • ALPRAZolam (XANAX) 0.5 MG tablet Take 1 tablet by mouth 2 (Two) Times a Day As Needed for Anxiety.   • amoxicillin " (AMOXIL) 875 MG tablet Take 1 tablet by mouth 2 (Two) Times a Day.   • fluticasone (Flonase) 50 MCG/ACT nasal spray 2 sprays into the nostril(s) as directed by provider Daily. 2 puffs each nostril   • folic acid (FOLVITE) 400 MCG tablet Take 400 mcg by mouth Daily.   • levothyroxine (SYNTHROID, LEVOTHROID) 75 MCG tablet Take 1 tablet by mouth Daily.   • Loratadine (CLARITIN) 10 MG capsule Take 1 tablet by mouth daily.   • mesalamine (APRISO) 0.375 g 24 hr capsule Take 375 mg by mouth Daily.   • montelukast (SINGULAIR) 10 MG tablet Take 1 tablet by mouth every night at bedtime.   • pantoprazole (PROTONIX) 40 MG EC tablet TAKE ONE TABLET BY MOUTH DAILY   • sertraline (ZOLOFT) 50 MG tablet TAKE ONE AND ONE-HALF (1 & 1/2) TABLET BY MOUTH DAILY   • vitamin B-12 (CYANOCOBALAMIN) 1000 MCG tablet Take 500 mcg by mouth Daily.   • albuterol sulfate  (90 Base) MCG/ACT inhaler Inhale 2 puffs Every 6 (Six) Hours As Needed for Wheezing or Shortness of Air (or cough you cannot get under control).   • ferrous sulfate 140 (45 Fe) MG tablet controlled-release tablet Take 140 mg by mouth Daily With Breakfast.   • Kelp 150 MCG tablet Take 1 tablet by mouth Daily.   • L-Glutamine 500 MG tablet Take 1 tablet by mouth Daily.   • metFORMIN ER (GLUCOPHAGE-XR) 500 MG 24 hr tablet Take 1 tablet by mouth 2 (Two) Times a Day.     No current facility-administered medications on file prior to visit.        Review of Systems   Constitutional: Negative for activity change, appetite change, chills, diaphoresis, fatigue, fever and malaise/fatigue.   HENT: Negative.    Respiratory: Negative.  Negative for cough and shortness of breath.    Cardiovascular: Positive for palpitations. Negative for chest pain, leg swelling, syncope and near-syncope.   Gastrointestinal: Positive for anal bleeding (hemorrhoids), blood in stool (from ulcerative colitis) and diarrhea (Patient has ulcerative colitis). Negative for abdominal pain, nausea, rectal pain and  vomiting.        GERD. Fatty liver.   Endocrine:        History of Hashimoto's thyroiditis. History of DM2. Patient states that she hasn't been taking her metformin as directed because it causes her diarrhea to be worse.   Patient also has PCOS.   Genitourinary: Positive for dysuria (mild, improving). Negative for difficulty urinating, flank pain, frequency, hematuria and urgency.        Patient recently treated for UTI at [facility]-currently taking antibiotics. Patient would like a repeat UA today.   Musculoskeletal: Negative for arthralgias and myalgias.   Skin: Negative.    Neurological: Negative.  Negative for dizziness, weakness and headaches.   Psychiatric/Behavioral: The patient is nervous/anxious.        Objective   Physical Exam   Constitutional: She is oriented to person, place, and time. Vital signs are normal. She appears well-developed and well-nourished. She is cooperative. She does not appear ill. No distress.   Morbidly obese   HENT:   Head: Normocephalic and atraumatic.   Right Ear: Tympanic membrane and external ear normal.   Left Ear: Tympanic membrane and external ear normal.   Nose: Nose normal.   Mouth/Throat: Uvula is midline, oropharynx is clear and moist and mucous membranes are normal.   Neck: Normal range of motion. Neck supple. No thyromegaly present.   Cardiovascular: Regular rhythm, S1 normal, S2 normal and normal heart sounds. Tachycardia present.   No murmur heard.  Pulmonary/Chest: Effort normal and breath sounds normal.   Abdominal: Soft. She exhibits no distension. There is no tenderness.     Vascular Status -  Her right foot exhibits no edema. Her left foot exhibits no edema.  Lymphadenopathy:     She has no cervical adenopathy.   Neurological: She is alert and oriented to person, place, and time.   Skin: Skin is warm, dry and intact. No rash noted. She is not diaphoretic.   Psychiatric: Her speech is normal and behavior is normal. Judgment and thought content normal. Her mood appears  anxious. She is not actively hallucinating. Cognition and memory are normal. She is attentive.   Vitals reviewed.    Vitals:    04/22/20 1538   BP: 138/72   Pulse: (!) 128   Resp: 16   Temp: 97.3 °F (36.3 °C)   SpO2: 97%   *Heart rate rechecked by me with result of 102 bpm.    Body mass index is 41.5 kg/m².    Assessment/Plan   Brigid was seen today for palpitations and urinary tract infection.    Diagnoses and all orders for this visit:    Palpitations  -     Comprehensive Metabolic Panel  -     POC CBC With / Auto Diff  -     Ambulatory Referral to [facility] [place] [facility] [place]    Tachycardia  -     Comprehensive Metabolic Panel  -     POC Glycosylated Hemoglobin (Hb A1C)  -     POC Glucose  -     POC CBC With / Auto Diff  -     Ambulatory Referral to [facility] [place] - [place]        *     POC glucose 171        *     POC HbA1C 6.0    Lab Results   Component Value Date    WBC 9.00 01/15/2020    HGB 10.5 (A) 04/22/2020    HCT 34.9 (A) 04/22/2020    MCV 73.7 04/22/2020     04/22/2020     *Last Hgb (1/15/20) was 10.2.    Dysuria  -     POCT urinalysis dipstick, automated    Brief Urine Lab Results  (Last result in the past 365 days)      Color   Clarity   Blood   Leuk Est   Nitrite   Protein   CREAT   Urine HCG        04/22/20 1544 Other Slightly Cloudy Trace Negative Negative 100 mg/dL             Hypothyroidism due to Hashimoto's thyroiditis  -     TSH  -     T4, Free    Prediabetes  -     POC Glycosylated Hemoglobin (Hb A1C)  -     POC Glucose        -     Discontinue Metformin (due to noncompliance and side effects of diarrhea)        -     Januvia 100 mg samples provided to patient and instructed on use    Other orders  -     ECG 12 Lead     Chronic anemia        -      POCT CBC        ECG 12 Lead  Date/Time: 4/22/2020 3:40 PM  Performed by: Jeanette Lozano APRN  Authorized by: Jeanette Lozano APRN   Comparison: compared with previous ECG from 7/30/2019  Comparison to  previous ECG: NSR now sinus tachycardia when compared to previous ECG  Rhythm: sinus tachycardia  Rate comments: mild tachycardia  BPM: 101  QRS axis: normal  Other: no other findings  Clinical impression comment: mild sinus tachycardia otherwise normal ECG            Discussed the nature of the medical condition(s) risks, complications, implications, management, safe and proper use of medications. Encouraged medication compliance, and keeping scheduled follow up appointments with me and any other providers.     Laboratory testing ordered today. Further recommendations after lab evaluation.    To ER if symptoms worsen.  F/U with gastroenterology and endocrinology.

## 2020-04-23 ENCOUNTER — TELEPHONE (OUTPATIENT)
Dept: FAMILY MEDICINE CLINIC | Facility: CLINIC | Age: 55
End: 2020-04-23

## 2020-04-23 LAB
ALBUMIN SERPL-MCNC: 4.3 G/DL (ref 3.5–5.2)
ALBUMIN/GLOB SERPL: 1.5 G/DL
ALP SERPL-CCNC: 92 U/L (ref 39–117)
ALT SERPL-CCNC: 23 U/L (ref 1–33)
AST SERPL-CCNC: 16 U/L (ref 1–32)
BILIRUB SERPL-MCNC: 0.2 MG/DL (ref 0.2–1.2)
BUN SERPL-MCNC: 12 MG/DL (ref 6–20)
BUN/CREAT SERPL: 15.4 (ref 7–25)
CALCIUM SERPL-MCNC: 9.5 MG/DL (ref 8.6–10.5)
CHLORIDE SERPL-SCNC: 96 MMOL/L (ref 98–107)
CO2 SERPL-SCNC: 26.1 MMOL/L (ref 22–29)
CREAT SERPL-MCNC: 0.78 MG/DL (ref 0.57–1)
GLOBULIN SER CALC-MCNC: 2.9 GM/DL
GLUCOSE SERPL-MCNC: 125 MG/DL (ref 65–99)
POTASSIUM SERPL-SCNC: 4.4 MMOL/L (ref 3.5–5.2)
PROT SERPL-MCNC: 7.2 G/DL (ref 6–8.5)
SODIUM SERPL-SCNC: 136 MMOL/L (ref 136–145)
T4 FREE SERPL-MCNC: 0.88 NG/DL (ref 0.93–1.7)
TSH SERPL DL<=0.005 MIU/L-ACNC: 3.2 UIU/ML (ref 0.27–4.2)

## 2020-04-23 NOTE — PATIENT INSTRUCTIONS
Palpitations  Palpitations are feelings that your heartbeat is irregular or is faster than normal. It may feel like your heart is fluttering or skipping a beat. Palpitations are usually not a serious problem. They may be caused by many things, including smoking, caffeine, alcohol, stress, and certain medicines or drugs. Most causes of palpitations are not serious. However, some palpitations can be a sign of a serious problem. You may need further tests to rule out serious medical problems.  Follow these instructions at home:         Pay attention to any changes in your condition. Take these actions to help manage your symptoms:  Eating and drinking  · Avoid foods and drinks that may cause palpitations. These may include:  ? Caffeinated coffee, tea, soft drinks, diet pills, and energy drinks.  ? Chocolate.  ? Alcohol.  Lifestyle  · Take steps to reduce your stress and anxiety. Things that can help you relax include:  ? Yoga.  ? Mind-body activities, such as deep breathing, meditation, or using words and images to create positive thoughts (guided imagery).  ? Physical activity, such as swimming, jogging, or walking. Tell your health care provider if your palpitations increase with activity. If you have chest pain or shortness of breath with activity, do not continue the activity until you are seen by your health care provider.  ? Biofeedback. This is a method that helps you learn to use your mind to control things in your body, such as your heartbeat.  · Do not use drugs, including cocaine or ecstasy. Do not use marijuana.  · Get plenty of rest and sleep. Keep a regular bed time.  General instructions  · Take over-the-counter and prescription medicines only as told by your health care provider.  · Do not use any products that contain nicotine or tobacco, such as cigarettes and e-cigarettes. If you need help quitting, ask your health care provider.  · Keep all follow-up visits as told by your health care provider. This  is important. These may include visits for further testing if palpitations do not go away or get worse.  Contact a health care provider if you:  · Continue to have a fast or irregular heartbeat after 24 hours.  · Notice that your palpitations occur more often.  Get help right away if you:  · Have chest pain or shortness of breath.  · Have a severe headache.  · Feel dizzy or you faint.  Summary  · Palpitations are feelings that your heartbeat is irregular or is faster than normal. It may feel like your heart is fluttering or skipping a beat.  · Palpitations may be caused by many things, including smoking, caffeine, alcohol, stress, certain medicines, and drugs.  · Although most causes of palpitations are not serious, some causes can be a sign of a serious medical problem.  · Get help right away if you faint or have chest pain, shortness of breath, a severe headache, or dizziness.  This information is not intended to replace advice given to you by your health care provider. Make sure you discuss any questions you have with your health care provider.  Document Released: 12/15/2001 Document Revised: 01/30/2019 Document Reviewed: 01/30/2019  [employer] Patient Education © 2020 [employer]    Supraventricular Tachycardia, Adult  Supraventricular tachycardia (SVT) is a type of abnormal heart rhythm. It causes the heart to beat very quickly and then return to a normal speed.  A normal resting heart rate is  beats per minute. During an episode of SVT, your heart rate may be higher than 150 beats per minute. Episodes of SVT can be frightening, but they are usually not dangerous. However, if episodes happen several times per day or last longer than a few seconds, they may lead to heart failure.  What are the causes?    Usually, a normal heartbeat starts when an area called the sinoatrial node releases an electrical signal. In SVT, other areas of the heart send out electrical signals that interfere with the  signal from the sinoatrial node. It is not known why some people get SVT and others do not.  What increases the risk?  You are more likely to develop this condition if you are:  · 12-30 years old.  · A woman.  The following factors may make you more likely to develop this condition:  · Stress.  · Tiredness.  · Smoking.  · Stimulant drugs, such as cocaine and methamphetamine.  · Alcohol.  · Caffeine.  · Pregnancy.  · Anxiety.  What are the signs or symptoms?  Symptoms of this condition include:  · A pounding heart.  · A feeling that the heart is skipping beats (palpitations).  · Weakness.  · Shortness of breath.  · Tightness or pain in your chest.  · Light-headedness.  · Anxiety.  · Dizziness.  · Sweating.  · Nausea.  · Fainting.  · Fatigue or tiredness.  A mild episode may not cause symptoms.  How is this diagnosed?  This condition may be diagnosed based on:  · Your symptoms.  · A physical exam.  ? If you have an episode of SVT during the exam, the health care provider may be able to diagnose SVT by listening to your heart and feeling your pulse.  · Tests. These may include:  ? An electrocardiogram (ECG). This test is done to check for problems with electrical activity in the heart.  ? A Holter monitor or event monitor test. This test involves wearing a portable device that monitors your heart rate over time.  ? An echocardiogram. This test involves taking an image of your heart using sound waves. It is done to rule out other causes of a fast heart rate.  ? Blood tests.  How is this treated?  This condition may be treated with:  · Vagal nerve stimulation. The treatment involves stimulating your vagus nerve, which slows down the heart. It is often the first and only treatment that is needed for this condition. Work with your health care provider to find which one works best for you. Ways to do this treatment include:  ? Holding your breath and pushing, as though you are having a bowel movement.  ? Massaging an area  on one side of your neck, below your jaw. Do not try this yourself. Only a health care provider should do this. If done the wrong way, it can lead to a stroke.  ? Bending forward with your head between your legs.  ? Coughing while bending forward with your head between your legs.  ? Closing your eyes and massaging your eyeballs. A health care provider should guide you through this method before you try it on your own.  · Medicines that prevent attacks.  · Medicine to stop an attack. The medicine is given through an IV at the hospital.  · A small electric shock (cardioversion) that stops an attack. Before you get the shock, you will get medicine to make you fall asleep.  · Radiofrequency ablation. In this procedure, a small, thin tube (catheter) is used to send radiofrequency energy to the area of tissue that is causing the rapid heartbeats. The energy kills the cells and helps your heart keep a normal rhythm. You may have this treatment if you have symptoms of SVT often.  If you do not have symptoms, you may not need treatment.  Follow these instructions at home:  Stress  · Avoid stressful situations when possible.  · Find healthy ways of managing stress that work for you. Some healthy ways to manage stress include:  ? Taking part in relaxing activities, such as yoga, meditation, or being out in nature.  ? Listening to relaxing music.  ? Practicing relaxation techniques, such as deep breathing.  ? Leading a healthy lifestyle. This involves getting plenty of sleep, exercising, and eating a balanced diet.  ? Attending counseling or talk therapy with a mental health professional.  Lifestyle    · Try to get at least 7 hours of sleep each night.  · Do not use any products that contain nicotine or tobacco, such as cigarettes, e-cigarettes, and chewing tobacco. If you need help quitting, ask your health care provider.  · Be aware of how alcohol affects your condition. If alcohol:  ? Triggers episodes of SVT, do not drink  alcohol.  ? Does not seem to trigger episodes, limit alcohol intake to no more than 1 drink a day for nonpregnant women and 2 drinks a day for men. Be aware of how much alcohol is in your drink. In the [place] one drink equals one 12 oz bottle of beer (355 mL), one 5 oz glass of wine (148 mL), or one 1½ oz glass of hard liquor (44 mL).  · Be aware of how caffeine affects your condition. If caffeine:  ? Triggers episodes of SVT, do not eat, drink, or use anything with caffeine in it.  ? Does not seem to trigger episodes, consume caffeine in moderation.  · Do not use stimulant drugs. If you need help quitting, talk with your health care provider.  General instructions  · Maintain a healthy weight.  · Exercise regularly. Ask your health care provider to suggest some good activities for you. Aim for one or a combination of the following:  ? 150 minutes per week of moderate exercise, such as walking or yoga.  ? 75 minutes per week of vigorous exercise, such as running or swimming.  · Perform vagus nerve stimulation as directed by your health care provider.  · Take over-the-counter and prescription medicines only as told by your health care provider.  · Keep all follow-up visits as told by your health care provider. This is important.  Contact a health care provider if:  · You have episodes of SVT more often than before.  · Episodes of SVT last longer than before.  · Vagus nerve stimulation is no longer helping.  · You have new symptoms.  Get help right away if:  · You have chest pain.  · Your symptoms get worse.  · You have trouble breathing.  · You have an episode of SVT that lasts longer than 20 minutes.  · You faint.  These symptoms may represent a serious problem that is an emergency. Do not wait to see if the symptoms will go away. Get medical help right away. Call your local emergency services (911 in the [place] Do not drive yourself to the hospital.  Summary  · Supraventricular tachycardia (SVT) is a type of  abnormal heart rhythm.  · During an episode of SVT, your heart rate may be higher than 150 beats per minute.  · Treatment depends on frequency of occurrence and symptoms experienced.  This information is not intended to replace advice given to you by your health care provider. Make sure you discuss any questions you have with your health care provider.  Document Released: 12/18/2006 Document Revised: 11/05/2019 Document Reviewed: 11/05/2019  [employer] Interactive Patient Education © 2020 [employer]    Dysuria  Dysuria is pain or discomfort while urinating. The pain or discomfort may be felt in the part of your body that drains urine from the bladder (urethra) or in the surrounding tissue of the genitals. The pain may also be felt in the groin area, lower abdomen, or lower back. You may have to urinate frequently or have the sudden feeling that you have to urinate (urgency). Dysuria can affect both men and women, but it is more common in women.  Dysuria can be caused by many different things, including:  · Urinary tract infection.  · Kidney stones or bladder stones.  · Certain sexually transmitted infections (STIs), such as chlamydia.  · Dehydration.  · Inflammation of the tissues of the vagina.  · Use of certain medicines.  · Use of certain soaps or scented products that cause irritation.  Follow these instructions at home:  General instructions  · Watch your condition for any changes.  · Urinate often. Avoid holding urine for long periods of time.  · After a bowel movement or urination, women should cleanse from front to back, using each tissue only once.  · Urinate after sexual intercourse.  · Keep all follow-up visits as told by your health care provider. This is important.  · If you had any tests done to find the cause of dysuria, it is up to you to get your test results. Ask your health care provider, or the department that is doing the test, when your results will be ready.  Eating and drinking    · Drink  enough fluid to keep your urine pale yellow.  · Avoid caffeine, tea, and alcohol. They can irritate the bladder and make dysuria worse. In men, alcohol may irritate the prostate.  Medicines  · Take over-the-counter and prescription medicines only as told by your health care provider.  · If you were prescribed an antibiotic medicine, take it as told by your health care provider. Do not stop taking the antibiotic even if you start to feel better.  Contact a health care provider if:  · You have a fever.  · You develop pain in your back or sides.  · You have nausea or vomiting.  · You have blood in your urine.  · You are not urinating as often as you usually do.  Get help right away if:  · Your pain is severe and not relieved with medicines.  · You cannot eat or drink without vomiting.  · You are confused.  · You have a rapid heartbeat while at rest.  · You have shaking or chills.  · You feel extremely weak.  Summary  · Dysuria is pain or discomfort while urinating. Many different conditions can lead to dysuria.  · If you have dysuria, you may have to urinate frequently or have the sudden feeling that you have to urinate (urgency).  · Watch your condition for any changes. Keep all follow-up visits as told by your health care provider.  · Make sure that you urinate often and drink enough fluid to keep your urine pale yellow.  This information is not intended to replace advice given to you by your health care provider. Make sure you discuss any questions you have with your health care provider.  Document Released: 09/15/2005 Document Revised: 10/04/2018 Document Reviewed: 10/04/2018  [employer] Interactive Patient Education © 2020 [employer]    Hypothyroidism    Hypothyroidism is when the thyroid gland does not make enough of certain hormones (it is underactive). The thyroid gland is a small gland located in the lower front part of the neck, just in front of the windpipe (trachea). This gland makes hormones that help  control how the body uses food for energy (metabolism) as well as how the heart and brain function. These hormones also play a role in keeping your bones strong. When the thyroid is underactive, it produces too little of the hormones thyroxine (T4) and triiodothyronine (T3).  What are the causes?  This condition may be caused by:  · Hashimoto's disease. This is a disease in which the body's disease-fighting system (immune system) attacks the thyroid gland. This is the most common cause.  · Viral infections.  · Pregnancy.  · Certain medicines.  · Birth defects.  · Past radiation treatments to the head or neck for cancer.  · Past treatment with radioactive iodine.  · Past exposure to radiation in the environment.  · Past surgical removal of part or all of the thyroid.  · Problems with a gland in the center of the brain (pituitary gland).  · Lack of enough iodine in the diet.  What increases the risk?  You are more likely to develop this condition if:  · You are female.  · You have a family history of thyroid conditions.  · You use a medicine called lithium.  · You take medicines that affect the immune system (immunosuppressants).  What are the signs or symptoms?  Symptoms of this condition include:  · Feeling as though you have no energy (lethargy).  · Not being able to tolerate cold.  · Weight gain that is not explained by a change in diet or exercise habits.  · Lack of appetite.  · Dry skin.  · Coarse hair.  · Menstrual irregularity.  · Slowing of thought processes.  · Constipation.  · Sadness or depression.  How is this diagnosed?  This condition may be diagnosed based on:  · Your symptoms, your medical history, and a physical exam.  · Blood tests.  You may also have imaging tests, such as an ultrasound or MRI.  How is this treated?  This condition is treated with medicine that replaces the thyroid hormones that your body does not make. After you begin treatment, it may take several weeks for symptoms to go  away.  Follow these instructions at home:  · Take over-the-counter and prescription medicines only as told by your health care provider.  · If you start taking any new medicines, tell your health care provider.  · Keep all follow-up visits as told by your health care provider. This is important.  ? As your condition improves, your dosage of thyroid hormone medicine may change.  ? You will need to have blood tests regularly so that your health care provider can monitor your condition.  Contact a health care provider if:  · Your symptoms do not get better with treatment.  · You are taking thyroid replacement medicine and you:  ? Sweat a lot.  ? Have tremors.  ? Feel anxious.  ? Lose weight rapidly.  ? Cannot tolerate heat.  ? Have emotional swings.  ? Have diarrhea.  ? Feel weak.  Get help right away if you have:  · Chest pain.  · An irregular heartbeat.  · A rapid heartbeat.  · Difficulty breathing.  Summary  · Hypothyroidism is when the thyroid gland does not make enough of certain hormones (it is underactive).  · When the thyroid is underactive, it produces too little of the hormones thyroxine (T4) and triiodothyronine (T3).  · The most common cause is Hashimoto's disease, a disease in which the body's disease-fighting system (immune system) attacks the thyroid gland. The condition can also be caused by viral infections, medicine, pregnancy, or past radiation treatment to the head or neck.  · Symptoms may include weight gain, dry skin, constipation, feeling as though you do not have energy, and not being able to tolerate cold.  · This condition is treated with medicine to replace the thyroid hormones that your body does not make.  This information is not intended to replace advice given to you by your health care provider. Make sure you discuss any questions you have with your health care provider.  Document Released: 12/18/2006 Document Revised: 11/28/2018 Document Reviewed: 11/28/2018  [employer]  Patient Education © 2020 [employer]    Anemia    Anemia is a condition in which you do not have enough red blood cells or hemoglobin. Hemoglobin is a substance in red blood cells that carries oxygen. When you do not have enough red blood cells or hemoglobin (are anemic), your body cannot get enough oxygen and your organs may not work properly. As a result, you may feel very tired or have other problems.  What are the causes?  Common causes of anemia include:  · Excessive bleeding. Anemia can be caused by excessive bleeding inside or outside the body, including bleeding from the intestine or from periods in women.  · Poor nutrition.  · Long-lasting (chronic) kidney, thyroid, and liver disease.  · Bone marrow disorders.  · Cancer and treatments for cancer.  · HIV (human immunodeficiency virus) and AIDS (acquired immunodeficiency syndrome).  · Treatments for HIV and AIDS.  · Spleen problems.  · Blood disorders.  · Infections, medicines, and autoimmune disorders that destroy red blood cells.  What are the signs or symptoms?  Symptoms of this condition include:  · Minor weakness.  · Dizziness.  · Headache.  · Feeling heartbeats that are irregular or faster than normal (palpitations).  · Shortness of breath, especially with exercise.  · Paleness.  · Cold sensitivity.  · Indigestion.  · Nausea.  · Difficulty sleeping.  · Difficulty concentrating.  Symptoms may occur suddenly or develop slowly. If your anemia is mild, you may not have symptoms.  How is this diagnosed?  This condition is diagnosed based on:  · Blood tests.  · Your medical history.  · A physical exam.  · Bone marrow biopsy.  Your health care provider may also check your stool (feces) for blood and may do additional testing to look for the cause of your bleeding.  You may also have other tests, including:  · Imaging tests, such as a CT scan or MRI.  · Endoscopy.  · Colonoscopy.  How is this treated?  Treatment for this condition depends on the cause. If you  continue to lose a lot of blood, you may need to be treated at a hospital. Treatment may include:  · Taking supplements of iron, vitamin B12, or folic acid.  · Taking a hormone medicine (erythropoietin) that can help to stimulate red blood cell growth.  · Having a blood transfusion. This may be needed if you lose a lot of blood.  · Making changes to your diet.  · Having surgery to remove your spleen.  Follow these instructions at home:  · Take over-the-counter and prescription medicines only as told by your health care provider.  · Take supplements only as told by your health care provider.  · Follow any diet instructions that you were given.  · Keep all follow-up visits as told by your health care provider. This is important.  Contact a health care provider if:  · You develop new bleeding anywhere in the body.  Get help right away if:  · You are very weak.  · You are short of breath.  · You have pain in your abdomen or chest.  · You are dizzy or feel faint.  · You have trouble concentrating.  · You have bloody or black, tarry stools.  · You vomit repeatedly or you vomit up blood.  Summary  · Anemia is a condition in which you do not have enough red blood cells or enough of a substance in your red blood cells that carries oxygen (hemoglobin).  · Symptoms may occur suddenly or develop slowly.  · If your anemia is mild, you may not have symptoms.  · This condition is diagnosed with blood tests as well as a medical history and physical exam. Other tests may be needed.  · Treatment for this condition depends on the cause of the anemia.  This information is not intended to replace advice given to you by your health care provider. Make sure you discuss any questions you have with your health care provider.  Document Released: 01/25/2006 Document Revised: 01/19/2018 Document Reviewed: 01/19/2018  [employer] Interactive Patient Education © 2020 [employer]    Prediabetes  Prediabetes is the condition of having a blood sugar  (blood glucose) level that is higher than it should be, but not high enough for you to be diagnosed with type 2 diabetes. Having prediabetes puts you at risk for developing type 2 diabetes (type 2 diabetes mellitus). Prediabetes may be called impaired glucose tolerance or impaired fasting glucose.  Prediabetes usually does not cause symptoms. Your health care provider can diagnose this condition with blood tests. You may be tested for prediabetes if you are overweight and if you have at least one other risk factor for prediabetes.  What is blood glucose, and how is it measured?  Blood glucose refers to the amount of glucose in your bloodstream. Glucose comes from eating foods that contain sugars and starches (carbohydrates), which the body breaks down into glucose. Your blood glucose level may be measured in mg/dL (milligrams per deciliter) or mmol/L (millimoles per liter). Your blood glucose may be checked with one or more of the following blood tests:  · A fasting blood glucose (FBG) test. You will not be allowed to eat (you will fast) for 8 hours or longer before a blood sample is taken.  ? A normal range for FBG is  mg/dl (3.9-5.6 mmol/L).  · An A1c (hemoglobin A1c) blood test. This test provides information about blood glucose control over the previous 2?3 months.  · An oral glucose tolerance test (OGTT). This test measures your blood glucose at two times:  ? After fasting. This is your baseline level.  ? Two hours after you drink a beverage that contains glucose.  You may be diagnosed with prediabetes:  · If your FBG is 100?125 mg/dL (5.6-6.9 mmol/L).  · If your A1c level is 5.7?6.4%.  · If your OGTT result is 140?199 mg/dL (7.8-11 mmol/L).  These blood tests may be repeated to confirm your diagnosis.  How can this condition affect me?  The pancreas produces a hormone (insulin) that helps to move glucose from the bloodstream into cells. When cells in the body do not respond properly to insulin that the  body makes (insulin resistance), excess glucose builds up in the blood instead of going into cells. As a result, high blood glucose (hyperglycemia) can develop, which can cause many complications. Hyperglycemia is a symptom of prediabetes.  Having high blood glucose for a long time is dangerous. Too much glucose in your blood can damage your nerves and blood vessels. Long-term damage can lead to complications from diabetes, which may include:  · Heart disease.  · Stroke.  · Blindness.  · Kidney disease.  · Depression.  · Poor circulation in the feet and legs, which could lead to surgical removal (amputation) in severe cases.  What can increase my risk?  Risk factors for prediabetes include:  · Having a family member with type 2 diabetes.  · Being overweight or obese.  · Being older than age 45.  · Being of , -American, /, or / descent.  · Having an inactive (sedentary) lifestyle.  · Having a history of heart disease.  · History of gestational diabetes or polycystic ovary syndrome (PCOS), in women.  · Having low levels of good cholesterol (HDL-C) or high levels of blood fats (triglycerides).  · Having high blood pressure.  What actions can I take to prevent diabetes?         · Be physically active.  ? Do moderate-intensity physical activity for 30 or more minutes on 5 or more days of the week, or as much as told by your health care provider. This could be brisk walking, biking, or water aerobics.  ? Ask your health care provider what activities are safe for you. A mix of physical activities may be best, such as walking, swimming, cycling, and strength training.  · Lose weight as told by your health care provider.  ? Losing 5-7% of your body weight can reverse insulin resistance.  ? Your health care provider can determine how much weight loss is best for you and can help you lose weight safely.  · Follow a healthy meal plan. This includes eating lean proteins,  complex carbohydrates, fresh fruits and vegetables, low-fat dairy products, and healthy fats.  ? Follow instructions from your health care provider about eating or drinking restrictions.  ? Make an appointment to see a diet and nutrition specialist (registered dietitian) to help you create a healthy eating plan that is right for you.  · Do not smoke or use any tobacco products, such as cigarettes, chewing tobacco, and e-cigarettes. If you need help quitting, ask your health care provider.  · Take over-the-counter and prescription medicines as told by your health care provider. You may be prescribed medicines that help lower the risk of type 2 diabetes.  · Keep all follow-up visits as told by your health care provider. This is important.  Summary  · Prediabetes is the condition of having a blood sugar (blood glucose) level that is higher than it should be, but not high enough for you to be diagnosed with type 2 diabetes.  · Having prediabetes puts you at risk for developing type 2 diabetes (type 2 diabetes mellitus).  · To help prevent type 2 diabetes, make lifestyle changes such as being physically active and eating a healthy diet. Lose weight as told by your health care provider.  This information is not intended to replace advice given to you by your health care provider. Make sure you discuss any questions you have with your health care provider.  Document Released: 04/10/2017 Document Revised: 08/07/2018 Document Reviewed: 02/07/2017  [employer] Interactive Patient Education © 2020 [employer]    Prediabetes Eating Plan  Prediabetes is a condition that causes blood sugar (glucose) levels to be higher than normal. This increases the risk for developing diabetes. In order to prevent diabetes from developing, your health care provider may recommend a diet and other lifestyle changes to help you:  · Control your blood glucose levels.  · Improve your cholesterol levels.  · Manage your blood pressure.  Your health  care provider may recommend working with a diet and nutrition specialist (dietitian) to make a meal plan that is best for you.  What are tips for following this plan?  Lifestyle  · Set weight loss goals with the help of your health care team. It is recommended that most people with prediabetes lose 7% of their current body weight.  · Exercise for at least 30 minutes at least 5 days a week.  · Attend a support group or seek ongoing support from a mental health counselor.  · Take over-the-counter and prescription medicines only as told by your health care provider.  Reading food labels  · Read food labels to check the amount of fat, salt (sodium), and sugar in prepackaged foods. Avoid foods that have:  ? Saturated fats.  ? Trans fats.  ? Added sugars.  · Avoid foods that have more than 300 milligrams (mg) of sodium per serving. Limit your daily sodium intake to less than 2,300 mg each day.  Shopping  · Avoid buying pre-made and processed foods.  Cooking  · Cook with olive oil. Do not use butter, lard, or ghee.  · Bake, broil, grill, or boil foods. Avoid frying.  Meal planning    · Work with your dietitian to develop an eating plan that is right for you. This may include:  ? Tracking how many calories you take in. Use a food diary, notebook, or mobile application to track what you eat at each meal.  ? Using the glycemic index (GI) to plan your meals. The index tells you how quickly a food will raise your blood glucose. Choose low-GI foods. These foods take a longer time to raise blood glucose.  · Consider following a Mediterranean diet. This diet includes:  ? Several servings each day of fresh fruits and vegetables.  ? Eating fish at least twice a week.  ? Several servings each day of whole grains, beans, nuts, and seeds.  ? Using olive oil instead of other fats.  ? Moderate alcohol consumption.  ? Eating small amounts of red meat and whole-fat dairy.  · If you have high blood pressure, you may need to limit your  sodium intake or follow a diet such as the DASH eating plan. DASH is an eating plan that aims to lower high blood pressure.  What foods are recommended?  The items listed below may not be a complete list. Talk with your dietitian about what dietary choices are best for you.  Grains  Whole grains, such as whole-wheat or whole-grain breads, crackers, cereals, and pasta. Unsweetened oatmeal. Bulgur. Barley. Quinoa. Brown rice. Corn or whole-wheat flour tortillas or taco shells.  Vegetables  Lettuce. Spinach. Peas. Beets. Cauliflower. Cabbage. Broccoli. Carrots. Tomatoes. Squash. Eggplant. Herbs. Peppers. Onions. Cucumbers. [place] sprouts.  Fruits  Berries. Bananas. Apples. Oranges. Grapes. Papaya. [place]. Pomegranate. Kiwi. Grapefruit. Cherries.  Meats and other protein foods  Seafood. Poultry without skin. Lean cuts of pork and beef. Tofu. Eggs. Nuts. Beans.  Dairy  Low-fat or fat-free dairy products, such as yogurt, cottage cheese, and cheese.  Beverages  Water. Tea. Coffee. Sugar-free or diet soda. [place] water. Lowfat or no-fat milk. Milk alternatives, such as soy or almond milk.  Fats and oils  Olive oil. Canola oil. Sunflower oil. Grapeseed oil. Avocado. Walnuts.  Sweets and desserts  Sugar-free or low-fat pudding. Sugar-free or low-fat ice cream and other frozen treats.  Seasoning and other foods  Herbs. Sodium-free spices. Mustard. Relish. Low-fat, low-sugar ketchup. Low-fat, low-sugar barbecue sauce. Low-fat or fat-free mayonnaise.  What foods are not recommended?  The items listed below may not be a complete list. Talk with your dietitian about what dietary choices are best for you.  Grains  Refined white flour and flour products, such as bread, pasta, snack foods, and cereals.  Vegetables  Canned vegetables. Frozen vegetables with butter or cream sauce.  Fruits  Fruits canned with syrup.  Meats and other protein foods  Fatty cuts of meat. Poultry with skin. Breaded or fried meat. Processed  meats.  Dairy  Full-fat yogurt, cheese, or milk.  Beverages  Sweetened drinks, such as sweet iced tea and soda.  Fats and oils  Butter. Lard. Ghee.  Sweets and desserts  Baked goods, such as cake, cupcakes, pastries, cookies, and cheesecake.  Seasoning and other foods  Spice mixes with added salt. Ketchup. Barbecue sauce. Mayonnaise.  Summary  · To prevent diabetes from developing, you may need to make diet and other lifestyle changes to help control blood sugar, improve cholesterol levels, and manage your blood pressure.  · Set weight loss goals with the help of your health care team. It is recommended that most people with prediabetes lose 7 percent of their current body weight.  · Consider following a Mediterranean diet that includes plenty of fresh fruits and vegetables, whole grains, beans, nuts, seeds, fish, lean meat, low-fat dairy, and healthy oils.  This information is not intended to replace advice given to you by your health care provider. Make sure you discuss any questions you have with your health care provider.  Document Released: 05/03/2016 Document Revised: 02/21/2018 Document Reviewed: 02/21/2018  [employer] Interactive Patient Education © 2020 [employer]    Preventing Type 2 Diabetes Mellitus  Type 2 diabetes (type 2 diabetes mellitus) is a long-term (chronic) disease that affects blood sugar (glucose) levels. Normally, a hormone called insulin allows glucose to enter cells in the body. The cells use glucose for energy. In type 2 diabetes, one or both of these problems may be present:  · The body does not make enough insulin.  · The body does not respond properly to insulin that it makes (insulin resistance).  Insulin resistance or lack of insulin causes excess glucose to build up in the blood instead of going into cells. As a result, high blood glucose (hyperglycemia) develops, which can cause many complications. Being overweight or obese and having an inactive (sedentary) lifestyle can increase  your risk for diabetes. Type 2 diabetes can be delayed or prevented by making certain nutrition and lifestyle changes.  What nutrition changes can be made?    · Eat healthy meals and snacks regularly. Keep a healthy snack with you for when you get hungry between meals, such as fruit or a handful of nuts.  · Eat lean meats and proteins that are low in saturated fats, such as chicken, fish, egg whites, and beans. Avoid processed meats.  · Eat plenty of fruits and vegetables and plenty of grains that have not been processed (whole grains). It is recommended that you eat:  ? 1½?2 cups of fruit every day.  ? 2½?3 cups of vegetables every day.  ? 6?8 oz of whole grains every day, such as oats, whole wheat, bulgur, brown rice, quinoa, and millet.  · Eat low-fat dairy products, such as milk, yogurt, and cheese.  · Eat foods that contain healthy fats, such as nuts, avocado, olive oil, and canola oil.  · Drink water throughout the day. Avoid drinks that contain added sugar, such as soda or sweet tea.  · Follow instructions from your health care provider about specific eating or drinking restrictions.  · Control how much food you eat at a time (portion size).  ? Check food labels to find out the serving sizes of foods.  ? Use a kitchen scale to weigh amounts of foods.  · Saute or steam food instead of frying it. Cook with water or broth instead of oils or butter.  · Limit your intake of:  ? Salt (sodium). Have no more than 1 tsp (2,400 mg) of sodium a day. If you have heart disease or high blood pressure, have less than ½?¾ tsp (1,500 mg) of sodium a day.  ? Saturated fat. This is fat that is solid at room temperature, such as butter or fat on meat.  What lifestyle changes can be made?  Activity    · Do moderate-intensity physical activity for at least 30 minutes on at least 5 days of the week, or as much as told by your health care provider.  · Ask your health care provider what activities are safe for you. A mix of physical  activities may be best, such as walking, swimming, cycling, and strength training.  · Try to add physical activity into your day. For example:  ? Park in spots that are farther away than usual, so that you walk more. For example, park in a far corner of the parking lot when you go to the office or the grocery store.  ? Take a walk during your lunch break.  ? Use stairs instead of elevators or escalators.  Weight Loss  · Lose weight as directed. Your health care provider can determine how much weight loss is best for you and can help you lose weight safely.  · If you are overweight or obese, you may be instructed to lose at least 5?7 % of your body weight.  Alcohol and Tobacco    · Limit alcohol intake to no more than 1 drink a day for nonpregnant women and 2 drinks a day for men. One drink equals 12 oz of beer, 5 oz of wine, or 1½ oz of hard liquor.  · Do not use any tobacco products, such as cigarettes, chewing tobacco, and e-cigarettes. If you need help quitting, ask your health care provider.  Work With Your Health Care Provider  · Have your blood glucose tested regularly, as told by your health care provider.  · Discuss your risk factors and how you can reduce your risk for diabetes.  · Get screening tests as told by your health care provider. You may have screening tests regularly, especially if you have certain risk factors for type 2 diabetes.  · Make an appointment with a diet and nutrition specialist (registered dietitian). A registered dietitian can help you make a healthy eating plan and can help you understand portion sizes and food labels.  Why are these changes important?  · It is possible to prevent or delay type 2 diabetes and related health problems by making lifestyle and nutrition changes.  · It can be difficult to recognize signs of type 2 diabetes. The best way to avoid possible damage to your body is to take actions to prevent the disease before you develop symptoms.  What can happen if changes  are not made?  · Your blood glucose levels may keep increasing. Having high blood glucose for a long time is dangerous. Too much glucose in your blood can damage your blood vessels, heart, kidneys, nerves, and eyes.  · You may develop prediabetes or type 2 diabetes. Type 2 diabetes can lead to many chronic health problems and complications, such as:  ? Heart disease.  ? Stroke.  ? Blindness.  ? Kidney disease.  ? Depression.  ? Poor circulation in the feet and legs, which could lead to surgical removal (amputation) in severe cases.  Where to find support  · Ask your health care provider to recommend a registered dietitian, diabetes educator, or weight loss program.  · Look for local or online weight loss groups.  · Join a gym, fitness club, or outdoor activity group, such as a walking club.  Where to find more information  To learn more about diabetes and diabetes prevention, visit:  · American Diabetes Association (ADA): www.diabetes.org  · National [place] of Diabetes and Digestive and Kidney Diseases: www.niddk.nih.gov/health-information/diabetes  To learn more about healthy eating, visit:  · The U.S. Department of Agriculture (USDA), Choose My Plate: www.[place].gov/food-groups  · Office of Disease Prevention and Health Promotion (ODPHP), Dietary Guidelines: www.health.gov/dietaryguidelines  Summary  · You can reduce your risk for type 2 diabetes by increasing your physical activity, eating healthy foods, and losing weight as directed.  · Talk with your health care provider about your risk for type 2 diabetes. Ask about any blood tests or screening tests that you need to have.  This information is not intended to replace advice given to you by your health care provider. Make sure you discuss any questions you have with your health care provider.  Document Released: 04/10/2017 Document Revised: 11/29/2018 Document Reviewed: 02/07/2017  [employer]vier Interactive Patient Education © 2020 [employer]  Sitagliptin oral tablet  What is this medicine?  SITAGLIPTIN (sit a GLIP tin) helps to treat type 2 diabetes. It helps to control blood sugar. Treatment is combined with diet and exercise.  This medicine may be used for other purposes; ask your health care provider or pharmacist if you have questions.  COMMON BRAND NAME(S): Januvia  What should I tell my health care provider before I take this medicine?  They need to know if you have any of these conditions:  -diabetic ketoacidosis  -kidney disease  -pancreatitis  -previous swelling of the tongue, face, or lips with difficulty breathing, difficulty swallowing, hoarseness, or tightening of the throat  -type 1 diabetes  -an unusual or allergic reaction to sitagliptin, other medicines, foods, dyes, or preservatives  -pregnant or trying to get pregnant  -breast-feeding  How should I use this medicine?  Take this medicine by mouth with a glass of water. Follow the directions on the prescription label. You can take it with or without food. Do not cut, crush or chew this medicine. Take your dose at the same time each day. Do not take more often than directed. Do not stop taking except on your doctor's advice.  A special MedGuide will be given to you by the pharmacist with each prescription and refill. Be sure to read this information carefully each time.  Talk to your pediatrician regarding the use of this medicine in children. Special care may be needed.  Overdosage: If you think you have taken too much of this medicine contact a poison control center or emergency room at once.  NOTE: This medicine is only for you. Do not share this medicine with others.  What if I miss a dose?  If you miss a dose, take it as soon as you can. If it is almost time for your next dose, take only that dose. Do not take double or extra doses.  What may interact with this medicine?  Do not take this medicine with any of the following medications:  -gatifloxacin  This medicine may also  interact with the following medications:  -alcohol  -digoxin  -insulin  -sulfonylureas like glimepiride, glipizide, glyburide  This list may not describe all possible interactions. Give your health care provider a list of all the medicines, herbs, non-prescription drugs, or dietary supplements you use. Also tell them if you smoke, drink alcohol, or use illegal drugs. Some items may interact with your medicine.  What should I watch for while using this medicine?  Visit your doctor or health care professional for regular checks on your progress.  A test called the HbA1C (A1C) will be monitored. This is a simple blood test. It measures your blood sugar control over the last 2 to 3 months. You will receive this test every 3 to 6 months.  Learn how to check your blood sugar. Learn the symptoms of low and high blood sugar and how to manage them.  Always carry a quick-source of sugar with you in case you have symptoms of low blood sugar. Examples include hard sugar candy or glucose tablets. Make sure others know that you can choke if you eat or drink when you develop serious symptoms of low blood sugar, such as seizures or unconsciousness. They must get medical help at once.  Tell your doctor or health care professional if you have high blood sugar. You might need to change the dose of your medicine. If you are sick or exercising more than usual, you might need to change the dose of your medicine.  Do not skip meals. Ask your doctor or health care professional if you should avoid alcohol. Many nonprescription cough and cold products contain sugar or alcohol. These can affect blood sugar.  Wear a medical ID bracelet or chain, and carry a card that describes your disease and details of your medicine and dosage times.  What side effects may I notice from receiving this medicine?  Side effects that you should report to your doctor or health care professional as soon as possible:  -allergic reactions like skin rash, itching or  hives, swelling of the face, lips, or tongue  -breathing problems  -general ill feeling or flu-like symptoms  -joint pain  -loss of appetite  -redness, blistering, peeling or loosening of the skin, including inside the mouth  -signs and symptoms of heart failure like breathing problems, fast, irregular heartbeat, sudden weight gain; swelling of the ankles, feet, hands; unusually weak or tired  -signs and symptoms of low blood sugar such as feeling anxious, confusion, dizziness, increased hunger, unusually weak or tired, sweating, shakiness, cold, irritable, headache, blurred vision, fast heartbeat, loss of consciousness  -signs and symptoms of muscle injury like dark urine; trouble passing urine or change in the amount of urine; unusually weak or tired; muscle pain; back pain  -unusual stomach upset or pain  -vomiting  Side effects that usually do not require medical attention (report to your doctor or health care professional if they continue or are bothersome):  -diarrhea  -headache  -sore throat  -stomach upset  -stuffy or runny nose  This list may not describe all possible side effects. Call your doctor for medical advice about side effects. You may report side effects to FDA at 1-397-FDA-0642.  Where should I keep my medicine?  Keep out of the reach of children.  Store at room temperature between 15 and 30 degrees C (59 and 86 degrees F). Throw away any unused medicine after the expiration date.  NOTE: This sheet is a summary. It may not cover all possible information. If you have questions about this medicine, talk to your doctor, pharmacist, or health care provider.  © 2020 [employer] (2019-07-23 16:38:43)

## 2020-04-23 NOTE — TELEPHONE ENCOUNTER
Called patient to inform of lab results (normal findings), no answer. Left message for patient to call office.

## 2020-04-23 NOTE — TELEPHONE ENCOUNTER
Pt called in requesting a call back about the labs results. Please call back the pt asap [phone]

## 2020-04-24 ENCOUNTER — TELEPHONE (OUTPATIENT)
Dept: FAMILY MEDICINE CLINIC | Facility: CLINIC | Age: 55
End: 2020-04-24

## 2020-04-30 ENCOUNTER — HOSPITAL ENCOUNTER (OUTPATIENT)
Dept: CARDIOLOGY | Facility: HOSPITAL | Age: 55
Discharge: HOME | End: 2020-04-30
Admitting: NURSE PRACTITIONER

## 2020-04-30 ENCOUNTER — TELEMEDICINE (OUTPATIENT)
Dept: CARDIOLOGY | Facility: HOSPITAL | Age: 55
End: 2020-04-30

## 2020-04-30 VITALS
HEIGHT: 67 IN | DIASTOLIC BLOOD PRESSURE: 76 MMHG | HEART RATE: 71 BPM | WEIGHT: 265 LBS | BODY MASS INDEX: 41.59 KG/M2 | SYSTOLIC BLOOD PRESSURE: 117 MMHG

## 2020-04-30 DIAGNOSIS — R00.2 PALPITATIONS: ICD-10-CM

## 2020-04-30 DIAGNOSIS — R00.2 PALPITATIONS: Primary | ICD-10-CM

## 2020-04-30 PROCEDURE — 99443 PR PHYS/QHP TELEPHONE EVALUATION 21-30 MIN: CPT | Performed by: NURSE PRACTITIONER

## 2020-04-30 PROCEDURE — 0296T HC EXT ECG > 48HR TO 21 DAY RCRD W/CONECT INTL RCRD: CPT

## 2020-04-30 RX ORDER — ZINC 25 MG
0.5 TABLET ORAL DAILY
COMMUNITY

## 2020-04-30 RX ORDER — MULTIVIT WITH MINERALS/LUTEIN
1000 TABLET ORAL DAILY
COMMUNITY

## 2020-04-30 NOTE — PROGRESS NOTES
"[facility]  [facility]  Telemedicine note    04/30/2020         Brigid Coelho  [address] [facility] [address]  [unfilled]    1965    Debby Kennedy,     Brigid Coelho is a 54 y.o. female.      Subjective:     Chief Complaint:  Palpitations     You have chosen to receive care through a telephone visit. Do you consent to use a telephone visit for your medical care today? YES      This was an audio enabled telemedicine encounter. Patient refused video    HPI   53 YO with history of ulcerative colitis, anemia, GERD, anxiety, prediabetes, hypothyroidism scheduled today for a telemedicine evaluation of palpitations at the request of JATIN Welch. Patient reports episodes of heart racing that started earlier this month. She reports feeling like her heart is \"fluttering\". She reports that she has a history of PVCs. She was evaluated by a cardiologist in 2012 and had a holter monitor at that time. Current symptoms feel similar and feels like symptoms have almost resolved since last week. She has no exertional symptoms and palps only occur when she is sitting around. She drinks 3 soda's a day but recently stopped.   She reports that he HR was 128 at Dr. Lozano's office and says she was sweaty and might have been having a panic attack. She has a history of severe anxiety and does admit to recent stress and anxiety. She keeps her grandkids a couple nights a week, constantly watching the news regarding COVID19, son recently released from long-term and lives with her    Patient evaluated by Jenny STEINER 8/2019 for chest pain and had normal echo and GXT  Patient Active Problem List   Diagnosis   • Ulcerative colitis without complications (CMS/HCC)   • Seasonal allergies   • Anemia   • Esophageal reflux   • Generalized anxiety disorder   • Post-menopausal   • Prediabetes   • Hypothyroidism due to Hashimoto's thyroiditis   • Vitamin D deficiency   • Fatty " liver   • Anxiety   • Thickened endometrium   • Polycystic ovaries   • Lung nodule, solitary   • Morbid obesity with BMI of 40.0-44.9, adult (CMS/HCC)       Past Medical History:   Diagnosis Date   • Acute bronchitis    • Anxiety    • Chest pain    • Cholelithiasis    • GERD (gastroesophageal reflux disease)    • Hashimoto's thyroiditis    • Hemorrhoids    • Hypothyroidism    • Metrorrhagia    • Palpitations    • Polycystic ovary syndrome    • Polyp of sigmoid colon    • Ulcerative colitis (CMS/HCC)    • Upper respiratory infection    • UTI (urinary tract infection)        Past Surgical History:   Procedure Laterality Date   • APPENDECTOMY     •  SECTION     • CHOLECYSTECTOMY     • DILATATION AND CURETTAGE      Of Cervical Stump   • MYOMECTOMY     • SMALL INTESTINE SURGERY     • TUBAL ABDOMINAL LIGATION         Family History   Problem Relation Age of Onset   • Hyperlipidemia Mother    • CHAD disease Mother    • Cancer Mother    • Diabetes Mother    • Heart disease Mother    • Rheum arthritis Father    • Hyperlipidemia Father    • Heart attack Father 72   • Ovarian cancer Maternal Aunt    • Diabetes Paternal Grandmother    • Hypertension Paternal Grandmother    • Obesity Paternal Grandmother    • Colon cancer Other    • Arthritis Other    • Cancer Other         uncle; liver, lung    • Thyroid disease Cousin    • Thyroid cancer Cousin    • CHAD disease Daughter    • Obesity Brother    • Sleep apnea Brother        Social History     Socioeconomic History   • Marital status:      Spouse name: Not on file   • Number of children: Not on file   • Years of education: Not on file   • Highest education level: Not on file   Tobacco Use   • Smoking status: Never Smoker   • Smokeless tobacco: Never Used   Substance and Sexual Activity   • Alcohol use: No   • Drug use: No   • Sexual activity: Defer   Social History Narrative    Single    Caffeine: 3 sodas daily       Allergies   Allergen Reactions   • Adhesive Tape       Rash     • Erythromycin Other (See Comments)     Sores in mouth   • Epinephrine Palpitations   • Nitroglycerin Palpitations         Current Outpatient Medications:   •  albuterol sulfate  (90 Base) MCG/ACT inhaler, Inhale 2 puffs Every 6 (Six) Hours As Needed for Wheezing or Shortness of Air (or cough you cannot get under control)., Disp: 1 inhaler, Rfl: 0  •  ALPRAZolam (XANAX) 0.5 MG tablet, Take 1 tablet by mouth 2 (Two) Times a Day As Needed for Anxiety., Disp: 30 tablet, Rfl: 0  •  ascorbic acid (VITAMIN C) 1000 MG tablet, Take 1,000 mg by mouth Daily., Disp: , Rfl:   •  ferrous sulfate 140 (45 Fe) MG tablet controlled-release tablet, Take 140 mg by mouth Daily With Breakfast., Disp: , Rfl:   •  fluticasone (Flonase) 50 MCG/ACT nasal spray, 2 sprays into the nostril(s) as directed by provider Daily. 2 puffs each nostril, Disp: 1 bottle, Rfl: 0  •  folic acid (FOLVITE) 400 MCG tablet, Take 400 mcg by mouth Daily., Disp: , Rfl:   •  Garlic 1000 MG capsule, Take 1 tablet by mouth Daily., Disp: , Rfl:   •  levothyroxine (SYNTHROID, LEVOTHROID) 75 MCG tablet, Take 1 tablet by mouth Daily., Disp: 90 tablet, Rfl: 0  •  Loratadine (CLARITIN) 10 MG capsule, Take 1 tablet by mouth daily., Disp: , Rfl:   •  mesalamine (APRISO) 0.375 g 24 hr capsule, Take 375 mg by mouth As Needed., Disp: , Rfl:   •  metFORMIN ER (GLUCOPHAGE-XR) 500 MG 24 hr tablet, Take 1 tablet by mouth 2 (Two) Times a Day. (Patient taking differently: Take 500 mg by mouth 3 (Three) Times a Week.), Disp: 180 tablet, Rfl: 0  •  montelukast (SINGULAIR) 10 MG tablet, Take 1 tablet by mouth every night at bedtime., Disp: 90 tablet, Rfl: 1  •  pantoprazole (PROTONIX) 40 MG EC tablet, TAKE ONE TABLET BY MOUTH DAILY, Disp: 90 tablet, Rfl: 1  •  Selenium 200 MCG capsule, Take 1 tablet by mouth Daily., Disp: , Rfl:   •  sertraline (ZOLOFT) 50 MG tablet, TAKE ONE AND ONE-HALF (1 & 1/2) TABLET BY MOUTH DAILY (Patient taking differently: Take 50 mg by  "mouth Daily. TAKE ONE AND ONE-HALF (1 & 1/2) TABLET BY MOUTH DAILY), Disp: 135 tablet, Rfl: 0  •  Vitamin A 2400 MCG (8000 UT) tablet, Take 1 tablet by mouth Daily., Disp: , Rfl:   •  vitamin B-12 (CYANOCOBALAMIN) 1000 MCG tablet, Take 500 mcg by mouth Daily., Disp: , Rfl:   •  Zinc 25 MG tablet, Take 0.5 tablets by mouth Daily., Disp: , Rfl:     The following portions of the patient's history were reviewed today and updated as appropriate: allergies, current medications, past family history, past medical history, past social history, past surgical history and problem list     Review of Systems   Constitution: Negative for chills and fever.   HENT: Negative.    Eyes: Negative.    Cardiovascular: Positive for palpitations. Negative for chest pain, claudication, cyanosis, dyspnea on exertion, irregular heartbeat, leg swelling, near-syncope, orthopnea, paroxysmal nocturnal dyspnea and syncope.   Respiratory: Negative for cough, shortness of breath and snoring.    Endocrine: Negative.    Hematologic/Lymphatic: Does not bruise/bleed easily.   Skin: Negative for poor wound healing.   Musculoskeletal: Negative.    Gastrointestinal: Negative for abdominal pain, heartburn, hematemesis, melena, nausea and vomiting.   Genitourinary: Negative.  Negative for hematuria.   Neurological: Negative.    Psychiatric/Behavioral: The patient is nervous/anxious.    Allergic/Immunologic: Negative.        Objective:     Vitals:    04/30/20 1002   BP: 117/76   Pulse: 71   Weight: 120 kg (265 lb)   Height: 170.2 cm (67\")       Body mass index is 41.5 kg/m².    Physical Exam   Constitutional: She is oriented to person, place, and time.   Pulmonary/Chest: No respiratory distress.   Neurological: She is alert and oriented to person, place, and time.   Psychiatric: She has a normal mood and affect. Her behavior is normal. Judgment and thought content normal.       Lab and Diagnostic Review:  Echo 8/14/2019  · Left ventricular systolic function is " normal. Estimated EF = 65%.  · Left ventricular diastolic dysfunction (grade I) consistent with impaired relaxation.  · The cardiac valves are anatomically and functionally normal.       GXT 8/14/2019  · Average exercise capacity with normal hemodynamic response to exercise.  · Expected exercise time 7:30, actual time 6:18, test stopped at patient request due to fatigue, LOU +15.  · The test is negative for anginal symptoms or diagnostic ST segment changes at high workload and target heart rate.  · No exercise-induced arrhythmias.  Results for orders placed or performed in visit on 04/22/20   Comprehensive Metabolic Panel   Result Value Ref Range    Glucose 125 (H) 65 - 99 mg/dL    BUN 12 6 - 20 mg/dL    Creatinine 0.78 0.57 - 1.00 mg/dL    eGFR Non African Am 77 >60 mL/min/1.73    eGFR African Am 93 >60 mL/min/1.73    BUN/Creatinine Ratio 15.4 7.0 - 25.0    Sodium 136 136 - 145 mmol/L    Potassium 4.4 3.5 - 5.2 mmol/L    Chloride 96 (L) 98 - 107 mmol/L    Total CO2 26.1 22.0 - 29.0 mmol/L    Calcium 9.5 8.6 - 10.5 mg/dL    Total Protein 7.2 6.0 - 8.5 g/dL    Albumin 4.30 3.50 - 5.20 g/dL    Globulin 2.9 gm/dL    A/G Ratio 1.5 g/dL    Total Bilirubin 0.2 0.2 - 1.2 mg/dL    Alkaline Phosphatase 92 39 - 117 U/L    AST (SGOT) 16 1 - 32 U/L    ALT (SGPT) 23 1 - 33 U/L   T4, Free   Result Value Ref Range    Free T4 0.88 (L) 0.93 - 1.70 ng/dL   TSH   Result Value Ref Range    TSH 3.200 0.270 - 4.200 uIU/mL   POCT urinalysis dipstick, automated   Result Value Ref Range    Color Other (A) Yellow, Straw, Dark Yellow, [place]    Clarity, UA Slightly Cloudy (A) Clear    Specific Gravity  1.030 1.005 - 1.030    pH, Urine 5.5 5.0 - 8.0    Leukocytes Negative Negative    Nitrite, UA Negative Negative    Protein,  mg/dL (A) Negative mg/dL    Glucose, UA Negative Negative, 1000 mg/dL (3+) mg/dL    Ketones, UA 15 mg/dL (A) Negative    Urobilinogen, UA Normal Normal    Bilirubin Small (1+) (A) Negative    Blood, UA Trace (A)  Negative   POC Glycosylated Hemoglobin (Hb A1C)   Result Value Ref Range    Hemoglobin A1C 6 %   POC Glucose   Result Value Ref Range    Glucose 171 (A) 70 - 130 mg/dL   POC CBC With / Auto Diff   Result Value Ref Range    WBC 10.4     RBC 4.74     Hemoglobin 10.5 (A) 12.0 - 17.0 g/dL    Hematocrit 34.9 (A) 38 - 51 %    MCV 73.7     MCH 22.2     MCHC 30.1     RDW-CV 16.1     MPV 7.7     Platelets 447 10*3/mm3    Immature Granulocyte Rel % 7.9 %     EKG 4/22/20 sinus tachycardia,     Assessment and Plan:   1. Palpitations  Continue to decrease caffeine  Encouraged healthy diet and regular exercise  - Holter Monitor - 72 Hour Up To 21 Days; Future      This visit has been scheduled as a telephone visit to comply with patient safety concerns in accordance with CDC recommendations. Total time of discussion was 33 minutes.      It has been a pleasure to participate in the care of this patient.  Patient was instructed to call the [facility] Center with any questions, concerns, or worsening symptoms.    *Please note that portions of this note were completed with a voice recognition program. Efforts were made to edit the dictations, but occasionally words are mistranscribed.

## 2020-05-13 ENCOUNTER — TELEPHONE (OUTPATIENT)
Dept: CARDIOLOGY | Facility: HOSPITAL | Age: 55
End: 2020-05-13

## 2020-05-13 ENCOUNTER — TELEPHONE (OUTPATIENT)
Dept: URGENT CARE | Facility: CLINIC | Age: 55
End: 2020-05-13

## 2020-05-13 DIAGNOSIS — F41.9 ANXIETY: ICD-10-CM

## 2020-05-13 RX ORDER — ALPRAZOLAM 0.5 MG/1
TABLET ORAL
Qty: 30 TABLET | Refills: 0 | OUTPATIENT
Start: 2020-05-13

## 2020-05-13 RX ORDER — LEVOTHYROXINE SODIUM 0.07 MG/1
TABLET ORAL
Qty: 30 TABLET | Refills: 0 | Status: SHIPPED | OUTPATIENT
Start: 2020-05-13 | End: 2020-05-14 | Stop reason: SDUPTHER

## 2020-05-13 NOTE — TELEPHONE ENCOUNTER
Patient called and states that she has received her monitor from [employer] but is concerned with the patches because she has skin sensitivities. She wants to know what option are available. Left message for patient to call.

## 2020-05-14 RX ORDER — LEVOTHYROXINE SODIUM 0.07 MG/1
75 TABLET ORAL DAILY
Qty: 30 TABLET | Refills: 0 | Status: SHIPPED | OUTPATIENT
Start: 2020-05-14 | End: 2020-06-18 | Stop reason: ALTCHOICE

## 2020-05-19 NOTE — TELEPHONE ENCOUNTER
Several attempts to reach patient. I have left several voicemail messages and have been unsuccessful at reaching patient. Patient was given the phone number to call back if her issue was unresolved with the patch.

## 2020-06-02 ENCOUNTER — TELEPHONE (OUTPATIENT)
Dept: CARDIOLOGY | Facility: HOSPITAL | Age: 55
End: 2020-06-02

## 2020-06-02 NOTE — TELEPHONE ENCOUNTER
Spoke to patient and she has not started wearing her monitor as of yet due to having sensitivities to the patches.. Patient is going to call [employer] and have EKG leads sent to her home and is going to give that a try.

## 2020-06-02 NOTE — TELEPHONE ENCOUNTER
OK thanks. She is following up with Jenny. Her appointment may need to be pushed back depending on when she started it.

## 2020-06-02 NOTE — TELEPHONE ENCOUNTER
Patient states that she likely will begin wearing the monitor on Friday and was notified that she would follow-up in 3-4 weeks after beginning the monitor.

## 2020-06-05 ENCOUNTER — OFFICE VISIT (OUTPATIENT)
Dept: ENDOCRINOLOGY | Facility: CLINIC | Age: 55
End: 2020-06-05

## 2020-06-05 VITALS
SYSTOLIC BLOOD PRESSURE: 122 MMHG | DIASTOLIC BLOOD PRESSURE: 80 MMHG | HEART RATE: 72 BPM | WEIGHT: 268 LBS | BODY MASS INDEX: 42.06 KG/M2 | OXYGEN SATURATION: 98 % | HEIGHT: 67 IN

## 2020-06-05 DIAGNOSIS — E03.8 HYPOTHYROIDISM DUE TO HASHIMOTO'S THYROIDITIS: ICD-10-CM

## 2020-06-05 DIAGNOSIS — R73.03 PREDIABETES: Primary | ICD-10-CM

## 2020-06-05 DIAGNOSIS — E28.2 POLYCYSTIC OVARIES: ICD-10-CM

## 2020-06-05 DIAGNOSIS — E06.3 HYPOTHYROIDISM DUE TO HASHIMOTO'S THYROIDITIS: ICD-10-CM

## 2020-06-05 LAB
EXPIRATION DATE: NORMAL
EXPIRATION DATE: NORMAL
GLUCOSE BLDC GLUCOMTR-MCNC: 120 MG/DL (ref 70–130)
HBA1C MFR BLD: 5.8 %
Lab: NORMAL
Lab: NORMAL

## 2020-06-05 PROCEDURE — 83036 HEMOGLOBIN GLYCOSYLATED A1C: CPT | Performed by: INTERNAL MEDICINE

## 2020-06-05 PROCEDURE — 99214 OFFICE O/P EST MOD 30 MIN: CPT | Performed by: INTERNAL MEDICINE

## 2020-06-05 PROCEDURE — 82962 GLUCOSE BLOOD TEST: CPT | Performed by: INTERNAL MEDICINE

## 2020-06-05 RX ORDER — METFORMIN HYDROCHLORIDE 500 MG/1
500 TABLET, EXTENDED RELEASE ORAL
Qty: 90 TABLET | Refills: 3 | Status: SHIPPED | OUTPATIENT
Start: 2020-06-05 | End: 2020-10-27 | Stop reason: SDUPTHER

## 2020-06-05 RX ORDER — SERTRALINE HYDROCHLORIDE 25 MG/1
TABLET, FILM COATED ORAL
COMMUNITY
Start: 2020-05-14 | End: 2020-10-05

## 2020-06-05 NOTE — PROGRESS NOTES
Chief complaint  Hypothyroidism (Follow Up) and Prediabetes    Subjective   Brigid Coelho is a 55 y.o. female is here today for follow-up.  Follow-up for hypothyroidism,  goiter and small thyroid nodules, discovered on the ultrasound in 05/2013. Jan 2016 her TFT were low and she was started on levothyroxine 50 mcg a day with symptomatic improvement.   Thyroid u/s 1/2016 -Heterogeneous thyroid gland with small 6 mm isthmus nodule.     Patient also has a history of ulcerative colitis and iron deficiency anemia, anxiety. At initial endocrine evaluation she was found to have elevated Hgb A1C, I have started her on metformin.   She has frequent postprandial hypoglycemia episodes and coudlnt tolerate higher dose of metformin because of that.       She has cravings for food and stress eating. She complains of fatigue, cold and heat intolerance, dysphagia, weight gain and neck swelling. Contrave and phentermine was tried in the past and triggered increased anxiety.   Medications    Current Outpatient Medications:   •  ALPRAZolam (XANAX) 0.5 MG tablet, Take 1 tablet by mouth 2 (Two) Times a Day As Needed for Anxiety., Disp: 30 tablet, Rfl: 0  •  ascorbic acid (VITAMIN C) 1000 MG tablet, Take 1,000 mg by mouth Daily., Disp: , Rfl:   •  ferrous sulfate 140 (45 Fe) MG tablet controlled-release tablet, Take 140 mg by mouth Daily With Breakfast., Disp: , Rfl:   •  folic acid (FOLVITE) 400 MCG tablet, Take 400 mcg by mouth Daily., Disp: , Rfl:   •  Garlic 1000 MG capsule, Take 1 tablet by mouth Daily., Disp: , Rfl:   •  levothyroxine (SYNTHROID, LEVOTHROID) 75 MCG tablet, Take 1 tablet by mouth Daily., Disp: 30 tablet, Rfl: 0  •  Loratadine (CLARITIN) 10 MG capsule, Take 1 tablet by mouth daily., Disp: , Rfl:   •  mesalamine (APRISO) 0.375 g 24 hr capsule, Take 375 mg by mouth As Needed., Disp: , Rfl:   •  metFORMIN ER (GLUCOPHAGE-XR) 500 MG 24 hr tablet, Take 1 tablet by mouth Daily With Breakfast., Disp: 90 tablet,  Rfl: 3  •  montelukast (SINGULAIR) 10 MG tablet, Take 1 tablet by mouth every night at bedtime., Disp: 90 tablet, Rfl: 1  •  Selenium 200 MCG capsule, Take 1 tablet by mouth Daily., Disp: , Rfl:   •  sertraline (ZOLOFT) 25 MG tablet, , Disp: , Rfl:   •  sertraline (ZOLOFT) 50 MG tablet, TAKE ONE AND ONE-HALF (1 & 1/2) TABLET BY MOUTH DAILY (Patient taking differently: Take 50 mg by mouth Daily. TAKE ONE AND ONE-HALF (1 & 1/2) TABLET BY MOUTH DAILY), Disp: 135 tablet, Rfl: 0  •  Vitamin A 2400 MCG (8000 UT) tablet, Take 1 tablet by mouth Daily., Disp: , Rfl:   •  Zinc 25 MG tablet, Take 0.5 tablets by mouth Daily., Disp: , Rfl:   •  naltrexone-bupropion ER (Contrave) 8-90 MG tablet, Take 2 tablets by mouth 2 (Two) Times a Day. Start 1 t daily for a week, 1 tab BID for a week, then 2 tab BID., Disp: 120 tablet, Rfl: 3    PMH  The following portions of the patient's history were reviewed and updated as appropriate: allergies, current medications, past family history, past medical history, past social history, past surgical history and problem list.    Review of systems  Review of Systems   Constitutional: Positive for fatigue and unexpected weight change (weight gain). Negative for activity change, appetite change, chills and diaphoresis.   HENT: Positive for trouble swallowing and voice change. Negative for congestion, ear pain, facial swelling, hearing loss, postnasal drip and sore throat.    Eyes: Negative.  Negative for redness, itching and visual disturbance.   Respiratory: Negative for cough and shortness of breath.    Cardiovascular: Negative for chest pain, palpitations and leg swelling.   Gastrointestinal: Negative for abdominal distention, abdominal pain, constipation, diarrhea, nausea and vomiting.   Endocrine:        As listed in HPI   Genitourinary: Negative.    Musculoskeletal: Positive for arthralgias. Negative for back pain, joint swelling, myalgias, neck pain and neck stiffness.   Skin: Negative.   "  Allergic/Immunologic: Negative.    Neurological: Negative for dizziness, tremors, syncope, weakness, light-headedness and headaches.   Hematological: Negative.    Psychiatric/Behavioral: Negative.    All other systems reviewed and are negative.      Physical exam  Objective   Blood pressure 122/80, pulse 72, height 170.2 cm (67\"), weight 122 kg (268 lb), SpO2 98 %. Body mass index is 41.97 kg/m².    Physical Exam   Constitutional: She is oriented to person, place, and time. She appears well-developed and well-nourished.   HENT:   Head: Normocephalic and atraumatic.   Eyes: Conjunctivae are normal.   Neck: No muscular tenderness present. Carotid bruit is not present. No thyroid mass present.   The neck is supple and no assimetry visualized   Cardiovascular: Normal rate, regular rhythm and normal heart sounds.   Pulmonary/Chest: Effort normal and breath sounds normal.   Neurological: She is alert and oriented to person, place, and time.   Skin: Skin is warm and dry.   Psychiatric: She has a normal mood and affect. Thought content normal.   Vitals reviewed.        LABS AND IMAGING  Orders Only on 06/05/2020   Component Date Value Ref Range Status   • Glucose 06/05/2020 85  65 - 99 mg/dL Final   • BUN 06/05/2020 8  6 - 24 mg/dL Final   • Creatinine 06/05/2020 0.72  0.57 - 1.00 mg/dL Final   • eGFR Non African Am 06/05/2020 94  >59 mL/min/1.73 Final   • eGFR African Am 06/05/2020 108  >59 mL/min/1.73 Final   • BUN/Creatinine Ratio 06/05/2020 11  9 - 23 Final   • Sodium 06/05/2020 142  134 - 144 mmol/L Final   • Potassium 06/05/2020 4.2  3.5 - 5.2 mmol/L Final   • Chloride 06/05/2020 103  96 - 106 mmol/L Final   • Total CO2 06/05/2020 25  20 - 29 mmol/L Final   • Calcium 06/05/2020 9.1  8.7 - 10.2 mg/dL Final   • Total Protein 06/05/2020 6.9  6.0 - 8.5 g/dL Final   • Albumin 06/05/2020 4.4  3.8 - 4.9 g/dL Final   • Globulin 06/05/2020 2.5  1.5 - 4.5 g/dL Final   • A/G Ratio 06/05/2020 1.8  1.2 - 2.2 Final   • Total " Bilirubin 06/05/2020 0.2  0.0 - 1.2 mg/dL Final   • Alkaline Phosphatase 06/05/2020 85  39 - 117 IU/L Final   • AST (SGOT) 06/05/2020 27  0 - 40 IU/L Final   • ALT (SGPT) 06/05/2020 24  0 - 32 IU/L Final   Office Visit on 06/05/2020   Component Date Value Ref Range Status   • Glucose 06/05/2020 120  70 - 130 mg/dL Final   • Lot Number 06/05/2020 [id number]   Final   • Expiration Date 06/05/2020 11/25/20   Final   • Hemoglobin A1C 06/05/2020 5.8  % Final   • Lot Number 06/05/2020 [id number]   Final   • Expiration Date 06/05/2020 01/27/2020   Final       Assessment  Assessment/Plan   Problem List Items Addressed This Visit        Endocrine    Prediabetes - Primary    Relevant Medications    metFORMIN ER (GLUCOPHAGE-XR) 500 MG 24 hr tablet    Other Relevant Orders    POC Glucose Fingerstick (Completed)    POC Glycosylated Hemoglobin (Hb A1C) (Completed)    Hypothyroidism due to Hashimoto's thyroiditis    Relevant Orders    TSH    T4, Free    Polycystic ovaries    Relevant Orders    Comprehensive Metabolic Panel          Plan  Thyroid u/s 1/2017 - heterogeneous gland with no nodules.     -repeat thyroid function tests on levothyroxine 75 mcg, adjust the dose accordingly.      -Continue metformin 500 mg a day  for insulin resistance and postprandial hypoglycemia.      - start Contrave, discussed side effects. She has cravings for sweets and it should be beneficial. Monitor BP while taking the medication        F/u in 4-6 months.

## 2020-06-06 LAB
ALBUMIN SERPL-MCNC: 4.4 G/DL (ref 3.8–4.9)
ALBUMIN/GLOB SERPL: 1.8 {RATIO} (ref 1.2–2.2)
ALP SERPL-CCNC: 85 IU/L (ref 39–117)
ALT SERPL-CCNC: 24 IU/L (ref 0–32)
AST SERPL-CCNC: 27 IU/L (ref 0–40)
BILIRUB SERPL-MCNC: 0.2 MG/DL (ref 0–1.2)
BUN SERPL-MCNC: 8 MG/DL (ref 6–24)
BUN/CREAT SERPL: 11 (ref 9–23)
CALCIUM SERPL-MCNC: 9.1 MG/DL (ref 8.7–10.2)
CHLORIDE SERPL-SCNC: 103 MMOL/L (ref 96–106)
CO2 SERPL-SCNC: 25 MMOL/L (ref 20–29)
CREAT SERPL-MCNC: 0.72 MG/DL (ref 0.57–1)
GLOBULIN SER CALC-MCNC: 2.5 G/DL (ref 1.5–4.5)
GLUCOSE SERPL-MCNC: 85 MG/DL (ref 65–99)
POTASSIUM SERPL-SCNC: 4.2 MMOL/L (ref 3.5–5.2)
PROT SERPL-MCNC: 6.9 G/DL (ref 6–8.5)
SODIUM SERPL-SCNC: 142 MMOL/L (ref 134–144)

## 2020-06-17 ENCOUNTER — TELEPHONE (OUTPATIENT)
Dept: CARDIOLOGY | Facility: HOSPITAL | Age: 55
End: 2020-06-17

## 2020-06-17 NOTE — TELEPHONE ENCOUNTER
Her wear time has .  She can call [employer] to see if they will extend her wear time.  She will have to discuss the charges with [employer].  Her appoitment will need to be rescheduled.

## 2020-06-17 NOTE — TELEPHONE ENCOUNTER
Patient was supposed to had an appointment tomorrow for follow-up for her monitor. She was scheduled with Jenny. Patient states that she never put the monitor on because of sensitive skin. Patient was advised weeks ago to call [facility] to request sensitive patches. Patient states that she never did and states that the company is requesting to have the monitor returned although she hadn't wore the device.

## 2020-06-18 ENCOUNTER — TELEPHONE (OUTPATIENT)
Dept: ENDOCRINOLOGY | Facility: CLINIC | Age: 55
End: 2020-06-18

## 2020-06-18 DIAGNOSIS — E03.8 HYPOTHYROIDISM DUE TO HASHIMOTO'S THYROIDITIS: Primary | ICD-10-CM

## 2020-06-18 DIAGNOSIS — E06.3 HYPOTHYROIDISM DUE TO HASHIMOTO'S THYROIDITIS: Primary | ICD-10-CM

## 2020-06-18 RX ORDER — LEVOTHYROXINE SODIUM 88 UG/1
88 TABLET ORAL DAILY
Qty: 90 TABLET | Refills: 1 | Status: SHIPPED | OUTPATIENT
Start: 2020-06-18 | End: 2020-10-27 | Stop reason: SDUPTHER

## 2020-06-18 NOTE — TELEPHONE ENCOUNTER
PATIENT WAS GIVEN SAMPLES OF LEVOTHYROXINE 88 MCG AND IS NOW OUT OF SAMPLES AND WOULD LIKE TO HAVE AN RX SENT TO [facility]. SHE STATES THAT SHE IS HAVING GREAT RESULTS WITH THE 88 MCG DOSE AND WOULD LIKE TO CONTINUE.

## 2020-06-22 ENCOUNTER — TELEPHONE (OUTPATIENT)
Dept: CARDIOLOGY | Facility: HOSPITAL | Age: 55
End: 2020-06-22

## 2020-06-22 NOTE — TELEPHONE ENCOUNTER
Patient states that she received the bridge for her holter monitor and wanted to speak to someone about the monitor to make sure she is putting the monitor on correctly. Left message for patient to call our office.

## 2020-06-23 ENCOUNTER — TELEPHONE (OUTPATIENT)
Dept: FAMILY MEDICINE CLINIC | Facility: CLINIC | Age: 55
End: 2020-06-23

## 2020-06-23 NOTE — TELEPHONE ENCOUNTER
Pt called in the requesting a referral for a back doctor. Pt also requesting med refill on ALPRAZolam (XANAX) 0.5 MG tablet to be sent to the [address] AT [facility] & [address] - [phone]  - [phone]

## 2020-06-23 NOTE — TELEPHONE ENCOUNTER
lvm advising patient needs to be seen either video or in person. Advised to call back to schedule.

## 2020-06-24 DIAGNOSIS — F41.9 ANXIETY: ICD-10-CM

## 2020-06-24 RX ORDER — ALPRAZOLAM 0.5 MG/1
0.5 TABLET ORAL 2 TIMES DAILY PRN
Qty: 30 TABLET | Refills: 0 | Status: SHIPPED | OUTPATIENT
Start: 2020-06-24 | End: 2020-09-02 | Stop reason: SDUPTHER

## 2020-06-26 ENCOUNTER — TELEPHONE (OUTPATIENT)
Dept: ENDOCRINOLOGY | Facility: CLINIC | Age: 55
End: 2020-06-26

## 2020-06-26 ENCOUNTER — OFFICE VISIT (OUTPATIENT)
Dept: FAMILY MEDICINE CLINIC | Facility: CLINIC | Age: 55
End: 2020-06-26

## 2020-06-26 VITALS
BODY MASS INDEX: 41.82 KG/M2 | DIASTOLIC BLOOD PRESSURE: 85 MMHG | OXYGEN SATURATION: 98 % | WEIGHT: 267 LBS | HEART RATE: 94 BPM | SYSTOLIC BLOOD PRESSURE: 120 MMHG | TEMPERATURE: 97.3 F | RESPIRATION RATE: 18 BRPM

## 2020-06-26 DIAGNOSIS — M54.50 MIDLINE LOW BACK PAIN WITHOUT SCIATICA, UNSPECIFIED CHRONICITY: Primary | ICD-10-CM

## 2020-06-26 PROBLEM — M51.369 DDD (DEGENERATIVE DISC DISEASE), LUMBAR: Status: ACTIVE | Noted: 2020-06-26

## 2020-06-26 PROBLEM — M51.36 DDD (DEGENERATIVE DISC DISEASE), LUMBAR: Status: ACTIVE | Noted: 2020-06-26

## 2020-06-26 PROCEDURE — 96372 THER/PROPH/DIAG INJ SC/IM: CPT | Performed by: NURSE PRACTITIONER

## 2020-06-26 PROCEDURE — 99214 OFFICE O/P EST MOD 30 MIN: CPT | Performed by: NURSE PRACTITIONER

## 2020-06-26 RX ORDER — CYCLOBENZAPRINE HCL 10 MG
TABLET ORAL
Qty: 60 TABLET | Refills: 0 | Status: SHIPPED | OUTPATIENT
Start: 2020-06-26 | End: 2020-07-20 | Stop reason: SDUPTHER

## 2020-06-26 RX ORDER — DEXAMETHASONE SODIUM PHOSPHATE 4 MG/ML
4 INJECTION, SOLUTION INTRA-ARTICULAR; INTRALESIONAL; INTRAMUSCULAR; INTRAVENOUS; SOFT TISSUE ONCE
Status: COMPLETED | OUTPATIENT
Start: 2020-06-26 | End: 2020-06-26

## 2020-06-26 RX ADMIN — DEXAMETHASONE SODIUM PHOSPHATE 4 MG: 4 INJECTION, SOLUTION INTRA-ARTICULAR; INTRALESIONAL; INTRAMUSCULAR; INTRAVENOUS; SOFT TISSUE at 18:25

## 2020-06-26 NOTE — TELEPHONE ENCOUNTER
Patient states that she was able to get the monitor on but states that the leads are not sticking real well due to her being busty. Patient claims that she had took off the monitor but will put it back on this afternoon.

## 2020-06-26 NOTE — TELEPHONE ENCOUNTER
PHARMACY CALLED STATING THAT THE PATIENT'S INSURANCE PROVIDER WANTED THEM TO REACH OUT TO DR HYLTON TO SEE IF SHE WANTED TO PRESCRIBE A STATIN ALONG WITH THE PATIENT'S DIABETES MEDICATIONS.

## 2020-06-26 NOTE — PROGRESS NOTES
Subjective   Brigid Coelho is a 55 y.o. female.     Ms. Coelho presents today with c/o low back pain. She states that she has a long history of back pain, lumbar DDD. She states that she had a MRI several years ago which revealed bulging discs. She feels that her back pain has worsened recently and she would like to have new imaging.     Back Pain   This is a new problem. Episode onset: 2 weeks. The problem occurs daily. The problem is unchanged. The pain is present in the lumbar spine. The quality of the pain is described as aching. The pain radiates to the left thigh. The pain is moderate. The symptoms are aggravated by position. Pertinent negatives include no abdominal pain, bladder incontinence, bowel incontinence, chest pain, dysuria, fever, leg pain, numbness, paresis, paresthesias, pelvic pain, perianal numbness, tingling or weakness. Risk factors include obesity and sedentary lifestyle. She has tried NSAIDs for the symptoms. The treatment provided no relief.       The following portions of the patient's history were reviewed and updated as appropriate: allergies, current medications, past family history, past medical history, past social history, past surgical history and problem list.    Allergies as of 06/26/2020 - Reviewed 06/26/2020   Allergen Reaction Noted   • Adhesive tape  12/26/2017   • Erythromycin Other (See Comments) 05/27/2016   • Epinephrine Palpitations 05/27/2016   • Nitroglycerin Palpitations 05/27/2016       Current Outpatient Medications on File Prior to Visit   Medication Sig   • ALPRAZolam (Xanax) 0.5 MG tablet Take 1 tablet by mouth 2 (Two) Times a Day As Needed for Anxiety.   • ascorbic acid (VITAMIN C) 1000 MG tablet Take 1,000 mg by mouth Daily.   • ferrous sulfate 140 (45 Fe) MG tablet controlled-release tablet Take 140 mg by mouth Daily With Breakfast.   • folic acid (FOLVITE) 400 MCG tablet Take 400 mcg by mouth Daily.   • Garlic 1000 MG capsule Take 1 tablet by  mouth Daily.   • levothyroxine (Synthroid) 88 MCG tablet Take 1 tablet by mouth Daily.   • Loratadine (CLARITIN) 10 MG capsule Take 1 tablet by mouth daily.   • mesalamine (APRISO) 0.375 g 24 hr capsule Take 375 mg by mouth As Needed.   • metFORMIN ER (GLUCOPHAGE-XR) 500 MG 24 hr tablet Take 1 tablet by mouth Daily With Breakfast.   • montelukast (SINGULAIR) 10 MG tablet Take 1 tablet by mouth every night at bedtime.   • naltrexone-bupropion ER (Contrave) 8-90 MG tablet Take 2 tablets by mouth 2 (Two) Times a Day. Start 1 t daily for a week, 1 tab BID for a week, then 2 tab BID.   • Selenium 200 MCG capsule Take 1 tablet by mouth Daily.   • sertraline (ZOLOFT) 25 MG tablet    • sertraline (ZOLOFT) 50 MG tablet TAKE ONE AND ONE-HALF (1 & 1/2) TABLET BY MOUTH DAILY (Patient taking differently: Take 50 mg by mouth Daily. TAKE ONE AND ONE-HALF (1 & 1/2) TABLET BY MOUTH DAILY)   • Vitamin A 2400 MCG (8000 UT) tablet Take 1 tablet by mouth Daily.   • Zinc 25 MG tablet Take 0.5 tablets by mouth Daily.     No current facility-administered medications on file prior to visit.        Review of Systems   Constitutional: Negative.  Negative for fever.   Respiratory: Negative.    Cardiovascular: Negative.  Negative for chest pain.   Gastrointestinal: Negative.  Negative for abdominal pain and bowel incontinence.   Genitourinary: Negative for bladder incontinence, dysuria, flank pain, frequency, hematuria, pelvic pain and urgency.   Musculoskeletal: Positive for back pain and myalgias.        Patient has history lumbar DDD   Neurological: Negative.  Negative for tingling, weakness, numbness and paresthesias.       Objective   Physical Exam   Constitutional: She is oriented to person, place, and time. Vital signs are normal. She appears well-developed and well-nourished. She is cooperative.  Non-toxic appearance. She does not have a sickly appearance. She does not appear ill. No distress.   Cardiovascular: Normal rate, regular  rhythm and normal heart sounds.   Pulmonary/Chest: Effort normal and breath sounds normal.   Abdominal: Soft. She exhibits no distension. There is no tenderness. There is no CVA tenderness.   Musculoskeletal:        Cervical back: Normal.        Thoracic back: Normal.        Lumbar back: She exhibits pain and spasm. She exhibits no tenderness, no swelling, no edema and no deformity.   Neurological: She is alert and oriented to person, place, and time.   Skin: Skin is warm, dry and intact. No rash noted. She is not diaphoretic.   Psychiatric: She has a normal mood and affect. Her behavior is normal. Judgment and thought content normal.   Vitals reviewed.    Vitals:    06/26/20 1711   BP: 120/85   Pulse: 94   Resp: 18   Temp: 97.3 °F (36.3 °C)   SpO2: 98%     Body mass index is 41.82 kg/m².    Assessment/Plan   Brigid was seen today for back pain.    Diagnoses and all orders for this visit:    Midline low back pain without sciatica, unspecified chronicity  -     XR Spine Lumbar 4+ View; Future  -     cyclobenzaprine (FLEXERIL) 10 MG tablet; 1/2-1 TID PRN  -     diclofenac (VOLTAREN) 1 % gel gel; Apply 4 g topically to the appropriate area as directed 3 (Three) Times a Day.  -     dexamethasone (DECADRON) injection 4 mg     Discussed the nature of the medical condition(s) risks, complications, implications, management, safe and proper use of medications. Encouraged medication compliance, and keeping scheduled follow up appointments with me and any other providers.

## 2020-06-26 NOTE — PATIENT INSTRUCTIONS
Acute Back Pain, Adult  Acute back pain is sudden and usually short-lived. It is often caused by an injury to the muscles and tissues in the back. The injury may result from:  · A muscle or ligament getting overstretched or torn (strained). Ligaments are tissues that connect bones to each other. Lifting something improperly can cause a back strain.  · Wear and tear (degeneration) of the spinal disks. Spinal disks are circular tissue that provides cushioning between the bones of the spine (vertebrae).  · Twisting motions, such as while playing sports or doing yard work.  · A hit to the back.  · Arthritis.  You may have a physical exam, lab tests, and imaging tests to find the cause of your pain. Acute back pain usually goes away with rest and home care.  Follow these instructions at home:  Managing pain, stiffness, and swelling  · Take over-the-counter and prescription medicines only as told by your health care provider.  · Your health care provider may recommend applying ice during the first 24-48 hours after your pain starts. To do this:  ? Put ice in a plastic bag.  ? Place a towel between your skin and the bag.  ? Leave the ice on for 20 minutes, 2-3 times a day.  · If directed, apply heat to the affected area as often as told by your health care provider. Use the heat source that your health care provider recommends, such as a moist heat pack or a heating pad.  ? Place a towel between your skin and the heat source.  ? Leave the heat on for 20-30 minutes.  ? Remove the heat if your skin turns bright red. This is especially important if you are unable to feel pain, heat, or cold. You have a greater risk of getting burned.  Activity    · Do not stay in bed. Staying in bed for more than 1-2 days can delay your recovery.  · Sit up and stand up straight. Avoid leaning forward when you sit, or hunching over when you stand.  ? If you work at a desk, sit close to it so you do not need to lean over. Keep your chin tucked  "in. Keep your neck drawn back, and keep your elbows bent at a right angle. Your arms should look like the letter \"L.\"  ? Sit high and close to the steering wheel when you drive. Add lower back (lumbar) support to your car seat, if needed.  · Take short walks on even surfaces as soon as you are able. Try to increase the length of time you walk each day.  · Do not sit, drive, or  one place for more than 30 minutes at a time. Sitting or standing for long periods of time can put stress on your back.  · Do not drive or use heavy machinery while taking prescription pain medicine.  · Use proper lifting techniques. When you bend and lift, use positions that put less stress on your back:  ? Bend your knees.  ? Keep the load close to your body.  ? Avoid twisting.  · Exercise regularly as told by your health care provider. Exercising helps your back heal faster and helps prevent back injuries by keeping muscles strong and flexible.  · Work with a physical therapist to make a safe exercise program, as recommended by your health care provider. Do any exercises as told by your physical therapist.  Lifestyle  · Maintain a healthy weight. Extra weight puts stress on your back and makes it difficult to have good posture.  · Avoid activities or situations that make you feel anxious or stressed. Stress and anxiety increase muscle tension and can make back pain worse. Learn ways to manage anxiety and stress, such as through exercise.  General instructions  · Sleep on a firm mattress in a comfortable position. Try lying on your side with your knees slightly bent. If you lie on your back, put a pillow under your knees.  · Follow your treatment plan as told by your health care provider. This may include:  ? Cognitive or behavioral therapy.  ? Acupuncture or massage therapy.  ? Meditation or yoga.  Contact a health care provider if:  · You have pain that is not relieved with rest or medicine.  · You have increasing pain going down " into your legs or buttocks.  · Your pain does not improve after 2 weeks.  · You have pain at night.  · You lose weight without trying.  · You have a fever or chills.  Get help right away if:  · You develop new bowel or bladder control problems.  · You have unusual weakness or numbness in your arms or legs.  · You develop nausea or vomiting.  · You develop abdominal pain.  · You feel faint.  Summary  · Acute back pain is sudden and usually short-lived.  · Use proper lifting techniques. When you bend and lift, use positions that put less stress on your back.  · Take over-the-counter and prescription medicines and apply heat or ice as directed by your health care provider.  This information is not intended to replace advice given to you by your health care provider. Make sure you discuss any questions you have with your health care provider.  Document Released: 12/18/2006 Document Revised: 04/07/2020 Document Reviewed: 08/01/2018  [employer] Patient Education © 2020 [employer]    Degenerative Disk Disease    Degenerative disk disease is a condition caused by changes that occur in the spinal disks as a person ages. Spinal disks are soft and compressible disks located between the bones of your spine (vertebrae). These disks act like shock absorbers.  Degenerative disk disease can affect the whole spine. However, the neck and lower back are most often affected. Many changes can occur in the spinal disks with aging, such as:  · The spinal disks may dry and shrink.  · Small tears may occur in the tough, outer covering of the disk (annulus).  · The disk space may become smaller due to loss of water.  · Abnormal growths in the bone (spurs) may occur. This can put pressure on the nerve roots exiting the spinal canal, causing pain.  · The spinal canal may become narrowed.  What are the causes?  This condition may be caused by:  · Normal degeneration with age.  · Injuries.  · Certain activities and sports that cause  damage.  What increases the risk?  The following factors may make you more likely to develop this condition:  · Being overweight.  · Having a family history of degenerative disk disease.  · Smoking.  · Sudden injury.  · Doing work that requires heavy lifting.  What are the signs or symptoms?  Symptoms of this condition include:  · Pain that varies in intensity. Some people have no pain, while others have severe pain. The location of the pain depends on the part of your backbone that is affected. You may have:  ? Pain in your neck or arm if a disk in your neck area is affected.  ? Pain in your back, buttocks, or legs if a disk in your lower back is affected.  · Pain that becomes worse while bending or reaching up, or with twisting movements.  · Pain that may start gradually and then get worse as time passes. It may also start after a major or minor injury.  · Numbness or tingling in the arms or legs.  How is this diagnosed?  This condition may be diagnosed based on:  · Your symptoms and medical history.  · A physical exam.  · Imaging tests, including:  ? An X-ray of the spine.  ? MRI.  How is this treated?  This condition may be treated with:  · Medicines.  · Rehabilitation exercises. These activities aim to strengthen muscles in your back and abdomen to better support your spine.  If treatments do not help to relieve your symptoms or you have severe pain, you may need surgery.  Follow these instructions at home:  Medicines  · Take over-the-counter and prescription medicines only as told by your health care provider.  · Do not drive or use heavy machinery while taking prescription pain medicine.  · If you are taking prescription pain medicine, take actions to prevent or treat constipation. Your health care provider may recommend that you:  ? Drink enough fluid to keep your urine pale yellow.  ? Eat foods that are high in fiber, such as fresh fruits and vegetables, whole grains, and beans.  ? Limit foods that are high  in fat and processed sugars, such as fried or sweet foods.  ? Take an over-the-counter or prescription medicine for constipation.  Activity  · Rest as told by your health care provider.  · Ask your health care provider what activities are safe for you. Return to your normal activities as directed.  · Avoid sitting for a long time without moving. Get up to take short walks every 1-2 hours. This is important to improve blood flow and breathing. Ask for help if you feel weak or unsteady.  · Perform relaxation exercises as told by your health care provider.  · Maintain good posture.  · Do not lift anything that is heavier than 10 lb (4.5 kg), or the limit that you are told, until your health care provider says that it is safe.  · Follow proper lifting and walking techniques as told by your health care provider.  Managing pain, stiffness, and swelling    · If directed, put ice on the painful area. Icing can help to relieve pain.  ? Put ice in a plastic bag.  ? Place a towel between your skin and the bag.  ? Leave the ice on for 20 minutes, 2-3 times a day.  · If directed, apply heat to the painful area as often as told by your health care provider. Heat can reduce the stiffness of your muscles. Use the heat source that your health care provider recommends, such as a moist heat pack or a heating pad.  ? Place a towel between your skin and the heat source.  ? Leave the heat on for 20-30 minutes.  ? Remove the heat if your skin turns bright red. This is especially important if you are unable to feel pain, heat, or cold. You may have a greater risk of getting burned.  General instructions  · Change your sitting, standing, and sleeping habits as told by your health care provider.  · Avoid sitting in the same position for long periods of time. Change positions frequently.  · Lose weight or maintain a healthy weight as told by your health care provider.  · Do not use any products that contain nicotine or tobacco, such as  cigarettes and e-cigarettes. If you need help quitting, ask your health care provider.  · Wear supportive footwear.  · Keep all follow-up visits as told by your health care provider. This is important. This may include visits for physical therapy.  Contact a health care provider if you:  · Have pain that does not go away within 1-4 weeks.  · Lose your appetite.  · Lose weight without trying.  Get help right away if you:  · Have severe pain.  · Notice weakness in your arms, hands, or legs.  · Begin to lose control of your bladder or bowel movements.  · Have fevers or night sweats.  Summary  · Degenerative disk disease is a condition caused by changes that occur in the spinal disks as a person ages.  · Degenerative disk disease can affect the whole spine. However, the neck and lower back are most often affected.  · Take over-the-counter and prescription medicines only as told by your health care provider.  This information is not intended to replace advice given to you by your health care provider. Make sure you discuss any questions you have with your health care provider.  Document Released: 10/14/2008 Document Revised: 12/13/2018 Document Reviewed: 12/13/2018  [employer] Patient Education © 2020 [employer]  Cyclobenzaprine tablets  What is this medicine?  CYCLOBENZAPRINE (sye kloe CARRI za preen) is a muscle relaxer. It is used to treat muscle pain, spasms, and stiffness.  This medicine may be used for other purposes; ask your health care provider or pharmacist if you have questions.  COMMON BRAND NAME(S): Fexmid, Flexeril  What should I tell my health care provider before I take this medicine?  They need to know if you have any of these conditions:  · heart disease, irregular heartbeat, or previous heart attack  · liver disease  · thyroid problem  · an unusual or allergic reaction to cyclobenzaprine, tricyclic antidepressants, lactose, other medicines, foods, dyes, or preservatives  · pregnant or trying to get  pregnant  · breast-feeding  How should I use this medicine?  Take this medicine by mouth with a glass of water. Follow the directions on the prescription label. If this medicine upsets your stomach, take it with food or milk. Take your medicine at regular intervals. Do not take it more often than directed.  Talk to your pediatrician regarding the use of this medicine in children. Special care may be needed.  Overdosage: If you think you have taken too much of this medicine contact a poison control center or emergency room at once.  NOTE: This medicine is only for you. Do not share this medicine with others.  What if I miss a dose?  If you miss a dose, take it as soon as you can. If it is almost time for your next dose, take only that dose. Do not take double or extra doses.  What may interact with this medicine?  Do not take this medicine with any of the following medications:  · MAOIs like Carbex, Eldepryl, Marplan, Nardil, and Parnate  · narcotic medicines for cough  · safinamide  This medicine may also interact with the following medications:  · alcohol  · bupropion  · antihistamines for allergy, cough and cold  · certain medicines for anxiety or sleep  · certain medicines for bladder problems like oxybutynin, tolterodine  · certain medicines for depression like amitriptyline, fluoxetine, sertraline  · certain medicines for Parkinson's disease like benztropine, trihexyphenidyl  · certain medicines for seizures like phenobarbital, primidone  · certain medicines for stomach problems like dicyclomine, hyoscyamine  · certain medicines for travel sickness like scopolamine  · general anesthetics like halothane, isoflurane, methoxyflurane, propofol  · ipratropium  · local anesthetics like lidocaine, pramoxine, tetracaine  · medicines that relax muscles for surgery  · narcotic medicines for pain  · phenothiazines like chlorpromazine, mesoridazine, prochlorperazine, thioridazine  · verapamil  This list may not describe  all possible interactions. Give your health care provider a list of all the medicines, herbs, non-prescription drugs, or dietary supplements you use. Also tell them if you smoke, drink alcohol, or use illegal drugs. Some items may interact with your medicine.  What should I watch for while using this medicine?  Tell your doctor or health care professional if your symptoms do not start to get better or if they get worse.  You may get drowsy or dizzy. Do not drive, use machinery, or do anything that needs mental alertness until you know how this medicine affects you. Do not stand or sit up quickly, especially if you are an older patient. This reduces the risk of dizzy or fainting spells. Alcohol may interfere with the effect of this medicine. Avoid alcoholic drinks.  If you are taking another medicine that also causes drowsiness, you may have more side effects. Give your health care provider a list of all medicines you use. Your doctor will tell you how much medicine to take. Do not take more medicine than directed. Call emergency for help if you have problems breathing or unusual sleepiness.  Your mouth may get dry. Chewing sugarless gum or sucking hard candy, and drinking plenty of water may help. Contact your doctor if the problem does not go away or is severe.  What side effects may I notice from receiving this medicine?  Side effects that you should report to your doctor or health care professional as soon as possible:  · allergic reactions like skin rash, itching or hives, swelling of the face, lips, or tongue  · breathing problems  · chest pain  · fast, irregular heartbeat  · hallucinations  · seizures  · unusually weak or tired  Side effects that usually do not require medical attention (report to your doctor or health care professional if they continue or are bothersome):  · headache  · nausea, vomiting  This list may not describe all possible side effects. Call your doctor for medical advice about side  effects. You may report side effects to FDA at 2-932-BUH-1508.  Where should I keep my medicine?  Keep out of the reach of children.  Store at room temperature between 15 and 30 degrees C (59 and 86 degrees F). Keep container tightly closed. Throw away any unused medicine after the expiration date.  NOTE: This sheet is a summary. It may not cover all possible information. If you have questions about this medicine, talk to your doctor, pharmacist, or health care provider.  © 2020 [employer] (2019-11-20 12:49:26)  Diclofenac sodium topical solution  What is this medicine?  DICLOFENAC (dye KLOE fen ak) is a non-steroidal anti-inflammatory drug (NSAID). It is used to treat osteoarthritis of the knees.  This medicine may be used for other purposes; ask your health care provider or pharmacist if you have questions.  COMMON BRAND NAME(S): DICLOVIX, INFLAMMA-K, PENNSAID, VOPAC MDS  What should I tell my health care provider before I take this medicine?  They need to know if you have any of these conditions:  · asthma  · bleeding problems  · coronary artery bypass graft (CABG) surgery within the past 2 weeks  · heart disease  · high blood pressure  · if you frequently drink alcohol containing drinks  · kidney disease  · liver disease  · open or infected skin  · stomach problems  · an unusual or allergic reaction to diclofenac, aspirin, other NSAIDs, other medicines, foods, dyes, or preservatives  · pregnant or trying to get pregnant  · breast-feeding  How should I use this medicine?  This medicine is for external use only. Follow the directions on the prescription label. Wash hands before and after use. Apply to the clean, dry skin of the knee. Rub around front, back, and sides of the knee. Do not apply to open wounds, infections, swelling, or areas of exfoliative dermatitis. Allow medicine to dry before using any other lotion or medicine on the same place. Do not get this medicine in your mouth or eyes. If this  medicine gets in your eye, rinse out with plenty of cool tap water. Protect treatment area from sunlight and sun lamps. Use your doses at regular intervals. Do not use it more often than directed.  A special MedGuide will be given to you by the pharmacist with each prescription and refill. Be sure to read this information carefully each time.  Talk to your pediatrician regarding the use of this medicine in children. Special care may be needed.  Overdosage: If you think you have taken too much of this medicine contact a poison control center or emergency room at once.  NOTE: This medicine is only for you. Do not share this medicine with others.  What if I miss a dose?  If you miss a dose, use it as soon as you can. If it is almost time for your next dose, use only that dose. Do not use double or extra doses.  What may interact with this medicine?  Do not take this medicine with any of the following medications:  · cidofovir  · ketorolac  · methotrexate  This medicine may also interact with the following medications:  · aspirin  · cyclosporine  · lithium  · medicines for blood pressure  · medicines that treat or prevent blood clots like warfarin, enoxaparin, and dalteparin  · NSAIDs, medicines for pain and inflammation, like ibuprofen or naproxen  · other products used on the skin  · steroid medicines like prednisone or cortisone  This list may not describe all possible interactions. Give your health care provider a list of all the medicines, herbs, non-prescription drugs, or dietary supplements you use. Also tell them if you smoke, drink alcohol, or use illegal drugs. Some items may interact with your medicine.  What should I watch for while using this medicine?  Tell your doctor or healthcare provider if your symptoms do not start to get better or if they get worse.  This medicine may cause serious skin reactions. They can happen weeks to months after starting the medicine. Contact your healthcare provider right  away if you notice fevers or flu-like symptoms with a rash. The rash may be red or purple and then turn into blisters or peeling of the skin. Or, you might notice a red rash with swelling of the face, lips or lymph nodes in your neck or under your arms.  This medicine can make you more sensitive to the sun. Keep out of the sun. If you cannot avoid being in the sun, wear protective clothing and use sunscreen. Do not use sun lamps or tanning beds/booths.  Do not take medicines such as ibuprofen and naproxen with this medicine. Side effects such as stomach upset, nausea, or ulcers may be more likely to occur. Many medicines available without a prescription should not be taken with this medicine.  This medicine does not prevent heart attack or stroke. In fact, this medicine may increase the chance of a heart attack or stroke. The chance may increase with longer use of this medicine and in people who have heart disease. If you take aspirin to prevent heart attack or stroke, talk with your doctor or healthcare provider.  This medicine can cause ulcers and bleeding in the stomach and intestines at any time during treatment. Ulcers and bleeding can happen without warning symptoms and can cause death. To reduce your risk, do not smoke cigarettes or drink alcohol while you are taking this medicine.  This medicine can cause you to bleed more easily. Try to avoid damage to your teeth and gums when you brush or floss your teeth.  What side effects may I notice from receiving this medicine?  Side effects that you should report to your doctor or health care professional as soon as possible:  · allergic reactions like skin rash, itching or hives, swelling of the face, lips, or tongue  · black or bloody stools, blood in the urine or vomit  · blurred vision  · chest pain  · difficulty breathing or wheezing  · nausea or vomiting  · rash, fever, and swollen lymph nodes  · redness, blistering, peeling or loosening of the skin, including  inside the mouth  · slurred speech or weakness on one side of the body  · trouble passing urine or change in the amount of urine  · unexplained weight gain or swelling  · unusually weak or tired  · yellowing of eyes or skin  Side effects that usually do not require medical attention (report to your doctor or health care professional if they continue or are bothersome):  · dizziness  · dry skin  · headache  · increased sensitivity to the sun  · stomach upset  · tingling at the application  This list may not describe all possible side effects. Call your doctor for medical advice about side effects. You may report side effects to FDA at 7-538-PPB-6233.  Where should I keep my medicine?  Keep out of the reach of children.  Store at room temperature between 15 and 30 degrees C (59 and 86 degrees F). Keep container tightly closed. Throw away any unused medicine after the expiration date.  NOTE: This sheet is a summary. It may not cover all possible information. If you have questions about this medicine, talk to your doctor, pharmacist, or health care provider.  © 2020 [employer] (2020-03-04 13:15:19)  Dexamethasone injection  What is this medicine?  DEXAMETHASONE (dex a METH a sone) is a corticosteroid. It is used to treat inflammation of the skin, joints, lungs, and other organs. Common conditions treated include asthma, allergies, and arthritis. It is also used for other conditions, like blood disorders and diseases of the adrenal glands.  This medicine may be used for other purposes; ask your health care provider or pharmacist if you have questions.  COMMON BRAND NAME(S): Decadron, DoubleDex, Simplist Dexamethasone, Solurex  What should I tell my health care provider before I take this medicine?  They need to know if you have any of these conditions:  · blood clotting problems  · Cushing's syndrome  · diabetes  · glaucoma  · heart problems or disease  · high blood pressure  · infection like herpes, measles,  tuberculosis, or chickenpox  · kidney disease  · liver disease  · mental problems  · myasthenia gravis  · osteoporosis  · previous heart attack  · seizures  · stomach, ulcer or intestine disease including colitis and diverticulitis  · thyroid problem  · an unusual or allergic reaction to dexamethasone, corticosteroids, other medicines, lactose, foods, dyes, or preservatives  · pregnant or trying to get pregnant  · breast-feeding  How should I use this medicine?  This medicine is for injection into a muscle, joint, lesion, soft tissue, or vein. It is given by a health care professional in a hospital or clinic setting.  Talk to your pediatrician regarding the use of this medicine in children. Special care may be needed.  Overdosage: If you think you have taken too much of this medicine contact a poison control center or emergency room at once.  NOTE: This medicine is only for you. Do not share this medicine with others.  What if I miss a dose?  This may not apply. If you are having a series of injections over a prolonged period, try not to miss an appointment. Call your doctor or health care professional to reschedule if you are unable to keep an appointment.  What may interact with this medicine?  Do not take this medicine with any of the following medications:  · mifepristone, RU-486  · vaccines  This medicine may also interact with the following medications:  · amphotericin B  · antibiotics like clarithromycin, erythromycin, and troleandomycin  · aspirin and aspirin-like drugs  · barbiturates like phenobarbital  · carbamazepine  · cholestyramine  · cholinesterase inhibitors like donepezil, galantamine, rivastigmine, and tacrine  · cyclosporine  · digoxin  · diuretics  · ephedrine  · female hormones, like estrogens or progestins and birth control pills  · indinavir  · isoniazid  · ketoconazole  · medicines for diabetes  · medicines that improve muscle tone or strength for conditions like myasthenia gravis  · NSAIDs,  medicines for pain and inflammation, like ibuprofen or naproxen  · phenytoin  · rifampin  · thalidomide  · warfarin  This list may not describe all possible interactions. Give your health care provider a list of all the medicines, herbs, non-prescription drugs, or dietary supplements you use. Also tell them if you smoke, drink alcohol, or use illegal drugs. Some items may interact with your medicine.  What should I watch for while using this medicine?  Your condition will be monitored carefully while you are receiving this medicine.  If you are taking this medicine for a long time, carry an identification card with your name and address, the type and dose of your medicine, and your doctor's name and address.  This medicine may increase your risk of getting an infection. Stay away from people who are sick. Tell your doctor or health care professional if you are around anyone with measles or chickenpox. Talk to your health care provider before you get any vaccines that you take this medicine.  If you are going to have surgery, tell your doctor or health care professional that you have taken this medicine within the last twelve months.  Ask your doctor or health care professional about your diet. You may need to lower the amount of salt you eat.  This medicine may increase blood sugar. Ask your healthcare provider if changes in diet or medicines are needed if you have diabetes.  What side effects may I notice from receiving this medicine?  Side effects that you should report to your doctor or health care professional as soon as possible:  · allergic reactions like skin rash, itching or hives, swelling of the face, lips, or tongue  · black or tarry stools  · change in the amount of urine  · confusion, excitement, restlessness, a false sense of well-being  · fever, sore throat, sneezing, cough, or other signs of infection, wounds that will not heal  · hallucinations  · mental depression, mood swings, mistaken feelings of  self importance or of being mistreated  · pain in hips, back, ribs, arms, shoulders, or legs  · pain, redness, or irritation at the injection site  · redness, blistering, peeling or loosening of the skin, including inside the mouth  · rounding out of face  ·   signs and symptoms of high blood sugar such as being more thirsty or hungry or having to urinate more than normal. You may also feel very tired or have blurry vision.  · swelling of feet or lower legs  · unusual bleeding or bruising  · wounds that do not heal  Side effects that usually do not require medical attention (report to your doctor or health care professional if they continue or are bothersome):  · diarrhea or constipation  · change in taste  · headache  · nausea, vomiting  · skin problems, acne, thin and shiny skin  · trouble sleeping  · unusual growth of hair on the face or body  · weight gain  This list may not describe all possible side effects. Call your doctor for medical advice about side effects. You may report side effects to FDA at 9-685-GQZ-8231.  Where should I keep my medicine?  This drug is given in a hospital or clinic and will not be stored at home.  NOTE: This sheet is a summary. It may not cover all possible information. If you have questions about this medicine, talk to your doctor, pharmacist, or health care provider.  © 2020 [employer] (2019-09-18 10:35:53)

## 2020-07-02 ENCOUNTER — HOSPITAL ENCOUNTER (OUTPATIENT)
Dept: GENERAL RADIOLOGY | Facility: HOSPITAL | Age: 55
Discharge: HOME | End: 2020-07-02
Admitting: NURSE PRACTITIONER

## 2020-07-02 DIAGNOSIS — M54.50 MIDLINE LOW BACK PAIN WITHOUT SCIATICA, UNSPECIFIED CHRONICITY: ICD-10-CM

## 2020-07-02 PROCEDURE — 72110 X-RAY EXAM L-2 SPINE 4/>VWS: CPT

## 2020-07-08 ENCOUNTER — TELEPHONE (OUTPATIENT)
Dept: FAMILY MEDICINE CLINIC | Facility: CLINIC | Age: 55
End: 2020-07-08

## 2020-07-08 PROCEDURE — 87077 CULTURE AEROBIC IDENTIFY: CPT | Performed by: PERSONAL EMERGENCY RESPONSE ATTENDANT

## 2020-07-08 PROCEDURE — 87186 SC STD MICRODIL/AGAR DIL: CPT | Performed by: PERSONAL EMERGENCY RESPONSE ATTENDANT

## 2020-07-08 PROCEDURE — 87086 URINE CULTURE/COLONY COUNT: CPT | Performed by: PERSONAL EMERGENCY RESPONSE ATTENDANT

## 2020-07-08 NOTE — TELEPHONE ENCOUNTER
Patient called and stated that she would like to get her x-ray results and she would like a referral to [facility] at [phone].    Patient callback:[phone]    Please advise

## 2020-07-10 ENCOUNTER — TELEPHONE (OUTPATIENT)
Dept: URGENT CARE | Facility: CLINIC | Age: 55
End: 2020-07-10

## 2020-07-13 ENCOUNTER — TELEPHONE (OUTPATIENT)
Dept: URGENT CARE | Facility: CLINIC | Age: 55
End: 2020-07-13

## 2020-07-13 NOTE — TELEPHONE ENCOUNTER
Advised pt of bacteria the culture grew as well as advised pt their medication has been sent over to the pharmacy, they are able to reach back out to us if any issues with this prescription.

## 2020-07-17 ENCOUNTER — TELEPHONE (OUTPATIENT)
Dept: FAMILY MEDICINE CLINIC | Facility: CLINIC | Age: 55
End: 2020-07-17

## 2020-07-17 NOTE — TELEPHONE ENCOUNTER
LVM for patient. I don't think she needs a referral to a chiropractor. She can't  a referral. Dr. Kennedy will have to put one in on the computer for the Rheumatologist and she may even want to see her in the office first.  is out but will be back on Monday and will respond to this.

## 2020-07-20 ENCOUNTER — OFFICE VISIT (OUTPATIENT)
Dept: FAMILY MEDICINE CLINIC | Facility: CLINIC | Age: 55
End: 2020-07-20

## 2020-07-20 VITALS
OXYGEN SATURATION: 97 % | HEART RATE: 97 BPM | RESPIRATION RATE: 16 BRPM | SYSTOLIC BLOOD PRESSURE: 114 MMHG | TEMPERATURE: 97.3 F | DIASTOLIC BLOOD PRESSURE: 68 MMHG | WEIGHT: 267.8 LBS | HEIGHT: 67 IN | BODY MASS INDEX: 42.03 KG/M2

## 2020-07-20 DIAGNOSIS — M25.50 GENERALIZED JOINT PAIN: ICD-10-CM

## 2020-07-20 DIAGNOSIS — M54.50 MIDLINE LOW BACK PAIN WITHOUT SCIATICA, UNSPECIFIED CHRONICITY: ICD-10-CM

## 2020-07-20 DIAGNOSIS — M54.16 LUMBAR RADICULOPATHY: Primary | ICD-10-CM

## 2020-07-20 PROCEDURE — 99214 OFFICE O/P EST MOD 30 MIN: CPT | Performed by: FAMILY MEDICINE

## 2020-07-20 PROCEDURE — 96372 THER/PROPH/DIAG INJ SC/IM: CPT | Performed by: FAMILY MEDICINE

## 2020-07-20 RX ORDER — SODIUM, POTASSIUM,MAG SULFATES 17.5-3.13G
SOLUTION, RECONSTITUTED, ORAL ORAL
COMMUNITY
Start: 2020-07-09 | End: 2020-07-20

## 2020-07-20 RX ORDER — METHYLPREDNISOLONE ACETATE 40 MG/ML
40 INJECTION, SUSPENSION INTRA-ARTICULAR; INTRALESIONAL; INTRAMUSCULAR; SOFT TISSUE ONCE
Status: COMPLETED | OUTPATIENT
Start: 2020-07-20 | End: 2020-07-20

## 2020-07-20 RX ORDER — CEFDINIR 300 MG/1
CAPSULE ORAL
COMMUNITY
Start: 2020-07-13 | End: 2020-07-20

## 2020-07-20 RX ORDER — CYCLOBENZAPRINE HCL 10 MG
10 TABLET ORAL 2 TIMES DAILY PRN
Qty: 60 TABLET | Refills: 0 | Status: SHIPPED | OUTPATIENT
Start: 2020-07-20 | End: 2020-10-13 | Stop reason: SDUPTHER

## 2020-07-20 RX ADMIN — METHYLPREDNISOLONE ACETATE 40 MG: 40 INJECTION, SUSPENSION INTRA-ARTICULAR; INTRALESIONAL; INTRAMUSCULAR; SOFT TISSUE at 16:32

## 2020-07-20 NOTE — PROGRESS NOTES
Subjective   Brigid Coelho is a 55 y.o. female.   Has a history of low back pain/sciatica 2011    Back Pain   This is a recurrent (started 3 weeks ago) problem. The problem occurs constantly. The problem is unchanged. The pain is present in the lumbar spine. The quality of the pain is described as aching. Radiates to: right hip. The pain is at a severity of 7/10. The pain is moderate. The pain is worse during the day. The symptoms are aggravated by lying down, position, bending, sitting, standing and twisting. Stiffness is present in the morning. Pertinent negatives include no abdominal pain, bladder incontinence, chest pain, dysuria, fever, leg pain, numbness, tingling or weight loss. Risk factors include obesity and menopause. She has tried muscle relaxant and NSAIDs for the symptoms. The treatment provided mild relief.   Unable to take NSAIDS due to UC.  Pt would like to see chiro  Pt would like referred to rheum for generalized joint pain. Dad has RA. Has Ulcerative colitis and concerned about autoimmune cause of joint pain.     The following portions of the patient's history were reviewed and updated as appropriate: allergies, current medications, past family history, past medical history, past social history, past surgical history and problem list.  Vitals:    07/20/20 1448   BP: 114/68   Pulse: 97   Resp: 16   Temp: 97.3 °F (36.3 °C)   SpO2: 97%     Body mass index is 41.94 kg/m².  Review of Systems   Constitutional: Negative.  Negative for activity change, fatigue, fever, unexpected weight gain and unexpected weight loss.   HENT: Negative.  Negative for congestion, sneezing and sore throat.    Eyes: Negative.  Negative for blurred vision, double vision and visual disturbance.   Respiratory: Negative.  Negative for cough, chest tightness, shortness of breath and wheezing.    Cardiovascular: Negative.  Negative for chest pain, palpitations and leg swelling.   Gastrointestinal: Negative.  Negative  for abdominal distention, abdominal pain, blood in stool, constipation, diarrhea and nausea.   Endocrine: Negative.  Negative for cold intolerance and heat intolerance.   Genitourinary: Negative.  Negative for dysuria, frequency, urgency and urinary incontinence.   Musculoskeletal: Positive for back pain. Negative for arthralgias and myalgias.   Skin: Negative.  Negative for rash.   Allergic/Immunologic: Negative.    Neurological: Negative.  Negative for dizziness, tingling, syncope, numbness and memory problem.   Hematological: Negative.  Negative for adenopathy.   Psychiatric/Behavioral: Negative.  Negative for suicidal ideas and depressed mood. The patient is not nervous/anxious.    All other systems reviewed and are negative.      Objective   Physical Exam   Constitutional: She is oriented to person, place, and time. She appears well-developed and well-nourished. No distress.   HENT:   Right Ear: External ear normal.   Left Ear: External ear normal.   Nose: Nose normal.   Mouth/Throat: Oropharynx is clear and moist.   Eyes: Pupils are equal, round, and reactive to light. Conjunctivae and EOM are normal.   Neck: Normal range of motion. Neck supple. No thyromegaly present.   Cardiovascular: Normal rate, regular rhythm and normal heart sounds.   No murmur heard.  Pulmonary/Chest: Effort normal and breath sounds normal. No respiratory distress. She has no wheezes.   Abdominal: Soft. Bowel sounds are normal. She exhibits no distension and no mass. There is no tenderness. There is no rebound and no guarding. No hernia.   Musculoskeletal: Normal range of motion. She exhibits no edema or tenderness.   Lymphadenopathy:     She has no cervical adenopathy.   Neurological: She is alert and oriented to person, place, and time. She has normal reflexes.   Skin: Skin is warm and dry. No rash noted. She is not diaphoretic. No erythema. No pallor.   Psychiatric: She has a normal mood and affect. Her behavior is normal. Judgment  and thought content normal.   Nursing note and vitals reviewed.          Assessment/Plan   Brigid was seen today for back pain.    Diagnoses and all orders for this visit:    Lumbar radiculopathy  -     MRI Lumbar Spine Without Contrast; Future  -     methylPREDNISolone acetate (DEPO-medrol) injection 40 mg    Generalized joint pain  -     Ambulatory Referral to Rheumatology    Midline low back pain without sciatica, unspecified chronicity  -     cyclobenzaprine (FLEXERIL) 10 MG tablet; Take 1 tablet by mouth 2 (Two) Times a Day As Needed for Muscle Spasms.  -     diclofenac (VOLTAREN) 1 % gel gel; Apply 4 g topically to the appropriate area as directed 3 (Three) Times a Day.      Today I have spent a total of 25 minutes face to face with Brigid Coelho.  During that time, a total of 15 minutes was spent counseling on back pain and joint pain. Discussed meds, compliance, f/u.

## 2020-07-24 ENCOUNTER — TELEPHONE (OUTPATIENT)
Dept: FAMILY MEDICINE CLINIC | Facility: CLINIC | Age: 55
End: 2020-07-24

## 2020-08-01 ENCOUNTER — HOSPITAL ENCOUNTER (EMERGENCY)
Facility: HOSPITAL | Age: 55
Discharge: HOME | End: 2020-08-01
Attending: EMERGENCY MEDICINE | Admitting: EMERGENCY MEDICINE

## 2020-08-01 VITALS
DIASTOLIC BLOOD PRESSURE: 100 MMHG | HEART RATE: 91 BPM | TEMPERATURE: 97.5 F | OXYGEN SATURATION: 99 % | WEIGHT: 260 LBS | RESPIRATION RATE: 18 BRPM | SYSTOLIC BLOOD PRESSURE: 139 MMHG | BODY MASS INDEX: 40.81 KG/M2 | HEIGHT: 67 IN

## 2020-08-01 DIAGNOSIS — R25.2 MUSCLE CRAMPS: ICD-10-CM

## 2020-08-01 DIAGNOSIS — R74.8 ELEVATED CPK: ICD-10-CM

## 2020-08-01 DIAGNOSIS — M54.50 ACUTE BILATERAL LOW BACK PAIN WITHOUT SCIATICA: Primary | ICD-10-CM

## 2020-08-01 LAB
ANION GAP SERPL CALCULATED.3IONS-SCNC: 10 MMOL/L (ref 5–15)
BASOPHILS # BLD AUTO: 0.08 10*3/MM3 (ref 0–0.2)
BASOPHILS NFR BLD AUTO: 0.8 % (ref 0–1.5)
BUN SERPL-MCNC: 12 MG/DL (ref 6–20)
BUN/CREAT SERPL: 13.6 (ref 7–25)
CALCIUM SPEC-SCNC: 8.9 MG/DL (ref 8.6–10.5)
CHLORIDE SERPL-SCNC: 104 MMOL/L (ref 98–107)
CK SERPL-CCNC: 454 U/L (ref 20–180)
CO2 SERPL-SCNC: 26 MMOL/L (ref 22–29)
CREAT SERPL-MCNC: 0.88 MG/DL (ref 0.57–1)
DEPRECATED RDW RBC AUTO: 51.1 FL (ref 37–54)
EOSINOPHIL # BLD AUTO: 0.15 10*3/MM3 (ref 0–0.4)
EOSINOPHIL NFR BLD AUTO: 1.5 % (ref 0.3–6.2)
ERYTHROCYTE [DISTWIDTH] IN BLOOD BY AUTOMATED COUNT: 17.8 % (ref 12.3–15.4)
GFR SERPL CREATININE-BSD FRML MDRD: 66 ML/MIN/1.73
GLUCOSE SERPL-MCNC: 116 MG/DL (ref 65–99)
HCT VFR BLD AUTO: 39.8 % (ref 34–46.6)
HGB BLD-MCNC: 12.1 G/DL (ref 12–15.9)
IMM GRANULOCYTES # BLD AUTO: 0.04 10*3/MM3 (ref 0–0.05)
IMM GRANULOCYTES NFR BLD AUTO: 0.4 % (ref 0–0.5)
LYMPHOCYTES # BLD AUTO: 1.7 10*3/MM3 (ref 0.7–3.1)
LYMPHOCYTES NFR BLD AUTO: 16.9 % (ref 19.6–45.3)
MCH RBC QN AUTO: 24.2 PG (ref 26.6–33)
MCHC RBC AUTO-ENTMCNC: 30.4 G/DL (ref 31.5–35.7)
MCV RBC AUTO: 79.6 FL (ref 79–97)
MONOCYTES # BLD AUTO: 0.59 10*3/MM3 (ref 0.1–0.9)
MONOCYTES NFR BLD AUTO: 5.9 % (ref 5–12)
NEUTROPHILS NFR BLD AUTO: 7.51 10*3/MM3 (ref 1.7–7)
NEUTROPHILS NFR BLD AUTO: 74.5 % (ref 42.7–76)
NRBC BLD AUTO-RTO: 0 /100 WBC (ref 0–0.2)
PLATELET # BLD AUTO: 363 10*3/MM3 (ref 140–450)
PMV BLD AUTO: 10.1 FL (ref 6–12)
POTASSIUM SERPL-SCNC: 4.2 MMOL/L (ref 3.5–5.2)
RBC # BLD AUTO: 5 10*6/MM3 (ref 3.77–5.28)
SODIUM SERPL-SCNC: 140 MMOL/L (ref 136–145)
TSH SERPL DL<=0.05 MIU/L-ACNC: 3.06 UIU/ML (ref 0.27–4.2)
WBC # BLD AUTO: 10.07 10*3/MM3 (ref 3.4–10.8)

## 2020-08-01 PROCEDURE — 85025 COMPLETE CBC W/AUTO DIFF WBC: CPT | Performed by: EMERGENCY MEDICINE

## 2020-08-01 PROCEDURE — 80048 BASIC METABOLIC PNL TOTAL CA: CPT | Performed by: EMERGENCY MEDICINE

## 2020-08-01 PROCEDURE — 82550 ASSAY OF CK (CPK): CPT | Performed by: EMERGENCY MEDICINE

## 2020-08-01 PROCEDURE — 84443 ASSAY THYROID STIM HORMONE: CPT | Performed by: EMERGENCY MEDICINE

## 2020-08-01 PROCEDURE — 99283 EMERGENCY DEPT VISIT LOW MDM: CPT

## 2020-08-03 ENCOUNTER — EPISODE CHANGES (OUTPATIENT)
Dept: CASE MANAGEMENT | Facility: OTHER | Age: 55
End: 2020-08-03

## 2020-08-16 ENCOUNTER — APPOINTMENT (OUTPATIENT)
Dept: PREADMISSION TESTING | Facility: HOSPITAL | Age: 55
End: 2020-08-16

## 2020-08-16 PROCEDURE — U0004 COV-19 TEST NON-CDC HGH THRU: HCPCS

## 2020-08-16 PROCEDURE — C9803 HOPD COVID-19 SPEC COLLECT: HCPCS

## 2020-08-16 PROCEDURE — U0002 COVID-19 LAB TEST NON-CDC: HCPCS

## 2020-08-17 LAB
REF LAB TEST METHOD: NORMAL
SARS-COV-2 RNA RESP QL NAA+PROBE: NOT DETECTED

## 2020-08-17 RX ORDER — MIDAZOLAM HYDROCHLORIDE 1 MG/ML
2 INJECTION INTRAMUSCULAR; INTRAVENOUS ONCE
Status: DISCONTINUED | OUTPATIENT
Start: 2020-08-18 | End: 2020-08-19 | Stop reason: HOSPADM

## 2020-08-18 ENCOUNTER — HOSPITAL ENCOUNTER (OUTPATIENT)
Dept: MRI IMAGING | Facility: HOSPITAL | Age: 55
Discharge: HOME | End: 2020-08-18

## 2020-08-18 VITALS
DIASTOLIC BLOOD PRESSURE: 44 MMHG | SYSTOLIC BLOOD PRESSURE: 108 MMHG | OXYGEN SATURATION: 98 % | RESPIRATION RATE: 16 BRPM

## 2020-08-18 DIAGNOSIS — M54.16 LUMBAR RADICULOPATHY: ICD-10-CM

## 2020-08-18 RX ORDER — SODIUM CHLORIDE 0.9 % (FLUSH) 0.9 %
10 SYRINGE (ML) INJECTION AS NEEDED
Status: DISCONTINUED | OUTPATIENT
Start: 2020-08-18 | End: 2020-08-19 | Stop reason: HOSPADM

## 2020-08-18 RX ORDER — SODIUM CHLORIDE 0.9 % (FLUSH) 0.9 %
3 SYRINGE (ML) INJECTION EVERY 12 HOURS SCHEDULED
Status: DISCONTINUED | OUTPATIENT
Start: 2020-08-18 | End: 2020-08-19 | Stop reason: HOSPADM

## 2020-08-18 NOTE — NURSING NOTE
Pt arrived late for her appointment for MRI with sedation today. While working her up it was discovered that she had a package of peanut butter crackers prior to her arrival.  After checking with Dr. Luna it was determined that she would need to be rescheduled for another date when she was NPO for her sedation.  Her COVID test was performed on  and will  tomorrow therefore pt requests that she be reschedule for tomorrow, early morning if possible due to her tendency to drop her blood sugar. After speaking with MRI and radiology nurses, it was determined that she could be rescheduled tomorrow at 12:00 as this was the only time slot available.  Explained this to patient who was in agreement with proceeding with mri/sedation tomorrow at 12:00 with 10:00 arrival time.

## 2020-08-19 ENCOUNTER — HOSPITAL ENCOUNTER (OUTPATIENT)
Dept: MRI IMAGING | Facility: HOSPITAL | Age: 55
Discharge: HOME | End: 2020-08-19
Admitting: RADIOLOGY

## 2020-08-19 VITALS
RESPIRATION RATE: 20 BRPM | WEIGHT: 260 LBS | OXYGEN SATURATION: 100 % | BODY MASS INDEX: 40.72 KG/M2 | HEART RATE: 80 BPM | DIASTOLIC BLOOD PRESSURE: 65 MMHG | SYSTOLIC BLOOD PRESSURE: 105 MMHG | TEMPERATURE: 96.2 F

## 2020-08-19 PROCEDURE — 25010000002 HYDROMORPHONE 1 MG/ML SOLUTION: Performed by: RADIOLOGY

## 2020-08-19 PROCEDURE — 72148 MRI LUMBAR SPINE W/O DYE: CPT

## 2020-08-19 PROCEDURE — 25010000002 MIDAZOLAM PER 1 MG: Performed by: RADIOLOGY

## 2020-08-19 RX ORDER — MIDAZOLAM HYDROCHLORIDE 1 MG/ML
2 INJECTION INTRAMUSCULAR; INTRAVENOUS
Status: COMPLETED | OUTPATIENT
Start: 2020-08-19 | End: 2020-08-19

## 2020-08-19 RX ADMIN — MIDAZOLAM 2 MG: 1 INJECTION INTRAMUSCULAR; INTRAVENOUS at 13:00

## 2020-08-19 RX ADMIN — HYDROMORPHONE HYDROCHLORIDE 0.5 MG: 1 INJECTION, SOLUTION INTRAMUSCULAR; INTRAVENOUS; SUBCUTANEOUS at 12:58

## 2020-08-21 ENCOUNTER — EPISODE CHANGES (OUTPATIENT)
Dept: CASE MANAGEMENT | Facility: OTHER | Age: 55
End: 2020-08-21

## 2020-08-24 DIAGNOSIS — M48.061 SPINAL STENOSIS OF LUMBAR REGION WITHOUT NEUROGENIC CLAUDICATION: Primary | ICD-10-CM

## 2020-08-25 NOTE — PROGRESS NOTES
Pt has been notified about his results    Subjective   Brigid Coelho is a 54 y.o. female.   Has recently had UTI and given antibiotics at [facility]. Subsequently went to ER for chest pain and had CT scan which showed a nodule.  Has had cough and congestion start since end of July. Has an appointment to see pulmonary.  Shortness of Breath   This is a new problem. The current episode started 1 to 4 weeks ago. The problem occurs intermittently. The problem has been unchanged. Associated symptoms include a sore throat and wheezing. Pertinent negatives include no abdominal pain, chest pain, fever, leg swelling or rash. Nothing aggravates the symptoms. She has tried beta agonist inhalers (singulari and Claritin) for the symptoms. The treatment provided mild relief.        The following portions of the patient's history were reviewed and updated as appropriate: allergies, current medications, past family history, past medical history, past social history, past surgical history and problem list.  Vitals:    08/10/19 1124   BP: 130/80   Pulse: 74   Resp: 18   Temp: 97.1 °F (36.2 °C)   SpO2: 97%     Body mass index is 42.13 kg/m².  Review of Systems   Constitutional: Negative.  Negative for activity change, fatigue, fever, unexpected weight gain and unexpected weight loss.   HENT: Positive for sore throat. Negative for congestion and sneezing.    Eyes: Negative.  Negative for blurred vision, double vision and visual disturbance.   Respiratory: Positive for shortness of breath and wheezing. Negative for cough and chest tightness.    Cardiovascular: Negative.  Negative for chest pain, palpitations and leg swelling.   Gastrointestinal: Negative.  Negative for abdominal distention, abdominal pain, blood in stool, constipation, diarrhea and nausea.   Endocrine: Negative.  Negative for cold intolerance and heat intolerance.   Genitourinary: Negative.  Negative for urinary incontinence, dysuria, frequency and urgency.   Musculoskeletal: Negative.  Negative for arthralgias  and myalgias.   Skin: Negative.  Negative for rash.   Allergic/Immunologic: Negative.    Neurological: Negative.  Negative for dizziness, syncope, numbness and memory problem.   Hematological: Negative.  Negative for adenopathy.   Psychiatric/Behavioral: Negative.  Negative for suicidal ideas and depressed mood. The patient is not nervous/anxious.    All other systems reviewed and are negative.      Objective   Physical Exam   Constitutional: She is oriented to person, place, and time. She appears well-developed and well-nourished. No distress.   HENT:   Right Ear: External ear normal.   Left Ear: External ear normal.   Nose: Nose normal.   Mouth/Throat: Oropharynx is clear and moist.   Eyes: Conjunctivae and EOM are normal. Pupils are equal, round, and reactive to light.   Neck: Normal range of motion. Neck supple. No thyromegaly present.   Cardiovascular: Normal rate, regular rhythm and normal heart sounds.   No murmur heard.  Pulmonary/Chest: Effort normal and breath sounds normal. No respiratory distress. She has no wheezes.   Abdominal: Soft. Bowel sounds are normal. She exhibits no distension and no mass. There is no tenderness. There is no rebound and no guarding. No hernia.   Musculoskeletal: Normal range of motion. She exhibits no edema or tenderness.   Lymphadenopathy:     She has no cervical adenopathy.   Neurological: She is alert and oriented to person, place, and time. She has normal reflexes.   Skin: Skin is warm and dry. No rash noted. She is not diaphoretic. No erythema. No pallor.   Psychiatric: She has a normal mood and affect. Her behavior is normal. Judgment and thought content normal.   Nursing note and vitals reviewed.          Assessment/Plan   Brigid was seen today for shortness of breath.    Diagnoses and all orders for this visit:    Acute URI    Bronchitis    Other orders  -     doxycycline (VIBRAMYCIN) 100 MG capsule; Take 1 capsule by mouth 2 (Two) Times a Day.      To to use  albuterol 4 times daily for the next few days.  She will return to clinic if symptoms worsen or persist.  Has appointment to see pulmonary.

## 2020-08-26 ENCOUNTER — TELEPHONE (OUTPATIENT)
Dept: FAMILY MEDICINE CLINIC | Facility: CLINIC | Age: 55
End: 2020-08-26

## 2020-08-26 NOTE — TELEPHONE ENCOUNTER
PATIENT STATES THAT DR BAL WITH [facility] STATES THE PRESCRIPTION FOR [facility] WILL NOT WORK FOR INSURANCE PURPOSES AND THAT A REFERRAL IS REQUIRED.  PATIENT IS REQUESTING A REFERRAL TO   [facility]     [facility]  [facility] [address] [address]  [place] [address]  Phone: [phone]          PLEASE CALL PATIENT AND CONFIRM [phone]

## 2020-08-26 NOTE — TELEPHONE ENCOUNTER
Patient advised of results and the need for neurology referral. Advised this has been placed and someone will contact her soon.

## 2020-08-26 NOTE — TELEPHONE ENCOUNTER
PATIENT IS REQUESTING A CALL BACK ABOUT HER MRI TEST RESULTS FROM 8-18-20  SHE ALSO WANTS TO CHECK ON A REFERRAL       PATIENT CALL BACK [phone]

## 2020-08-27 DIAGNOSIS — M54.6 ACUTE BILATERAL THORACIC BACK PAIN: Primary | ICD-10-CM

## 2020-08-31 ENCOUNTER — TELEPHONE (OUTPATIENT)
Dept: ENDOCRINOLOGY | Facility: CLINIC | Age: 55
End: 2020-08-31

## 2020-09-02 ENCOUNTER — OFFICE VISIT (OUTPATIENT)
Dept: FAMILY MEDICINE CLINIC | Facility: CLINIC | Age: 55
End: 2020-09-02

## 2020-09-02 VITALS
HEIGHT: 67 IN | RESPIRATION RATE: 16 BRPM | WEIGHT: 268 LBS | HEART RATE: 72 BPM | DIASTOLIC BLOOD PRESSURE: 74 MMHG | TEMPERATURE: 97.3 F | OXYGEN SATURATION: 98 % | SYSTOLIC BLOOD PRESSURE: 141 MMHG | BODY MASS INDEX: 42.06 KG/M2

## 2020-09-02 DIAGNOSIS — M48.061 SPINAL STENOSIS OF LUMBAR REGION WITHOUT NEUROGENIC CLAUDICATION: Primary | ICD-10-CM

## 2020-09-02 DIAGNOSIS — M54.50 MIDLINE LOW BACK PAIN WITHOUT SCIATICA, UNSPECIFIED CHRONICITY: ICD-10-CM

## 2020-09-02 DIAGNOSIS — F41.9 ANXIETY: ICD-10-CM

## 2020-09-02 PROCEDURE — 99213 OFFICE O/P EST LOW 20 MIN: CPT | Performed by: FAMILY MEDICINE

## 2020-09-02 RX ORDER — ALPRAZOLAM 0.5 MG/1
0.5 TABLET ORAL 2 TIMES DAILY PRN
Qty: 30 TABLET | Refills: 0 | Status: SHIPPED | OUTPATIENT
Start: 2020-09-02 | End: 2020-10-13 | Stop reason: SDUPTHER

## 2020-09-02 NOTE — PROGRESS NOTES
Subjective   Brigid Coelho is a 55 y.o. female.   C/O back pain. Has had recent MRI which showed lumbar stenosis. Has not seen neurosurgery yet. Has had trouble controlling back pain. Needs rf of Diclofenac gel. Uses Flexeril prn.      Has been taking Ibuprofen and had some GI bleeding from ulcerative colitis. Has had upper and lower endoscopy and found to have colon, gastric, and esophageal polyps.    Has recently seen endo regarding thyroid.    Needs rf of meds. Uses xanax prn for anxiety.  Anxiety   Presents for follow-up (on zoloft with relief.) visit. Symptoms include nervous/anxious behavior. Patient reports no chest pain, depressed mood, dizziness, excessive worry, insomnia, irritability, malaise, muscle tension, nausea, palpitations, panic, restlessness, shortness of breath or suicidal ideas. Symptoms occur rarely. The patient sleeps 8 hours per night. The quality of sleep is good. Nighttime awakenings: occasional.     Compliance with medications is %.        C.  The following portions of the patient's history were reviewed and updated as appropriate: allergies, current medications, past family history, past medical history, past social history, past surgical history and problem list.  Vitals:    09/02/20 1316   BP: 141/74   Pulse: 72   Resp: 16   Temp: 97.3 °F (36.3 °C)   SpO2: 98%     Body mass index is 41.97 kg/m².  Review of Systems   Constitutional: Negative.  Negative for activity change, fatigue, fever, irritability, unexpected weight gain and unexpected weight loss.   HENT: Negative.  Negative for congestion, sneezing and sore throat.    Eyes: Negative.  Negative for blurred vision, double vision and visual disturbance.   Respiratory: Negative.  Negative for cough, chest tightness, shortness of breath and wheezing.    Cardiovascular: Negative.  Negative for chest pain, palpitations and leg swelling.   Gastrointestinal: Negative.  Negative for abdominal distention, abdominal pain,  blood in stool, constipation, diarrhea and nausea.   Endocrine: Negative.  Negative for cold intolerance and heat intolerance.   Genitourinary: Negative.  Negative for dysuria, frequency, urgency and urinary incontinence.   Musculoskeletal: Positive for back pain. Negative for arthralgias and myalgias.   Skin: Negative.  Negative for rash.   Allergic/Immunologic: Negative.    Neurological: Negative.  Negative for dizziness, syncope, numbness and memory problem.   Hematological: Negative.  Negative for adenopathy.   Psychiatric/Behavioral: Negative for suicidal ideas and depressed mood. The patient is nervous/anxious. The patient does not have insomnia.    All other systems reviewed and are negative.      Objective   Physical Exam   Constitutional: She is oriented to person, place, and time. She appears well-developed and well-nourished. No distress.   HENT:   Right Ear: External ear normal.   Left Ear: External ear normal.   Nose: Nose normal.   Mouth/Throat: Oropharynx is clear and moist.   Eyes: Pupils are equal, round, and reactive to light. Conjunctivae and EOM are normal.   Neck: Normal range of motion. Neck supple. No thyromegaly present.   Cardiovascular: Normal rate, regular rhythm and normal heart sounds.   No murmur heard.  Pulmonary/Chest: Effort normal and breath sounds normal. No respiratory distress. She has no wheezes.   Abdominal: Soft. Bowel sounds are normal. She exhibits no distension and no mass. There is no tenderness. There is no rebound and no guarding. No hernia.   Musculoskeletal: Normal range of motion. She exhibits no edema or tenderness.   Lymphadenopathy:     She has no cervical adenopathy.   Neurological: She is alert and oriented to person, place, and time. She has normal reflexes.   Skin: Skin is warm and dry. No rash noted. She is not diaphoretic. No erythema. No pallor.   Psychiatric: She has a normal mood and affect. Her behavior is normal. Judgment and thought content normal.    Nursing note and vitals reviewed.          Assessment/Plan   Brigid was seen today for anxiety and colonoscopy.    Diagnoses and all orders for this visit:    Spinal stenosis of lumbar region without neurogenic claudication    Anxiety  -     ALPRAZolam (Xanax) 0.5 MG tablet; Take 1 tablet by mouth 2 (Two) Times a Day As Needed for Anxiety.    Midline low back pain without sciatica, unspecified chronicity  -     diclofenac (VOLTAREN) 1 % gel gel; Apply 4 g topically to the appropriate area as directed 3 (Three) Times a Day.    Patient to see neurosurgery.

## 2020-09-21 ENCOUNTER — OFFICE VISIT (OUTPATIENT)
Dept: NEUROSURGERY | Facility: CLINIC | Age: 55
End: 2020-09-21

## 2020-09-21 VITALS — WEIGHT: 270 LBS | HEIGHT: 67 IN | TEMPERATURE: 97.5 F | BODY MASS INDEX: 42.38 KG/M2

## 2020-09-21 DIAGNOSIS — E66.01 CLASS 3 SEVERE OBESITY DUE TO EXCESS CALORIES WITH SERIOUS COMORBIDITY AND BODY MASS INDEX (BMI) OF 40.0 TO 44.9 IN ADULT (HCC): Primary | ICD-10-CM

## 2020-09-21 DIAGNOSIS — M54.50 CHRONIC BILATERAL LOW BACK PAIN, UNSPECIFIED WHETHER SCIATICA PRESENT: ICD-10-CM

## 2020-09-21 DIAGNOSIS — G89.29 CHRONIC BILATERAL LOW BACK PAIN, UNSPECIFIED WHETHER SCIATICA PRESENT: ICD-10-CM

## 2020-09-21 DIAGNOSIS — M51.36 DDD (DEGENERATIVE DISC DISEASE), LUMBAR: ICD-10-CM

## 2020-09-21 PROCEDURE — 99203 OFFICE O/P NEW LOW 30 MIN: CPT | Performed by: NEUROLOGICAL SURGERY

## 2020-09-21 RX ORDER — SENNOSIDES 8.6 MG
500 CAPSULE ORAL EVERY 8 HOURS PRN
COMMUNITY

## 2020-09-21 NOTE — PROGRESS NOTES
Subjective     Chief Complaint: Back pain    Patient ID: Brigid Coelho is a 55 y.o. female seen for consultation today at the request of  Debby Kennedy DO    Back Pain  This is a recurrent problem. The current episode started more than 1 month ago. The problem occurs constantly. The problem is unchanged. The pain is present in the lumbar spine. The quality of the pain is described as aching. The pain is at a severity of 2/10. The pain is mild. The pain is worse during the day. The symptoms are aggravated by sitting. Associated symptoms include pelvic pain. Pertinent negatives include no abdominal pain, chest pain, dysuria, fever, headaches, numbness or weakness. Risk factors include obesity and lack of exercise. She has tried home exercises, heat and analgesics for the symptoms. The treatment provided mild relief.       This is a very pleasant 55-year-old woman who presents to my office with a 3-month history of low back pain.  She has not tried physical therapy or pain management.  Her risk factors for low back pain are morbid obesity and a sedentary lifestyle.  She denies any radiating pain into her legs or buttocks.  She denies any bladder/bowel incontinence.    Her pain is exacerbated by sitting and lying on her side.  Her pain is alleviated by standing up, moving, and walking.  She takes Tylenol for her pain which helps somewhat.  She cannot take anti-inflammatories due to her ulcerative colitis.  Today she rates her pain at a 2/10.    Her medical comorbidities include morbid obesity, and ulcerative colitis.    The following portions of the patient's history were reviewed and updated as appropriate: allergies, current medications, past family history, past medical history, past social history, past surgical history and problem list.    Family history:   Family History   Problem Relation Age of Onset   • Hyperlipidemia Mother    • CHAD disease Mother    • Cancer Mother    • Diabetes Mother    • Heart  disease Mother    • Rheum arthritis Father    • Hyperlipidemia Father    • Heart attack Father 72   • Ovarian cancer Maternal Aunt    • Diabetes Paternal Grandmother    • Hypertension Paternal Grandmother    • Obesity Paternal Grandmother    • Colon cancer Other    • Arthritis Other    • Cancer Other         uncle; liver, lung    • Thyroid disease Cousin    • Thyroid cancer Cousin    • CHAD disease Daughter    • Obesity Brother    • Sleep apnea Brother        Social history:   Social History     Socioeconomic History   • Marital status:      Spouse name: Not on file   • Number of children: Not on file   • Years of education: Not on file   • Highest education level: Not on file   Tobacco Use   • Smoking status: Never Smoker   • Smokeless tobacco: Never Used   Substance and Sexual Activity   • Alcohol use: No   • Drug use: No   • Sexual activity: Defer   Social History Narrative    Single    Caffeine: 3 sodas daily       Review of Systems   Constitutional: Negative for activity change, appetite change, chills, diaphoresis, fatigue, fever and unexpected weight change.   HENT: Positive for congestion, dental problem and postnasal drip. Negative for drooling, ear discharge, ear pain, facial swelling, hearing loss, mouth sores, nosebleeds, rhinorrhea, sinus pressure, sinus pain, sneezing, sore throat, tinnitus, trouble swallowing and voice change.    Eyes: Negative for photophobia, pain, discharge, redness, itching and visual disturbance.   Respiratory: Negative for apnea, cough, choking, chest tightness, shortness of breath, wheezing and stridor.    Cardiovascular: Negative for chest pain, palpitations and leg swelling.   Gastrointestinal: Positive for anal bleeding and blood in stool. Negative for abdominal distention, abdominal pain, constipation, diarrhea, nausea, rectal pain and vomiting.   Endocrine: Negative for cold intolerance, heat intolerance, polydipsia, polyphagia and polyuria.   Genitourinary:  "Positive for frequency and pelvic pain. Negative for decreased urine volume, difficulty urinating, dyspareunia, dysuria, enuresis, flank pain, genital sores, hematuria, menstrual problem, urgency, vaginal bleeding, vaginal discharge and vaginal pain.   Musculoskeletal: Positive for back pain. Negative for arthralgias, gait problem, joint swelling, myalgias, neck pain and neck stiffness.   Skin: Negative for color change, pallor, rash and wound.   Allergic/Immunologic: Positive for environmental allergies, food allergies and immunocompromised state.   Neurological: Negative for dizziness, tremors, seizures, syncope, facial asymmetry, speech difficulty, weakness, light-headedness, numbness and headaches.   Hematological: Negative for adenopathy. Does not bruise/bleed easily.   Psychiatric/Behavioral: Negative for agitation, behavioral problems, confusion, decreased concentration, dysphoric mood, hallucinations, self-injury, sleep disturbance and suicidal ideas. The patient is nervous/anxious. The patient is not hyperactive.        Objective   Temperature 97.5 °F (36.4 °C), height 170.2 cm (67\"), weight 122 kg (270 lb).  Body mass index is 42.29 kg/m².    Physical Exam  Vitals signs and nursing note reviewed.   Constitutional:       Appearance: She is well-developed. She is not toxic-appearing.   HENT:      Head: Normocephalic and atraumatic.      Right Ear: Hearing normal.      Left Ear: Hearing normal.      Nose: Nose normal.   Eyes:      General: Lids are normal.      Conjunctiva/sclera: Conjunctivae normal.      Pupils: Pupils are equal, round, and reactive to light.   Neck:      Musculoskeletal: Normal range of motion.      Vascular: No JVD.   Cardiovascular:      Rate and Rhythm: Normal rate and regular rhythm.      Pulses:           Radial pulses are 2+ on the right side and 2+ on the left side.   Pulmonary:      Effort: Pulmonary effort is normal. No respiratory distress.      Breath sounds: No stridor. No " wheezing.   Musculoskeletal:      Right hip: She exhibits normal range of motion.      Left hip: She exhibits normal range of motion.      Lumbar back: She exhibits decreased range of motion and pain.   Skin:     General: Skin is warm and dry.      Findings: No erythema or rash.   Neurological:      Mental Status: She is alert and oriented to person, place, and time.      GCS: GCS eye subscore is 4. GCS verbal subscore is 5. GCS motor subscore is 6.      Cranial Nerves: No cranial nerve deficit.      Motor: No abnormal muscle tone.      Coordination: Coordination is intact. Romberg sign negative.      Gait: Gait abnormal.      Deep Tendon Reflexes: Reflexes abnormal.      Reflex Scores:       Patellar reflexes are 1+ on the right side and 1+ on the left side.       Achilles reflexes are 0 on the right side and 0 on the left side.     Comments: Subtle weakness in her left gastrocnemius with toe raised.  Heel raise gait performed unremarkably.   Psychiatric:         Behavior: Behavior normal.         Thought Content: Thought content normal.         Judgment: Judgment normal.           Assessment/Plan     Independent Review of Radiographic Studies:      Available for my review is a MRI of the lumbar spine that was performed on August 19, 2020.  A comparison study from July 2011 is also available for my review. Which causes severe central canal stenosis.. There is severe degenerative disc disease with broad-based disc osteophyte complexes at L4-5 and L5-S1.  These are superimposed on a congenitally narrowed spinal canal.  There has been progression compared to the study from 2011.  There is moderate central canal stenosis at L4-5 and L5-S1 with severe bilateral lateral recess stenosis.    Medical Decision Making:      This is a very pleasant 55-year-old woman with a history of intermittent, recurrent low back pain.  She is really not having much in the way of sciatica, however it does appear that she might have some  left calf weakness.  This could potentially be due to S1 nerve root impingement, however she is not complaining of much in the way of pain or sensory loss in that leg at this point.    I think she is an excellent candidate for physical therapy and weight loss.  I referred her to nutrition services and physical therapy.  I reviewed the signs and symptoms of lumbosacral radiculopathy and cauda equina with her.  I directed her to contact my office with new or worsening symptoms.  I would like to follow-up with her in about 3 months, or sooner if clinically indicated for her low back pain.    Brigid was seen today for back pain.    Diagnoses and all orders for this visit:    Class 3 severe obesity due to excess calories with serious comorbidity and body mass index (BMI) of 40.0 to 44.9 in adult (CMS/[facility])  -     Ambulatory Referral to Nutrition Services    DDD (degenerative disc disease), lumbar  -     Ambulatory Referral to Physical Therapy Evaluate and treat    Chronic bilateral low back pain, unspecified whether sciatica present  -     Ambulatory Referral to Physical Therapy Evaluate and treat        Return in about 3 months (around 12/21/2020), or if symptoms worsen or fail to improve.           This document signed by JEAN-PAUL Love MD September 21, 2020 14:53 EDT

## 2020-09-22 ENCOUNTER — TELEPHONE (OUTPATIENT)
Dept: NEUROSURGERY | Facility: CLINIC | Age: 55
End: 2020-09-22

## 2020-09-22 DIAGNOSIS — E66.01 CLASS 3 SEVERE OBESITY DUE TO EXCESS CALORIES WITH SERIOUS COMORBIDITY AND BODY MASS INDEX (BMI) OF 40.0 TO 44.9 IN ADULT (HCC): ICD-10-CM

## 2020-09-22 DIAGNOSIS — G89.29 CHRONIC BILATERAL LOW BACK PAIN, UNSPECIFIED WHETHER SCIATICA PRESENT: ICD-10-CM

## 2020-09-22 DIAGNOSIS — M51.36 DDD (DEGENERATIVE DISC DISEASE), LUMBAR: Primary | ICD-10-CM

## 2020-09-22 DIAGNOSIS — M54.50 CHRONIC BILATERAL LOW BACK PAIN, UNSPECIFIED WHETHER SCIATICA PRESENT: ICD-10-CM

## 2020-09-22 NOTE — TELEPHONE ENCOUNTER
PATIENT CALLED TO SEE IF WHILE SHE IS WAITING TO SEE PHYSICAL THERAPY IF SHE CAN BE REFERRED TO [facility] FOR TREATMENT.  SHE DOES NOT WANT TO CANCEL THE PHYSICAL THERAPY BUT WANTS SOMETHING TO RELIEVE THE PAIN WHILE SHE WAITS    [phone]

## 2020-09-22 NOTE — TELEPHONE ENCOUNTER
Called pt and notified.  Chriopractic order placed and faxed to Ritchie's office. Pt notified via detailed VM.

## 2020-09-22 NOTE — TELEPHONE ENCOUNTER
Patient called and said she initially requested to be referred to [facility], but they do not accept her insurance. She is requesting to be referred to [facility] PT Chandra Tinsley. Thanks.

## 2020-09-30 ENCOUNTER — TELEPHONE (OUTPATIENT)
Dept: NEUROSURGERY | Facility: CLINIC | Age: 55
End: 2020-09-30

## 2020-09-30 DIAGNOSIS — G89.29 CHRONIC BILATERAL LOW BACK PAIN, UNSPECIFIED WHETHER SCIATICA PRESENT: Primary | ICD-10-CM

## 2020-09-30 DIAGNOSIS — M54.50 CHRONIC BILATERAL LOW BACK PAIN, UNSPECIFIED WHETHER SCIATICA PRESENT: Primary | ICD-10-CM

## 2020-10-05 RX ORDER — SERTRALINE HYDROCHLORIDE 25 MG/1
TABLET, FILM COATED ORAL
Qty: 270 TABLET | Refills: 0 | Status: SHIPPED | OUTPATIENT
Start: 2020-10-05 | End: 2020-11-17

## 2020-10-07 ENCOUNTER — TREATMENT (OUTPATIENT)
Dept: PHYSICAL THERAPY | Facility: CLINIC | Age: 55
End: 2020-10-07

## 2020-10-07 DIAGNOSIS — G89.29 CHRONIC BILATERAL LOW BACK PAIN, UNSPECIFIED WHETHER SCIATICA PRESENT: Primary | ICD-10-CM

## 2020-10-07 DIAGNOSIS — M54.50 CHRONIC BILATERAL LOW BACK PAIN, UNSPECIFIED WHETHER SCIATICA PRESENT: Primary | ICD-10-CM

## 2020-10-07 PROCEDURE — 97110 THERAPEUTIC EXERCISES: CPT | Performed by: PHYSICAL THERAPIST

## 2020-10-07 PROCEDURE — 97162 PT EVAL MOD COMPLEX 30 MIN: CPT | Performed by: PHYSICAL THERAPIST

## 2020-10-07 NOTE — PROGRESS NOTES
"  Physical Therapy Initial Evaluation and Plan of Care      Patient: Brigid Coelho   : 1965  Diagnosis/ICD-10 Code:  The encounter diagnosis was Chronic bilateral low back pain, unspecified whether sciatica present.   Referring practitioner: Marlon Love*  Date of Initial Visit: Type: THERAPY  Noted: 10/7/2020  Today's Date: 10/7/2020  Patient seen for 1 sessions         Brigid Coelho reports:  Low back pain, intermittent symptoms in either leg  Subjective Questionnaire: Oswestry: 2550=50%    Subjective Evaluation    History of Present Illness  Onset date: 2020.  Mechanism of injury: Pt reports remote history of back pain in .  This episode began in 2020.  She is not sure what may have contributed to symptoms onset, but she has done a lot of lifting, moving to different home.  She also lifts her grandchildren at least 2 days per week.  She describes morning stiffness, intermittent grabbing/spasms.  She feels better when she is up and moving.    Subjective comment: Low back pain, intermittent pain in both legs  Patient Occupation: Pt does not work.  She used to work as a massage therapist. Pain  Current pain ratin  At best pain ratin  At worst pain rating: 10  Location: both sides of low back, R superior gluteal pain, R heel pain, \"1 day\" of left LE pain.  Quality: tight (grabbing, spasms)  Relieving factors: heat and change in position  Aggravating factors: lifting (sitting)    Hand dominance: right    Diagnostic Tests  MRI studies: abnormal    Treatments  Previous treatment: chiropractic  Current treatment: chiropractic  Patient Goals  Patient goals for therapy: decreased pain, independence with ADLs/IADLs and return to sport/leisure activities           Treatment  Exercise 1  Exercise Name 1: Initial HEP education provided and practice performed in clinic today.         Functional Testing  Functional Tests Options: Modified Barrera(=50%) "   Objective        Special Questions      Additional Special Questions  Sleeps up to 6 hours.  Has stiffness and difficult mobility upon waking.      Postural Observations  Seated posture: poor  Standing posture: fair  Correction of posture: makes symptoms better        Neurological Testing     Sensation     Lumbar   Left   Intact: light touch    Right   Intact: light touch    Reflexes   Left   Patellar (L4): trace (1+)  Achilles (S1): absent (0)    Right   Patellar (L4): trace (1+)  Achilles (S1): absent (0)    Additional Neurological Details  B calf weakness, L weaker than R.    Active Range of Motion     Additional Active Range of Motion Details  Flexion-50%-increases back and R gluteal pain  Extension-improves with repetition-centralizes pain to low back  SGIS restricted bilaterally    Supine flexion-increases pain  Prone extension centralizes and decreases pain    Strength/Myotome Testing     Lumbar   Left   Toe walk: abnormal    Right   Toe walk: abnormal    Left Hip   Planes of Motion   Flexion: 4+    Right Hip   Planes of Motion   Flexion: 4+    Left Knee   Flexion: 4+  Extension: 5    Right Knee   Flexion: 4  Extension: 5    Left Ankle/Foot   Dorsiflexion: 5  Plantar flexion: 3-  Inversion: 5  Eversion: 5  Great toe extension: 5    Right Ankle/Foot   Dorsiflexion: 5  Plantar flexion: 3-  Inversion: 5  Eversion: 5  Great toe extension: 5    Tests       Thoracic   Negative slump.     Lumbar     Left   Negative passive SLR.     Right   Negative passive SLR.           Assessment & Plan     Assessment  Impairments: abnormal or restricted ROM, impaired physical strength, lacks appropriate home exercise program and pain with function  Assessment details: Pt responded well to prone and standing extension exercises during initial session.  She will benefit from PT intervention to address posture and body mechanics to reduce back strain as well as to develop and educate in exercise program to control symptoms and  prevent/reduce recurrence of symptoms.  Prognosis: good  Functional Limitations: carrying objects, lifting, uncomfortable because of pain and stooping  Goals  Plan Goals: Pt. demonstrates independence and compliance with initial HEP.  Pt. reports reduction in pain intensity to no worse than 6/10 on NPRS.  AROM of lumbar spine shows improvement over baseline measures.  Functional outcome measure is improved by 10%.  Pt demonstrates awareness of correct, supported sitting posture.    Pt. demonstrates independence in advanced HEP for ongoing improvement.  LROM is sufficient for performance of daily activities without increase in pain.  B calf strength shows improvement over baseline measure.  Functional outcome measure is improved to 30% or less.          Plan  Therapy options: will be seen for skilled physical therapy services  Planned modality interventions: cryotherapy and thermotherapy (hydrocollator packs)  Planned therapy interventions: abdominal trunk stabilization, body mechanics training, flexibility, functional ROM exercises, home exercise program, joint mobilization, therapeutic activities, stretching, strengthening, spinal/joint mobilization, postural training and neuromuscular re-education  Frequency: 1x week  Duration in visits: 8  Treatment plan discussed with: patient  Plan details: PT weekly per POC.  Progress or modify exercise program as symptom response indicates.        Timed:  Manual Therapy:         mins  15314;  Therapeutic Exercise:    10     mins  30620;     Neuromuscular Oksana:        mins  23516;    Therapeutic Activity:          mins  96293;     Gait Training:           mins  17202;     Ultrasound:          mins  06154;    Electrical Stimulation:         mins  35121 ( );    Untimed:  Electrical Stimulation:         mins  15414 ( );  Mechanical Traction:         mins  42107;     Timed Treatment:   10   mins   Total Treatment:     40   mins    PT SIGNATURE: Heidi Tapia  PT   DATE TREATMENT INITIATED: 10/7/2020    Initial Certification  Certification Period: 1/5/2021  I certify that the therapy services are furnished while this patient is under my care.  The services outlined above are required by this patient, and will be reviewed every 90 days.     PHYSICIAN: Marlon Love MD      DATE:     Please sign and return via fax to [phone].. Thank you, [facility].

## 2020-10-12 DIAGNOSIS — F41.9 ANXIETY: ICD-10-CM

## 2020-10-12 DIAGNOSIS — M54.50 MIDLINE LOW BACK PAIN WITHOUT SCIATICA, UNSPECIFIED CHRONICITY: ICD-10-CM

## 2020-10-12 RX ORDER — CYCLOBENZAPRINE HCL 10 MG
10 TABLET ORAL 2 TIMES DAILY PRN
Qty: 60 TABLET | Refills: 0 | OUTPATIENT
Start: 2020-10-12

## 2020-10-12 RX ORDER — ALPRAZOLAM 0.5 MG/1
0.5 TABLET ORAL 2 TIMES DAILY PRN
Qty: 30 TABLET | Refills: 0 | OUTPATIENT
Start: 2020-10-12

## 2020-10-12 NOTE — TELEPHONE ENCOUNTER
PT CALLED TO REQUEST REFILL FOR RX   ALPRAZolam (Xanax) 0.5 MG tablet      cyclobenzaprine (FLEXERIL) 10 MG tablet       AND  diclofenac (VOLTAREN) 1 % gel gel.    PT STATED  THAT SHE BELIEVES THAT SHE IS GOING TO NEED A WRITTEN RX FOR RX   ALPRAZolam (Xanax) 0.5 MG tablet    PLEASE ADVISE.  CALL BACK:[phone]      ANATOLYCimarron Memorial Hospital – Boise CityLEILA [place] [address] [address] [facility] [address]

## 2020-10-15 ENCOUNTER — TELEPHONE (OUTPATIENT)
Dept: NEUROSURGERY | Facility: CLINIC | Age: 55
End: 2020-10-15

## 2020-10-15 ENCOUNTER — TREATMENT (OUTPATIENT)
Dept: PHYSICAL THERAPY | Facility: CLINIC | Age: 55
End: 2020-10-15

## 2020-10-15 DIAGNOSIS — G89.29 CHRONIC BILATERAL LOW BACK PAIN, UNSPECIFIED WHETHER SCIATICA PRESENT: Primary | ICD-10-CM

## 2020-10-15 DIAGNOSIS — M54.50 CHRONIC BILATERAL LOW BACK PAIN, UNSPECIFIED WHETHER SCIATICA PRESENT: Primary | ICD-10-CM

## 2020-10-15 PROCEDURE — 97110 THERAPEUTIC EXERCISES: CPT | Performed by: PHYSICAL THERAPIST

## 2020-10-15 NOTE — PROGRESS NOTES
Physical Therapy Daily Progress Note  VISIT: 2      Brigid Coelho reports: low back pain comes and goes.  It is difficult to get up after sitting.  Pain is mild at the moment.    Subjective     Treatment  Pre-treatment pain:  3  Post-treatment pain:  2  Exercise 1  Exercise Name 1: Detailed HEP review.    Exercise 2  Exercise Name 2: Reinforcement of sitting posture  Exercise 3  Exercise Name 3: Reinforcement of importance of frequent change of position/postural correction  Exercise 4  Exercise Name 4: prone press ups  Exercise 5  Exercise Name 5: standing lumbar extension (3 options)             Objective     Assessment & Plan     Assessment  Assessment details: Pt demonstrates improving range into lumbar extension.  She responds better to standing extension either facing wall or with back to countertop than when using hands on back for support.    Plan  Plan details: Continue PT.  Progress exercises as symptom response indicates.               Timed:  Manual Therapy:         mins  25278;  Therapeutic Exercise:    25     mins  93152;     Neuromuscular Oksana:        mins  93488;    Therapeutic Activity:          mins  49524;     Gait Training:           mins  28067;     Ultrasound:          mins  22819;    Electrical Stimulation:         mins  22509 ( );    Untimed:  Electrical Stimulation:         mins  14717 ( );  Mechanical Traction:         mins  35356;     Timed Treatment:   25   mins   Total Treatment:     25   mins      Heidi Tapia, PT  Physical Therapist

## 2020-10-20 ENCOUNTER — TELEPHONE (OUTPATIENT)
Dept: FAMILY MEDICINE CLINIC | Facility: CLINIC | Age: 55
End: 2020-10-20

## 2020-10-20 NOTE — TELEPHONE ENCOUNTER
PATIENT CALLED AND SAID THE BACK DR/NEUROSURGEON  SHE WAS REFERRED TO  PLACED IN HER CHART THAT'S SHE WAS OBESE WITH EXCESSIVE CALORIES; SHE WAS ASKING IF THIS  WAS NORMAL FOR A PHYSICIAN TO PUT OBESITY WITH EXCESSIVE CALORIES AS A DIAGNOSIS AND THAT HE DIDN'T COVER HER BULGING DISKS OR HERNIATED DISK OR ANYTHING RELATED TO HER BACK; PLEASE ADVISE AND CALL PATIENT    ROXANNE: [phone]

## 2020-10-23 ENCOUNTER — TELEPHONE (OUTPATIENT)
Dept: NEUROLOGY | Facility: CLINIC | Age: 55
End: 2020-10-23

## 2020-10-23 NOTE — TELEPHONE ENCOUNTER
PT SAYS THAT THE OFFICE NEEDS TO SEND A REFERRAL TO HER INSURANCE COMPANY THAT SHE CAN GET 12 VISITS FOR THE [facility]. IT NEEDS TO GO THROUGH THE [facility] SITE TO BE APPROVED AND PAID FOR.          PT SAYS SHE IS GETTING WORSE EVERYDAY.  SHE SAYS HER FOOT TURNING IN AND MAKING HER ARCH COLLAPSE. HER BACK IS GETTING WORSE WALKING AND STANDING AND GETTING UP FROM A SITTING POSITION.  PT IS TRYING TO LOSE WEIGHT.  ROXANNE  [phone]

## 2020-10-27 ENCOUNTER — OFFICE VISIT (OUTPATIENT)
Dept: ENDOCRINOLOGY | Facility: CLINIC | Age: 55
End: 2020-10-27

## 2020-10-27 ENCOUNTER — TREATMENT (OUTPATIENT)
Dept: PHYSICAL THERAPY | Facility: CLINIC | Age: 55
End: 2020-10-27

## 2020-10-27 VITALS — BODY MASS INDEX: 41.28 KG/M2 | WEIGHT: 263 LBS | HEIGHT: 67 IN

## 2020-10-27 DIAGNOSIS — R73.03 PREDIABETES: ICD-10-CM

## 2020-10-27 DIAGNOSIS — M54.50 CHRONIC BILATERAL LOW BACK PAIN, UNSPECIFIED WHETHER SCIATICA PRESENT: Primary | ICD-10-CM

## 2020-10-27 DIAGNOSIS — G89.29 CHRONIC BILATERAL LOW BACK PAIN, UNSPECIFIED WHETHER SCIATICA PRESENT: Primary | ICD-10-CM

## 2020-10-27 DIAGNOSIS — E06.3 HYPOTHYROIDISM DUE TO HASHIMOTO'S THYROIDITIS: ICD-10-CM

## 2020-10-27 DIAGNOSIS — E03.8 HYPOTHYROIDISM DUE TO HASHIMOTO'S THYROIDITIS: ICD-10-CM

## 2020-10-27 PROCEDURE — 99442 PR PHYS/QHP TELEPHONE EVALUATION 11-20 MIN: CPT | Performed by: INTERNAL MEDICINE

## 2020-10-27 PROCEDURE — 97110 THERAPEUTIC EXERCISES: CPT | Performed by: PHYSICAL THERAPIST

## 2020-10-27 RX ORDER — METFORMIN HYDROCHLORIDE 500 MG/1
500 TABLET, EXTENDED RELEASE ORAL
Qty: 180 TABLET | Refills: 3 | Status: SHIPPED | OUTPATIENT
Start: 2020-10-27 | End: 2021-08-28 | Stop reason: SDUPTHER

## 2020-10-27 RX ORDER — LEVOTHYROXINE SODIUM 88 UG/1
88 TABLET ORAL DAILY
Qty: 90 TABLET | Refills: 3 | Status: SHIPPED | OUTPATIENT
Start: 2020-10-27 | End: 2021-01-06 | Stop reason: SDUPTHER

## 2020-10-27 NOTE — PROGRESS NOTES
Physical Therapy Daily Progress Note  VISIT: 3      Brigid Coelho reports: mild low back discomfort upon arrival today.  It is worse in the mornings.  She notices mid-upper back discomfort as well which increases as the day goes on.  She also complains today of R Achilles pain at posterior calcaneus insertion.    Subjective     Treatment  Pre-treatment pain:  2  Post-treatment pain:  2  Exercise 1  Exercise Name 1: Gait assessment: R pronation more than left.  Pt is wearing very flat shoes.  Ankle MMT is normal.  She is able to perform heel raise.  Exercise 2  Exercise Name 2: Gastroc stretch  Exercise 3  Exercise Name 3: Soleus stretch  Exercise 4  Exercise Name 4: postural exercises: snow radha at wall and in supine  Exercise 5  Exercise Name 5: lat pull down-poor technique, many cues required, not added to HEP  Exercise 6  Exercise Name 6: B ER with scap squeeze-poor technique with excessive shrugging noted  Exercise 7  Exercise Name 7: Reinforcement of sitting posture, avoiding soft low seats     Discussed importance of supportive footwear.        Objective     Assessment & Plan     Assessment  Assessment details: Pt has difficulty attending to task, is easily distracted.    Plan  Plan details: Continue PT.  Progress exercise as tolerated and as symptoms indicate.               Timed:  Manual Therapy:         mins  41594;  Therapeutic Exercise:    30     mins  85405;     Neuromuscular Oksana:        mins  64150;    Therapeutic Activity:          mins  23751;     Gait Training:           mins  02111;     Ultrasound:          mins  14620;    Electrical Stimulation:         mins  64577 ( );    Untimed:  Electrical Stimulation:         mins  72642 ( );  Mechanical Traction:         mins  34079;     Timed Treatment:  30    mins   Total Treatment:     30   mins      Heidi Tapia, PT  Physical Therapist

## 2020-10-27 NOTE — PROGRESS NOTES
"You have chosen to receive care through a telephone visit. Do you consent to use a telephone visit for your medical care today? Yes  Chief complaint  Hypothyroidism (Follow UP:  feels like 88 works better than 75.   Started taking Metformin, tolerated 1 pill daily.  Still  unable lose weight.  Would like discuss the RX \"Plenty\" )    Subjective   Brigid Coelho is a 55 y.o. female is here today for follow-up.  Follow-up for hypothyroidism,  goiter and small thyroid nodules, discovered on the ultrasound in 05/2013. Jan 2016 her TFT were low and she was started on levothyroxine 50 mcg a day with symptomatic improvement.   Thyroid u/s 1/2016 -Heterogeneous thyroid gland with small 6 mm isthmus nodule.     Patient also has a history of ulcerative colitis and iron deficiency anemia, anxiety. She had colon polyps removed   At initial endocrine evaluation she was found to have elevated Hgb A1C, I have started her on metformin and she tolerated it well. 1000 mg daily.   She has back pain and herniated disk.   She feels better on the higher dose of 88 mcg levothyroxine daily.   She has tried contrave and it was too expensive.     Medications    Current Outpatient Medications:   •  acetaminophen (TYLENOL) 650 MG 8 hr tablet, Take 650 mg by mouth Every 8 (Eight) Hours As Needed for Mild Pain ., Disp: , Rfl:   •  ALPRAZolam (Xanax) 0.5 MG tablet, Take 1 tablet by mouth Daily As Needed for Anxiety., Disp: 30 tablet, Rfl: 1  •  ascorbic acid (VITAMIN C) 1000 MG tablet, Take 1,000 mg by mouth Daily., Disp: , Rfl:   •  Chromium 500 MCG tablet, Take 1 each by mouth Daily., Disp: , Rfl:   •  cyclobenzaprine (FLEXERIL) 10 MG tablet, Take 1 tablet by mouth 2 (Two) Times a Day As Needed for Muscle Spasms., Disp: 60 tablet, Rfl: 1  •  diclofenac (VOLTAREN) 1 % gel gel, Apply 4 g topically to the appropriate area as directed 3 (Three) Times a Day., Disp: 100 g, Rfl: 1  •  ferrous sulfate 140 (45 Fe) MG tablet controlled-release " "tablet, Take 140 mg by mouth Daily With Breakfast., Disp: , Rfl:   •  folic acid (FOLVITE) 400 MCG tablet, Take 400 mcg by mouth Daily., Disp: , Rfl:   •  Garlic 1000 MG capsule, Take 1 tablet by mouth Daily., Disp: , Rfl:   •  levothyroxine (Synthroid) 88 MCG tablet, Take 1 tablet by mouth Daily., Disp: 90 tablet, Rfl: 3  •  Loratadine (CLARITIN) 10 MG capsule, Take 1 tablet by mouth daily., Disp: , Rfl:   •  mesalamine (APRISO) 0.375 g 24 hr capsule, Take 375 mg by mouth As Needed., Disp: , Rfl:   •  metFORMIN ER (GLUCOPHAGE-XR) 500 MG 24 hr tablet, Take 1 tablet by mouth Daily With Breakfast., Disp: 180 tablet, Rfl: 3  •  montelukast (SINGULAIR) 10 MG tablet, Take 1 tablet by mouth every night at bedtime., Disp: 90 tablet, Rfl: 1  •  Selenium 200 MCG capsule, Take 1 tablet by mouth Daily., Disp: , Rfl:   •  sertraline (ZOLOFT) 25 MG tablet, TAKE THREE TABLETS BY MOUTH DAILY, Disp: 270 tablet, Rfl: 0  •  Vitamin A 2400 MCG (8000 UT) tablet, Take 1 tablet by mouth Daily., Disp: , Rfl:   •  Zinc 25 MG tablet, Take 0.5 tablets by mouth Daily., Disp: , Rfl:     PMH  The following portions of the patient's history were reviewed and updated as appropriate: allergies, current medications, past family history, past medical history, past social history, past surgical history and problem list.    Review of systems  Review of Systems   Constitutional: Positive for fatigue and unexpected weight change (weight gain). Negative for activity change, appetite change, chills and diaphoresis.   Positive for arthralgias and back pain no joint swelling, myalgias, neck pain and neck stiffness.   Skin: Negative.    Allergic/Immunologic: Negative.    Neurological: Negative for dizziness, tremors, syncope, weakness, light-headedness and headaches.   Hematological: Negative.    Psychiatric/Behavioral: Negative.    All other systems reviewed and are negative.      Physical exam  Objective   Height 170.2 cm (67\"), weight 119 kg (263 lb). " Body mass index is 41.19 kg/m².    Physical Exam   Constitutional: She is oriented to person, place, and time. She appears well-developed and well-nourished.   Psychiatric: She has a normal mood and affect. Thought content normal.   Vitals reviewed.      LABS AND IMAGING  No visits with results within 1 Month(s) from this visit.   Latest known visit with results is:   Appointment on 08/16/2020   Component Date Value Ref Range Status   • Reference Lab Report 08/16/2020    Final    See scanned report     • COVID19 08/16/2020 Not Detected  Not Detected - Ref. Range Final       Assessment  Assessment/Plan   Problems Addressed this Visit        Endocrine    Prediabetes    Relevant Medications    metFORMIN ER (GLUCOPHAGE-XR) 500 MG 24 hr tablet    Other Relevant Orders    Comprehensive Metabolic Panel    Hemoglobin A1c    Hypothyroidism due to Hashimoto's thyroiditis    Relevant Medications    levothyroxine (Synthroid) 88 MCG tablet    Other Relevant Orders    TSH    T4, Free      Diagnoses       Codes Comments    Prediabetes     ICD-10-CM: R73.03  ICD-9-CM: 790.29     Hypothyroidism due to Hashimoto's thyroiditis     ICD-10-CM: E03.8, E06.3  ICD-9-CM: 244.8, 245.2           Plan  Thyroid u/s 1/2017 - heterogeneous gland with no nodules.     -repeat thyroid function tests on levothyroxine 88 mcg, adjust the dose accordingly.   Repeat lab test     -Continue metformin and increase to 1000 mg a day  for insulin resistance and postprandial hypoglycemia. Continue diet and exercise. Cont using stevia.      F/u in 4-6 months.    13 minutes spend for medical discussion

## 2020-11-10 ENCOUNTER — TREATMENT (OUTPATIENT)
Dept: PHYSICAL THERAPY | Facility: CLINIC | Age: 55
End: 2020-11-10

## 2020-11-10 DIAGNOSIS — M54.50 CHRONIC BILATERAL LOW BACK PAIN, UNSPECIFIED WHETHER SCIATICA PRESENT: Primary | ICD-10-CM

## 2020-11-10 DIAGNOSIS — G89.29 CHRONIC BILATERAL LOW BACK PAIN, UNSPECIFIED WHETHER SCIATICA PRESENT: Primary | ICD-10-CM

## 2020-11-10 PROCEDURE — 97530 THERAPEUTIC ACTIVITIES: CPT | Performed by: PHYSICAL THERAPIST

## 2020-11-10 PROCEDURE — 97110 THERAPEUTIC EXERCISES: CPT | Performed by: PHYSICAL THERAPIST

## 2020-11-10 NOTE — PROGRESS NOTES
Re-Assessment / Re-Certification      Patient: Brigid Coelho   : 1965  Diagnosis/ICD-10 Code:  The encounter diagnosis was Chronic bilateral low back pain, unspecified whether sciatica present.   Referring practitioner: Marlon Love*  Date of Initial Visit: Type: THERAPY  Noted: 10/7/2020  Today's Date: 11/10/2020  Patient seen for 4 sessions      Subjective:   Brigid Coelho reports: only mild discomfort today and overall improvement noted.  Subjective Questionnaire: Oswestry: 20% (initially 50%)  Clinical Progress: improved  Home Program Compliance: Yes  Treatment has included: therapeutic exercise    Subjective Evaluation    Pain  Current pain ratin  At best pain ratin  At worst pain ratin  Location: low back           Treatment  Exercise 1  Exercise Name 1: Reassessment completed  Exercise 2  Exercise Name 2: Practiced lifting mechanics, such as picking up items from floor  Exercise 3  Exercise Name 3: Practiced trunk rotation to improve ease with personal hygiene   Exercise 4  Exercise Name 4: Added wall slide squats to HEP  Exercise 5  Exercise Name 5: Total Gy, squats with attention to correct form and slow controlled movement         Functional Testing  Functional Tests Options: Modified [place]   Objective   Assessment/Plan  Progress toward previous goals: Partially Met     Goals  Plan Goals: Pt. demonstrates independence and compliance with initial HEP-met.  Pt. reports reduction in pain intensity to no worse than 6/10 on NPRS-progressing.  AROM of lumbar spine shows improvement over baseline measures-met.  Functional outcome measure is improved by 10%-met.  Pt demonstrates awareness of correct, supported sitting posture-met.    Pt. demonstrates independence in advanced HEP for ongoing improvement-progressing.  LROM is sufficient for performance of daily activities without increase in pain-progressing.  B calf strength shows improvement over baseline measure-R  calf strength is improved.  Functional outcome measure is improved to 30% or less-met.    Recommendations: Continue as planned  Timeframe: 1 month  Prognosis to achieve goals: good    PT Signature: Heidi Tapia PT      Based upon review of the patient's progress and continued therapy plan, it is my medical opinion that Brigid Coelho should continue physical therapy treatment at [facility]  [address]  [facility] [address] [phone].    Signature: __________________________________  Marlon Love MD    Timed:  Manual Therapy:         mins  74591;  Therapeutic Exercise:    25     mins  29498;     Neuromuscular Oksana:        mins  69469;    Therapeutic Activity:     15     mins  05920;     Gait Training:           mins  03859;     Ultrasound:          mins  17656;    Electrical Stimulation:         mins  05856 ( );    Untimed:  Electrical Stimulation:         mins  00601 ( );  Mechanical Traction:         mins  58340;     Timed Treatment:   40   mins   Total Treatment:     40   mins

## 2020-11-17 RX ORDER — MONTELUKAST SODIUM 10 MG/1
TABLET ORAL
Qty: 90 TABLET | Refills: 0 | Status: SHIPPED | OUTPATIENT
Start: 2020-11-17 | End: 2021-09-08 | Stop reason: SDUPTHER

## 2020-11-17 RX ORDER — SERTRALINE HYDROCHLORIDE 25 MG/1
TABLET, FILM COATED ORAL
Qty: 270 TABLET | Refills: 0 | Status: SHIPPED | OUTPATIENT
Start: 2020-11-17 | End: 2021-02-16

## 2020-11-18 PROCEDURE — U0003 INFECTIOUS AGENT DETECTION BY NUCLEIC ACID (DNA OR RNA); SEVERE ACUTE RESPIRATORY SYNDROME CORONAVIRUS 2 (SARS-COV-2) (CORONAVIRUS DISEASE [COVID-19]), AMPLIFIED PROBE TECHNIQUE, MAKING USE OF HIGH THROUGHPUT TECHNOLOGIES AS DESCRIBED BY CMS-2020-01-R: HCPCS | Performed by: NURSE PRACTITIONER

## 2020-11-19 ENCOUNTER — TRANSCRIBE ORDERS (OUTPATIENT)
Dept: PHYSICAL THERAPY | Facility: CLINIC | Age: 55
End: 2020-11-19

## 2020-11-19 DIAGNOSIS — M51.36 DDD (DEGENERATIVE DISC DISEASE), LUMBAR: Primary | ICD-10-CM

## 2020-11-24 ENCOUNTER — TELEPHONE (OUTPATIENT)
Dept: NEUROSURGERY | Facility: CLINIC | Age: 55
End: 2020-11-24

## 2020-11-24 NOTE — TELEPHONE ENCOUNTER
Caller: ROXANNE SAHNI    Relationship to patient: PT    Best call back number: [phone]    Chief complaint: PT WOULD LIKE TO CHANGE 12/16/20 FOLLOW UP TO A TELEPHONE VISIT DUE TO COVID CONCERNS    Type of visit: FOLLOW UP    Requested date: 12/16/20  11:45 AM  (OR ANY DAY IN DEC AFTER THE 16TH)     If rescheduling, when is the original appointment: 12/16/20 11:45 AM    Additional notes: PT IS STARTING CHIROPRACTOR AND PHYSICAL THERAPY, UNSURE IF FOLLOW UP IS NEEDED AT THIS TIME. IF DR HOLLINS FEELS HE NEEDS TO SEE HER IN THE OFFICE, PT ASKS IF IT COULD BE RESCHEDULED FOR A LATER DATE WHEN SHE IS FURTHER ALONG IN HER EXERCISE AND THERAPY PROGRAM AND COVID NUMBERS ARE LOWER. PT IS OK KEEPING EXISTING APPT IF CAN BE CHANGED TO A TELEPHONE VISIT.    PLEASE CALL PT IF TELEPHONE VISIT IS AVAILABLE. PT CANNOT DO MY CHART VISIT DUE TO TECH LIMITATIONS.    THANK YOU.

## 2020-12-09 ENCOUNTER — DOCUMENTATION (OUTPATIENT)
Dept: PHYSICAL THERAPY | Facility: CLINIC | Age: 55
End: 2020-12-09

## 2020-12-09 NOTE — PROGRESS NOTES
Discharge Summary  Discharge Summary from Physical Therapy Report      Patient: Brigid Coelho   : 1965  Diagnosis/ICD-10 Code:  There were no encounter diagnoses.   Referring practitioner: No ref. provider found  Date of Initial Visit: No linked episodes  Today's Date: 2020  Date of Last Visit: 11-     Number of Visits: 4    Discharge Status of Patient: Pt cancelled remaining appointments due to COVID concerns.    Goals: Partially Met    Discharge Plan: Continue with current home exercise program as instructed          Date of Discharge: 2020        Heidi Tapia, PT

## 2020-12-22 ENCOUNTER — TELEMEDICINE (OUTPATIENT)
Dept: FAMILY MEDICINE CLINIC | Facility: CLINIC | Age: 55
End: 2020-12-22

## 2020-12-22 DIAGNOSIS — F41.9 ANXIETY: ICD-10-CM

## 2020-12-22 DIAGNOSIS — F41.9 ANXIETY: Primary | ICD-10-CM

## 2020-12-22 DIAGNOSIS — M54.50 MIDLINE LOW BACK PAIN WITHOUT SCIATICA, UNSPECIFIED CHRONICITY: ICD-10-CM

## 2020-12-22 PROCEDURE — 99442 PR PHYS/QHP TELEPHONE EVALUATION 11-20 MIN: CPT | Performed by: NURSE PRACTITIONER

## 2020-12-22 RX ORDER — ALPRAZOLAM 0.5 MG/1
0.5 TABLET ORAL DAILY PRN
Qty: 30 TABLET | Refills: 0 | Status: SHIPPED | OUTPATIENT
Start: 2020-12-22 | End: 2021-02-16 | Stop reason: SDUPTHER

## 2020-12-22 NOTE — PROGRESS NOTES
Subjective   Brigid Coelho is a 55 y.o. female.   Chief Complaint   Patient presents with   • telehealth video visit   • Anxiety   • Med Refill   • Back Pain      Anxiety  Presents for follow-up visit. Symptoms include excessive worry and nervous/anxious behavior. Patient reports no chest pain, compulsions, confusion, decreased concentration, depressed mood, dizziness, dry mouth, feeling of choking, hyperventilation, impotence, insomnia, irritability, malaise, muscle tension, nausea, obsessions, palpitations, panic, restlessness, shortness of breath or suicidal ideas. The most recent episode lasted 2 hours (2-3 hours). The severity of symptoms is moderate. The quality of sleep is poor. Nighttime awakenings: occasional, one to two.     Compliance with medications is 26-50%.   Back Pain  This is a chronic problem. The current episode started more than 1 year ago. The problem occurs intermittently. The problem has been waxing and waning since onset. The pain is present in the lumbar spine. The quality of the pain is described as aching. The pain does not radiate. The pain is mild. The symptoms are aggravated by bending. Pertinent negatives include no chest pain. She has tried chiropractic manipulation and NSAIDs (diclofenac gel) for the symptoms. The treatment provided mild relief.    flipped video visit. Failed   Anxiety is waxing and waning.     This was an audio and video enabled telemedicine encounter.  You have chosen to receive care through a telephone visit. Do you consent to use a telephone visit for your medical care today? Yes.     The following portions of the patient's history were reviewed and updated as appropriate: allergies, current medications, past family history, past medical history, past social history, past surgical history and problem list.    Review of Systems   Constitutional: Negative.  Negative for irritability.   HENT: Negative.    Eyes: Negative.    Respiratory: Negative.   Negative for shortness of breath.    Cardiovascular: Negative.  Negative for chest pain and palpitations.   Gastrointestinal: Negative.  Negative for nausea.   Genitourinary: Negative for impotence.   Musculoskeletal: Positive for back pain.   Allergic/Immunologic: Negative.    Neurological: Negative for dizziness.   Hematological: Negative.    Psychiatric/Behavioral: Negative for confusion, decreased concentration and suicidal ideas. The patient is nervous/anxious. The patient does not have insomnia.      Past Surgical History:   Procedure Laterality Date   • APPENDECTOMY     •  SECTION      x 2   • CHOLECYSTECTOMY     • DILATATION AND CURETTAGE      Of Cervical Stump   • MYOMECTOMY     • SMALL INTESTINE SURGERY     • TUBAL ABDOMINAL LIGATION       Past Medical History:   Diagnosis Date   • Acute bronchitis    • Anemia    • Anxiety    • Chest pain    • Cholelithiasis    • Claustrophobia    • GERD (gastroesophageal reflux disease)    • Hashimoto's thyroiditis    • Hemorrhoids    • Hypothyroidism    • Low back pain    • Metrorrhagia    • Palpitations    • Polycystic ovary syndrome    • Polyp of sigmoid colon    • Ulcerative colitis (CMS/HCC)    • Upper respiratory infection    • UTI (urinary tract infection)        Current Outpatient Medications:   •  acetaminophen (TYLENOL) 650 MG 8 hr tablet, Take 650 mg by mouth Every 8 (Eight) Hours As Needed for Mild Pain ., Disp: , Rfl:   •  ALPRAZolam (Xanax) 0.5 MG tablet, Take 1 tablet by mouth Daily As Needed for Anxiety., Disp: 30 tablet, Rfl: 0  •  amoxicillin-clavulanate (Augmentin) 875-125 MG per tablet, Take 1 tablet by mouth 2 (Two) Times a Day., Disp: 20 tablet, Rfl: 0  •  ascorbic acid (VITAMIN C) 1000 MG tablet, Take 1,000 mg by mouth Daily., Disp: , Rfl:   •  cyclobenzaprine (FLEXERIL) 10 MG tablet, Take 1 tablet by mouth 2 (Two) Times a Day As Needed for Muscle Spasms., Disp: 60 tablet, Rfl: 1  •  diclofenac (VOLTAREN) 1 % gel gel, Apply 4 g topically to  the appropriate area as directed 3 (Three) Times a Day., Disp: 100 g, Rfl: 1  •  ferrous sulfate 140 (45 Fe) MG tablet controlled-release tablet, Take 140 mg by mouth Daily With Breakfast., Disp: , Rfl:   •  folic acid (FOLVITE) 400 MCG tablet, Take 400 mcg by mouth Daily., Disp: , Rfl:   •  Garlic 1000 MG capsule, Take 1 tablet by mouth Daily., Disp: , Rfl:   •  levothyroxine (Synthroid) 88 MCG tablet, Take 1 tablet by mouth Daily., Disp: 90 tablet, Rfl: 3  •  Loratadine (CLARITIN) 10 MG capsule, Take 1 tablet by mouth daily., Disp: , Rfl:   •  mesalamine (APRISO) 0.375 g 24 hr capsule, Take 375 mg by mouth As Needed., Disp: , Rfl:   •  metFORMIN ER (GLUCOPHAGE-XR) 500 MG 24 hr tablet, Take 1 tablet by mouth Daily With Breakfast., Disp: 180 tablet, Rfl: 3  •  montelukast (SINGULAIR) 10 MG tablet, TAKE ONE TABLET BY MOUTH EVERY NIGHT AT BEDTIME, Disp: 90 tablet, Rfl: 0  •  Selenium 200 MCG capsule, Take 1 tablet by mouth Daily., Disp: , Rfl:   •  sertraline (ZOLOFT) 25 MG tablet, TAKE THREE TABLETS BY MOUTH DAILY, Disp: 270 tablet, Rfl: 0  •  Vitamin A 2400 MCG (8000 UT) tablet, Take 1 tablet by mouth Daily., Disp: , Rfl:   •  Zinc 25 MG tablet, Take 0.5 tablets by mouth Daily., Disp: , Rfl:      Objective   Allergies   Allergen Reactions   • Adhesive Tape      Rash     • Erythromycin Other (See Comments)     Sores in mouth   • Epinephrine Palpitations   • Nitroglycerin Palpitations     There were no vitals taken for this visit.   Telephone visit - no vitals were taken.     Physical Exam  Neurological:      Mental Status: She is alert and oriented to person, place, and time.   Psychiatric:         Mood and Affect: Mood normal.         Behavior: Behavior normal.         Assessment/Plan   Diagnoses and all orders for this visit:    1. Anxiety (Primary)    2. Midline low back pain without sciatica, unspecified chronicity        Spent 14 minutes on telephone with patient.   She is out of xanax. Spoke with Dr. Kennedy.  She will reorder.   Will reorder her diclofenac gell  She is going to chiropractor. Helping.            Patient Instructions   Managing Anxiety, Adult  After being diagnosed with an anxiety disorder, you may be relieved to know why you have felt or behaved a certain way. You may also feel overwhelmed about the treatment ahead and what it will mean for your life. With care and support, you can manage this condition and recover from it.  How to manage lifestyle changes  Managing stress and anxiety    Stress is your body's reaction to life changes and events, both good and bad. Most stress will last just a few hours, but stress can be ongoing and can lead to more than just stress. Although stress can play a major role in anxiety, it is not the same as anxiety. Stress is usually caused by something external, such as a deadline, test, or competition. Stress normally passes after the triggering event has ended.   Anxiety is caused by something internal, such as imagining a terrible outcome or worrying that something will go wrong that will devastate you. Anxiety often does not go away even after the triggering event is over, and it can become long-term (chronic) worry. It is important to understand the differences between stress and anxiety and to manage your stress effectively so that it does not lead to an anxious response.  Talk with your health care provider or a counselor to learn more about reducing anxiety and stress. He or she may suggest tension reduction techniques, such as:  · Music therapy. This can include creating or listening to music that you enjoy and that inspires you.  · Mindfulness-based meditation. This involves being aware of your normal breaths while not trying to control your breathing. It can be done while sitting or walking.  · Centering prayer. This involves focusing on a word, phrase, or sacred image that means something to you and brings you peace.  · Deep breathing. To do this, expand your  stomach and inhale slowly through your nose. Hold your breath for 3-5 seconds. Then exhale slowly, letting your stomach muscles relax.  · Self-talk. This involves identifying thought patterns that lead to anxiety reactions and changing those patterns.  · Muscle relaxation. This involves tensing muscles and then relaxing them.  Choose a tension reduction technique that suits your lifestyle and personality. These techniques take time and practice. Set aside 5-15 minutes a day to do them. Therapists can offer counseling and training in these techniques. The training to help with anxiety may be covered by some insurance plans. Other things you can do to manage stress and anxiety include:  · Keeping a stress/anxiety diary. This can help you learn what triggers your reaction and then learn ways to manage your response.  · Thinking about how you react to certain situations. You may not be able to control everything, but you can control your response.  · Making time for activities that help you relax and not feeling guilty about spending your time in this way.  · Visual imagery and yoga can help you stay calm and relax.    Medicines  Medicines can help ease symptoms. Medicines for anxiety include:  · Anti-anxiety drugs.  · Antidepressants.  Medicines are often used as a primary treatment for anxiety disorder. Medicines will be prescribed by a health care provider. When used together, medicines, psychotherapy, and tension reduction techniques may be the most effective treatment.  Relationships  Relationships can play a big part in helping you recover. Try to spend more time connecting with trusted friends and family members. Consider going to couples counseling, taking family education classes, or going to family therapy. Therapy can help you and others better understand your condition.  How to recognize changes in your anxiety  Everyone responds differently to treatment for anxiety. Recovery from anxiety happens when  symptoms decrease and stop interfering with your daily activities at home or work. This may mean that you will start to:  · Have better concentration and focus. Worry will interfere less in your daily thinking.  · Sleep better.  · Be less irritable.  · Have more energy.  · Have improved memory.  It is important to recognize when your condition is getting worse. Contact your health care provider if your symptoms interfere with home or work and you feel like your condition is not improving.  Follow these instructions at home:  Activity  · Exercise. Most adults should do the following:  ? Exercise for at least 150 minutes each week. The exercise should increase your heart rate and make you sweat (moderate-intensity exercise).  ? Strengthening exercises at least twice a week.  · Get the right amount and quality of sleep. Most adults need 7-9 hours of sleep each night.  Lifestyle    · Eat a healthy diet that includes plenty of vegetables, fruits, whole grains, low-fat dairy products, and lean protein. Do not eat a lot of foods that are high in solid fats, added sugars, or salt.  · Make choices that simplify your life.  · Do not use any products that contain nicotine or tobacco, such as cigarettes, e-cigarettes, and chewing tobacco. If you need help quitting, ask your health care provider.  · Avoid caffeine, alcohol, and certain over-the-counter cold medicines. These may make you feel worse. Ask your pharmacist which medicines to avoid.  General instructions  · Take over-the-counter and prescription medicines only as told by your health care provider.  · Keep all follow-up visits as told by your health care provider. This is important.  Where to find support  You can get help and support from these sources:  · Self-help groups.  · Online and community organizations.  · A trusted spiritual leader.  · Couples counseling.  · Family education classes.  · Family therapy.  Where to find more information  You may find that  joining a support group helps you deal with your anxiety. The following sources can help you locate counselors or support groups near you:  · [employer] Health [employer]: www.mentalhealthamerica.net  · Anxiety and Depression Association of [place] (ADAA): www.adaa.org  · National [place] [employer]ness (VALDEMAR): www.valdemar.org  Contact a health care provider if you:  · Have a hard time staying focused or finishing daily tasks.  · Spend many hours a day feeling worried about everyday life.  · Become exhausted by worry.  · Start to have headaches, feel tense, or have nausea.  · Urinate more than normal.  · Have diarrhea.  Get help right away if you have:  · A racing heart and shortness of breath.  · Thoughts of hurting yourself or others.  If you ever feel like you may hurt yourself or others, or have thoughts about taking your own life, get help right away. You can go to your nearest emergency department or call:  · Your local emergency services (911 in the [place]  · A suicide crisis helpline, such as the National Suicide Prevention Lifeline at 1-995.836.4805. This is open 24 hours a day.  Summary  · Taking steps to learn and use tension reduction techniques can help calm you and help prevent triggering an anxiety reaction.  · When used together, medicines, psychotherapy, and tension reduction techniques may be the most effective treatment.  · Family, friends, and partners can play a big part in helping you recover from an anxiety disorder.  This information is not intended to replace advice given to you by your health care provider. Make sure you discuss any questions you have with your health care provider.  Document Revised: 05/19/2020 Document Reviewed: 05/19/2020  Elsevier Patient Education © 2020 [employer]  Chronic Back Pain  When back pain lasts longer than 3 months, it is called chronic back pain. The cause of your back pain may not be known. Some common causes include:  · Wear and tear (degenerative disease)  of the bones, ligaments, or disks in your back.  · Inflammation and stiffness in your back (arthritis).  People who have chronic back pain often go through certain periods in which the pain is more intense (flare-ups). Many people can learn to manage the pain with home care.  Follow these instructions at home:  Pay attention to any changes in your symptoms. Take these actions to help with your pain:  Activity    · Avoid bending and other activities that make the problem worse.  · Maintain a proper position when standing or sitting:  ? When standing, keep your upper back and neck straight, with your shoulders pulled back. Avoid slouching.  ? When sitting, keep your back straight and relax your shoulders. Do not round your shoulders or pull them backward.  · Do not sit or  one place for long periods of time.  · Take brief periods of rest throughout the day. This will reduce your pain. Resting in a lying or standing position is usually better than sitting to rest.  · When you are resting for longer periods, mix in some mild activity or stretching between periods of rest. This will help to prevent stiffness and pain.  · Get regular exercise. Ask your health care provider what activities are safe for you.  · Do not lift anything that is heavier than 10 lb (4.5 kg). Always use proper lifting technique, which includes:  ? Bending your knees.  ? Keeping the load close to your body.  ? Avoiding twisting.  · Sleep on a firm mattress in a comfortable position. Try lying on your side with your knees slightly bent. If you lie on your back, put a pillow under your knees.  Managing pain  · If directed, apply ice to the painful area. Your health care provider may recommend applying ice during the first 24-48 hours after a flare-up begins.  ? Put ice in a plastic bag.  ? Place a towel between your skin and the bag.  ? Leave the ice on for 20 minutes, 2-3 times per day.  · If directed, apply heat to the affected area as often  as told by your health care provider. Use the heat source that your health care provider recommends, such as a moist heat pack or a heating pad.  ? Place a towel between your skin and the heat source.  ? Leave the heat on for 20-30 minutes.  ? Remove the heat if your skin turns bright red. This is especially important if you are unable to feel pain, heat, or cold. You may have a greater risk of getting burned.  · Try soaking in a warm tub.  · Take over-the-counter and prescription medicines only as told by your health care provider.  · Keep all follow-up visits as told by your health care provider. This is important.  Contact a health care provider if:  · You have pain that is not relieved with rest or medicine.  Get help right away if:  · You have weakness or numbness in one or both of your legs or feet.  · You have trouble controlling your bladder or your bowels.  · You have nausea or vomiting.  · You have pain in your abdomen.  · You have shortness of breath or you faint.  This information is not intended to replace advice given to you by your health care provider. Make sure you discuss any questions you have with your health care provider.  Document Revised: 04/09/2020 Document Reviewed: 06/27/2018  [employer] Patient Education © 2020 [employer]        JATIN Gomez

## 2020-12-30 NOTE — PATIENT INSTRUCTIONS
Managing Anxiety, Adult  After being diagnosed with an anxiety disorder, you may be relieved to know why you have felt or behaved a certain way. You may also feel overwhelmed about the treatment ahead and what it will mean for your life. With care and support, you can manage this condition and recover from it.  How to manage lifestyle changes  Managing stress and anxiety    Stress is your body's reaction to life changes and events, both good and bad. Most stress will last just a few hours, but stress can be ongoing and can lead to more than just stress. Although stress can play a major role in anxiety, it is not the same as anxiety. Stress is usually caused by something external, such as a deadline, test, or competition. Stress normally passes after the triggering event has ended.   Anxiety is caused by something internal, such as imagining a terrible outcome or worrying that something will go wrong that will devastate you. Anxiety often does not go away even after the triggering event is over, and it can become long-term (chronic) worry. It is important to understand the differences between stress and anxiety and to manage your stress effectively so that it does not lead to an anxious response.  Talk with your health care provider or a counselor to learn more about reducing anxiety and stress. He or she may suggest tension reduction techniques, such as:  · Music therapy. This can include creating or listening to music that you enjoy and that inspires you.  · Mindfulness-based meditation. This involves being aware of your normal breaths while not trying to control your breathing. It can be done while sitting or walking.  · Centering prayer. This involves focusing on a word, phrase, or sacred image that means something to you and brings you peace.  · Deep breathing. To do this, expand your stomach and inhale slowly through your nose. Hold your breath for 3-5 seconds. Then exhale slowly, letting your stomach muscles  relax.  · Self-talk. This involves identifying thought patterns that lead to anxiety reactions and changing those patterns.  · Muscle relaxation. This involves tensing muscles and then relaxing them.  Choose a tension reduction technique that suits your lifestyle and personality. These techniques take time and practice. Set aside 5-15 minutes a day to do them. Therapists can offer counseling and training in these techniques. The training to help with anxiety may be covered by some insurance plans. Other things you can do to manage stress and anxiety include:  · Keeping a stress/anxiety diary. This can help you learn what triggers your reaction and then learn ways to manage your response.  · Thinking about how you react to certain situations. You may not be able to control everything, but you can control your response.  · Making time for activities that help you relax and not feeling guilty about spending your time in this way.  · Visual imagery and yoga can help you stay calm and relax.    Medicines  Medicines can help ease symptoms. Medicines for anxiety include:  · Anti-anxiety drugs.  · Antidepressants.  Medicines are often used as a primary treatment for anxiety disorder. Medicines will be prescribed by a health care provider. When used together, medicines, psychotherapy, and tension reduction techniques may be the most effective treatment.  Relationships  Relationships can play a big part in helping you recover. Try to spend more time connecting with trusted friends and family members. Consider going to couples counseling, taking family education classes, or going to family therapy. Therapy can help you and others better understand your condition.  How to recognize changes in your anxiety  Everyone responds differently to treatment for anxiety. Recovery from anxiety happens when symptoms decrease and stop interfering with your daily activities at home or work. This may mean that you will start to:  · Have  better concentration and focus. Worry will interfere less in your daily thinking.  · Sleep better.  · Be less irritable.  · Have more energy.  · Have improved memory.  It is important to recognize when your condition is getting worse. Contact your health care provider if your symptoms interfere with home or work and you feel like your condition is not improving.  Follow these instructions at home:  Activity  · Exercise. Most adults should do the following:  ? Exercise for at least 150 minutes each week. The exercise should increase your heart rate and make you sweat (moderate-intensity exercise).  ? Strengthening exercises at least twice a week.  · Get the right amount and quality of sleep. Most adults need 7-9 hours of sleep each night.  Lifestyle    · Eat a healthy diet that includes plenty of vegetables, fruits, whole grains, low-fat dairy products, and lean protein. Do not eat a lot of foods that are high in solid fats, added sugars, or salt.  · Make choices that simplify your life.  · Do not use any products that contain nicotine or tobacco, such as cigarettes, e-cigarettes, and chewing tobacco. If you need help quitting, ask your health care provider.  · Avoid caffeine, alcohol, and certain over-the-counter cold medicines. These may make you feel worse. Ask your pharmacist which medicines to avoid.  General instructions  · Take over-the-counter and prescription medicines only as told by your health care provider.  · Keep all follow-up visits as told by your health care provider. This is important.  Where to find support  You can get help and support from these sources:  · Self-help groups.  · Online and community organizations.  · A trusted spiritual leader.  · Couples counseling.  · Family education classes.  · Family therapy.  Where to find more information  You may find that joining a support group helps you deal with your anxiety. The following sources can help you locate counselors or support groups near  you:  · Mental Health Alize: www.mentalhealthamerica.net  · Anxiety and Depression Association of [employer] (ADAA): www.adaa.org  · National [employer] Mental [employer]ness (VALDEMAR): www.valdemar.org  Contact a health care provider if you:  · Have a hard time staying focused or finishing daily tasks.  · Spend many hours a day feeling worried about everyday life.  · Become exhausted by worry.  · Start to have headaches, feel tense, or have nausea.  · Urinate more than normal.  · Have diarrhea.  Get help right away if you have:  · A racing heart and shortness of breath.  · Thoughts of hurting yourself or others.  If you ever feel like you may hurt yourself or others, or have thoughts about taking your own life, get help right away. You can go to your nearest emergency department or call:  · Your local emergency services (911 in the [place]  · A suicide crisis helpline, such as the National Suicide Prevention Lifeline at 1-363.823.6447. This is open 24 hours a day.  Summary  · Taking steps to learn and use tension reduction techniques can help calm you and help prevent triggering an anxiety reaction.  · When used together, medicines, psychotherapy, and tension reduction techniques may be the most effective treatment.  · Family, friends, and partners can play a big part in helping you recover from an anxiety disorder.  This information is not intended to replace advice given to you by your health care provider. Make sure you discuss any questions you have with your health care provider.  Document Revised: 05/19/2020 Document Reviewed: 05/19/2020  Elsevier Patient Education © 2020 Elsevier Inc.  Chronic Back Pain  When back pain lasts longer than 3 months, it is called chronic back pain. The cause of your back pain may not be known. Some common causes include:  · Wear and tear (degenerative disease) of the bones, ligaments, or disks in your back.  · Inflammation and stiffness in your back (arthritis).  People who have chronic back  pain often go through certain periods in which the pain is more intense (flare-ups). Many people can learn to manage the pain with home care.  Follow these instructions at home:  Pay attention to any changes in your symptoms. Take these actions to help with your pain:  Activity    · Avoid bending and other activities that make the problem worse.  · Maintain a proper position when standing or sitting:  ? When standing, keep your upper back and neck straight, with your shoulders pulled back. Avoid slouching.  ? When sitting, keep your back straight and relax your shoulders. Do not round your shoulders or pull them backward.  · Do not sit or  one place for long periods of time.  · Take brief periods of rest throughout the day. This will reduce your pain. Resting in a lying or standing position is usually better than sitting to rest.  · When you are resting for longer periods, mix in some mild activity or stretching between periods of rest. This will help to prevent stiffness and pain.  · Get regular exercise. Ask your health care provider what activities are safe for you.  · Do not lift anything that is heavier than 10 lb (4.5 kg). Always use proper lifting technique, which includes:  ? Bending your knees.  ? Keeping the load close to your body.  ? Avoiding twisting.  · Sleep on a firm mattress in a comfortable position. Try lying on your side with your knees slightly bent. If you lie on your back, put a pillow under your knees.  Managing pain  · If directed, apply ice to the painful area. Your health care provider may recommend applying ice during the first 24-48 hours after a flare-up begins.  ? Put ice in a plastic bag.  ? Place a towel between your skin and the bag.  ? Leave the ice on for 20 minutes, 2-3 times per day.  · If directed, apply heat to the affected area as often as told by your health care provider. Use the heat source that your health care provider recommends, such as a moist heat pack or a  heating pad.  ? Place a towel between your skin and the heat source.  ? Leave the heat on for 20-30 minutes.  ? Remove the heat if your skin turns bright red. This is especially important if you are unable to feel pain, heat, or cold. You may have a greater risk of getting burned.  · Try soaking in a warm tub.  · Take over-the-counter and prescription medicines only as told by your health care provider.  · Keep all follow-up visits as told by your health care provider. This is important.  Contact a health care provider if:  · You have pain that is not relieved with rest or medicine.  Get help right away if:  · You have weakness or numbness in one or both of your legs or feet.  · You have trouble controlling your bladder or your bowels.  · You have nausea or vomiting.  · You have pain in your abdomen.  · You have shortness of breath or you faint.  This information is not intended to replace advice given to you by your health care provider. Make sure you discuss any questions you have with your health care provider.  Document Revised: 04/09/2020 Document Reviewed: 06/27/2018  Elsevier Patient Education © 2020 Elsevier Inc.

## 2021-01-06 ENCOUNTER — TELEPHONE (OUTPATIENT)
Dept: ENDOCRINOLOGY | Facility: CLINIC | Age: 56
End: 2021-01-06

## 2021-01-06 DIAGNOSIS — E06.3 HYPOTHYROIDISM DUE TO HASHIMOTO'S THYROIDITIS: ICD-10-CM

## 2021-01-06 DIAGNOSIS — E03.8 HYPOTHYROIDISM DUE TO HASHIMOTO'S THYROIDITIS: ICD-10-CM

## 2021-01-06 NOTE — TELEPHONE ENCOUNTER
Pt only has 2 pills left  Please call in for pt her levothyroxine 88 mcg  [facility]    pts number  [phone]

## 2021-01-07 RX ORDER — LEVOTHYROXINE SODIUM 88 UG/1
88 TABLET ORAL DAILY
Qty: 90 TABLET | Refills: 0 | Status: SHIPPED | OUTPATIENT
Start: 2021-01-07 | End: 2022-05-13 | Stop reason: SDUPTHER

## 2021-01-08 NOTE — TELEPHONE ENCOUNTER
PATIENTS PHARMACY WILL NOT HAVE LEVOTHYROXINE UNTIL Monday AND SHE WANTS TO KNOW IF IT IS OKAY THAT SHE GOES 2 DAYS WITHOUT TAKING MEDICATION? PLEASE GIVE PT A CALL.

## 2021-01-08 NOTE — TELEPHONE ENCOUNTER
Returned patient call. Advised 1 of 2 options.    1  Call pharmacy ask them to contact another local pharmacy to see if they have it in stock and then transfer it to a pharmacy that has it in stock.  That is unacceptable. Levothyroixine is a very common RX and they should do that for her.  2 I have left samples at  for

## 2021-01-20 ENCOUNTER — TELEPHONE (OUTPATIENT)
Dept: NEUROSURGERY | Facility: CLINIC | Age: 56
End: 2021-01-20

## 2021-01-20 NOTE — TELEPHONE ENCOUNTER
Caller: ROXANNE GARCIA    Relationship to patient: PT    Best call back number: [phone]    Chief complaint: WORSENING BACK PAIN, PT IS HAVING DIFFICULTY MOVING FROM SITTING TO STANDING, LYING ON HER SIDE RELIEVES IT SOME.    Type of visit: TELEPHONE VISIT    Requested date: ASAP     If rescheduling, when is the original appointment: N/A    Additional notes: PT MISSED HER 12/16/20 FOLLOW UP WITH DR HOLLINS. PT COMPLETED SOME PHYSICAL THERAPY 4-6 VISITS WITH NO RELIEF, HAS BEEN SEEING THE [facility] BUT YESTERDAY'S VISIT MADE PT'S PAIN WORSE. PT HAS NOT COMPLETED ANY APPTS WITH PAIN MANAGEMENT YET. PT STATES SHE IS NOT SUPPOSED TO TAKE IBUPROFEN, BUT 2 TYLENOL AND 2 IBUPROFEN IS THE ONLY THING THAT HAS HELPED SOME. TYLENOL ALONE DOES NOT RELIEVE PAIN.    PT WOULD PREFER A TELEPHONE VISIT DUE TO COVID CONCERNS. PLEASE CALL PT TO ADVISE IF TELEHEALTH IS POSSIBLE OR IF SHE NEEDS A FACE TO FACE VISIT.    THANK YOU.

## 2021-01-26 ENCOUNTER — TELEPHONE (OUTPATIENT)
Dept: NEUROSURGERY | Facility: CLINIC | Age: 56
End: 2021-01-26

## 2021-01-26 NOTE — TELEPHONE ENCOUNTER
PATIENT WOULD LIKE TO RESCHEDULE HER TELE PHONE VISIT.  HER PHONE WAS DRAINED OF BATTERY AND SHE WAS UNAWARE.  SHE ASK IF THERE WAS ANYTHING AVAILABLE FOR THIS Friday TO NEXT WEEK.  PLEASE ADVISE.    [phone]

## 2021-02-16 ENCOUNTER — OFFICE VISIT (OUTPATIENT)
Dept: FAMILY MEDICINE CLINIC | Facility: CLINIC | Age: 56
End: 2021-02-16

## 2021-02-16 DIAGNOSIS — F41.9 ANXIETY: Chronic | ICD-10-CM

## 2021-02-16 DIAGNOSIS — R73.03 PREDIABETES: Chronic | ICD-10-CM

## 2021-02-16 DIAGNOSIS — M54.50 MIDLINE LOW BACK PAIN WITHOUT SCIATICA, UNSPECIFIED CHRONICITY: ICD-10-CM

## 2021-02-16 DIAGNOSIS — E78.2 MIXED HYPERLIPIDEMIA: Chronic | ICD-10-CM

## 2021-02-16 DIAGNOSIS — E06.3 HASHIMOTO'S THYROIDITIS: Primary | Chronic | ICD-10-CM

## 2021-02-16 PROBLEM — K76.0 FATTY LIVER: Chronic | Status: ACTIVE | Noted: 2018-07-10

## 2021-02-16 PROCEDURE — 99214 OFFICE O/P EST MOD 30 MIN: CPT | Performed by: FAMILY MEDICINE

## 2021-02-16 RX ORDER — CYCLOBENZAPRINE HCL 10 MG
10 TABLET ORAL 2 TIMES DAILY PRN
Qty: 60 TABLET | Refills: 1 | Status: SHIPPED | OUTPATIENT
Start: 2021-02-16 | End: 2021-05-19 | Stop reason: SDUPTHER

## 2021-02-16 RX ORDER — ALPRAZOLAM 0.5 MG/1
0.5 TABLET ORAL DAILY PRN
Qty: 30 TABLET | Refills: 0 | Status: SHIPPED | OUTPATIENT
Start: 2021-02-16 | End: 2021-04-01 | Stop reason: SDUPTHER

## 2021-02-16 NOTE — PROGRESS NOTES
Telemedicine video visit  Consent obtained  Unable to complete visit using a video connection to the patient. A phone visit was used to complete this visits. Total time of discussion was 30 minutes.    Chief Complaint  No chief complaint on file.    Subjective          Brigid Coelho presents to [facility]   Has had increased low back pain. [facility]. Will be starting PT. Has been seeing neurosurgery.   Needs rf of meds.  Would like referred to behavioral health for counseling. On Zoloft and xanax.  Due for labs. Has a history of HL and prediabetes. Stable off meds.  Follows with endo for Hashimotos.    Anxiety  Presents for follow-up visit. Symptoms include excessive worry, nervous/anxious behavior and panic. Patient reports no chest pain, depressed mood, insomnia, irritability, malaise, palpitations, restlessness, shortness of breath or suicidal ideas. Symptoms occur rarely. The severity of symptoms is mild. The patient sleeps 7 hours per night. The quality of sleep is good. Nighttime awakenings: occasional.     Compliance with medications is %.       Review of Systems   Constitutional: Negative.  Negative for irritability.   HENT: Negative.    Eyes: Negative.    Respiratory: Negative.  Negative for shortness of breath.    Cardiovascular: Negative.  Negative for chest pain and palpitations.   Gastrointestinal: Negative.    Endocrine: Negative.    Genitourinary: Negative.    Musculoskeletal: Positive for back pain.   Skin: Negative.    Allergic/Immunologic: Negative.    Neurological: Negative.    Hematological: Negative.    Psychiatric/Behavioral: Negative for suicidal ideas and depressed mood. The patient is nervous/anxious. The patient does not have insomnia.      Objective   Vital Signs:   There were no vitals taken for this visit.    Physical Exam  Vitals signs and nursing note reviewed.   Constitutional:       General: She is not in acute  distress.     Appearance: She is well-developed. She is not diaphoretic.   HENT:      Right Ear: External ear normal.      Left Ear: External ear normal.      Nose: Nose normal.   Eyes:      Conjunctiva/sclera: Conjunctivae normal.      Pupils: Pupils are equal, round, and reactive to light.   Neck:      Musculoskeletal: Normal range of motion and neck supple.      Thyroid: No thyromegaly.   Cardiovascular:      Rate and Rhythm: Normal rate and regular rhythm.      Heart sounds: Normal heart sounds. No murmur.   Pulmonary:      Effort: Pulmonary effort is normal. No respiratory distress.      Breath sounds: Normal breath sounds. No wheezing.   Abdominal:      General: Bowel sounds are normal. There is no distension.      Palpations: Abdomen is soft. There is no mass.      Tenderness: There is no abdominal tenderness. There is no guarding or rebound.      Hernia: No hernia is present.   Musculoskeletal: Normal range of motion.         General: No tenderness.   Lymphadenopathy:      Cervical: No cervical adenopathy.   Skin:     General: Skin is warm and dry.      Coloration: Skin is not pale.      Findings: No erythema or rash.   Neurological:      Mental Status: She is alert and oriented to person, place, and time.      Deep Tendon Reflexes: Reflexes are normal and symmetric.   Psychiatric:         Behavior: Behavior normal.         Thought Content: Thought content normal.         Judgment: Judgment normal.        Result Review :                 Assessment and Plan    Diagnoses and all orders for this visit:    1. Hashimoto's thyroiditis (Primary)  -     TSH; Future  -     T4, Free; Future    2. Midline low back pain without sciatica, unspecified chronicity  -     cyclobenzaprine (FLEXERIL) 10 MG tablet; Take 1 tablet by mouth 2 (Two) Times a Day As Needed for Muscle Spasms.  Dispense: 60 tablet; Refill: 1  -     Diclofenac Sodium (VOLTAREN) 1 % gel gel; Apply 4 g topically to the appropriate area as directed 3  (Three) Times a Day.  Dispense: 150 g; Refill: 1    3. Anxiety  -     Ambulatory Referral to Behavioral Health  -     sertraline (Zoloft) 50 MG tablet; Take 1 tablet by mouth Daily.  Dispense: 90 tablet; Refill: 0  -     ALPRAZolam (Xanax) 0.5 MG tablet; Take 1 tablet by mouth Daily As Needed for Anxiety.  Dispense: 30 tablet; Refill: 0    4. Prediabetes  -     Hemoglobin A1c; Future    5. Mixed hyperlipidemia  -     Comprehensive Metabolic Panel; Future  -     CBC & Differential; Future  -     Lipid Panel; Future        Follow Up   No follow-ups on file.  Patient was given instructions and counseling regarding her condition or for health maintenance advice. Please see specific information pulled into the AVS if appropriate.

## 2021-04-01 ENCOUNTER — TELEPHONE (OUTPATIENT)
Dept: FAMILY MEDICINE CLINIC | Facility: CLINIC | Age: 56
End: 2021-04-01

## 2021-04-01 DIAGNOSIS — F41.9 ANXIETY: Chronic | ICD-10-CM

## 2021-04-01 RX ORDER — ALPRAZOLAM 0.5 MG/1
0.5 TABLET ORAL DAILY PRN
Qty: 30 TABLET | Refills: 0 | Status: SHIPPED | OUTPATIENT
Start: 2021-04-01 | End: 2021-05-19 | Stop reason: SDUPTHER

## 2021-04-01 RX ORDER — ALPRAZOLAM 0.5 MG/1
0.5 TABLET ORAL DAILY PRN
Qty: 30 TABLET | Refills: 0 | Status: CANCELLED | OUTPATIENT
Start: 2021-04-01

## 2021-04-01 NOTE — TELEPHONE ENCOUNTER
Caller: Brigid Coelho    Relationship: Self    Best call back number: [phone]    Medication needed:   Requested Prescriptions     Pending Prescriptions Disp Refills   • ALPRAZolam (Xanax) 0.5 MG tablet 30 tablet 0     Sig: Take 1 tablet by mouth Daily As Needed for Anxiety.   • sertraline (Zoloft) 50 MG tablet 90 tablet 0     Sig: Take 1 tablet by mouth Daily.       When do you need the refill by: 4/1/21    Does the patient have less than a 3 day supply:  [] Yes  [x] No    What is the patient's preferred pharmacy: [address] AT [facility] [address] - [phone]  - [phone] FX         ADDITIONAL QUESTION: PATIENT WOULD LIKE TO KNOW IF DR. GIRON THINKS IT IS SAFE FOR HER TO RECEIVE THE COVID - 19 VACCINE. PATIENT STATED SHE IS SCHEDULED FOR THE FIRST DOSE OF THE VACCINE TOMORROW AT [facility]. PATIENT IS CONCERNED ABOUT THIS DUE TO HAVING ULCERATIVE COLITIS AND HASHIMOTO'S THYROIDITIS.

## 2021-04-18 PROCEDURE — U0004 COV-19 TEST NON-CDC HGH THRU: HCPCS | Performed by: NURSE PRACTITIONER

## 2021-04-20 ENCOUNTER — TELEPHONE (OUTPATIENT)
Dept: URGENT CARE | Facility: CLINIC | Age: 56
End: 2021-04-20

## 2021-05-11 DIAGNOSIS — F41.9 ANXIETY: Chronic | ICD-10-CM

## 2021-05-11 RX ORDER — ALPRAZOLAM 0.5 MG/1
0.5 TABLET ORAL DAILY PRN
Qty: 30 TABLET | Refills: 0 | Status: CANCELLED | OUTPATIENT
Start: 2021-05-11

## 2021-05-15 ENCOUNTER — HOSPITAL ENCOUNTER (EMERGENCY)
Facility: HOSPITAL | Age: 56
Discharge: HOME | End: 2021-05-15
Attending: EMERGENCY MEDICINE | Admitting: EMERGENCY MEDICINE

## 2021-05-15 ENCOUNTER — APPOINTMENT (OUTPATIENT)
Dept: GENERAL RADIOLOGY | Facility: HOSPITAL | Age: 56
End: 2021-05-15

## 2021-05-15 ENCOUNTER — APPOINTMENT (OUTPATIENT)
Dept: MRI IMAGING | Facility: HOSPITAL | Age: 56
End: 2021-05-15

## 2021-05-15 ENCOUNTER — APPOINTMENT (OUTPATIENT)
Dept: CT IMAGING | Facility: HOSPITAL | Age: 56
End: 2021-05-15

## 2021-05-15 VITALS
WEIGHT: 250 LBS | OXYGEN SATURATION: 94 % | SYSTOLIC BLOOD PRESSURE: 132 MMHG | HEIGHT: 67 IN | BODY MASS INDEX: 39.24 KG/M2 | HEART RATE: 82 BPM | RESPIRATION RATE: 16 BRPM | TEMPERATURE: 98.2 F | DIASTOLIC BLOOD PRESSURE: 89 MMHG

## 2021-05-15 DIAGNOSIS — J01.10 ACUTE NON-RECURRENT FRONTAL SINUSITIS: ICD-10-CM

## 2021-05-15 DIAGNOSIS — R42 DIZZINESS: Primary | ICD-10-CM

## 2021-05-15 LAB
ALBUMIN SERPL-MCNC: 3.8 G/DL (ref 3.5–5.2)
ALBUMIN/GLOB SERPL: 1.2 G/DL
ALP SERPL-CCNC: 85 U/L (ref 39–117)
ALT SERPL W P-5'-P-CCNC: 29 U/L (ref 1–33)
ANION GAP SERPL CALCULATED.3IONS-SCNC: 10 MMOL/L (ref 5–15)
AST SERPL-CCNC: 28 U/L (ref 1–32)
BASOPHILS # BLD AUTO: 0.07 10*3/MM3 (ref 0–0.2)
BASOPHILS NFR BLD AUTO: 0.9 % (ref 0–1.5)
BILIRUB SERPL-MCNC: <0.2 MG/DL (ref 0–1.2)
BUN SERPL-MCNC: 6 MG/DL (ref 6–20)
BUN/CREAT SERPL: 8.5 (ref 7–25)
CALCIUM SPEC-SCNC: 8.9 MG/DL (ref 8.6–10.5)
CHLORIDE SERPL-SCNC: 104 MMOL/L (ref 98–107)
CO2 SERPL-SCNC: 27 MMOL/L (ref 22–29)
CREAT SERPL-MCNC: 0.71 MG/DL (ref 0.57–1)
D DIMER PPP FEU-MCNC: 0.63 MCGFEU/ML (ref 0–0.56)
DEPRECATED RDW RBC AUTO: 45.7 FL (ref 37–54)
EOSINOPHIL # BLD AUTO: 0.25 10*3/MM3 (ref 0–0.4)
EOSINOPHIL NFR BLD AUTO: 3.3 % (ref 0.3–6.2)
ERYTHROCYTE [DISTWIDTH] IN BLOOD BY AUTOMATED COUNT: 15.4 % (ref 12.3–15.4)
FLUAV RNA RESP QL NAA+PROBE: NOT DETECTED
FLUBV RNA RESP QL NAA+PROBE: NOT DETECTED
GFR SERPL CREATININE-BSD FRML MDRD: 85 ML/MIN/1.73
GLOBULIN UR ELPH-MCNC: 3.1 GM/DL
GLUCOSE SERPL-MCNC: 117 MG/DL (ref 65–99)
HCT VFR BLD AUTO: 36.4 % (ref 34–46.6)
HGB BLD-MCNC: 11.1 G/DL (ref 12–15.9)
HOLD SPECIMEN: NORMAL
IMM GRANULOCYTES # BLD AUTO: 0.03 10*3/MM3 (ref 0–0.05)
IMM GRANULOCYTES NFR BLD AUTO: 0.4 % (ref 0–0.5)
LYMPHOCYTES # BLD AUTO: 1.87 10*3/MM3 (ref 0.7–3.1)
LYMPHOCYTES NFR BLD AUTO: 24.3 % (ref 19.6–45.3)
MCH RBC QN AUTO: 25.2 PG (ref 26.6–33)
MCHC RBC AUTO-ENTMCNC: 30.5 G/DL (ref 31.5–35.7)
MCV RBC AUTO: 82.7 FL (ref 79–97)
MONOCYTES # BLD AUTO: 0.46 10*3/MM3 (ref 0.1–0.9)
MONOCYTES NFR BLD AUTO: 6 % (ref 5–12)
NEUTROPHILS NFR BLD AUTO: 5.01 10*3/MM3 (ref 1.7–7)
NEUTROPHILS NFR BLD AUTO: 65.1 % (ref 42.7–76)
NRBC BLD AUTO-RTO: 0 /100 WBC (ref 0–0.2)
PLATELET # BLD AUTO: 365 10*3/MM3 (ref 140–450)
PMV BLD AUTO: 10.2 FL (ref 6–12)
POTASSIUM SERPL-SCNC: 3.5 MMOL/L (ref 3.5–5.2)
PROT SERPL-MCNC: 6.9 G/DL (ref 6–8.5)
RBC # BLD AUTO: 4.4 10*6/MM3 (ref 3.77–5.28)
SARS-COV-2 RNA RESP QL NAA+PROBE: NOT DETECTED
SODIUM SERPL-SCNC: 141 MMOL/L (ref 136–145)
TROPONIN T SERPL-MCNC: <0.01 NG/ML (ref 0–0.03)
WBC # BLD AUTO: 7.69 10*3/MM3 (ref 3.4–10.8)
WHOLE BLOOD HOLD SPECIMEN: NORMAL
WHOLE BLOOD HOLD SPECIMEN: NORMAL

## 2021-05-15 PROCEDURE — 87636 SARSCOV2 & INF A&B AMP PRB: CPT | Performed by: EMERGENCY MEDICINE

## 2021-05-15 PROCEDURE — 85025 COMPLETE CBC W/AUTO DIFF WBC: CPT | Performed by: EMERGENCY MEDICINE

## 2021-05-15 PROCEDURE — 71275 CT ANGIOGRAPHY CHEST: CPT

## 2021-05-15 PROCEDURE — 93005 ELECTROCARDIOGRAM TRACING: CPT | Performed by: EMERGENCY MEDICINE

## 2021-05-15 PROCEDURE — 96376 TX/PRO/DX INJ SAME DRUG ADON: CPT

## 2021-05-15 PROCEDURE — 99284 EMERGENCY DEPT VISIT MOD MDM: CPT

## 2021-05-15 PROCEDURE — 84484 ASSAY OF TROPONIN QUANT: CPT | Performed by: EMERGENCY MEDICINE

## 2021-05-15 PROCEDURE — 80053 COMPREHEN METABOLIC PANEL: CPT | Performed by: EMERGENCY MEDICINE

## 2021-05-15 PROCEDURE — 71045 X-RAY EXAM CHEST 1 VIEW: CPT

## 2021-05-15 PROCEDURE — 85379 FIBRIN DEGRADATION QUANT: CPT | Performed by: EMERGENCY MEDICINE

## 2021-05-15 PROCEDURE — 25010000002 LORAZEPAM PER 2 MG: Performed by: EMERGENCY MEDICINE

## 2021-05-15 PROCEDURE — 0 IOPAMIDOL PER 1 ML: Performed by: EMERGENCY MEDICINE

## 2021-05-15 PROCEDURE — 70551 MRI BRAIN STEM W/O DYE: CPT

## 2021-05-15 PROCEDURE — 96374 THER/PROPH/DIAG INJ IV PUSH: CPT

## 2021-05-15 PROCEDURE — 70450 CT HEAD/BRAIN W/O DYE: CPT

## 2021-05-15 RX ORDER — BIOTIN 1000 MCG
TABLET,CHEWABLE ORAL
COMMUNITY

## 2021-05-15 RX ORDER — MECLIZINE HYDROCHLORIDE 25 MG/1
25 TABLET ORAL EVERY 6 HOURS PRN
Qty: 20 TABLET | Refills: 0 | Status: SHIPPED | OUTPATIENT
Start: 2021-05-15 | End: 2022-02-24

## 2021-05-15 RX ORDER — LORAZEPAM 2 MG/ML
1 INJECTION INTRAMUSCULAR ONCE
Status: COMPLETED | OUTPATIENT
Start: 2021-05-15 | End: 2021-05-15

## 2021-05-15 RX ORDER — SODIUM CHLORIDE 0.9 % (FLUSH) 0.9 %
10 SYRINGE (ML) INJECTION AS NEEDED
Status: DISCONTINUED | OUTPATIENT
Start: 2021-05-15 | End: 2021-05-16 | Stop reason: HOSPADM

## 2021-05-15 RX ORDER — MECLIZINE HYDROCHLORIDE 25 MG/1
25 TABLET ORAL ONCE
Status: COMPLETED | OUTPATIENT
Start: 2021-05-15 | End: 2021-05-15

## 2021-05-15 RX ADMIN — IOPAMIDOL 77 ML: 755 INJECTION, SOLUTION INTRAVENOUS at 18:29

## 2021-05-15 RX ADMIN — MECLIZINE HYDROCHLORIDE 25 MG: 25 TABLET ORAL at 16:49

## 2021-05-15 RX ADMIN — LORAZEPAM 1 MG: 2 INJECTION INTRAMUSCULAR; INTRAVENOUS at 19:54

## 2021-05-15 RX ADMIN — LORAZEPAM 1 MG: 2 INJECTION INTRAMUSCULAR; INTRAVENOUS at 19:23

## 2021-05-16 ENCOUNTER — NURSE TRIAGE (OUTPATIENT)
Dept: CALL CENTER | Facility: HOSPITAL | Age: 56
End: 2021-05-16

## 2021-05-16 NOTE — TELEPHONE ENCOUNTER
"    Reason for Disposition  • Health Information question, no triage required and triager able to answer question    Additional Information  • Negative: [1] Caller is not with the adult (patient) AND [2] reporting urgent symptoms  • Negative: Lab result questions  • Negative: Medication questions  • Negative: Caller can't be reached by phone  • Negative: Caller has already spoken to PCP or another triager  • Negative: RN needs further essential information from caller in order to complete triage  • Negative: Requesting regular office appointment  • Negative: [1] Caller requesting NON-URGENT health information AND [2] PCP's office is the best resource  • Negative: General information question, no triage required and triager able to answer question    Answer Assessment - Initial Assessment Questions  1. REASON FOR CALL or QUESTION: \"What is your reason for calling today?\" or \"How can I best help you?\" or \"What question do you have that I can help answer?\"      Covid tested yesterday at the ER. Was discharged before resulted. Results negative.    Protocols used: INFORMATION ONLY CALL - NO TRIAGE-ADULT-      "

## 2021-05-17 LAB
QT INTERVAL: 366 MS
QTC INTERVAL: 440 MS

## 2021-05-19 ENCOUNTER — OFFICE VISIT (OUTPATIENT)
Dept: FAMILY MEDICINE CLINIC | Facility: CLINIC | Age: 56
End: 2021-05-19

## 2021-05-19 VITALS
DIASTOLIC BLOOD PRESSURE: 80 MMHG | HEART RATE: 90 BPM | BODY MASS INDEX: 40.97 KG/M2 | HEIGHT: 67 IN | SYSTOLIC BLOOD PRESSURE: 114 MMHG | OXYGEN SATURATION: 99 % | TEMPERATURE: 98 F | RESPIRATION RATE: 18 BRPM | WEIGHT: 261 LBS

## 2021-05-19 DIAGNOSIS — J32.9 RECURRENT SINUS INFECTIONS: Chronic | ICD-10-CM

## 2021-05-19 DIAGNOSIS — M54.50 MIDLINE LOW BACK PAIN WITHOUT SCIATICA, UNSPECIFIED CHRONICITY: Chronic | ICD-10-CM

## 2021-05-19 DIAGNOSIS — J06.9 ACUTE URI: Primary | ICD-10-CM

## 2021-05-19 DIAGNOSIS — F41.9 ANXIETY: Chronic | ICD-10-CM

## 2021-05-19 PROCEDURE — 99214 OFFICE O/P EST MOD 30 MIN: CPT | Performed by: FAMILY MEDICINE

## 2021-05-19 RX ORDER — AMOXICILLIN 500 MG/1
500 CAPSULE ORAL 3 TIMES DAILY
Qty: 30 CAPSULE | Refills: 0 | Status: SHIPPED | OUTPATIENT
Start: 2021-05-19 | End: 2021-07-07

## 2021-05-19 RX ORDER — ALPRAZOLAM 0.5 MG/1
0.5 TABLET ORAL DAILY PRN
Qty: 30 TABLET | Refills: 0 | Status: SHIPPED | OUTPATIENT
Start: 2021-05-19 | End: 2021-07-07 | Stop reason: SDUPTHER

## 2021-05-19 RX ORDER — CYCLOBENZAPRINE HCL 10 MG
10 TABLET ORAL 2 TIMES DAILY PRN
Qty: 60 TABLET | Refills: 1 | Status: SHIPPED | OUTPATIENT
Start: 2021-05-19 | End: 2021-09-08 | Stop reason: SDUPTHER

## 2021-05-19 NOTE — PROGRESS NOTES
"Chief Complaint  Anxiety (follow up and refill Xanax.) and Sinus Problem (drainage)    Subjective          Brigid Coelho presents to [facility] for   Has had recent sinus infection. Was seen at ER. Has had dizziness, congestion for 1 week.Took meclizine some relief. Has discolored drainage and some cough. Takes Claritin and Mucinex some relief.    Needs rf of meds.  Uses xanax prn for anxiety. Continues on Zoloft with relief.    Continues to have occasional back pain. Needs refill of flexeril and diclofenac.  Anxiety  Presents for follow-up visit. Symptoms include excessive worry, nervous/anxious behavior and panic. Patient reports no chest pain, depressed mood, insomnia, irritability, malaise, palpitations, restlessness, shortness of breath or suicidal ideas. Symptoms occur rarely. The severity of symptoms is mild. The patient sleeps 7 hours per night. The quality of sleep is good. Nighttime awakenings: occasional.     Compliance with medications is %.       Objective   Vital Signs:   /80 (BP Location: Right arm, Patient Position: Sitting, Cuff Size: Adult)   Pulse 90   Temp 98 °F (36.7 °C) (Temporal)   Resp 18   Ht 170.2 cm (67\")   Wt 118 kg (261 lb)   SpO2 99%   BMI 40.88 kg/m²       Review of Systems   Constitutional: Negative for fatigue, fever, irritability and unexpected weight change.   HENT: Positive for congestion, rhinorrhea and sinus pressure. Negative for sneezing and sore throat.    Respiratory: Positive for cough. Negative for chest tightness, shortness of breath and wheezing.    Cardiovascular: Negative for chest pain, palpitations and leg swelling.   Musculoskeletal: Positive for back pain. Negative for arthralgias.   Psychiatric/Behavioral: Negative for suicidal ideas. The patient is nervous/anxious. The patient does not have insomnia.      Physical Exam  Vitals and nursing note reviewed.   Constitutional:       General: She is not in " acute distress.     Appearance: She is well-developed. She is not diaphoretic.   HENT:      Right Ear: External ear normal.      Left Ear: External ear normal.      Nose: Nose normal.   Eyes:      Conjunctiva/sclera: Conjunctivae normal.      Pupils: Pupils are equal, round, and reactive to light.   Neck:      Thyroid: No thyromegaly.   Cardiovascular:      Rate and Rhythm: Normal rate and regular rhythm.      Heart sounds: Normal heart sounds. No murmur heard.     Pulmonary:      Effort: Pulmonary effort is normal. No respiratory distress.      Breath sounds: Normal breath sounds. No wheezing.   Abdominal:      General: Bowel sounds are normal. There is no distension.      Palpations: Abdomen is soft. There is no mass.      Tenderness: There is no abdominal tenderness. There is no guarding or rebound.      Hernia: No hernia is present.   Musculoskeletal:         General: No tenderness. Normal range of motion.      Cervical back: Normal range of motion and neck supple.   Lymphadenopathy:      Cervical: No cervical adenopathy.   Skin:     General: Skin is warm and dry.      Coloration: Skin is not pale.      Findings: No erythema or rash.   Neurological:      Mental Status: She is alert and oriented to person, place, and time.      Deep Tendon Reflexes: Reflexes are normal and symmetric.   Psychiatric:         Behavior: Behavior normal.         Thought Content: Thought content normal.         Judgment: Judgment normal.        Result Review :                 Assessment and Plan {CC Problem List  Visit Diagnosis  ROS  Review (Popup)  Health Maintenance  Quality  BestPractice  Medications  SmartSets  SnapShot Encounters  Media :23}   Problem List Items Addressed This Visit        Mental Health    Anxiety    Relevant Medications    ALPRAZolam (Xanax) 0.5 MG tablet      Other Visit Diagnoses     Acute URI    -  Primary    Relevant Medications    amoxicillin (AMOXIL) 500 MG capsule    Strain of lumbar region,  subsequent encounter        Midline low back pain without sciatica, unspecified chronicity        Relevant Medications    cyclobenzaprine (FLEXERIL) 10 MG tablet    Diclofenac Sodium (VOLTAREN) 1 % gel gel    Recurrent sinus infections        Relevant Orders    Ambulatory Referral to ENT (Otolaryngology) (Completed)          Follow Up   No follow-ups on file.  Patient was given instructions and counseling regarding her condition or for health maintenance advice. Please see specific information pulled into the AVS if appropriate.

## 2021-05-27 ENCOUNTER — PATIENT OUTREACH (OUTPATIENT)
Dept: CASE MANAGEMENT | Facility: OTHER | Age: 56
End: 2021-05-27

## 2021-05-27 NOTE — OUTREACH NOTE
Patient Outreach Note    Attempted to contact pt regarding ED visit 5/15/21 with chief c/o listed as dizziness and to assess for any case management needs.  4 attempts were made and all 4 attempts were unsuccessful.  Was able to leave message on all 4 attempts.  Of note, pt was seen in PCP, Dr. Kennedy' office on 5/19/21.     Teresita Castro RN  Ambulatory     5/27/2021, 16:12 EDT

## 2021-06-30 DIAGNOSIS — F41.9 ANXIETY: Chronic | ICD-10-CM

## 2021-06-30 RX ORDER — ALPRAZOLAM 0.5 MG/1
0.5 TABLET ORAL DAILY PRN
Qty: 30 TABLET | Refills: 0 | OUTPATIENT
Start: 2021-06-30

## 2021-07-01 NOTE — TELEPHONE ENCOUNTER
PATIENT CHECKING ON THE STATUS OF HER REFILL. CAN SOMEONE PLEASE CALL PATIENT BACK TO LET HER KNOW THE STATUS?    PLEASE CALL PATIENT BACK -[phone]    PATIENT HAS ONLY 2 PILLS LEFT OF MEDICATIONS.

## 2021-07-07 ENCOUNTER — OFFICE VISIT (OUTPATIENT)
Dept: FAMILY MEDICINE CLINIC | Facility: CLINIC | Age: 56
End: 2021-07-07

## 2021-07-07 VITALS
WEIGHT: 261 LBS | BODY MASS INDEX: 40.97 KG/M2 | SYSTOLIC BLOOD PRESSURE: 126 MMHG | HEIGHT: 67 IN | TEMPERATURE: 97.5 F | DIASTOLIC BLOOD PRESSURE: 88 MMHG | RESPIRATION RATE: 18 BRPM | HEART RATE: 76 BPM | OXYGEN SATURATION: 98 %

## 2021-07-07 DIAGNOSIS — E11.9 TYPE 2 DIABETES MELLITUS WITHOUT COMPLICATION, WITHOUT LONG-TERM CURRENT USE OF INSULIN (HCC): Chronic | ICD-10-CM

## 2021-07-07 DIAGNOSIS — F41.9 ANXIETY: Primary | Chronic | ICD-10-CM

## 2021-07-07 DIAGNOSIS — I83.811 VARICOSE VEINS OF LEG WITH PAIN, RIGHT: ICD-10-CM

## 2021-07-07 DIAGNOSIS — E03.9 ACQUIRED HYPOTHYROIDISM: Chronic | ICD-10-CM

## 2021-07-07 PROCEDURE — 99214 OFFICE O/P EST MOD 30 MIN: CPT | Performed by: FAMILY MEDICINE

## 2021-07-07 RX ORDER — MESALAMINE 0.38 G/1
CAPSULE, EXTENDED RELEASE ORAL
COMMUNITY
Start: 2021-04-02 | End: 2021-08-28 | Stop reason: SDUPTHER

## 2021-07-07 RX ORDER — MONTELUKAST SODIUM 10 MG/1
TABLET ORAL
COMMUNITY
Start: 2021-04-02 | End: 2021-08-28 | Stop reason: SDUPTHER

## 2021-07-07 RX ORDER — ALPRAZOLAM 0.5 MG/1
0.5 TABLET ORAL DAILY PRN
Qty: 30 TABLET | Refills: 0 | Status: SHIPPED | OUTPATIENT
Start: 2021-07-07 | End: 2021-08-18 | Stop reason: SDUPTHER

## 2021-07-07 RX ORDER — LEVOTHYROXINE SODIUM 88 UG/1
TABLET ORAL
COMMUNITY
Start: 2021-04-02 | End: 2021-07-13 | Stop reason: SDUPTHER

## 2021-07-07 RX ORDER — METFORMIN HYDROCHLORIDE 500 MG/1
TABLET, EXTENDED RELEASE ORAL
COMMUNITY
Start: 2021-04-02 | End: 2021-08-28 | Stop reason: SINTOL

## 2021-07-08 LAB
ALBUMIN SERPL-MCNC: 4.4 G/DL (ref 3.5–5.2)
ALBUMIN/GLOB SERPL: 1.5 G/DL
ALP SERPL-CCNC: 88 U/L (ref 39–117)
ALT SERPL-CCNC: 27 U/L (ref 1–33)
AST SERPL-CCNC: 24 U/L (ref 1–32)
BASOPHILS # BLD AUTO: 0.08 10*3/MM3 (ref 0–0.2)
BASOPHILS NFR BLD AUTO: 0.9 % (ref 0–1.5)
BILIRUB SERPL-MCNC: <0.2 MG/DL (ref 0–1.2)
BUN SERPL-MCNC: 9 MG/DL (ref 6–20)
BUN/CREAT SERPL: 12.9 (ref 7–25)
CALCIUM SERPL-MCNC: 9.6 MG/DL (ref 8.6–10.5)
CHLORIDE SERPL-SCNC: 103 MMOL/L (ref 98–107)
CHOLEST SERPL-MCNC: 182 MG/DL (ref 0–200)
CO2 SERPL-SCNC: 25.9 MMOL/L (ref 22–29)
CREAT SERPL-MCNC: 0.7 MG/DL (ref 0.57–1)
EOSINOPHIL # BLD AUTO: 0.31 10*3/MM3 (ref 0–0.4)
EOSINOPHIL NFR BLD AUTO: 3.3 % (ref 0.3–6.2)
ERYTHROCYTE [DISTWIDTH] IN BLOOD BY AUTOMATED COUNT: 15.6 % (ref 12.3–15.4)
GLOBULIN SER CALC-MCNC: 2.9 GM/DL
GLUCOSE SERPL-MCNC: 70 MG/DL (ref 65–99)
HBA1C MFR BLD: 5.9 % (ref 4.8–5.6)
HCT VFR BLD AUTO: 36.2 % (ref 34–46.6)
HDLC SERPL-MCNC: 37 MG/DL (ref 40–60)
HGB BLD-MCNC: 11.5 G/DL (ref 12–15.9)
IMM GRANULOCYTES # BLD AUTO: 0.03 10*3/MM3 (ref 0–0.05)
IMM GRANULOCYTES NFR BLD AUTO: 0.3 % (ref 0–0.5)
LDLC SERPL CALC-MCNC: 97 MG/DL (ref 0–100)
LYMPHOCYTES # BLD AUTO: 2.06 10*3/MM3 (ref 0.7–3.1)
LYMPHOCYTES NFR BLD AUTO: 22.1 % (ref 19.6–45.3)
MCH RBC QN AUTO: 24.4 PG (ref 26.6–33)
MCHC RBC AUTO-ENTMCNC: 31.8 G/DL (ref 31.5–35.7)
MCV RBC AUTO: 76.9 FL (ref 79–97)
MONOCYTES # BLD AUTO: 0.71 10*3/MM3 (ref 0.1–0.9)
MONOCYTES NFR BLD AUTO: 7.6 % (ref 5–12)
NEUTROPHILS # BLD AUTO: 6.13 10*3/MM3 (ref 1.7–7)
NEUTROPHILS NFR BLD AUTO: 65.8 % (ref 42.7–76)
NRBC BLD AUTO-RTO: 0 /100 WBC (ref 0–0.2)
PLATELET # BLD AUTO: 403 10*3/MM3 (ref 140–450)
POTASSIUM SERPL-SCNC: 5.2 MMOL/L (ref 3.5–5.2)
PROT SERPL-MCNC: 7.3 G/DL (ref 6–8.5)
RBC # BLD AUTO: 4.71 10*6/MM3 (ref 3.77–5.28)
SODIUM SERPL-SCNC: 138 MMOL/L (ref 136–145)
TRIGL SERPL-MCNC: 283 MG/DL (ref 0–150)
TSH SERPL DL<=0.005 MIU/L-ACNC: 3.13 UIU/ML (ref 0.27–4.2)
VLDLC SERPL CALC-MCNC: 48 MG/DL (ref 5–40)
WBC # BLD AUTO: 9.32 10*3/MM3 (ref 3.4–10.8)

## 2021-07-13 ENCOUNTER — TELEPHONE (OUTPATIENT)
Dept: ENDOCRINOLOGY | Facility: CLINIC | Age: 56
End: 2021-07-13

## 2021-07-13 DIAGNOSIS — E06.3 HYPOTHYROIDISM DUE TO HASHIMOTO'S THYROIDITIS: ICD-10-CM

## 2021-07-13 DIAGNOSIS — E03.8 HYPOTHYROIDISM DUE TO HASHIMOTO'S THYROIDITIS: ICD-10-CM

## 2021-07-13 DIAGNOSIS — E03.9 ACQUIRED HYPOTHYROIDISM: Chronic | ICD-10-CM

## 2021-07-13 RX ORDER — LEVOTHYROXINE SODIUM 88 UG/1
88 TABLET ORAL DAILY
Qty: 90 TABLET | Refills: 0 | Status: CANCELLED | OUTPATIENT
Start: 2021-07-13 | End: 2022-07-13

## 2021-07-13 RX ORDER — LEVOTHYROXINE SODIUM 88 UG/1
TABLET ORAL
Qty: 90 TABLET | Refills: 3 | Status: SHIPPED | OUTPATIENT
Start: 2021-07-13 | End: 2021-08-28

## 2021-07-13 NOTE — TELEPHONE ENCOUNTER
PT CALLED STATING HE IS OUT OF MEDICATION AND SHE HAD HER LABS DRAWN AT ANOTHER  FACILITY. SHE IS UNDER THE IMPRESSION HER LABS ARE ABNORMAL AND A MEDICATION CHANGE IS NEEDED. PLEASE HAVE DR REVIEW LABS AND SEND IN MEDS. THANK YOU

## 2021-08-05 PROCEDURE — U0004 COV-19 TEST NON-CDC HGH THRU: HCPCS | Performed by: PHYSICIAN ASSISTANT

## 2021-08-06 ENCOUNTER — TELEPHONE (OUTPATIENT)
Dept: URGENT CARE | Facility: CLINIC | Age: 56
End: 2021-08-06

## 2021-08-18 ENCOUNTER — TELEPHONE (OUTPATIENT)
Dept: FAMILY MEDICINE CLINIC | Facility: CLINIC | Age: 56
End: 2021-08-18

## 2021-08-18 DIAGNOSIS — F41.9 ANXIETY: Chronic | ICD-10-CM

## 2021-08-18 NOTE — TELEPHONE ENCOUNTER
PATIENT CONCERNED ABOUT NOT GETTING COVID DUE TO AUTOIMMUNE STATUS.  WOULD LIKE PHONE CALL TO DISCUSS THIS. SHOULD SHE WAIT FOR NEW MEDICATION COMING OUT OR PILL COMING OUT?    PLEASE CALL [phone]

## 2021-08-18 NOTE — TELEPHONE ENCOUNTER
Caller: Brigid Coelho    Relationship: Self    Best call back number: [phone]    Medication needed:   Requested Prescriptions     Pending Prescriptions Disp Refills   • ALPRAZolam (Xanax) 0.5 MG tablet 30 tablet 0     Sig: Take 1 tablet by mouth Daily As Needed for Anxiety.       What additional details did the patient provide when requesting the medication: PATIENT OUT OF MEDICATION    Does the patient have less than a 3 day supply:  [x] Yes  [] No    What is the patient's preferred pharmacy: [facility] [address] AT [facility] & [address] - [phone]  - [phone] FX

## 2021-08-19 RX ORDER — ALPRAZOLAM 0.5 MG/1
0.5 TABLET ORAL DAILY PRN
Qty: 30 TABLET | Refills: 0 | Status: SHIPPED | OUTPATIENT
Start: 2021-08-19 | End: 2021-11-29 | Stop reason: SDUPTHER

## 2021-08-24 NOTE — TELEPHONE ENCOUNTER
Please let pt know that the J&J vaccine is assoc with clots in brain, but the Pfizer and Moderna have been heavily studied and should be safe for her to receive.

## 2021-08-26 ENCOUNTER — TELEPHONE (OUTPATIENT)
Dept: FAMILY MEDICINE CLINIC | Facility: CLINIC | Age: 56
End: 2021-08-26

## 2021-08-26 NOTE — TELEPHONE ENCOUNTER
Caller: Brigid Coelho    Relationship: Self    Best call back number: [phone]    What medication are you requesting: AMOXICILLIN AND DIFLUCAN    What are your current symptoms: COUGHING DARK MUCOUS/SINUS DRAINAGE    How long have you been experiencing symptoms: 3 DAYS AGO  Have you had these symptoms before:    [x] Yes  [] No    Have you been treated for these symptoms before:   [x] Yes  [] No    If a prescription is needed, what is your preferred pharmacy and phone number:    [facility] [phone]    Additional notes:PATIENT BELIEVES SHE HAS ANOTHER UPPER RESPITORY INFECTION. PATIENT IS NOT VACCINATED AND DOESN'T WANT TO COME IN AND THERE WERE NO VIDEO VISITS AVAILABLE. PATIENT ALSO GETS A YEAST INFECTION WHEN TAKING ANTIBIOTIC SO THAT IS WHY SHE IS ASKING FOR DIFLUCAN.

## 2021-08-26 NOTE — TELEPHONE ENCOUNTER
Caller: Brigid Coelho    Relationship: Self    Best call back number: [phone]     What medication are you requesting: ANTIBIOTIC AND DIFLUCAN     What are your current symptoms: COUGHING DARK MUCUS     How long have you been experiencing symptoms: 3 DAYS     Have you had these symptoms before:    [x] Yes  [] No    Have you been treated for these symptoms before:   [x] Yes  [] No    If a prescription is needed, what is your preferred pharmacy and phone number: [facility] [address] [employer] [address] AT [facility] [employer] [address]  - [phone]  - [phone]     Additional notes: MUCUS WAS CLEAR BUT TURNED DARK THIS MORNING. PATIENT BELIEVES SHE HAS A UPPER RESPIRATORY INFECTION AND IS NOT COMFORTABLE GOING TO URGENT TREATMENT DUE TO BEING UNVACCINATED AND HAVING A COMPROMISED IMMUNE SYSTEM. PATIENT STATED SHE WAS UNABLE TO DO A VIDEO VISIT BECAUSE HER PHONE DID NOT HAVE THE CAPABILITY TO HAVE PHONE VISITS AT THIS TIME. PATIENT STATES SHE WILL NEED DIFLUCAN BECAUSE SHE GETS YEAST INFECTIONS WHILE TAKING ANTIBIOTICS.

## 2021-08-28 PROCEDURE — U0004 COV-19 TEST NON-CDC HGH THRU: HCPCS | Performed by: NURSE PRACTITIONER

## 2021-09-08 DIAGNOSIS — F41.9 ANXIETY: Chronic | ICD-10-CM

## 2021-09-08 DIAGNOSIS — M54.50 MIDLINE LOW BACK PAIN WITHOUT SCIATICA, UNSPECIFIED CHRONICITY: Chronic | ICD-10-CM

## 2021-09-08 NOTE — TELEPHONE ENCOUNTER
Caller: Meliton Brigidmehul Naranjo    Relationship: Self    Best call back number: [phone]     Medication needed:   Requested Prescriptions     Pending Prescriptions Disp Refills   • montelukast (SINGULAIR) 10 MG tablet 90 tablet 0     Sig: Take 1 tablet by mouth every night at bedtime.   • mesalamine (APRISO) 0.375 g 24 hr capsule       Sig: Take 1 capsule by mouth As Needed.   • sertraline (Zoloft) 50 MG tablet 90 tablet 0     Sig: Take 1 tablet by mouth Daily.   • cyclobenzaprine (FLEXERIL) 10 MG tablet 60 tablet 1     Sig: Take 1 tablet by mouth 2 (Two) Times a Day As Needed for Muscle Spasms.       When do you need the refill by: ASAP    What additional details did the patient provide when requesting the medication: PATIENT CALLED REQUESTING REFILLS ON ABOVE MEDICATIONS    Does the patient have less than a 3 day supply:  [x] Yes  [] No    What is the patient's preferred pharmacy: [facility] [address] AT [facility] [address] - [phone]  - [phone] FX

## 2021-09-09 RX ORDER — MESALAMINE 0.38 G/1
375 CAPSULE, EXTENDED RELEASE ORAL AS NEEDED
OUTPATIENT
Start: 2021-09-09

## 2021-09-09 RX ORDER — MONTELUKAST SODIUM 10 MG/1
10 TABLET ORAL
Qty: 90 TABLET | Refills: 0 | Status: SHIPPED | OUTPATIENT
Start: 2021-09-09 | End: 2022-01-11

## 2021-09-09 RX ORDER — CYCLOBENZAPRINE HCL 10 MG
10 TABLET ORAL 2 TIMES DAILY PRN
Qty: 60 TABLET | Refills: 1 | Status: SHIPPED | OUTPATIENT
Start: 2021-09-09 | End: 2021-11-29

## 2021-09-21 ENCOUNTER — TELEPHONE (OUTPATIENT)
Dept: ENDOCRINOLOGY | Facility: CLINIC | Age: 56
End: 2021-09-21

## 2021-09-21 NOTE — TELEPHONE ENCOUNTER
PATIENT IS CALLING WANTING TO DISCUSS CONCERNS WITH COVID 19 VACCINE AND HER OWN HEALTH ISSUES WITH AUTO IMMUNE ISSUES. SHE WOULD LIKE A CALL BACK TO DISCUSS CONCERNS. PHONE NUMBER [phone]

## 2021-09-27 ENCOUNTER — TELEPHONE (OUTPATIENT)
Dept: FAMILY MEDICINE CLINIC | Facility: CLINIC | Age: 56
End: 2021-09-27

## 2021-10-30 ENCOUNTER — HOSPITAL ENCOUNTER (EMERGENCY)
Facility: HOSPITAL | Age: 56
Discharge: HOME | End: 2021-10-30
Attending: EMERGENCY MEDICINE | Admitting: EMERGENCY MEDICINE

## 2021-10-30 ENCOUNTER — APPOINTMENT (OUTPATIENT)
Dept: GENERAL RADIOLOGY | Facility: HOSPITAL | Age: 56
End: 2021-10-30

## 2021-10-30 ENCOUNTER — APPOINTMENT (OUTPATIENT)
Dept: CT IMAGING | Facility: HOSPITAL | Age: 56
End: 2021-10-30

## 2021-10-30 VITALS
RESPIRATION RATE: 16 BRPM | HEART RATE: 73 BPM | BODY MASS INDEX: 40.02 KG/M2 | DIASTOLIC BLOOD PRESSURE: 70 MMHG | SYSTOLIC BLOOD PRESSURE: 112 MMHG | HEIGHT: 67 IN | WEIGHT: 255 LBS | OXYGEN SATURATION: 98 % | TEMPERATURE: 98.8 F
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
## 2021-10-30 DIAGNOSIS — S82.852A CLOSED TRIMALLEOLAR FRACTURE OF LEFT ANKLE, INITIAL ENCOUNTER: Primary | ICD-10-CM

## 2021-10-30 DIAGNOSIS — S93.05XA ANKLE DISLOCATION, LEFT, INITIAL ENCOUNTER: ICD-10-CM

## 2021-10-30 PROCEDURE — 25010000002 MIDAZOLAM PER 1 MG: Performed by: EMERGENCY MEDICINE

## 2021-10-30 PROCEDURE — 73610 X-RAY EXAM OF ANKLE: CPT

## 2021-10-30 PROCEDURE — 25010000002 HYDROMORPHONE PER 4 MG: Performed by: EMERGENCY MEDICINE

## 2021-10-30 PROCEDURE — 96374 THER/PROPH/DIAG INJ IV PUSH: CPT

## 2021-10-30 PROCEDURE — 73130 X-RAY EXAM OF HAND: CPT

## 2021-10-30 PROCEDURE — 99152 MOD SED SAME PHYS/QHP 5/>YRS: CPT

## 2021-10-30 PROCEDURE — 25010000002 LORAZEPAM PER 2 MG: Performed by: EMERGENCY MEDICINE

## 2021-10-30 PROCEDURE — 73700 CT LOWER EXTREMITY W/O DYE: CPT

## 2021-10-30 PROCEDURE — 96375 TX/PRO/DX INJ NEW DRUG ADDON: CPT

## 2021-10-30 PROCEDURE — 96376 TX/PRO/DX INJ SAME DRUG ADON: CPT

## 2021-10-30 PROCEDURE — 25010000002 PROPOFOL 10 MG/ML EMULSION: Performed by: EMERGENCY MEDICINE

## 2021-10-30 PROCEDURE — 25010000002 ONDANSETRON PER 1 MG: Performed by: EMERGENCY MEDICINE

## 2021-10-30 PROCEDURE — 99284 EMERGENCY DEPT VISIT MOD MDM: CPT

## 2021-10-30 RX ORDER — HYDROMORPHONE HYDROCHLORIDE 1 MG/ML
0.25 INJECTION, SOLUTION INTRAMUSCULAR; INTRAVENOUS; SUBCUTANEOUS ONCE
Status: COMPLETED | OUTPATIENT
Start: 2021-10-30 | End: 2021-10-30

## 2021-10-30 RX ORDER — LORAZEPAM 2 MG/ML
0.25 INJECTION INTRAMUSCULAR ONCE
Status: COMPLETED | OUTPATIENT
Start: 2021-10-30 | End: 2021-10-30

## 2021-10-30 RX ORDER — HYDROCODONE BITARTRATE AND ACETAMINOPHEN 7.5; 325 MG/1; MG/1
1 TABLET ORAL EVERY 6 HOURS PRN
Qty: 12 TABLET | Refills: 0 | Status: SHIPPED | OUTPATIENT
Start: 2021-10-30 | End: 2021-11-29

## 2021-10-30 RX ORDER — ONDANSETRON 2 MG/ML
4 INJECTION INTRAMUSCULAR; INTRAVENOUS ONCE
Status: COMPLETED | OUTPATIENT
Start: 2021-10-30 | End: 2021-10-30

## 2021-10-30 RX ORDER — CEPHALEXIN 250 MG/1
500 CAPSULE ORAL ONCE
Status: COMPLETED | OUTPATIENT
Start: 2021-10-30 | End: 2021-10-30

## 2021-10-30 RX ORDER — MIDAZOLAM HYDROCHLORIDE 1 MG/ML
INJECTION INTRAMUSCULAR; INTRAVENOUS
Status: DISCONTINUED
Start: 2021-10-30 | End: 2021-10-30 | Stop reason: HOSPADM

## 2021-10-30 RX ORDER — CEPHALEXIN 500 MG/1
500 CAPSULE ORAL 4 TIMES DAILY
Qty: 28 CAPSULE | Refills: 0 | Status: SHIPPED | OUTPATIENT
Start: 2021-10-30 | End: 2021-11-24

## 2021-10-30 RX ORDER — HYDROMORPHONE HYDROCHLORIDE 1 MG/ML
0.5 INJECTION, SOLUTION INTRAMUSCULAR; INTRAVENOUS; SUBCUTANEOUS ONCE
Status: DISCONTINUED | OUTPATIENT
Start: 2021-10-30 | End: 2021-10-30

## 2021-10-30 RX ORDER — MIDAZOLAM HYDROCHLORIDE 1 MG/ML
INJECTION INTRAMUSCULAR; INTRAVENOUS
Status: COMPLETED | OUTPATIENT
Start: 2021-10-30 | End: 2021-10-30

## 2021-10-30 RX ORDER — PROPOFOL 10 MG/ML
200 VIAL (ML) INTRAVENOUS ONCE
Status: DISCONTINUED | OUTPATIENT
Start: 2021-10-30 | End: 2021-10-30 | Stop reason: HOSPADM

## 2021-10-30 RX ORDER — PROPOFOL 10 MG/ML
VIAL (ML) INTRAVENOUS
Status: COMPLETED | OUTPATIENT
Start: 2021-10-30 | End: 2021-10-30

## 2021-10-30 RX ADMIN — PROPOFOL 30 MG: 10 INJECTION, EMULSION INTRAVENOUS at 16:08

## 2021-10-30 RX ADMIN — PROPOFOL 20 MG: 10 INJECTION, EMULSION INTRAVENOUS at 16:07

## 2021-10-30 RX ADMIN — PROPOFOL 20 MG: 10 INJECTION, EMULSION INTRAVENOUS at 16:06

## 2021-10-30 RX ADMIN — CEPHALEXIN 500 MG: 250 CAPSULE ORAL at 18:39

## 2021-10-30 RX ADMIN — HYDROMORPHONE HYDROCHLORIDE 0.25 MG: 1 INJECTION, SOLUTION INTRAMUSCULAR; INTRAVENOUS; SUBCUTANEOUS at 15:31

## 2021-10-30 RX ADMIN — HYDROMORPHONE HYDROCHLORIDE 0.25 MG: 1 INJECTION, SOLUTION INTRAMUSCULAR; INTRAVENOUS; SUBCUTANEOUS at 18:20

## 2021-10-30 RX ADMIN — ONDANSETRON 4 MG: 2 INJECTION INTRAMUSCULAR; INTRAVENOUS at 15:32

## 2021-10-30 RX ADMIN — PROPOFOL 20 MG: 10 INJECTION, EMULSION INTRAVENOUS at 16:10

## 2021-10-30 RX ADMIN — LORAZEPAM 0.25 MG: 2 INJECTION INTRAMUSCULAR; INTRAVENOUS at 15:42

## 2021-10-30 RX ADMIN — MIDAZOLAM 1 MG: 1 INJECTION INTRAMUSCULAR; INTRAVENOUS at 16:10

## 2021-10-30 RX ADMIN — PROPOFOL 25 MG: 10 INJECTION, EMULSION INTRAVENOUS at 16:08

## 2021-10-30 RX ADMIN — PROPOFOL 50 MG: 10 INJECTION, EMULSION INTRAVENOUS at 16:05

## 2021-11-01 ENCOUNTER — TELEPHONE (OUTPATIENT)
Dept: FAMILY MEDICINE CLINIC | Facility: CLINIC | Age: 56
End: 2021-11-01

## 2021-11-01 ENCOUNTER — PREP FOR SURGERY (OUTPATIENT)
Dept: OTHER | Facility: HOSPITAL | Age: 56
End: 2021-11-01

## 2021-11-01 DIAGNOSIS — S82.852A CLOSED TRIMALLEOLAR FRACTURE OF LEFT ANKLE, INITIAL ENCOUNTER: Primary | ICD-10-CM

## 2021-11-01 DIAGNOSIS — F41.9 ANXIETY: Chronic | ICD-10-CM

## 2021-11-01 RX ORDER — CEFAZOLIN SODIUM 2 G/100ML
2 INJECTION, SOLUTION INTRAVENOUS ONCE
Status: CANCELLED | OUTPATIENT
Start: 2021-11-01 | End: 2021-11-01

## 2021-11-01 RX ORDER — ACETAMINOPHEN 500 MG
1000 TABLET ORAL ONCE
Status: CANCELLED | OUTPATIENT
Start: 2021-11-01 | End: 2021-11-01

## 2021-11-01 NOTE — TELEPHONE ENCOUNTER
PATIENT CALLED TO UPDATE YOU ON HER BREAKING HER LEFT ANKLE ON Saturday 10/30/21. SHE IS SEEING AN ORTHOPEDIC SURGEON TOMORROW 11/02/21. THEY HAD SUGGESTED SHE LET HER PRIMARY KNOW.      Brigid Coelho    [phone]

## 2021-11-02 ENCOUNTER — ANESTHESIA EVENT (OUTPATIENT)
Dept: PERIOP | Facility: HOSPITAL | Age: 56
End: 2021-11-02

## 2021-11-02 ENCOUNTER — OFFICE VISIT (OUTPATIENT)
Dept: ORTHOPEDIC SURGERY | Facility: CLINIC | Age: 56
End: 2021-11-02

## 2021-11-02 ENCOUNTER — PRE-ADMISSION TESTING (OUTPATIENT)
Dept: PREADMISSION TESTING | Facility: HOSPITAL | Age: 56
End: 2021-11-02

## 2021-11-02 VITALS
HEIGHT: 67 IN | SYSTOLIC BLOOD PRESSURE: 128 MMHG | HEART RATE: 70 BPM | DIASTOLIC BLOOD PRESSURE: 82 MMHG | WEIGHT: 255.73 LBS | BODY MASS INDEX: 40.14 KG/M2

## 2021-11-02 DIAGNOSIS — S82.852A CLOSED TRIMALLEOLAR FRACTURE OF LEFT ANKLE, INITIAL ENCOUNTER: ICD-10-CM

## 2021-11-02 DIAGNOSIS — S82.852A CLOSED TRIMALLEOLAR FRACTURE OF LEFT ANKLE, INITIAL ENCOUNTER: Primary | ICD-10-CM

## 2021-11-02 LAB — SARS-COV-2 RNA PNL SPEC NAA+PROBE: NOT DETECTED

## 2021-11-02 PROCEDURE — C9803 HOPD COVID-19 SPEC COLLECT: HCPCS

## 2021-11-02 PROCEDURE — U0004 COV-19 TEST NON-CDC HGH THRU: HCPCS

## 2021-11-02 PROCEDURE — 99204 OFFICE O/P NEW MOD 45 MIN: CPT | Performed by: ORTHOPAEDIC SURGERY

## 2021-11-02 RX ORDER — ALPRAZOLAM 0.5 MG/1
0.5 TABLET ORAL DAILY PRN
Qty: 30 TABLET | Refills: 0 | Status: CANCELLED | OUTPATIENT
Start: 2021-11-02

## 2021-11-02 RX ORDER — FAMOTIDINE 10 MG/ML
20 INJECTION, SOLUTION INTRAVENOUS ONCE
Status: CANCELLED | OUTPATIENT
Start: 2021-11-02 | End: 2021-11-02

## 2021-11-02 NOTE — PROGRESS NOTES
[facility]        Subjective     Pain of the Left Ankle (DOI: 10/30/2021)      HPI    Brigid Coelho is a 56 y.o. female who presents with new problem of: left ankle fracture.  Onset: mechanical fall. The issue has been ongoing for  3 day(s). Pain is a 3/10 on the pain scale. Pain is described as burning. Associated symptoms include pain, swelling and stiffness. The pain is worse with leisure; resting, pain medication and/or NSAID and lying down improve the pain. Previous treatments have included: bracing.    I have reviewed the following portions of the patient's history:History of Present Illness and review of systems.        Patient is here today for ER follow-up.  She slipped in the mud and her right foot went out from under her and she twisted her left ankle.  She was reduced in the ER by Dr. Adnersen.  Denies chest pain or shortness of breath.    Past Medical History:   Diagnosis Date   • Acute bronchitis    • Anemia    • Anxiety    • Chest pain    • Cholelithiasis    • Claustrophobia    • GERD (gastroesophageal reflux disease)    • Hashimoto's thyroiditis    • Hemorrhoids    • Hypothyroidism    • Low back pain    • Metrorrhagia    • Palpitations    • Polycystic ovary syndrome    • Polyp of sigmoid colon    • Ulcerative colitis (HCC)    • Upper respiratory infection    • UTI (urinary tract infection)       Past Surgical History:   Procedure Laterality Date   • APPENDECTOMY     •  SECTION      x 2   • CHOLECYSTECTOMY     • DILATATION AND CURETTAGE      Of Cervical Stump   • MYOMECTOMY     • SMALL INTESTINE SURGERY     • TUBAL ABDOMINAL LIGATION        Family History   Problem Relation Age of Onset   • Hyperlipidemia Mother    • CHAD disease Mother    • Cancer Mother    • Diabetes Mother    • Heart disease Mother    • Rheum arthritis Father    • Hyperlipidemia Father    • Heart attack Father 72   • Ovarian cancer Maternal Aunt    • Diabetes Paternal Grandmother    •  Hypertension Paternal Grandmother    • Obesity Paternal Grandmother    • Colon cancer Other    • Arthritis Other    • Cancer Other         uncle; liver, lung    • Thyroid disease Cousin    • Thyroid cancer Cousin    • CHAD disease Daughter    • Obesity Brother    • Sleep apnea Brother      Social History     Socioeconomic History   • Marital status:    Tobacco Use   • Smoking status: Never Smoker   • Smokeless tobacco: Never Used   Vaping Use   • Vaping Use: Never used   Substance and Sexual Activity   • Alcohol use: No   • Drug use: No   • Sexual activity: Defer      Current Outpatient Medications on File Prior to Visit   Medication Sig Dispense Refill   • acetaminophen (TYLENOL) 650 MG 8 hr tablet Take 650 mg by mouth Every 8 (Eight) Hours As Needed for Mild Pain .     • ALPRAZolam (Xanax) 0.5 MG tablet Take 1 tablet by mouth Daily As Needed for Anxiety. 30 tablet 0   • ascorbic acid (VITAMIN C) 1000 MG tablet Take 1,000 mg by mouth Daily.     • Biotin 1000 MCG chewable tablet Chew.     • cephalexin (KEFLEX) 500 MG capsule Take 1 capsule by mouth 4 (Four) Times a Day. 28 capsule 0   • cyclobenzaprine (FLEXERIL) 10 MG tablet Take 1 tablet by mouth 2 (Two) Times a Day As Needed for Muscle Spasms. 60 tablet 1   • diclofenac (VOLTAREN) 1 % gel gel Apply 4 g topically to the appropriate area as directed 3 (Three) Times a Day. 100 g 1   • Diclofenac Sodium (VOLTAREN) 1 % gel gel Apply 4 g topically to the appropriate area as directed 3 (Three) Times a Day. 150 g 1   • famotidine (PEPCID) 20 MG tablet Take 20 mg by mouth At Night As Needed for Heartburn.     • ferrous sulfate 140 (45 Fe) MG tablet controlled-release tablet Take 140 mg by mouth Daily With Breakfast.     • folic acid (FOLVITE) 400 MCG tablet Take 400 mcg by mouth Daily.     • Garlic 1000 MG capsule Take 1 tablet by mouth Daily.     • HYDROcodone-acetaminophen (NORCO) 7.5-325 MG per tablet Take 1 tablet by mouth Every 6 (Six) Hours As Needed for  "Moderate Pain . 12 tablet 0   • ibuprofen (ADVIL,MOTRIN) 800 MG tablet Take 1 tablet by mouth Every 8 (Eight) Hours As Needed for Mild Pain . 90 tablet 0   • levothyroxine (Synthroid) 88 MCG tablet Take 1 tablet by mouth Daily. 90 tablet 0   • Loratadine (CLARITIN) 10 MG capsule Take 1 tablet by mouth daily.     • meclizine (ANTIVERT) 25 MG tablet Take 1 tablet by mouth Every 6 (Six) Hours As Needed for Dizziness for up to 20 doses. 20 tablet 0   • Melatonin 2.5 MG capsule Take  by mouth.     • mesalamine (APRISO) 0.375 g 24 hr capsule Take 375 mg by mouth As Needed.     • montelukast (SINGULAIR) 10 MG tablet Take 1 tablet by mouth every night at bedtime. 90 tablet 0   • Selenium 200 MCG capsule Take 1 tablet by mouth Daily.     • sertraline (Zoloft) 50 MG tablet Take 1 tablet by mouth Daily. 90 tablet 0   • Vitamin A 2400 MCG (8000 UT) tablet Take 1 tablet by mouth Daily.     • Zinc 25 MG tablet Take 0.5 tablets by mouth Daily.       No current facility-administered medications on file prior to visit.      Allergies   Allergen Reactions   • Adhesive Tape      Rash     • Erythromycin Other (See Comments)     Sores in mouth   • Epinephrine Palpitations   • Nitroglycerin Palpitations          Review of Systems   Constitutional: Negative.    HENT: Negative.    Eyes: Negative.    Respiratory: Negative.    Cardiovascular: Negative.    Gastrointestinal: Negative.    Endocrine: Negative.    Genitourinary: Negative.    Musculoskeletal: Positive for arthralgias.   Skin: Negative.    Allergic/Immunologic: Negative.    Neurological: Negative.    Hematological: Negative.    Psychiatric/Behavioral: Negative.         I reviewed the patient's chief complaint, history of present illness, review of systems, past medical history, surgical history, family history, social history, medications and allergy list.        Objective      Physical Exam  /82   Pulse 70   Ht 170.2 cm (67.01\")   Wt 116 kg (255 lb 11.7 oz)   BMI 40.04 " kg/m²     Body mass index is 40.04 kg/m².    General  Mental Status - alert  General Appearance - cooperative, well groomed, not in acute distress  Orientation - Oriented X3  Build & Nutrition - well developed and well nourished  Posture - normal posture  Gait -wheelchair       Ortho Exam  Left ankle: Patient has intact sensation over the dorsal plantar aspect of the foot to light touch.  There is no visible fracture blisters noted.  EHL notes are intact.  Palpable dorsalis pedis pulse.    Imaging/Studies  Imaging Results (Last 24 Hours)     ** No results found for the last 24 hours. **        XR Ankle 3+ View Left  Narrative: CR Ankle Min 3 Vws LT    INDICATION:   Postreduction bimalleolar and/or trimalleolar ankle fracture/dislocation.    COMPARISON:   Acute injury films 10/30/2001 at 1528 hours    FINDINGS:  AP, lateral, and oblique view(s) of the left ankle.  Partial reduction the patient's ankle dislocation with more anatomic alignment of the talus with the distal tibia. Partial reduction of the medial malleolar fracture fragment which now remains  displaced about 11 mm. Near anatomic reduction of the patient's oblique distal fibular fracture with about one cortical width of posterior displacement of the distal fracture fragment. A distinct posterior malleolar fragment not seen. Talus and subtalar  joint appears anatomic. Casting material noted. Mild generalized soft tissue swelling about the ankle.  Impression: Partial but near anatomic reduction of the patient's fracture dislocation as detailed above.    Signer Name: ТАТЬЯНА Houston MD   Signed: 10/30/2021 8:17 PM   Workstation Name: [facility]    [facility]  CT Lower Extremity Left Without Contrast  Narrative: EXAMINATION: CT LOWER EXTREMITY LEFT WO CONTRAST-      INDICATION: Trimalleolar fracture dislocation     TECHNIQUE: Noncontrast CT of the right ankle     COMPARISON: Same-day right ankle radiographs     FINDINGS:      There  are a few scattered foci of subcutaneous gas at the anterior ankle  which raises suspicion for open fracture. Redemonstration of  trimalleolar fracture. There is mild posterior displacement of the  distal fibular fracture fragment by approximately 5 mm (series 5 image  19). There is 5 mm diastases of the posterior malleolar fracture  fragment at the articular surface. There is mild varus angulation of the  medial malleolar fracture fragment. No additional fractures are  identified. There is soft tissue swelling about the ankle. No evidence  of tendinous entrapment. The medial margin of the posterior malleolar  fracture fragment contacts the tibialis posterior tendon (series 3 image  57).     Impression:    Trimalleolar fracture as detailed above. There are a few scattered  subcutaneous foci of gas at the anterior ankle raising concern for  possible open fracture type injury. Correlate clinically. No evidence of  tendinous entrapment.        This report was finalized on 10/30/2021 5:45 PM by Jasvir Pineda MD.     XR Ankle 3+ View Left  Narrative: EXAMINATION: XR ANKLE 3+ VW LEFT-      INDICATION: JOINT PAIN, ANKLE     TECHNIQUE: 3 images of the left ankle     COMPARISON: NONE     FINDINGS:      Acute tibiotalar fracture dislocation with lateral displacement of the  talus relative to the tibia. Associated displaced fractures of the  distal fibula, medial malleolus, and likely posterior malleolus. No  evidence of soft tissue gas to suggest open fracture.     Impression:    Acute tibiotalar fracture dislocation with lateral displacement of the  talus relative to the tibia. Associated displaced fractures of the  distal fibula, medial malleolus, and likely posterior malleolus. No  evidence of soft tissue gas to suggest open fracture. Consider CT  following reduction to assess for additional occult fractures.     This report was finalized on 10/30/2021 4:30 PM by Jasvir Pineda MD.     XR Hand 3+ View Left  Narrative:  EXAMINATION: XR HAND 3+ VW LEFT-      INDICATION: traumas, pain in 5th MC     TECHNIQUE: 3 images the left hand     COMPARISON: NONE     FINDINGS:      The soft tissues are unremarkable. No acute fracture or malalignment.  The joint spaces are grossly preserved.     Impression:    No acute fracture or malalignment.     This report was finalized on 10/30/2021 4:21 PM by Jasvir Pineda MD.     We reviewed images and report of the x-rays above and CT scan above.  Patient has a trimalleolar ankle fracture dislocation.  I do not believe that there is a true open injury.  She does have an acceptable reduction.    Assessment    Assessment:  1. Closed trimalleolar fracture of left ankle, initial encounter        Plan:  1. Continue over-the-counter medication as needed for discomfort  2. Left trimalleolar ankle fracture dislocation--patient tells me that she Reanna turns her ear screen.  She does not do well with stainless steel products.  Plan will be for ORIF of the left medial lateral malleoli.  The risk, benefits, potential hazards of the procedure were discussed with her today.  She had the opportunity to ask questions and wishes to proceed with scheduling.  We will get this done tomorrow as an outpatient.  She will need a nerve block.  She will require anxiety medication from her PCP for her anxiety disorder.  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  We will make appropriate arrangements.        Giovani Daniels MD  11/02/21  10:50 EDT    Dragon disclaimer:  Much of this encounter note is an electronic transcription/translation of spoken language to printed text. The electronic translation of spoken language may permit erroneous, or at times, nonsensical words or phrases to be inadvertently transcribed; Although I have reviewed the note for such errors, some may still exist.

## 2021-11-02 NOTE — H&P (VIEW-ONLY)
[facility] [place]        Subjective     Pain of the Left Ankle (DOI: 10/30/2021)      HPI    Brigid Coelho is a 56 y.o. female who presents with new problem of: left ankle fracture.  Onset: mechanical fall. The issue has been ongoing for  3 day(s). Pain is a 3/10 on the pain scale. Pain is described as burning. Associated symptoms include pain, swelling and stiffness. The pain is worse with leisure; resting, pain medication and/or NSAID and lying down improve the pain. Previous treatments have included: bracing.    I have reviewed the following portions of the patient's history:History of Present Illness and review of systems.        Patient is here today for ER follow-up.  She slipped in the mud and her right foot went out from under her and she twisted her left ankle.  She was reduced in the ER by Dr. Andersen.  Denies chest pain or shortness of breath.    Past Medical History:   Diagnosis Date   • Acute bronchitis    • Anemia    • Anxiety    • Chest pain    • Cholelithiasis    • Claustrophobia    • GERD (gastroesophageal reflux disease)    • Hashimoto's thyroiditis    • Hemorrhoids    • Hypothyroidism    • Low back pain    • Metrorrhagia    • Palpitations    • Polycystic ovary syndrome    • Polyp of sigmoid colon    • Ulcerative colitis (HCC)    • Upper respiratory infection    • UTI (urinary tract infection)       Past Surgical History:   Procedure Laterality Date   • APPENDECTOMY     •  SECTION      x 2   • CHOLECYSTECTOMY     • DILATATION AND CURETTAGE      Of Cervical Stump   • MYOMECTOMY     • SMALL INTESTINE SURGERY     • TUBAL ABDOMINAL LIGATION        Family History   Problem Relation Age of Onset   • Hyperlipidemia Mother    • CHAD disease Mother    • Cancer Mother    • Diabetes Mother    • Heart disease Mother    • Rheum arthritis Father    • Hyperlipidemia Father    • Heart attack Father 72   • Ovarian cancer Maternal Aunt    • Diabetes Paternal Grandmother    •  Hypertension Paternal Grandmother    • Obesity Paternal Grandmother    • Colon cancer Other    • Arthritis Other    • Cancer Other         uncle; liver, lung    • Thyroid disease Cousin    • Thyroid cancer Cousin    • CHAD disease Daughter    • Obesity Brother    • Sleep apnea Brother      Social History     Socioeconomic History   • Marital status:    Tobacco Use   • Smoking status: Never Smoker   • Smokeless tobacco: Never Used   Vaping Use   • Vaping Use: Never used   Substance and Sexual Activity   • Alcohol use: No   • Drug use: No   • Sexual activity: Defer      Current Outpatient Medications on File Prior to Visit   Medication Sig Dispense Refill   • acetaminophen (TYLENOL) 650 MG 8 hr tablet Take 650 mg by mouth Every 8 (Eight) Hours As Needed for Mild Pain .     • ALPRAZolam (Xanax) 0.5 MG tablet Take 1 tablet by mouth Daily As Needed for Anxiety. 30 tablet 0   • ascorbic acid (VITAMIN C) 1000 MG tablet Take 1,000 mg by mouth Daily.     • Biotin 1000 MCG chewable tablet Chew.     • cephalexin (KEFLEX) 500 MG capsule Take 1 capsule by mouth 4 (Four) Times a Day. 28 capsule 0   • cyclobenzaprine (FLEXERIL) 10 MG tablet Take 1 tablet by mouth 2 (Two) Times a Day As Needed for Muscle Spasms. 60 tablet 1   • diclofenac (VOLTAREN) 1 % gel gel Apply 4 g topically to the appropriate area as directed 3 (Three) Times a Day. 100 g 1   • Diclofenac Sodium (VOLTAREN) 1 % gel gel Apply 4 g topically to the appropriate area as directed 3 (Three) Times a Day. 150 g 1   • famotidine (PEPCID) 20 MG tablet Take 20 mg by mouth At Night As Needed for Heartburn.     • ferrous sulfate 140 (45 Fe) MG tablet controlled-release tablet Take 140 mg by mouth Daily With Breakfast.     • folic acid (FOLVITE) 400 MCG tablet Take 400 mcg by mouth Daily.     • Garlic 1000 MG capsule Take 1 tablet by mouth Daily.     • HYDROcodone-acetaminophen (NORCO) 7.5-325 MG per tablet Take 1 tablet by mouth Every 6 (Six) Hours As Needed for  "Moderate Pain . 12 tablet 0   • ibuprofen (ADVIL,MOTRIN) 800 MG tablet Take 1 tablet by mouth Every 8 (Eight) Hours As Needed for Mild Pain . 90 tablet 0   • levothyroxine (Synthroid) 88 MCG tablet Take 1 tablet by mouth Daily. 90 tablet 0   • Loratadine (CLARITIN) 10 MG capsule Take 1 tablet by mouth daily.     • meclizine (ANTIVERT) 25 MG tablet Take 1 tablet by mouth Every 6 (Six) Hours As Needed for Dizziness for up to 20 doses. 20 tablet 0   • Melatonin 2.5 MG capsule Take  by mouth.     • mesalamine (APRISO) 0.375 g 24 hr capsule Take 375 mg by mouth As Needed.     • montelukast (SINGULAIR) 10 MG tablet Take 1 tablet by mouth every night at bedtime. 90 tablet 0   • Selenium 200 MCG capsule Take 1 tablet by mouth Daily.     • sertraline (Zoloft) 50 MG tablet Take 1 tablet by mouth Daily. 90 tablet 0   • Vitamin A 2400 MCG (8000 UT) tablet Take 1 tablet by mouth Daily.     • Zinc 25 MG tablet Take 0.5 tablets by mouth Daily.       No current facility-administered medications on file prior to visit.      Allergies   Allergen Reactions   • Adhesive Tape      Rash     • Erythromycin Other (See Comments)     Sores in mouth   • Epinephrine Palpitations   • Nitroglycerin Palpitations          Review of Systems   Constitutional: Negative.    HENT: Negative.    Eyes: Negative.    Respiratory: Negative.    Cardiovascular: Negative.    Gastrointestinal: Negative.    Endocrine: Negative.    Genitourinary: Negative.    Musculoskeletal: Positive for arthralgias.   Skin: Negative.    Allergic/Immunologic: Negative.    Neurological: Negative.    Hematological: Negative.    Psychiatric/Behavioral: Negative.         I reviewed the patient's chief complaint, history of present illness, review of systems, past medical history, surgical history, family history, social history, medications and allergy list.        Objective      Physical Exam  /82   Pulse 70   Ht 170.2 cm (67.01\")   Wt 116 kg (255 lb 11.7 oz)   BMI 40.04 " kg/m²     Body mass index is 40.04 kg/m².    General  Mental Status - alert  General Appearance - cooperative, well groomed, not in acute distress  Orientation - Oriented X3  Build & Nutrition - well developed and well nourished  Posture - normal posture  Gait -wheelchair       Ortho Exam  Left ankle: Patient has intact sensation over the dorsal plantar aspect of the foot to light touch.  There is no visible fracture blisters noted.  EHL notes are intact.  Palpable dorsalis pedis pulse.    Imaging/Studies  Imaging Results (Last 24 Hours)     ** No results found for the last 24 hours. **        XR Ankle 3+ View Left  Narrative: CR Ankle Min 3 Vws LT    INDICATION:   Postreduction bimalleolar and/or trimalleolar ankle fracture/dislocation.    COMPARISON:   Acute injury films 10/30/2001 at 1528 hours    FINDINGS:  AP, lateral, and oblique view(s) of the left ankle.  Partial reduction the patient's ankle dislocation with more anatomic alignment of the talus with the distal tibia. Partial reduction of the medial malleolar fracture fragment which now remains  displaced about 11 mm. Near anatomic reduction of the patient's oblique distal fibular fracture with about one cortical width of posterior displacement of the distal fracture fragment. A distinct posterior malleolar fragment not seen. Talus and subtalar  joint appears anatomic. Casting material noted. Mild generalized soft tissue swelling about the ankle.  Impression: Partial but near anatomic reduction of the patient's fracture dislocation as detailed above.    Signer Name: ТАТЬЯНА Houston MD   Signed: 10/30/2021 8:17 PM   Workstation Name: [facility]    [facility]  CT Lower Extremity Left Without Contrast  Narrative: EXAMINATION: CT LOWER EXTREMITY LEFT WO CONTRAST-      INDICATION: Trimalleolar fracture dislocation     TECHNIQUE: Noncontrast CT of the right ankle     COMPARISON: Same-day right ankle radiographs     FINDINGS:      There  are a few scattered foci of subcutaneous gas at the anterior ankle  which raises suspicion for open fracture. Redemonstration of  trimalleolar fracture. There is mild posterior displacement of the  distal fibular fracture fragment by approximately 5 mm (series 5 image  19). There is 5 mm diastases of the posterior malleolar fracture  fragment at the articular surface. There is mild varus angulation of the  medial malleolar fracture fragment. No additional fractures are  identified. There is soft tissue swelling about the ankle. No evidence  of tendinous entrapment. The medial margin of the posterior malleolar  fracture fragment contacts the tibialis posterior tendon (series 3 image  57).     Impression:    Trimalleolar fracture as detailed above. There are a few scattered  subcutaneous foci of gas at the anterior ankle raising concern for  possible open fracture type injury. Correlate clinically. No evidence of  tendinous entrapment.        This report was finalized on 10/30/2021 5:45 PM by Jasvir Pineda MD.     XR Ankle 3+ View Left  Narrative: EXAMINATION: XR ANKLE 3+ VW LEFT-      INDICATION: JOINT PAIN, ANKLE     TECHNIQUE: 3 images of the left ankle     COMPARISON: NONE     FINDINGS:      Acute tibiotalar fracture dislocation with lateral displacement of the  talus relative to the tibia. Associated displaced fractures of the  distal fibula, medial malleolus, and likely posterior malleolus. No  evidence of soft tissue gas to suggest open fracture.     Impression:    Acute tibiotalar fracture dislocation with lateral displacement of the  talus relative to the tibia. Associated displaced fractures of the  distal fibula, medial malleolus, and likely posterior malleolus. No  evidence of soft tissue gas to suggest open fracture. Consider CT  following reduction to assess for additional occult fractures.     This report was finalized on 10/30/2021 4:30 PM by aJsvir Pineda MD.     XR Hand 3+ View Left  Narrative:  EXAMINATION: XR HAND 3+ VW LEFT-      INDICATION: traumas, pain in 5th MC     TECHNIQUE: 3 images the left hand     COMPARISON: NONE     FINDINGS:      The soft tissues are unremarkable. No acute fracture or malalignment.  The joint spaces are grossly preserved.     Impression:    No acute fracture or malalignment.     This report was finalized on 10/30/2021 4:21 PM by Jasvir Pineda MD.     We reviewed images and report of the x-rays above and CT scan above.  Patient has a trimalleolar ankle fracture dislocation.  I do not believe that there is a true open injury.  She does have an acceptable reduction.    Assessment    Assessment:  1. Closed trimalleolar fracture of left ankle, initial encounter        Plan:  1. Continue over-the-counter medication as needed for discomfort  2. Left trimalleolar ankle fracture dislocation--patient tells me that she Reanna turns her ear screen.  She does not do well with stainless steel products.  Plan will be for ORIF of the left medial lateral malleoli.  The risk, benefits, potential hazards of the procedure were discussed with her today.  She had the opportunity to ask questions and wishes to proceed with scheduling.  We will get this done tomorrow as an outpatient.  She will need a nerve block.  She will require anxiety medication from her PCP for her anxiety disorder.  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  We will make appropriate arrangements.        Giovani Daniels MD  11/02/21  10:50 EDT    Dragon disclaimer:  Much of this encounter note is an electronic transcription/translation of spoken language to printed text. The electronic translation of spoken language may permit erroneous, or at times, nonsensical words or phrases to be inadvertently transcribed; Although I have reviewed the note for such errors, some may still exist.

## 2021-11-02 NOTE — TELEPHONE ENCOUNTER
Pt called requesting a refill of Xanex 0.5mg. She is having surgery on her foot tomorrow and she is extremely anxious about it. She has one or two pills left from previous rx.

## 2021-11-03 ENCOUNTER — ANESTHESIA EVENT CONVERTED (OUTPATIENT)
Dept: ANESTHESIOLOGY | Facility: HOSPITAL | Age: 56
End: 2021-11-03

## 2021-11-03 ENCOUNTER — HOSPITAL ENCOUNTER (OUTPATIENT)
Facility: HOSPITAL | Age: 56
Discharge: HOME | End: 2021-11-03
Attending: ORTHOPAEDIC SURGERY | Admitting: ORTHOPAEDIC SURGERY

## 2021-11-03 ENCOUNTER — ANESTHESIA (OUTPATIENT)
Dept: PERIOP | Facility: HOSPITAL | Age: 56
End: 2021-11-03

## 2021-11-03 ENCOUNTER — APPOINTMENT (OUTPATIENT)
Dept: GENERAL RADIOLOGY | Facility: HOSPITAL | Age: 56
End: 2021-11-03

## 2021-11-03 VITALS
RESPIRATION RATE: 18 BRPM | OXYGEN SATURATION: 94 % | HEART RATE: 93 BPM | WEIGHT: 255 LBS | BODY MASS INDEX: 40.02 KG/M2 | SYSTOLIC BLOOD PRESSURE: 124 MMHG | HEIGHT: 67 IN | DIASTOLIC BLOOD PRESSURE: 73 MMHG | TEMPERATURE: 97.6 F

## 2021-11-03 DIAGNOSIS — S82.852A CLOSED TRIMALLEOLAR FRACTURE OF LEFT ANKLE, INITIAL ENCOUNTER: ICD-10-CM

## 2021-11-03 LAB — GLUCOSE BLDC GLUCOMTR-MCNC: 114 MG/DL (ref 70–130)

## 2021-11-03 PROCEDURE — 82962 GLUCOSE BLOOD TEST: CPT

## 2021-11-03 PROCEDURE — 27822 TREATMENT OF ANKLE FRACTURE: CPT | Performed by: ORTHOPAEDIC SURGERY

## 2021-11-03 PROCEDURE — C1769 GUIDE WIRE: HCPCS | Performed by: ORTHOPAEDIC SURGERY

## 2021-11-03 PROCEDURE — 25010000002 FENTANYL CITRATE (PF) 50 MCG/ML SOLUTION: Performed by: NURSE ANESTHETIST, CERTIFIED REGISTERED

## 2021-11-03 PROCEDURE — 97161 PT EVAL LOW COMPLEX 20 MIN: CPT

## 2021-11-03 PROCEDURE — 25010000002 PROPOFOL 10 MG/ML EMULSION: Performed by: NURSE ANESTHETIST, CERTIFIED REGISTERED

## 2021-11-03 PROCEDURE — C1713 ANCHOR/SCREW BN/BN,TIS/BN: HCPCS | Performed by: ORTHOPAEDIC SURGERY

## 2021-11-03 PROCEDURE — 0 LIDOCAINE 1 % SOLUTION: Performed by: NURSE ANESTHETIST, CERTIFIED REGISTERED

## 2021-11-03 PROCEDURE — 25010000002 BUPRENORPHINE PER 0.1 MG: Performed by: NURSE ANESTHETIST, CERTIFIED REGISTERED

## 2021-11-03 PROCEDURE — 27822 TREATMENT OF ANKLE FRACTURE: CPT | Performed by: PHYSICIAN ASSISTANT

## 2021-11-03 PROCEDURE — 0 CEFAZOLIN IN DEXTROSE 2-4 GM/100ML-% SOLUTION: Performed by: ORTHOPAEDIC SURGERY

## 2021-11-03 PROCEDURE — 25010000002 MIDAZOLAM PER 1 MG: Performed by: NURSE ANESTHETIST, CERTIFIED REGISTERED

## 2021-11-03 PROCEDURE — 25010000002 DEXAMETHASONE PER 1 MG: Performed by: NURSE ANESTHETIST, CERTIFIED REGISTERED

## 2021-11-03 PROCEDURE — 25010000002 DEXAMETHASONE SODIUM PHOSPHATE 10 MG/ML SOLUTION: Performed by: NURSE ANESTHETIST, CERTIFIED REGISTERED

## 2021-11-03 PROCEDURE — 25010000002 NEOSTIGMINE 10 MG/10ML SOLUTION: Performed by: NURSE ANESTHETIST, CERTIFIED REGISTERED

## 2021-11-03 PROCEDURE — 25010000002 ROPIVACAINE PER 1 MG: Performed by: NURSE ANESTHETIST, CERTIFIED REGISTERED

## 2021-11-03 PROCEDURE — 76000 FLUOROSCOPY <1 HR PHYS/QHP: CPT

## 2021-11-03 PROCEDURE — 25010000002 FENTANYL CITRATE (PF) 50 MCG/ML SOLUTION

## 2021-11-03 DEVICE — BONE SCREW, FULLY THREADED,T10
Type: IMPLANTABLE DEVICE | Site: ANKLE | Status: FUNCTIONAL
Brand: VARIAX

## 2021-11-03 DEVICE — CANNULATED SCREW
Type: IMPLANTABLE DEVICE | Site: ANKLE | Status: FUNCTIONAL
Brand: ASNIS

## 2021-11-03 DEVICE — BONE SCREW
Type: IMPLANTABLE DEVICE | Site: ANKLE | Status: FUNCTIONAL
Brand: VARIAX

## 2021-11-03 DEVICE — LOCKING SCREW
Type: IMPLANTABLE DEVICE | Site: ANKLE | Status: FUNCTIONAL
Brand: VARIAX

## 2021-11-03 DEVICE — DISTAL LATERAL FIBULA PLATE, 5 HOLE
Type: IMPLANTABLE DEVICE | Site: ANKLE | Status: FUNCTIONAL
Brand: VARIAX

## 2021-11-03 RX ORDER — LIDOCAINE HYDROCHLORIDE 10 MG/ML
0.5 INJECTION, SOLUTION EPIDURAL; INFILTRATION; INTRACAUDAL; PERINEURAL ONCE AS NEEDED
Status: COMPLETED | OUTPATIENT
Start: 2021-11-03 | End: 2021-11-03

## 2021-11-03 RX ORDER — OXYCODONE AND ACETAMINOPHEN 7.5; 325 MG/1; MG/1
1 TABLET ORAL EVERY 6 HOURS PRN
Qty: 30 TABLET | Refills: 0 | Status: SHIPPED | OUTPATIENT
Start: 2021-11-03 | End: 2021-11-29

## 2021-11-03 RX ORDER — FENTANYL CITRATE 50 UG/ML
50 INJECTION, SOLUTION INTRAMUSCULAR; INTRAVENOUS
Status: DISCONTINUED | OUTPATIENT
Start: 2021-11-03 | End: 2021-11-03 | Stop reason: HOSPADM

## 2021-11-03 RX ORDER — LIDOCAINE HYDROCHLORIDE 10 MG/ML
INJECTION, SOLUTION INFILTRATION; PERINEURAL AS NEEDED
Status: DISCONTINUED | OUTPATIENT
Start: 2021-11-03 | End: 2021-11-03 | Stop reason: SURG

## 2021-11-03 RX ORDER — BUPRENORPHINE HYDROCHLORIDE 0.32 MG/ML
INJECTION INTRAMUSCULAR; INTRAVENOUS
Status: COMPLETED | OUTPATIENT
Start: 2021-11-03 | End: 2021-11-03

## 2021-11-03 RX ORDER — OXYCODONE AND ACETAMINOPHEN 7.5; 325 MG/1; MG/1
1 TABLET ORAL ONCE AS NEEDED
Status: DISCONTINUED | OUTPATIENT
Start: 2021-11-03 | End: 2021-11-03 | Stop reason: HOSPADM

## 2021-11-03 RX ORDER — PROMETHAZINE HYDROCHLORIDE 25 MG/1
25 TABLET ORAL ONCE AS NEEDED
Status: DISCONTINUED | OUTPATIENT
Start: 2021-11-03 | End: 2021-11-03 | Stop reason: HOSPADM

## 2021-11-03 RX ORDER — EPHEDRINE SULFATE 50 MG/ML
5 INJECTION, SOLUTION INTRAVENOUS ONCE AS NEEDED
Status: DISCONTINUED | OUTPATIENT
Start: 2021-11-03 | End: 2021-11-03 | Stop reason: HOSPADM

## 2021-11-03 RX ORDER — FAMOTIDINE 20 MG/1
20 TABLET, FILM COATED ORAL ONCE
Status: DISCONTINUED | OUTPATIENT
Start: 2021-11-03 | End: 2021-11-03 | Stop reason: HOSPADM

## 2021-11-03 RX ORDER — EPHEDRINE SULFATE 50 MG/ML
INJECTION, SOLUTION INTRAVENOUS AS NEEDED
Status: DISCONTINUED | OUTPATIENT
Start: 2021-11-03 | End: 2021-11-03 | Stop reason: SURG

## 2021-11-03 RX ORDER — SODIUM CHLORIDE 0.9 % (FLUSH) 0.9 %
10 SYRINGE (ML) INJECTION AS NEEDED
Status: DISCONTINUED | OUTPATIENT
Start: 2021-11-03 | End: 2021-11-03 | Stop reason: HOSPADM

## 2021-11-03 RX ORDER — BUPIVACAINE HYDROCHLORIDE 2.5 MG/ML
INJECTION, SOLUTION EPIDURAL; INFILTRATION; INTRACAUDAL
Status: COMPLETED | OUTPATIENT
Start: 2021-11-03 | End: 2021-11-03

## 2021-11-03 RX ORDER — FENTANYL CITRATE 50 UG/ML
INJECTION, SOLUTION INTRAMUSCULAR; INTRAVENOUS
Status: COMPLETED | OUTPATIENT
Start: 2021-11-03 | End: 2021-11-03

## 2021-11-03 RX ORDER — ESMOLOL HYDROCHLORIDE 10 MG/ML
INJECTION INTRAVENOUS AS NEEDED
Status: DISCONTINUED | OUTPATIENT
Start: 2021-11-03 | End: 2021-11-03 | Stop reason: SURG

## 2021-11-03 RX ORDER — GLYCOPYRROLATE 0.2 MG/ML
INJECTION INTRAMUSCULAR; INTRAVENOUS AS NEEDED
Status: DISCONTINUED | OUTPATIENT
Start: 2021-11-03 | End: 2021-11-03 | Stop reason: SURG

## 2021-11-03 RX ORDER — PROMETHAZINE HYDROCHLORIDE 25 MG/1
25 SUPPOSITORY RECTAL ONCE AS NEEDED
Status: DISCONTINUED | OUTPATIENT
Start: 2021-11-03 | End: 2021-11-03 | Stop reason: HOSPADM

## 2021-11-03 RX ORDER — SODIUM CHLORIDE 0.9 % (FLUSH) 0.9 %
10 SYRINGE (ML) INJECTION EVERY 12 HOURS SCHEDULED
Status: DISCONTINUED | OUTPATIENT
Start: 2021-11-03 | End: 2021-11-03 | Stop reason: HOSPADM

## 2021-11-03 RX ORDER — MIDAZOLAM HYDROCHLORIDE 1 MG/ML
INJECTION INTRAMUSCULAR; INTRAVENOUS
Status: COMPLETED | OUTPATIENT
Start: 2021-11-03 | End: 2021-11-03

## 2021-11-03 RX ORDER — KETAMINE HCL IN NACL, ISO-OSM 100MG/10ML
SYRINGE (ML) INJECTION AS NEEDED
Status: DISCONTINUED | OUTPATIENT
Start: 2021-11-03 | End: 2021-11-03 | Stop reason: SURG

## 2021-11-03 RX ORDER — FENTANYL CITRATE 50 UG/ML
INJECTION, SOLUTION INTRAMUSCULAR; INTRAVENOUS
Status: COMPLETED
Start: 2021-11-03 | End: 2021-11-03

## 2021-11-03 RX ORDER — DEXAMETHASONE SODIUM PHOSPHATE 4 MG/ML
INJECTION, SOLUTION INTRA-ARTICULAR; INTRALESIONAL; INTRAMUSCULAR; INTRAVENOUS; SOFT TISSUE AS NEEDED
Status: DISCONTINUED | OUTPATIENT
Start: 2021-11-03 | End: 2021-11-03 | Stop reason: SURG

## 2021-11-03 RX ORDER — MIDAZOLAM HYDROCHLORIDE 1 MG/ML
1 INJECTION INTRAMUSCULAR; INTRAVENOUS
Status: DISCONTINUED | OUTPATIENT
Start: 2021-11-03 | End: 2021-11-03 | Stop reason: HOSPADM

## 2021-11-03 RX ORDER — DEXAMETHASONE SODIUM PHOSPHATE 10 MG/ML
INJECTION, SOLUTION INTRAMUSCULAR; INTRAVENOUS
Status: COMPLETED | OUTPATIENT
Start: 2021-11-03 | End: 2021-11-03

## 2021-11-03 RX ORDER — MAGNESIUM HYDROXIDE 1200 MG/15ML
LIQUID ORAL AS NEEDED
Status: DISCONTINUED | OUTPATIENT
Start: 2021-11-03 | End: 2021-11-03 | Stop reason: HOSPADM

## 2021-11-03 RX ORDER — BUPIVACAINE HYDROCHLORIDE 5 MG/ML
INJECTION, SOLUTION EPIDURAL; INTRACAUDAL AS NEEDED
Status: DISCONTINUED | OUTPATIENT
Start: 2021-11-03 | End: 2021-11-03 | Stop reason: SURG

## 2021-11-03 RX ORDER — SODIUM CHLORIDE, SODIUM LACTATE, POTASSIUM CHLORIDE, CALCIUM CHLORIDE 600; 310; 30; 20 MG/100ML; MG/100ML; MG/100ML; MG/100ML
9 INJECTION, SOLUTION INTRAVENOUS CONTINUOUS
Status: DISCONTINUED | OUTPATIENT
Start: 2021-11-03 | End: 2021-11-03 | Stop reason: HOSPADM

## 2021-11-03 RX ORDER — ASPIRIN 81 MG/1
81 TABLET ORAL 2 TIMES DAILY
Qty: 62 TABLET | Refills: 0 | Status: SHIPPED | OUTPATIENT
Start: 2021-11-04 | End: 2022-02-24

## 2021-11-03 RX ORDER — NEOSTIGMINE METHYLSULFATE 1 MG/ML
INJECTION, SOLUTION INTRAVENOUS AS NEEDED
Status: DISCONTINUED | OUTPATIENT
Start: 2021-11-03 | End: 2021-11-03 | Stop reason: SURG

## 2021-11-03 RX ORDER — BUPIVACAINE HCL/0.9 % NACL/PF 0.125 %
PLASTIC BAG, INJECTION (ML) EPIDURAL AS NEEDED
Status: DISCONTINUED | OUTPATIENT
Start: 2021-11-03 | End: 2021-11-03 | Stop reason: SURG

## 2021-11-03 RX ORDER — ACETAMINOPHEN 500 MG
1000 TABLET ORAL ONCE
Status: COMPLETED | OUTPATIENT
Start: 2021-11-03 | End: 2021-11-03

## 2021-11-03 RX ORDER — CEFAZOLIN SODIUM 2 G/100ML
2 INJECTION, SOLUTION INTRAVENOUS ONCE
Status: COMPLETED | OUTPATIENT
Start: 2021-11-03 | End: 2021-11-03

## 2021-11-03 RX ORDER — PROPOFOL 10 MG/ML
VIAL (ML) INTRAVENOUS AS NEEDED
Status: DISCONTINUED | OUTPATIENT
Start: 2021-11-03 | End: 2021-11-03 | Stop reason: SURG

## 2021-11-03 RX ADMIN — Medication 200 MCG: at 08:25

## 2021-11-03 RX ADMIN — Medication 30 MG: at 08:02

## 2021-11-03 RX ADMIN — Medication 100 MCG: at 08:34

## 2021-11-03 RX ADMIN — LIDOCAINE HYDROCHLORIDE 0.5 ML: 10 INJECTION, SOLUTION EPIDURAL; INFILTRATION; INTRACAUDAL; PERINEURAL at 06:49

## 2021-11-03 RX ADMIN — EPHEDRINE SULFATE 10 MG: 50 INJECTION INTRAVENOUS at 08:14

## 2021-11-03 RX ADMIN — BUPIVACAINE HYDROCHLORIDE 10 ML: 5 INJECTION, SOLUTION EPIDURAL; INTRACAUDAL at 09:54

## 2021-11-03 RX ADMIN — EPHEDRINE SULFATE 15 MG: 50 INJECTION INTRAVENOUS at 08:34

## 2021-11-03 RX ADMIN — LIDOCAINE HYDROCHLORIDE 50 MG: 10 INJECTION, SOLUTION INFILTRATION; PERINEURAL at 07:53

## 2021-11-03 RX ADMIN — FENTANYL CITRATE 100 MCG: 50 INJECTION, SOLUTION INTRAMUSCULAR; INTRAVENOUS at 07:37

## 2021-11-03 RX ADMIN — BUPRENORPHINE HYDROCHLORIDE 0.3 MG: 0.32 INJECTION INTRAMUSCULAR; INTRAVENOUS at 07:43

## 2021-11-03 RX ADMIN — Medication 200 MCG: at 08:17

## 2021-11-03 RX ADMIN — SODIUM CHLORIDE, POTASSIUM CHLORIDE, SODIUM LACTATE AND CALCIUM CHLORIDE 9 ML/HR: 600; 310; 30; 20 INJECTION, SOLUTION INTRAVENOUS at 06:49

## 2021-11-03 RX ADMIN — GLYCOPYRROLATE 0.4 MG: 0.2 INJECTION INTRAMUSCULAR; INTRAVENOUS at 09:45

## 2021-11-03 RX ADMIN — MIDAZOLAM HYDROCHLORIDE 2 MG: 1 INJECTION, SOLUTION INTRAMUSCULAR; INTRAVENOUS at 07:37

## 2021-11-03 RX ADMIN — NEOSTIGMINE METHYLSULFATE 4 MG: 0.5 INJECTION INTRAVENOUS at 09:45

## 2021-11-03 RX ADMIN — ESMOLOL HYDROCHLORIDE 50 MG: 10 INJECTION, SOLUTION INTRAVENOUS at 07:53

## 2021-11-03 RX ADMIN — FENTANYL CITRATE 50 MCG: 50 INJECTION, SOLUTION INTRAMUSCULAR; INTRAVENOUS at 10:22

## 2021-11-03 RX ADMIN — CEFAZOLIN SODIUM 2 G: 2 INJECTION, SOLUTION INTRAVENOUS at 07:50

## 2021-11-03 RX ADMIN — ACETAMINOPHEN 1000 MG: 500 TABLET ORAL at 07:12

## 2021-11-03 RX ADMIN — ROPIVACAINE HYDROCHLORIDE 6 ML/HR: 2 INJECTION, SOLUTION EPIDURAL; INFILTRATION at 09:38

## 2021-11-03 RX ADMIN — GLYCOPYRROLATE 0.2 MG: 0.2 INJECTION INTRAMUSCULAR; INTRAVENOUS at 08:37

## 2021-11-03 RX ADMIN — EPHEDRINE SULFATE 10 MG: 50 INJECTION INTRAVENOUS at 08:05

## 2021-11-03 RX ADMIN — DEXAMETHASONE SODIUM PHOSPHATE 8 MG: 4 INJECTION, SOLUTION INTRA-ARTICULAR; INTRALESIONAL; INTRAMUSCULAR; INTRAVENOUS; SOFT TISSUE at 07:53

## 2021-11-03 RX ADMIN — PROPOFOL 25 MCG/KG/MIN: 10 INJECTION, EMULSION INTRAVENOUS at 08:00

## 2021-11-03 RX ADMIN — EPHEDRINE SULFATE 10 MG: 50 INJECTION INTRAVENOUS at 08:09

## 2021-11-03 RX ADMIN — BUPIVACAINE HYDROCHLORIDE 30 ML: 2.5 INJECTION, SOLUTION EPIDURAL; INFILTRATION; INTRACAUDAL; PERINEURAL at 07:43

## 2021-11-03 RX ADMIN — Medication 100 MCG: at 08:56

## 2021-11-03 RX ADMIN — DEXAMETHASONE SODIUM PHOSPHATE 4 MG: 10 INJECTION, SOLUTION INTRAMUSCULAR; INTRAVENOUS at 07:43

## 2021-11-03 RX ADMIN — PROPOFOL 200 MG: 10 INJECTION, EMULSION INTRAVENOUS at 07:53

## 2021-11-03 RX ADMIN — BUPIVACAINE HYDROCHLORIDE 30 ML: 2.5 INJECTION, SOLUTION EPIDURAL; INFILTRATION; INTRACAUDAL at 07:37

## 2021-11-03 NOTE — THERAPY EVALUATION
Patient Name: Brigid Coelho  : 1965    MRN: [id number]                              Today's Date: 11/3/2021       Admit Date: 11/3/2021    Visit Dx:     ICD-10-CM ICD-9-CM   1. Closed trimalleolar fracture of left ankle, initial encounter  S82.852A 824.6     Patient Active Problem List   Diagnosis   • Ulcerative colitis without complications (HCC)   • Seasonal allergies   • Anemia   • Esophageal reflux   • Generalized anxiety disorder   • Post-menopausal   • Prediabetes   • Hypothyroidism due to Hashimoto's thyroiditis   • Vitamin D deficiency   • Fatty liver   • Anxiety   • Thickened endometrium   • Polycystic ovaries   • Lung nodule, solitary   • Morbid obesity with BMI of 40.0-44.9, adult (HCC)   • DDD (degenerative disc disease), lumbar   • Mixed hyperlipidemia   • Closed trimalleolar fracture of left ankle     Past Medical History:   Diagnosis Date   • Acute bronchitis    • Anemia    • Anxiety    • Chest pain    • Cholelithiasis    • Claustrophobia    • GERD (gastroesophageal reflux disease)    • Hashimoto's thyroiditis    • Hemorrhoids    • Hypothyroidism    • Low back pain    • Metrorrhagia    • Palpitations    • Polycystic ovary syndrome    • Polyp of sigmoid colon    • Ulcerative colitis (HCC)    • Upper respiratory infection    • UTI (urinary tract infection)      Past Surgical History:   Procedure Laterality Date   • APPENDECTOMY     • CARDIAC CATHETERIZATION     •  SECTION      x 2   • CHOLECYSTECTOMY     • COLONOSCOPY     • DILATATION AND CURETTAGE      Of Cervical Stump   • MYOMECTOMY     • SMALL INTESTINE SURGERY     • TUBAL ABDOMINAL LIGATION        General Information     Row Name 21 1145          Physical Therapy Time and Intention    Document Type discharge evaluation/summary  -TIMOTHY     Mode of Treatment individual therapy; physical therapy  -TIMOTHY     Row Name 21 1145          General Information    Patient Profile Reviewed yes  -TIMOTHY     Prior Level of Function  independent:; all household mobility; transfer; bed mobility; ADL's  -TIMOTHY     Existing Precautions/Restrictions fall; other (see comments)  NWB L LE; popliteal  -TIMOTHY     Barriers to Rehab none identified  -TIMOTHY     Row Name 11/03/21 1145          Living Environment    Lives With alone  -TIMOTHY     Row Name 11/03/21 1145          Home Main Entrance    Number of Stairs, Main Entrance none  -TIMOTHY     Row Name 11/03/21 1145          Stairs Within Home, Primary    Stairs, Within Home, Primary 0  -TIMOTHY     Number of Stairs, Within Home, Primary none  -TIMOTHY     Row Name 11/03/21 1145          Cognition    Orientation Status (Cognition) oriented x 4  -TIMOTHY     Row Name 11/03/21 1145          Safety Issues, Functional Mobility    Safety Issues Affecting Function (Mobility) safety precaution awareness; safety precautions follow-through/compliance  -     Impairments Affecting Function (Mobility) endurance/activity tolerance; strength; balance  -TIMOTHY           User Key  (r) = Recorded By, (t) = Taken By, (c) = Cosigned By    Initials Name Provider Type    TIMOTHY Victoriano Love, PT Physical Therapist               Mobility     Row Name 11/03/21 1145          Bed Mobility    Bed Mobility scooting/bridging; supine-sit; sit-supine  -TIMOTHY     Scooting/Bridging [place] (Bed Mobility) supervision; verbal cues  -TIMOTHY     Supine-Sit [place] (Bed Mobility) supervision; verbal cues  -TIMOTHY     Sit-Supine [place] (Bed Mobility) supervision; verbal cues  -TIMOTHY     Assistive Device (Bed Mobility) bed rails; head of bed elevated  -TIMOTHY     Comment (Bed Mobility) Verbal cues for LE sequencing off of/onto EOB and trunk control into sitting/supine  -TIMOTHY     Row Name 11/03/21 1145          Transfers    Comment (Transfers) Verbal cues for safe hand placement during standing/sitting and maintaining NWB at L LE; CGA for toilet transfer  -TIMOTHY     Row Name 11/03/21 1145          Sit-Stand Transfer    Sit-Stand [place] (Transfers) verbal cues; supervision  -TIMOTHY      Assistive Device (Sit-Stand Transfers) walker, front-wheeled  -     Row Name 11/03/21 1145          Gait/Stairs (Locomotion)    [place] Level (Gait) verbal cues; contact guard  -     Assistive Device (Gait) walker, front-wheeled  -     Distance in Feet (Gait) 110  -TIMOTHY     Deviations/Abnormal Patterns (Gait) bilateral deviations; alecia decreased; gait speed decreased; stride length decreased  -TIMOTHY     Bilateral Gait Deviations forward flexed posture  -     [place] Level (Stairs) not tested  -     Comment (Gait/Stairs) Pt ambulated with swing to pattern and decreased speed. Verbal cues for maintaining upright posture, body within walker, and WB through UEs. Pt able to maintain NWB at L LE.  -     Row Name 11/03/21 1145          Mobility    Extremity Weight-bearing Status left lower extremity  -     Left Lower Extremity (Weight-bearing Status) non weight-bearing (NWB)  -           User Key  (r) = Recorded By, (t) = Taken By, (c) = Cosigned By    Initials Name Provider Type    Victoriano Brenner PT Physical Therapist               Obj/Interventions     Row Name 11/03/21 1145          Range of Motion Comprehensive    General Range of Motion lower extremity range of motion deficits identified  -     Comment, General Range of Motion R LE AROM WFL; L LE in cast  -     Row Name 11/03/21 1145          Strength Comprehensive (MMT)    General Manual Muscle Testing (MMT) Assessment lower extremity strength deficits identified  -     Comment, General Manual Muscle Testing (MMT) Assessment R LE functionally 4+/5; L LE NWB  -     Row Name 11/03/21 1145          Sensory Assessment (Somatosensory)    Sensory Assessment (Somatosensory) LE sensation intact  -           User Key  (r) = Recorded By, (t) = Taken By, (c) = Cosigned By    Initials Name Provider Type    Victoriano Brenner PT Physical Therapist               Goals/Plan    No documentation.                Clinical Impression     Row Name 11/03/21  1145          Pain    Additional Documentation Pain Scale: Numbers Pre/Post-Treatment (Group)  -     Row Name 11/03/21 1145          Pain Scale: Numbers Pre/Post-Treatment    Pretreatment Pain Rating 0/10 - no pain  -     Posttreatment Pain Rating 0/10 - no pain  -     Row Name 11/03/21 1145          Therapy Assessment/Plan (PT)    Patient/Family Therapy Goals Statement (PT) To return home  -     Row Name 11/03/21 1145          Positioning and Restraints    Pre-Treatment Position in bed  -     Post Treatment Position bed  -TIMOTHY     In Bed notified nsg; fowlers; call light within reach; encouraged to call for assist; with nsg  -           User Key  (r) = Recorded By, (t) = Taken By, (c) = Cosigned By    Initials Name Provider Type    Victoriano Brenner PT Physical Therapist               Outcome Measures     Row Name 11/03/21 1145          How much help from another person do you currently need...    Turning from your back to your side while in flat bed without using bedrails? 4  -TIMOTHY     Moving from lying on back to sitting on the side of a flat bed without bedrails? 4  -TIMOTHY     Moving to and from a bed to a chair (including a wheelchair)? 4  -TIMOTHY     Standing up from a chair using your arms (e.g., wheelchair, bedside chair)? 4  -TIMOTHY     Climbing 3-5 steps with a railing? 1  -TIMOTHY     To walk in hospital room? 3  -TIMOTHY     AM-PAC 6 Clicks Score (PT) 20  -     Row Name 11/03/21 1145          Functional Assessment    Outcome Measure Options AM-PAC 6 Clicks Basic Mobility (PT)  -           User Key  (r) = Recorded By, (t) = Taken By, (c) = Cosigned By    Initials Name Provider Type    Victoriano Brenner PT Physical Therapist                             Physical Therapy Education                 Title: PT OT SLP Therapies (Done)     Topic: Physical Therapy (Done)     Point: Mobility training (Done)     Learning Progress Summary           Patient Acceptance, E,D, VU by TIMOTHY at 11/3/2021 1145    Comment: Educated on safe  sequencing with bed mobility, ambulatory/car/toilet transfers, and gait. Reviewed NWB precautions.                   Point: Home exercise program (Done)     Learning Progress Summary           Patient Acceptance, E,D, VU by TIMOTHY at 11/3/2021 1145    Comment: Educated on safe sequencing with bed mobility, ambulatory/car/toilet transfers, and gait. Reviewed NWB precautions.                   Point: Body mechanics (Done)     Learning Progress Summary           Patient Acceptance, E,D, VU by TIMOTHY at 11/3/2021 1145    Comment: Educated on safe sequencing with bed mobility, ambulatory/car/toilet transfers, and gait. Reviewed NWB precautions.                   Point: Precautions (Done)     Learning Progress Summary           Patient Acceptance, E,D, VU by TIMOTHY at 11/3/2021 1145    Comment: Educated on safe sequencing with bed mobility, ambulatory/car/toilet transfers, and gait. Reviewed NWB precautions.                               User Key     Initials Effective Dates Name Provider Type Discipline     06/16/21 -  Victoriano Love, PT Physical Therapist PT              PT Recommendation and Plan     Plan of Care Reviewed With: patient  Progress: improving  Outcome Summary: PT eval complete. Pt ambulated 110 feet using RW and CGA for safety. Gait limited by fatigue. Bed mobility, STS, and toilet transfers performed with supervision. Pt able to maintain NWB at L LE throughout mobility tasks. Recommend use of RW at home, pt also fitted for crutches. Educated on safe car transfers. Functionally, pt safe to d/c home with assist today from a PT perspective.     Time Calculation:    PT Charges     Row Name 11/03/21 1145             Time Calculation    Start Time 1145  -TIMOTHY      PT Received On 11/03/21  -TIMOTHY      PT Goal Re-Cert Due Date 11/13/21  -TIMOTHY              Untimed Charges    PT Eval/Re-eval Minutes 46  -TIMOTHY              Total Minutes    Untimed Charges Total Minutes 46  -TIMOTHY       Total Minutes 46  -TIMOTHY            User Key  (r) =  Recorded By, (t) = Taken By, (c) = Cosigned By    Initials Name Provider Type    TIMOTHY Victoriano Love, PT Physical Therapist              Therapy Charges for Today     Code Description Service Date Service Provider Modifiers Qty    44906894479 HC PT EVAL LOW COMPLEXITY 4 11/3/2021 Victoriano Love, PT GP 1          PT G-Codes  Outcome Measure Options: AM-PAC 6 Clicks Basic Mobility (PT)  AM-PAC 6 Clicks Score (PT): 20    Victoriano Love, PT  11/3/2021

## 2021-11-03 NOTE — ANESTHESIA PROCEDURE NOTES
Left Femoral Block    Pre-sedation assessment completed: 11/3/2021 7:43 AM    Patient reassessed immediately prior to procedure    Patient location during procedure: OR  Start time: 11/3/2021 7:43 AM  Reason for block: post-op pain management  Performed by  CRNA: Ciera Jordan CRNA  Assisted by: Pat Huizar RN  Preanesthetic Checklist  Completed: patient identified, IV checked, site marked, surgical consent, monitors and equipment checked, pre-op evaluation and timeout performed  Prep:  Sterile barriers:gloves, cap, sterile barriers and mask  Prep: ChloraPrep  Patient monitoring: blood pressure monitoring, continuous pulse oximetry and EKG  Procedure    Sedation: yes  Performed under: local infiltration  Guidance:ultrasound guided, nerve stimulator and landmark technique  Images:still images not obtained    Laterality:left  Block Type:femoral  Injection Technique:single-shot  Needle Type:short-bevel  Needle Gauge:20 G  Resistance on Injection: none    Medications Used: bupivacaine PF (MARCAINE) 0.25 % injection, 30 mL  buprenorphine (BUPRENEX) injection, 0.3 mg  dexamethasone sodium phosphate injection, 4 mg  Med administered at 11/3/2021 7:43 AM      Medications  Preservative Free Saline:10ml    Post Assessment  Injection Assessment: negative aspiration for heme, no paresthesia on injection and incremental injection  Patient Tolerance:comfortable throughout block  Complications:no  Additional Notes  The BBRaun 360 degree echogenic needle was introduced in plane, in a lateral to medial direction at the level of the inguinal crease.  Under ultrasound guidance, the femoral artery and vein where located.  The needle was then directed below Fascia Iliacus towards the Femoral nerve.  NS was utilized to hydro dissect and david needle advancement towards the target structure.   LA was injected incrementally in 3-5 ml aliquots with negative aspirate.  LA spread was visualized around the nerve, negative intraneural  injection, low injection pressures.  Thank you

## 2021-11-03 NOTE — OP NOTE
Orthopaedics Operative Report    PREOPERATIVE DIAGNOSIS: Left trimalleolar ankle fracture dislocation    POSTOPERATIVE DIAGNOSIS: Same    PROCEDURE PERFORMED: Open treatment left trimalleolar ankle fracture dislocation without fixation of posterior lip    CPT:63132    SURGEON: Giovani Daniels MD    ANESTHESIA:  General with Block    STAFF:  Circulator: Nola Artis RN; David Byrd RN Extern; Hussein Damon RN  Radiology Technologist: Arabella Shaikh,   Scrub Person: Padilla Edmonds  Vendor Representative: Jerome Mills  Nursing Assistant: Perry Argueta  Assistant: Emilia Chavez PA-C    TOURNIQUET TIME: 95 minutes    ESTIMATED BLOOD LOSS: Minimal    COMPLICATIONS: None apparent.    IMPLANTS: Cook Sta 4.0 mm cannulated screws x2 for medial malleolus, Cook Sta periarticular distal fibula plate.  All implants are titanium.    PREOPERATIVE ANTIBIOTICS: Kefzol    REFERRING PHYSICIAN: Debby Kennedy DO    INDICATIONS: Unstable ankle fracture pattern    DESCRIPTION OF PROCEDURE: After informed consent was obtained, the patient was taken to the operating room.  Block of been placed in the holding area.  After the smooth induction of general anesthesia, patient was given a dose of IV antibiotics.  We then remove the patient's splint noticed there were 2 medial posterior fracture blisters with clear fluid noted.  The remainder of the skin appeared appropriate for surgery and we elected to proceed.  The left lower extremity was prepped and draped in the usual fashion for this type of procedure.  We performed timeout to verify site and the procedure to be performed and began with exsanguination of the left lower extremity using an Esmarch bandage inflation of tourniquet to 300 mmHg.  We made our lateral incision initially.  Dissected sharply and bluntly and identified our superficial peroneal nerve in the anterior aspect of the incision which was protected and with continuity throughout the  procedure.  Dissection was then taken deeper to the level of the distal fibular fracture.  There was a split that went from proximal and posterior to distal and anterior and did split the distal fibula at its tip as well.  This was reduced anatomically and then clamped and an ADP lag screw was placed with reasonably good purchase.  We then chose the appropriate size plate and this was bent to contour to the fibula.  It was applied at the appropriate level.  2 posterior screws were placed in the distal fragment which were locking screws.  The most anterior screw in the distal aspect of the plate was a cancellous screw to try and grab the anterior aspect of the coronal split.  A second ADP lag screw was placed again with reasonable bite.  4 points of fixation total were placed proximally.  We brought in C arm to verify that we had appropriately fixed the distal fibula and were out to length.  At this point, we then made a longitudinal incision over the medial malleolus.  We were anterior to the fracture blisters.  We dissected sharply and bluntly.  We had a crossing branch of the saphenous vein which was cauterized.  The main saphenous vein was anterior and protected throughout the procedure.  We dissected sharply bluntly identified our medial malleolus fracture.  We anatomically reduced the fracture and there was a small amount of comminution at the most medial shoulder.  Once this was reduced as well as possible, a clamp was placed and we brought in C-arm to verify appropriate reduction.  An anterior and then posterior K wire were placed and verified to be outside of the joint.  2 cannulated screws were then placed without difficulty with reasonably good purchase.  The more posterior screw had better purchase in the anterior screw.  Once these were maximally tightened, we brought C-arm back in and took multiple C-arm images to verify appropriate reduction.  A cotton test was performed that verified the syndesmosis  to be stable as well as no widening to be noted at the medial clear space.  At this point we also took a lateral view which showed our posterior malleolar fragment to be anatomically reduced and we elected to not to place any fixation in the posterior fragment.  We thoroughly irrigated the incisions.  We closed the incisions using Vicryl and nylon sutures.  Sterile dressings were applied to both incisions into the fracture blisters medially.  A very well-padded posterior splint with stirrups was applied and the tourniquet was deflated.  Patient was then allowed to awaken from anesthetic and then transferred back to her stretcher and then to the recovery room in stable condition.  All counts were correct postoperatively.  I performed the case.    Assistant: Emilia Chavez PA-C    The skilled assistance of the above noted first assistant was necessary during this complex surgical procedure.  The surgical assistant assisted with every aspect of the operation including, but not limited to, proper and safe positioning of the patient, obtaining adequate surgical exposure, manipulation of surgical instruments, suture management, surgical knot tying when necessary, the continual process of hemostasis during the procedure itself in addition to surgical wound closure and removal of the patient from the operating table and returning the patient back to the [facility].  The assistance of the surgical assistant allowed me to perform the most sensitive and technical potions of this operation using 2 hands, thus enhancing efficiency and patient safety.  This would not be possible without the help of a skilled assistant familiar with the procedure and capable of safely performing the aforementioned tasks.    POSTOPERATIVE PLAN:  1. Weight bearing status: Nonweightbearing left lower extremity  2.  Percocet 7.5 mg tablets and indwelling popliteal block for pain control  3. Follow-up in 2 weeks for wound check and suture  removal  4. Keep incisions clean and dry  5.  Nonweightbearing left lower extremity for 6 weeks and if patient is healing appropriately we will initiate gradual weightbearing at that time with physical therapy if appropriate.  6.  Patient will initiate 81 mg of aspirin twice daily beginning tomorrow for DVT prophylaxis.        Giovani Daniels M.D.*

## 2021-11-03 NOTE — INTERVAL H&P NOTE
Pre-Op H&P (See Recent Office Note Attached for Full H&P)    Patient Name: Brigid Coelho  : 1965  MRN: [id number]    Chief complaint: Left ankle pain    HPI:      Patient is a 56 y.o. female who presents with closed trimalleolar fracture of the left ankle. Patient presented to ED 10/30/2021 with ankle fracture after mechanical fall. Closed reduction performed in ED with plans for open reduction as outpatient procedure. Patient has no new symptoms or concerns today since last evaluated. No new medical conditions. Reports latex allergy.    Review of Systems:  General ROS:  no fever, chills, rashes.  No change since last office visit.  No recent sick exposure  Cardiovascular ROS: no chest pain or dyspnea on exertion  Respiratory ROS: no cough, shortness of breath, or wheezing    Immunization History:   Influenza:  no  Tetanus: due in January  COVID-19: no    Allergies   Allergen Reactions   • Adhesive Tape      Rash     • Erythromycin Other (See Comments)     Sores in mouth   • Nickel Itching   • Epinephrine Palpitations   • Latex Rash   • Nitroglycerin Palpitations       Meds:    No current facility-administered medications on file prior to encounter.     Current Outpatient Medications on File Prior to Encounter   Medication Sig Dispense Refill   • acetaminophen (TYLENOL) 650 MG 8 hr tablet Take 650 mg by mouth Every 8 (Eight) Hours As Needed for Mild Pain .     • ALPRAZolam (Xanax) 0.5 MG tablet Take 1 tablet by mouth Daily As Needed for Anxiety. 30 tablet 0   • ascorbic acid (VITAMIN C) 1000 MG tablet Take 1,000 mg by mouth Daily.     • Biotin 1000 MCG chewable tablet Chew.     • cephalexin (KEFLEX) 500 MG capsule Take 1 capsule by mouth 4 (Four) Times a Day. 28 capsule 0   • cyclobenzaprine (FLEXERIL) 10 MG tablet Take 1 tablet by mouth 2 (Two) Times a Day As Needed for Muscle Spasms. 60 tablet 1   • diclofenac (VOLTAREN) 1 % gel gel Apply 4 g topically to the appropriate area as directed 3  (Three) Times a Day. 100 g 1   • Diclofenac Sodium (VOLTAREN) 1 % gel gel Apply 4 g topically to the appropriate area as directed 3 (Three) Times a Day. 150 g 1   • famotidine (PEPCID) 20 MG tablet Take 20 mg by mouth At Night As Needed for Heartburn.     • ferrous sulfate 140 (45 Fe) MG tablet controlled-release tablet Take 140 mg by mouth Daily With Breakfast.     • folic acid (FOLVITE) 400 MCG tablet Take 400 mcg by mouth Daily.     • Garlic 1000 MG capsule Take 1 tablet by mouth Daily.     • HYDROcodone-acetaminophen (NORCO) 7.5-325 MG per tablet Take 1 tablet by mouth Every 6 (Six) Hours As Needed for Moderate Pain . 12 tablet 0   • ibuprofen (ADVIL,MOTRIN) 800 MG tablet Take 1 tablet by mouth Every 8 (Eight) Hours As Needed for Mild Pain . 90 tablet 0   • levothyroxine (Synthroid) 88 MCG tablet Take 1 tablet by mouth Daily. 90 tablet 0   • Loratadine (CLARITIN) 10 MG capsule Take 1 tablet by mouth daily.     • meclizine (ANTIVERT) 25 MG tablet Take 1 tablet by mouth Every 6 (Six) Hours As Needed for Dizziness for up to 20 doses. 20 tablet 0   • Melatonin 2.5 MG capsule Take  by mouth.     • mesalamine (APRISO) 0.375 g 24 hr capsule Take 375 mg by mouth As Needed.     • montelukast (SINGULAIR) 10 MG tablet Take 1 tablet by mouth every night at bedtime. 90 tablet 0   • Selenium 200 MCG capsule Take 1 tablet by mouth Daily.     • sertraline (Zoloft) 50 MG tablet Take 1 tablet by mouth Daily. 90 tablet 0   • Vitamin A 2400 MCG (8000 UT) tablet Take 1 tablet by mouth Daily.     • Zinc 25 MG tablet Take 0.5 tablets by mouth Daily.         Vital Signs:  There were no vitals taken for this visit.    Physical Exam:    CV:  S1S2 regular rate and rhythm, no murmur               Resp:  Clear to auscultation; respirations regular, even and unlabored    Results Review:     Lab Results   Component Value Date    WBC 9.32 07/07/2021    HGB 11.5 (L) 07/07/2021    HCT 36.2 07/07/2021    MCV 76.9 (L) 07/07/2021      07/07/2021    NEUTROABS 6.13 07/07/2021    GLUCOSE 70 07/07/2021    BUN 9 07/07/2021    CREATININE 0.70 07/07/2021    EGFRIFNONA 86 07/07/2021    EGFRIFAFRI 105 07/07/2021     07/07/2021    K 5.2 07/07/2021     07/07/2021    CO2 25.9 07/07/2021    CALCIUM 9.6 07/07/2021    ALBUMIN 4.40 07/07/2021    AST 24 07/07/2021    ALT 27 07/07/2021    BILITOT <0.2 07/07/2021        I reviewed the patient's new clinical results.      Assessment/Plan:  Assessment: Closed trimalleolar fracture of left ankle    Plan: Open reduction internal fixation of left medial and lateral malleoli. Titanium implants to be used due to possible nickel allergy. Plans to go home today.    Electronically signed by Karen Saez PA-C    11/3/2021   06:51 EDT     H&P reviewed. The patient was examined and there are no changes to the H&P.

## 2021-11-03 NOTE — PLAN OF CARE
Problem: Adult Inpatient Plan of Care  Goal: Plan of Care Review  Flowsheets (Taken 11/3/2021 1142)  Progress: improving  Plan of Care Reviewed With: patient  Outcome Summary: PT eval complete. Pt ambulated 110 feet using RW and CGA for safety. Gait limited by fatigue. Bed mobility, STS, and toilet transfers performed with supervision. Pt able to maintain NWB at L LE throughout mobility tasks. Recommend use of RW at home, pt also fitted for crutches. Educated on safe car transfers. Functionally, pt safe to d/c home with assist today from a PT perspective.   Goal Outcome Evaluation:  Plan of Care Reviewed With: patient        Progress: improving  Outcome Summary: PT eval complete. Pt ambulated 110 feet using RW and CGA for safety. Gait limited by fatigue. Bed mobility, STS, and toilet transfers performed with supervision. Pt able to maintain NWB at L LE throughout mobility tasks. Recommend use of RW at home, pt also fitted for crutches. Educated on safe car transfers. Functionally, pt safe to d/c home with assist today from a PT perspective.

## 2021-11-03 NOTE — ANESTHESIA POSTPROCEDURE EVALUATION
Patient: Brigid Coelho    Procedure Summary     Date: 11/03/21 Room / Location:  [facility]  [facility]    Anesthesia Start: 0749 Anesthesia Stop: 1000    Procedure: TRIMALLEOLAR FRACTURE OPEN REDUCTION INTERNAL FIXATION (Left Ankle) Diagnosis:       Closed trimalleolar fracture of left ankle, initial encounter      (Closed trimalleolar fracture of left ankle, initial encounter [S82.852A])    Surgeons: Giovani Daniels MD Provider: Dominga East MD    Anesthesia Type: general with block ASA Status: 3          Anesthesia Type: general with block    Vitals  No vitals data found for the desired time range.          Post Anesthesia Care and Evaluation    Patient location during evaluation: PACU  Patient participation: complete - patient participated  Level of consciousness: awake and alert  Pain management: adequate  Airway patency: patent  Anesthetic complications: No anesthetic complications  PONV Status: none  Cardiovascular status: hemodynamically stable and acceptable  Respiratory status: nonlabored ventilation, acceptable and nasal cannula  Hydration status: acceptable

## 2021-11-03 NOTE — ANESTHESIA PROCEDURE NOTES
Left Popliteal Catheter    Pre-sedation assessment completed: 11/3/2021 7:16 AM    Patient reassessed immediately prior to procedure    Patient location during procedure: pre-op  Start time: 11/3/2021 7:17 AM  Reason for block: at surgeon's request and post-op pain management  Performed by  CRNA: Ciera Jordan CRNA  Assisted by: Pat Huizar RN  Preanesthetic Checklist  Completed: patient identified, IV checked, site marked, risks and benefits discussed, surgical consent, monitors and equipment checked, pre-op evaluation and timeout performed  Prep:  Pt Position: right lateral decubitus  Sterile barriers:cap, gloves, mask and sterile barriers  Prep: ChloraPrep  Patient monitoring: blood pressure monitoring, continuous pulse oximetry and EKG  Procedure    Sedation: yes  Performed under: local infiltration  Guidance:ultrasound guided  Images:still images obtained, printed/placed on chart    Block Type:popliteal  Injection Technique:catheter  Needle Type:echogenic  Needle Gauge:18 G  Resistance on Injection: none  Catheter Size:20 G  Cath Depth at skin: 10 cm    Medications Used: fentaNYL citrate (PF) (SUBLIMAZE) injection, 100 mcg  bupivacaine PF (MARCAINE) 0.25 % injection, 30 mL  midazolam (VERSED) injection, 2 mg  Med administered at 11/3/2021 7:37 AM      Medications  Preservative Free Saline:10ml    Post Assessment  Injection Assessment: negative aspiration for heme, no paresthesia on injection and incremental injection  Patient Tolerance:comfortable throughout block  Complications:no  Additional Notes  Procedure:                                                         The pt was placed in  lateral position.  The Insertion site was  prepped and Draped in sterile fashion.  The pt was anesthetized with  IV Sedation( see meds).  Skin and cutaneous tissue was infiltrated and anesthetized with 1% Lidocaine 3 mls via a 25g needle.  A BBraun 4 inch 18g echogenic needle was then  inserted approximately 3 cm  proximal to the popliteal fossa at the lateral mid biceps femoris and advanced In-plane with Ultrasound guidance.  Normal Saline PSF was utilized for hydrodissection of tissue.  The popliteal artery was visualized and the common peroneal and tibial bifurcation was located.  LA injection spread was visualized, injection was incremental 1-5ml, injection pressure was normal or little, no intraneural injection, no vascular injection.  .  A BBraun 20g wire stylet catheter was placed via the needle with ultrasound visualization and confirmation with NS fluid bolus. The labeled catheter was then secured at insertion site with exofin tissue adhesive, benzoin, steristrips  and a CHG transparent dressing was placed over. Thank you

## 2021-11-03 NOTE — ANESTHESIA PREPROCEDURE EVALUATION
" Anesthesia Evaluation     Patient summary reviewed and Nursing notes reviewed   no history of anesthetic complications:  NPO Solid Status: > 8 hours  NPO Liquid Status: > 2 hours           Airway   Mallampati: II  TM distance: >3 FB  Neck ROM: full  No difficulty expected  Dental    (+) poor dentition        Pulmonary - negative pulmonary ROS   Cardiovascular - normal exam    ECG reviewed    (+) hyperlipidemia,   (-) angina, LUNDBERG      Neuro/Psych  (+) psychiatric history Anxiety and Depression,     (-) seizures, TIA  GI/Hepatic/Renal/Endo    (+) morbid obesity, GERD,  liver disease fatty liver disease, diabetes mellitus (\"prediabetes\"), thyroid problem hypothyroidism    Musculoskeletal     Abdominal    Substance History      OB/GYN          Other   arthritis,                    Anesthesia Plan    ASA 3     general with block     intravenous induction     Anesthetic plan, all risks, benefits, and alternatives have been provided, discussed and informed consent has been obtained with: patient.    Plan discussed with CRNA.      "

## 2021-11-03 NOTE — ANESTHESIA PROCEDURE NOTES
Airway  Urgency: elective    Date/Time: 11/3/2021 7:56 AM  Airway not difficult    General Information and Staff    Patient location during procedure: OR  CRNA: Neil Ignacio CRNA    Indications and Patient Condition  Indications for airway management: airway protection    Preoxygenated: yes  MILS not maintained throughout  Mask difficulty assessment: 1 - vent by mask    Final Airway Details  Final airway type: endotracheal airway      Successful airway: ETT  Cuffed: yes   Successful intubation technique: direct laryngoscopy  Endotracheal tube insertion site: oral  Blade: Riki  Blade size: 3  ETT size (mm): 7.0  Cormack-Lehane Classification: grade I - full view of glottis  Placement verified by: chest auscultation and capnometry   Cuff volume (mL): 8  Measured from: lips  ETT/EBT  to lips (cm): 20  Number of attempts at approach: 1  Assessment: lips, teeth, and gum same as pre-op and atraumatic intubation    Additional Comments  Negative epigastric sounds, Breath sound equal bilaterally with symmetric chest rise and fall

## 2021-11-04 NOTE — PROGRESS NOTES
[facility]    Nerve Cath Post Op Call    Patient Name: Brigid Coelho  :  1965  MRN:  [id number]  Date of Discharge: 11/3/2021    Nerve Cath Post Op Call:    Analgesia:Good  Pain Score:3/10  Side Effects:None  Catheter Site:clean  Patient Controlled ON Q pump infusion rate: 6ml/hr  Catheter Plan:Will continue with plan at home without changes and The patient was instructed to call ON CALL Anesthesia provider for any questions or problems  Patient/Family instructed to call ON CALL anesthesia provider for any questions or problems.  Patient Follow Up:  Patient provided the educational video about the nerve catheter and pain pump.  Video adequately prepared patient to manage pain pump at home.        Medial pain = 3, Lateral = 0  States hates the pump because she cant feel her leg. Advise to turn down to 4 ml/hr for 3 hrs and if need to turn it down to 2ml/hr for another 3 hrs.

## 2021-11-05 ENCOUNTER — TELEPHONE (OUTPATIENT)
Dept: ORTHOPEDIC SURGERY | Facility: CLINIC | Age: 56
End: 2021-11-05

## 2021-11-05 NOTE — TELEPHONE ENCOUNTER
Spoke with Chely at [facility] and she states they are needing a letter stating that a wheelchair is a medial necessity; She said specific things that would need to be included in this letter are:    A wheelchair that fits through doors in home, willing to use caregiver, cane/walker not sufficient, etc.      Her fax number is: [phone]    Letter typed up and faxed to above [facility] [employer]

## 2021-11-05 NOTE — PROGRESS NOTES
[facility]    Nerve Cath Post Op Call    Patient Name: Brigid Coelho  :  1965  MRN:  [id number]  Date of Discharge: 11/3/2021    Nerve Cath Post Op Call:    Analgesia:Good  Pain Score:8/10  Side Effects:None  Catheter Site:clean  Patient Controlled ON Q pump infusion rate: 8ml/hr (pt instructed to increase rate to 8 )  Catheter Plan:Will continue with plan at home without changes and The patient was instructed to call ON CALL Anesthesia provider for any questions or problems  Patient/Family instructed to call ON CALL anesthesia provider for any questions or problems.  Patient Follow Up:      Pt rates pain at 8. States it's been like that for a while now. Instructed to increase rate to 8 and see if she gets any better at that rate. Pt verbalizes understanding.

## 2021-11-05 NOTE — PROGRESS NOTES
[facility]    Nerve Cath Post Op Call    Patient Name: Brigid Coelho  :  1965  MRN:  [id number]  Date of Discharge: 11/3/2021    Nerve Cath Post Op Call:    Analgesia:Good  Pain Score:5/10  Side Effects:None  Catheter Site:clean  Patient Controlled ON Q pump infusion rate: 6ml/hr  Catheter Plan:Will continue with plan at home without changes and The patient was instructed to call ON CALL Anesthesia provider for any questions or problems  Patient/Family instructed to call ON CALL anesthesia provider for any questions or problems.  Patient Follow Up:  Patient provided the educational video about the nerve catheter and pain pump.  Video adequately prepared patient to manage pain pump at home.        Pt has been titrating the pump yesterday.Having pain. Pt thought pump was empty. Sent pictures via text. There is still medication in the pump. Patient advised to increase to 10 ml/hr until comfortable and then back to rate of 6 ml/hr

## 2021-11-05 NOTE — TELEPHONE ENCOUNTER
Tried calling pt twice; LVM for her to return my call and advised she go to the ED if she is at all concerned for GI bleed.    Aminata

## 2021-11-05 NOTE — TELEPHONE ENCOUNTER
PATIENT CALLED AND STATED SHE IS NOW BLEEDING FROM TAKING THE ASPRIN THERAPY FOR AFTER SURGERY PROTOCOL. SHE IS NOW BLEEDING EVERY TIME SHE GOES TO THE BATHROOM. SHE STATES SHE ULCERATIVE COLITIS. SHE WANTS TO KNOW WHAT TO DO? PATIENT CAN BE REACHED @ [phone]

## 2021-11-05 NOTE — TELEPHONE ENCOUNTER
Dr. Daniels-I'm waiting for pt to return my call but how would you like for her to proceed?    Thanks!  Aminata

## 2021-11-07 NOTE — PROGRESS NOTES
SEUN Lim    Nerve Cath Post Op Call    Patient Name: Brigid Coelho  :  1965  MRN:  [id number]  Date of Discharge: 11/3/2021    Nerve Cath Post Op Call:    Catheter Plan:Patient/Family member report nerve catheter previously discontinued, tip intact  Patient/Family instructed to call ON CALL anesthesia provider for any questions or problems.  Patient Follow Up:

## 2021-11-08 ENCOUNTER — TELEPHONE (OUTPATIENT)
Dept: ORTHOPEDIC SURGERY | Facility: CLINIC | Age: 56
End: 2021-11-08

## 2021-11-08 NOTE — TELEPHONE ENCOUNTER
Provider:CHEMA  Caller: ROXANNE SAHNI  Relationship to Patient: PATIENT  Pharmacy: NA  Phone Number: [phone]    Reason for Call:   1.CALLING TO CHECK STATUS OF WHEELCHAIR ORDER WITH [facility] - THEY WON'T CALL HER BACK  2. AIR CAST IS LOOSE NOW THAT SWELLING WENT DOWN; IS THIS OKAY?  3. SOMETIMES THERE IS A LITTLE POPPING IN THE KNEE OR ANKLE  4. E/R PHYSICIAN PUT HER ON KEFLEX, IS THAT OK?    When was the patient last seen: 11.3.21

## 2021-11-08 NOTE — TELEPHONE ENCOUNTER
Spoke with pt regarding her previous message but I also asked her about how she was doing regarding the last message I got from her on Friday about her ulcerative colitis and bleeding after taking the aspirin (unable to get ahold of pt; LVM); She states it has improved and she spoke with her gastro and they advised her to listen to her surgeon about the aspirin; She does report taking ibuprofen and tylenol and trying to avoid taking the narcotic but I told her she needs to be careful taking the ibuprofen and the aspirin due to the blood thinning; She states she understood but really didn't want to take the narcotic if she could help it; She also was asking how long she would need to be on the aspirin.    Also, we addressed her questions from her last message; Told her I had sent all the necessary paperwork to [facility] about the wheelchair but I wasn't sure about an update; Also, told her she could tighten the ACE wrap and re-wrap it if she felt like the splint had gotten a bit loose but she said she wasn't comfortable doing that and would feel better to come in and have the splint assessed so I offered her to come in tomorrow afternoon @ 3pm for this; Also assured her that some popping in the knee and ankle were not too concerning at this time (could be the weight of the splint); And advised she complete her Keflex as prescribed (states it was due to the abrasions she had while at the ED).    Patient will come in tomorrow for splint check and we will address any further questions at that time.    Aminata

## 2021-11-11 ENCOUNTER — TELEPHONE (OUTPATIENT)
Dept: ORTHOPEDIC SURGERY | Facility: CLINIC | Age: 56
End: 2021-11-11

## 2021-11-11 NOTE — TELEPHONE ENCOUNTER
"I spoke with the patient and she asked if she was supposed to feel the \"Drawing\" like pain in the ankle area. I asked what she meant by this and she mentioned that she feels more pain where the ankle bends. I advised that it is probably from where they adjusted her splint in the office on 11/8 and had to reposition her ankle. I told her that she just needs to elevate and take her medication to help with the pain. She understood and mentioned she should be able to make it to Tuesday next week.    Adelaida HOUGH"

## 2021-11-11 NOTE — TELEPHONE ENCOUNTER
Provider:      Caller: patient    Relationship to Patient: SELF      Phone Number: [phone] OR # [phone]    Reason for Call: PT. STATES THAT SHE JUST HAD LEFT ANKLE SURGERY WITH DR. BEAR. SHE CAME IN ON Tuesday AND SAW SWAPNIL AND HAD HER ANKLE RE- WRAPPED.   SHE IS HAVING MORE PAIN THAN SHE WAS. ASKING IF THIS IS NORMAL OR SHOULD SHE BE CONCERNED.   PLEASE CALL TO ADVISE.

## 2021-11-16 ENCOUNTER — OFFICE VISIT (OUTPATIENT)
Dept: ORTHOPEDIC SURGERY | Facility: CLINIC | Age: 56
End: 2021-11-16

## 2021-11-16 VITALS — TEMPERATURE: 97.1 F

## 2021-11-16 DIAGNOSIS — S82.852S CLOSED TRIMALLEOLAR FRACTURE OF LEFT ANKLE, SEQUELA: ICD-10-CM

## 2021-11-16 DIAGNOSIS — Z87.81 STATUS POST ORIF OF FRACTURE OF ANKLE: Primary | ICD-10-CM

## 2021-11-16 DIAGNOSIS — Z98.890 STATUS POST ORIF OF FRACTURE OF ANKLE: Primary | ICD-10-CM

## 2021-11-16 PROCEDURE — 99024 POSTOP FOLLOW-UP VISIT: CPT | Performed by: PHYSICIAN ASSISTANT

## 2021-11-16 NOTE — PROGRESS NOTES
[facility] Orthopaedic Surgery Clinic Note        Subjective     Post-op (2 week post Trimalleolar Fracture Open Reduction Internal Fixation Left 11/3/21)       KEVIN Coelho is a 56 y.o. female.  Patient presents for their initial postop visit following ORIF left ankle performed on the above date by Dr. Daniels.    Pain scale: 3/10. No numbness or tingling into the extremity.  Taking ASA as directed. Remaining NWB through use of wheelchair bit not elevating while in chair.  States has not been elevating above heart as much as she should.    Patient denies any fever, chills, night sweats or other constitutional symptoms.          Objective      Physical Exam  Temp 97.1 °F (36.2 °C)     There is no height or weight on file to calculate BMI.        Ortho Exam  Left ankle  Postoperative splint removed.  Skin: Moderate swelling with minimal wrinkle sign. Medial fracture blister healing without evidence of infection. Positive subcutaneous hematoma typical following surgery noted to anterior ankle and dorsum foot. Positive lower leg swelling but calf soft and pain-free.    Tenderness: Positive to medial and lateral ankle  Motion: No assessed at ankle.  Motor: Able to wiggle toes. Ankle motion not assessed.  Sensory: Grossly intact to light touch L2-S1  Vascular: 2+ DP with brisk CR into each toe.      Imaging Reviewed:  Ordered left ankle plain films.  Imaging read/interpreted by Dr. Daniels.    Left Ankle X-Ray     Indication: s/p ORIF  Views: AP, Lateral, Mortise     Comparison: Left ankle 10/30/2021     Findings:   In the interim, patient is s/p ORIF of the medial and lateral malleoli.  There is no significant healing yet demonstrated.  There is a fracture at the posterior malleolus as well.  The implants remain intact  No bony lesion  Soft tissues normal  Mortise: Well aligned  Syndesmosis:  no evidence of syndesmosis widening     Impression: s/p ORIF medial lateral malleoli with nonoperative  treatment of a posterior malleolus fracture.  No interval healing to date but stable appearance and improved alignment overall.       Assessment:  1. Status post ORIF of fracture of ankle    2. Closed trimalleolar fracture of left ankle, sequela        Plan:  1. Status post ORIF left ankle for trimalleolar fracture, stable.  2. Sutures were removed today.  3. Due to swelling unable to cast.  Placed into a 3 -sided fiberglass splint today, non-removable.  4. Needs strict elevation above her heart.  5. Remain NWB.  6. Continue with post-op pain medication as needed.  7. Follow up in 2 weeks for repeat evaluation, to include imaging out of splint.    8. Questions and concerns answered.    Case discussed with Dr. Daniels.      Emilia Chavez PA-C  11/17/21  22:42 EST      Dictated Utilizing Dragon Dictation.

## 2021-11-18 DIAGNOSIS — F41.9 ANXIETY: Chronic | ICD-10-CM

## 2021-11-18 NOTE — TELEPHONE ENCOUNTER
Caller: Meliton Brigid Nicole    Relationship: Self    Best call back number: [phone]    Requested Prescriptions:   Requested Prescriptions     Pending Prescriptions Disp Refills   • ALPRAZolam (Xanax) 0.5 MG tablet 30 tablet 0     Sig: Take 1 tablet by mouth Daily As Needed for Anxiety.        Pharmacy where request should be sent: [facility] [address] AT [facility] [address] - [phone]  - [phone]     Additional details provided by patient: PATIENT STATED THAT SHE BROKE HER ANKLE NOT ABLE TO COME INTO OFFICE BUT WOULDN'T MIND DOING A VIDEO VISIT IF NEEDED    PLEASE ADVISE     Does the patient have less than a 3 day supply:  [x] Yes  [] No    Jesus Kovacs Rep   11/18/21 10:05 EST

## 2021-11-19 ENCOUNTER — TELEPHONE (OUTPATIENT)
Dept: ORTHOPEDIC SURGERY | Facility: CLINIC | Age: 56
End: 2021-11-19

## 2021-11-19 RX ORDER — ALPRAZOLAM 0.5 MG/1
0.5 TABLET ORAL DAILY PRN
Qty: 30 TABLET | Refills: 0 | OUTPATIENT
Start: 2021-11-19

## 2021-11-19 NOTE — TELEPHONE ENCOUNTER
Patient called stating she had a splint put on 11/16 and it is loose. The swelling has went down and she is not in any pain. Patient is scheduled to come back on 12/2 and wants to know if she should wait till the next appointment or can she come back next week.She can be reached at [phone]

## 2021-11-19 NOTE — TELEPHONE ENCOUNTER
Patient called back stating, she can try an get her daughter to wrap it but she is unable to do it herself. She states, it feels like the foam pad that was put in there is moving. She said she tried to answer but was away from her phone and will answer if you call back [phone]

## 2021-11-19 NOTE — TELEPHONE ENCOUNTER
I left another message and told her to rewrap splint and call Monday if still bothering her.    Diya Sanchez

## 2021-11-19 NOTE — TELEPHONE ENCOUNTER
I left a message to have her unwrap  And re-wrap the splint pulling the ace wraps a little more snug to tighten the splint.  I told her if this does not help to give me a call.    Diya Sanchez

## 2021-11-19 NOTE — TELEPHONE ENCOUNTER
Caller: Brigid Coelho    Relationship to patient: Self    Best call back number:[phone]    Patient is needing: PATIENT STATED THAT SHE WAS CALLING TO CHECK ON STATUS OF THIS REQUEST    PLEASE ADVISE ASAP

## 2021-11-22 ENCOUNTER — TELEPHONE (OUTPATIENT)
Dept: ORTHOPEDIC SURGERY | Facility: CLINIC | Age: 56
End: 2021-11-22

## 2021-11-22 NOTE — TELEPHONE ENCOUNTER
Left message for the patient to call me to set up time for her to come in to have splint adjusted.    Diya Sanchez

## 2021-11-22 NOTE — TELEPHONE ENCOUNTER
Pt called in to say that splint is still bothering her.  Inquiring if she can come in Tuesday or Wednesday to get it adjusted.  Will call patient back to let her know if she needs an appt or if she can come in as a walk in to just get splint adjusted.

## 2021-11-22 NOTE — TELEPHONE ENCOUNTER
I left the patient a message to see if she can come today at 10:30 for splint change.  If she calls back keep patient on the line, since I have left several messages or confirm that she can come at 10:30.    Diya Sanchez

## 2021-11-22 NOTE — TELEPHONE ENCOUNTER
[phone]  Patient called because she missed a call from Diya about coming in for her splint.  She stated she could not make it today... she is asking for a call at the number listed above so that a time can be set up for possibly tomorrow or Wednesday.

## 2021-11-23 NOTE — TELEPHONE ENCOUNTER
Patient came in today for splint change. All incision looked good, fracture blister looks good and swelling has decreased.  I told the patient to continue elevating and resting.  We will see her next week at her scheduled appt.  I toldl her to call if she has any problems or questions.    Diya Sanchez

## 2021-11-24 ENCOUNTER — TELEPHONE (OUTPATIENT)
Dept: FAMILY MEDICINE CLINIC | Facility: CLINIC | Age: 56
End: 2021-11-24

## 2021-11-24 ENCOUNTER — TELEPHONE (OUTPATIENT)
Dept: ORTHOPEDIC SURGERY | Facility: CLINIC | Age: 56
End: 2021-11-24

## 2021-11-24 NOTE — TELEPHONE ENCOUNTER
Caller: ROXANNE SAHNI    Relationship to patient: SELF     Best call back number: [phone]    Patient is needing: PATIENT CALLED TO RESCHEDULE 12/2  APPT TO 11/30- [facility] DOES NOT HAVE AVAILABILITY- ATTEMPTED TO WARM TRANSFER- PLEASE CALL BACK TO SCHEDULE

## 2021-11-24 NOTE — TELEPHONE ENCOUNTER
Caller: ROXANNE SAHNI     Relationship to patient: SELF     Best call back number: [phone]    Patient is needing: ATTEMPTED TO WARM TRANSFER- PATIENT HAD L ANKLE SX ON 11/3; EXPERIENCING NERVE PAIN SHOOTING UP LEG-  PLEASE CALL PATIENT BACK

## 2021-11-24 NOTE — TELEPHONE ENCOUNTER
Patient thinks she has over done it this morning.  I told her to rest and elevate as much as possible so she can get out of the splint next week.  As for nerve pain she asked if it is waking up and I told her that could be it.  I also said to change positions when elevating to make sure not too much pressure on the ankle in one spot.  I offered to bring her in on Tuesday instead of Thursday, but she didn't know if her  could come on that day.  She will call back if she wants to move.    Diya Sanchez

## 2021-11-24 NOTE — TELEPHONE ENCOUNTER
The state [facility] regulates narcotics. She has to be seen in person. Last visit was July with me. Will

## 2021-11-24 NOTE — TELEPHONE ENCOUNTER
PT CALLED BACK AND MADE APT FOR DEC 8TH IS REQUESTING ENOUGH MEDICATION BE CALLED IN. WAS VERY UPSET AND DID NOT UNDERSTAND WHY IT HAD BEEN DENIED

## 2021-11-29 ENCOUNTER — TELEMEDICINE (OUTPATIENT)
Dept: FAMILY MEDICINE CLINIC | Facility: CLINIC | Age: 56
End: 2021-11-29

## 2021-11-29 ENCOUNTER — TELEPHONE (OUTPATIENT)
Dept: ORTHOPEDIC SURGERY | Facility: CLINIC | Age: 56
End: 2021-11-29

## 2021-11-29 DIAGNOSIS — F41.9 ANXIETY: Chronic | ICD-10-CM

## 2021-11-29 PROBLEM — R93.89 THICKENED ENDOMETRIUM: Status: RESOLVED | Noted: 2018-10-29 | Resolved: 2021-11-29

## 2021-11-29 PROCEDURE — 99213 OFFICE O/P EST LOW 20 MIN: CPT | Performed by: FAMILY MEDICINE

## 2021-11-29 RX ORDER — ALPRAZOLAM 0.5 MG/1
0.5 TABLET ORAL DAILY PRN
Qty: 30 TABLET | Refills: 0 | Status: SHIPPED | OUTPATIENT
Start: 2021-11-29 | End: 2022-02-24 | Stop reason: SDUPTHER

## 2021-11-29 NOTE — TELEPHONE ENCOUNTER
Provider: DR. BEAR    Caller: JASE    [facility]      Phone Number: [phone]    Reason for Call:  JASE FROM [facility] ADVISED SHE FAXED A MEDICAL NECESSITY LETTER ON 11/17/21 AND SHE WANTS TO KNOW IF IT WAS RECEIVED? IF SO, SHE NEEDS DR. BEAR TO SIGN IT AND FAX IT BACK [phone].

## 2021-11-29 NOTE — PROGRESS NOTES
Telemedicine video visit  Consent obtained    Chief Complaint  Anxiety    Subjective          Brigid Coelho presents to [facility] for   Has increased anxiety.  Pt has had recent surgery Nov 3 on left foot and ankle for a fracture from a fall. Pt is still not weight bearing.    Anxiety  Presents for follow-up (On Zoloft, uses xanax prn) visit. Symptoms include excessive worry, nervous/anxious behavior and panic. Patient reports no chest pain, depressed mood, dizziness, insomnia, irritability, malaise, nausea, palpitations, restlessness, shortness of breath or suicidal ideas. Symptoms occur rarely. The severity of symptoms is mild. The patient sleeps 7 hours per night. The quality of sleep is good. Nighttime awakenings: occasional.     Compliance with medications is %.       Objective   Vital Signs:   There were no vitals taken for this visit.      Review of Systems   Constitutional: Negative.  Negative for activity change, fatigue, fever, irritability and unexpected weight change.   HENT: Negative.  Negative for congestion, sneezing and sore throat.    Eyes: Negative.  Negative for visual disturbance.   Respiratory: Negative.  Negative for cough, chest tightness, shortness of breath and wheezing.    Cardiovascular: Negative.  Negative for chest pain, palpitations and leg swelling.   Gastrointestinal: Negative.  Negative for abdominal distention, abdominal pain, blood in stool, constipation, diarrhea and nausea.   Endocrine: Negative.  Negative for cold intolerance and heat intolerance.   Genitourinary: Negative.  Negative for dysuria, frequency and urgency.   Musculoskeletal: Negative.  Negative for arthralgias and myalgias.   Skin: Negative.  Negative for rash.   Allergic/Immunologic: Negative.    Neurological: Negative.  Negative for dizziness, syncope and numbness.   Hematological: Negative.  Negative for adenopathy.   Psychiatric/Behavioral: Negative for  suicidal ideas. The patient is nervous/anxious. The patient does not have insomnia.      Physical Exam  Vitals and nursing note reviewed.   Constitutional:       Appearance: She is well-developed. She is not diaphoretic.   HENT:      Head: Normocephalic.      Right Ear: External ear normal.      Left Ear: External ear normal.      Nose: Nose normal.      Mouth/Throat:      Pharynx: No oropharyngeal exudate.   Eyes:      Conjunctiva/sclera: Conjunctivae normal.      Pupils: Pupils are equal, round, and reactive to light.   Neck:      Thyroid: No thyromegaly.   Cardiovascular:      Rate and Rhythm: Normal rate.   Pulmonary:      Effort: No respiratory distress.   Chest:      Chest wall: No tenderness.   Abdominal:      General: There is no distension.      Palpations: There is no mass.      Tenderness: There is no abdominal tenderness. There is no guarding or rebound.   Musculoskeletal:         General: Normal range of motion.      Cervical back: Normal range of motion and neck supple.   Lymphadenopathy:      Cervical: No cervical adenopathy.   Skin:     General: Skin is warm and dry.      Findings: No erythema or rash.   Neurological:      Mental Status: She is alert and oriented to person, place, and time.      Motor: No abnormal muscle tone.      Coordination: Coordination normal.      Deep Tendon Reflexes: Reflexes are normal and symmetric. Reflexes normal.   Psychiatric:         Behavior: Behavior normal.         Thought Content: Thought content normal.         Judgment: Judgment normal.        Result Review :                 Assessment and Plan    Problem List Items Addressed This Visit        Mental Health    Anxiety    Relevant Medications    ALPRAZolam (Xanax) 0.5 MG tablet      Continue regular meds.  I spent 30 minutes caring for Brigid on this date of service. This time includes time spent by me in the following activities:preparing for the visit, reviewing tests, obtaining and/or reviewing a separately  obtained history, performing a medically appropriate examination and/or evaluation , counseling and educating the patient/family/caregiver, ordering medications, tests, or procedures, documenting information in the medical record, independently interpreting results and communicating that information with the patient/family/caregiver and care coordination  Follow Up   No follow-ups on file.  Patient was given instructions and counseling regarding her condition or for health maintenance advice. Please see specific information pulled into the AVS if appropriate.

## 2021-11-30 ENCOUNTER — OFFICE VISIT (OUTPATIENT)
Dept: ORTHOPEDIC SURGERY | Facility: CLINIC | Age: 56
End: 2021-11-30

## 2021-11-30 DIAGNOSIS — Z98.890 STATUS POST ORIF OF FRACTURE OF ANKLE: Primary | ICD-10-CM

## 2021-11-30 DIAGNOSIS — S82.852D CLOSED TRIMALLEOLAR FRACTURE OF LEFT ANKLE WITH ROUTINE HEALING, SUBSEQUENT ENCOUNTER: ICD-10-CM

## 2021-11-30 DIAGNOSIS — Z87.81 STATUS POST ORIF OF FRACTURE OF ANKLE: Primary | ICD-10-CM

## 2021-11-30 DIAGNOSIS — G57.82 SAPHENOUS NEURITIS, LEFT: ICD-10-CM

## 2021-11-30 PROCEDURE — 99024 POSTOP FOLLOW-UP VISIT: CPT | Performed by: PHYSICIAN ASSISTANT

## 2021-11-30 RX ORDER — OXYCODONE HYDROCHLORIDE AND ACETAMINOPHEN 5; 325 MG/1; MG/1
1 TABLET ORAL EVERY 6 HOURS PRN
Qty: 30 TABLET | Refills: 0 | Status: SHIPPED | OUTPATIENT
Start: 2021-11-30 | End: 2021-12-06 | Stop reason: SDUPTHER

## 2021-11-30 RX ORDER — GABAPENTIN 100 MG/1
100 CAPSULE ORAL 3 TIMES DAILY
Qty: 90 CAPSULE | Refills: 0 | Status: SHIPPED | OUTPATIENT
Start: 2021-11-30 | End: 2022-07-05

## 2021-11-30 NOTE — TELEPHONE ENCOUNTER
I spoke with Milana and I advised her that unfortunately we did not get the letter of medical necessity on the patient and I asked her to fax it directly to me on the back fax. I told her that once we got it, I would have Dr. Daniels take a look at it and go from there. She understood.    Adelaida

## 2021-12-02 ENCOUNTER — OFFICE VISIT (OUTPATIENT)
Dept: ORTHOPEDIC SURGERY | Facility: CLINIC | Age: 56
End: 2021-12-02

## 2021-12-02 ENCOUNTER — TELEPHONE (OUTPATIENT)
Dept: ORTHOPEDIC SURGERY | Facility: CLINIC | Age: 56
End: 2021-12-02

## 2021-12-02 DIAGNOSIS — Z87.81 STATUS POST ORIF OF FRACTURE OF ANKLE: Primary | ICD-10-CM

## 2021-12-02 DIAGNOSIS — Z98.890 STATUS POST ORIF OF FRACTURE OF ANKLE: Primary | ICD-10-CM

## 2021-12-02 DIAGNOSIS — S93.432D SYNDESMOTIC DISRUPTION OF LEFT ANKLE, SUBSEQUENT ENCOUNTER: ICD-10-CM

## 2021-12-02 DIAGNOSIS — S82.852S CLOSED TRIMALLEOLAR FRACTURE OF LEFT ANKLE, SEQUELA: ICD-10-CM

## 2021-12-02 PROCEDURE — 99024 POSTOP FOLLOW-UP VISIT: CPT | Performed by: ORTHOPAEDIC SURGERY

## 2021-12-02 NOTE — PROGRESS NOTES
[facility] Orthopaedic Surgery Clinic Note        Subjective     Post-op (2 day recheck- 4 weeks s/p Trimalleolar Fracture Open Reduction Internal Fixation Left 11/3/21)       KEVIN Coelho is a 56 y.o. female.  Patient returns for follow-up of her left ankle status post ORIF of a trimalleolar injury sooner than anticipated.  We have called the patient and discussed surgical intervention.  Last seen earlier in the week by Emilia where x-rays were obtained.  We reviewed those x-rays with a practice partner in the interim as well.  Comparison view of the right ankle x-ray was taken today to compare fibular length.          Objective      Physical Exam  There were no vitals taken for this visit.    There is no height or weight on file to calculate BMI.        Ortho Exam  Left lower extremity: Patient's splint is intact.  EHL and FHL are intact.  Grossly intact sensation over the dorsal plantar aspect of the left foot are intact to light touch.    Imaging Reviewed:  Imaging Results (Last 24 Hours)     Procedure Component Value Units Date/Time    XR Ankle 3+ View Right [[id number]] Resulted: 12/02/21 1248     Updated: 12/02/21 1249    Narrative:      Right Ankle X-Ray    Indication: Pain    Views: AP, Lateral, Mortise    Comparison: None    Findings:   No fracture  No bony lesion  Soft tissues normal  Normal joint spaces with mortise well-aligned, no evidence of syndesmosis   widening    Impression: Negative right ankle x-ray          We reviewed patient's x-rays from earlier in the week which show an irregularity at the medial malleolus and widening of the medial clear space relative to the lateral and superior clear spaces.  No stacy widening of the syndesmosis is noted.      Assessment    Assessment:  1. Status post ORIF of fracture of ankle    2. Closed trimalleolar fracture of left ankle, sequela    3. Syndesmotic disruption of left ankle, subsequent encounter        Plan:  1. Left trimalleolar  ankle fracture dislocation with syndesmotic disruption--at the time of surgery, cotton test was performed which showed no instability medially or widening of the syndesmosis.  And decision was made at that point did not place a syndesmotic screw.  Over the last 3 to 4 weeks, it has become apparent that the syndesmosis appears unstable and patient is loss of fixation of the medial malleolus.  Plan will be for revision ORIF of the medial malleolus and placement of a syndesmotic screw through the plate.  The risk, benefits, potential hazards, typical recovery and rehab as well as reasonable alternatives were discussed with her and her friend who accompanies her today.  They both have the opportunity to ask questions and understand the need to proceed with revision surgery.  We will get this done ASAP and likely this will be done Monday at the surgery center.  Hook plate may be necessary for the medial malleolus.      Giovani Daniels MD  12/02/21  13:06 EST      Dictated Utilizing Dragon Dictation.

## 2021-12-02 NOTE — TELEPHONE ENCOUNTER
I spoke with the patient and she is going to call and see if she can get a ride. She will call me back.    Diya Sanchez

## 2021-12-03 ENCOUNTER — LAB (OUTPATIENT)
Dept: PREADMISSION TESTING | Facility: HOSPITAL | Age: 56
End: 2021-12-03

## 2021-12-03 DIAGNOSIS — S82.852S CLOSED TRIMALLEOLAR FRACTURE OF LEFT ANKLE, SEQUELA: ICD-10-CM

## 2021-12-03 DIAGNOSIS — Z87.81 STATUS POST ORIF OF FRACTURE OF ANKLE: ICD-10-CM

## 2021-12-03 DIAGNOSIS — S93.432D SYNDESMOTIC DISRUPTION OF LEFT ANKLE, SUBSEQUENT ENCOUNTER: ICD-10-CM

## 2021-12-03 DIAGNOSIS — Z98.890 STATUS POST ORIF OF FRACTURE OF ANKLE: ICD-10-CM

## 2021-12-03 LAB — SARS-COV-2 RNA PNL SPEC NAA+PROBE: NOT DETECTED

## 2021-12-03 PROCEDURE — U0004 COV-19 TEST NON-CDC HGH THRU: HCPCS | Performed by: ORTHOPAEDIC SURGERY

## 2021-12-06 DIAGNOSIS — S82.852S CLOSED TRIMALLEOLAR FRACTURE OF LEFT ANKLE, SEQUELA: ICD-10-CM

## 2021-12-06 DIAGNOSIS — Z87.81 STATUS POST ORIF OF FRACTURE OF ANKLE: ICD-10-CM

## 2021-12-06 DIAGNOSIS — S93.432D SYNDESMOTIC DISRUPTION OF LEFT ANKLE, SUBSEQUENT ENCOUNTER: Primary | ICD-10-CM

## 2021-12-06 DIAGNOSIS — Z98.890 STATUS POST ORIF OF FRACTURE OF ANKLE: ICD-10-CM

## 2021-12-06 PROCEDURE — 76000 FLUOROSCOPY <1 HR PHYS/QHP: CPT | Performed by: ORTHOPAEDIC SURGERY

## 2021-12-06 RX ORDER — OXYCODONE HYDROCHLORIDE AND ACETAMINOPHEN 5; 325 MG/1; MG/1
1 TABLET ORAL EVERY 6 HOURS PRN
Qty: 30 TABLET | Refills: 0 | Status: SHIPPED | OUTPATIENT
Start: 2021-12-06 | End: 2022-02-24

## 2021-12-07 ENCOUNTER — TELEPHONE (OUTPATIENT)
Dept: ORTHOPEDIC SURGERY | Facility: CLINIC | Age: 56
End: 2021-12-07

## 2021-12-07 NOTE — TELEPHONE ENCOUNTER
This pt wanted to know if it was okay for her to take her gabapentin that was prescribed for nerve pain before her surgery. She wants to know if it is safe to take with the oxycodone-acetaminophen she was prescribed.

## 2021-12-08 NOTE — TELEPHONE ENCOUNTER
Spoke with patient, per Emilia it is okay to take Gabapentin with current treatment plan. Patient understood and will call back with any remaining questions.

## 2021-12-17 PROCEDURE — U0004 COV-19 TEST NON-CDC HGH THRU: HCPCS | Performed by: FAMILY MEDICINE

## 2021-12-18 ENCOUNTER — TELEPHONE (OUTPATIENT)
Dept: URGENT CARE | Facility: CLINIC | Age: 56
End: 2021-12-18

## 2021-12-18 ENCOUNTER — TELEPHONE (OUTPATIENT)
Dept: RADIOLOGY | Facility: CLINIC | Age: 56
End: 2021-12-18

## 2021-12-18 NOTE — TELEPHONE ENCOUNTER
Patient was seen yesterday and now having some SOA, requesting an inhaler to go along with her antibiotic; please advise [facility]

## 2021-12-18 NOTE — TELEPHONE ENCOUNTER
Patient called earlier and left message requesting an inhaler.  Call patient back reported she is used albuterol in the past.  Reviewed common side effects of albuterol.  Will prescribe MDI.  Follow-up with primary care as needed.  If any symptoms worsen go to nearest ER.

## 2021-12-21 ENCOUNTER — OFFICE VISIT (OUTPATIENT)
Dept: ORTHOPEDIC SURGERY | Facility: CLINIC | Age: 56
End: 2021-12-21

## 2021-12-21 VITALS — TEMPERATURE: 97.3 F

## 2021-12-21 DIAGNOSIS — G57.82 SAPHENOUS NEURITIS, LEFT: ICD-10-CM

## 2021-12-21 DIAGNOSIS — S93.432D SYNDESMOTIC DISRUPTION OF LEFT ANKLE, SUBSEQUENT ENCOUNTER: ICD-10-CM

## 2021-12-21 DIAGNOSIS — S82.852D CLOSED TRIMALLEOLAR FRACTURE OF LEFT ANKLE WITH ROUTINE HEALING, SUBSEQUENT ENCOUNTER: ICD-10-CM

## 2021-12-21 DIAGNOSIS — Z98.890 STATUS POST OPEN REDUCTION AND INTERNAL FIXATION (ORIF) OF FRACTURE: Primary | ICD-10-CM

## 2021-12-21 DIAGNOSIS — Z87.81 STATUS POST OPEN REDUCTION AND INTERNAL FIXATION (ORIF) OF FRACTURE: Primary | ICD-10-CM

## 2021-12-21 PROCEDURE — 99024 POSTOP FOLLOW-UP VISIT: CPT | Performed by: PHYSICIAN ASSISTANT

## 2021-12-21 NOTE — PROGRESS NOTES
"    [facility] [place]        Subjective     Follow-up of the Left Ankle (2 weeks s/p ORIF L ankle 12/6/21)       KEVIN Coelho is a 56 y.o. female. Patient presents for postop visit following ORIF ankle (11/3/2021) and ORIF syndesmosis with debridement medial gutter (12/6/2021)  performed on the above date by Dr. Daniels.    Pain scale: 3/10. Swelling improved.  Saphenous neuritis improved.    Patient denies any fever, chills, night sweats or other constitutional symptoms.         Objective      Physical Exam  Temp 97.3 °F (36.3 °C)     There is no height or weight on file to calculate BMI.        Ortho Exam  Left ankle  Post operative splint removed.  Skin: Surgical incision sites C/D/I.  No redness, warmth, drainage.  Wiggles toes  Motor: Grossly intact Q/HS/TA/GS/EHL/FHL/P  Sensory: Grossly intact to light touch L2-S1, \"crawling ant sensation\" medial lower leg improved.  Vascular: 2+ PT/DP.      Imaging Reviewed:  Ordered left ankle plain films.  Imaging read/interpreted by Dr. Daniels.    Left Ankle X-Ray     Indication: s/p ORIF  Views: AP, Lateral, Mortise     Comparison: Left ankle 2021-11-30     Findings:   Patient is s/p ORIF of the medial and lateral malleoli.  There is interval but incomplete healing demonstrated. In the interim, there has been a syndesmotic screw placed  The implants remain intact  No bony lesion  Soft tissues normal  Mortise: Well aligned with improved alignment overall compared to the prior study  Syndesmosis:  no evidence of syndesmosis widening     Impression: s/p ORIF medial lateral malleoli and syndesmosis with improved appearance of the ankle mortise.       Assessment:  1. Status post open reduction and internal fixation (ORIF) of fracture    2. Syndesmotic disruption of left ankle, subsequent encounter    3. Closed trimalleolar fracture of left ankle with routine healing, subsequent encounter        Plan:  1. Status post ORIF left ankle, " stable.  2. Sutures were removed today.  3. Recommend OTC pain medication.  4. Remain NWB as directed.  5. Placed into tall pneumatic boot today--must wear 24/7. Only remove for hygiene.  6. Follow up in 2 weeks for repeat evaluation, to include repeat imaging of ankle (NWB).  7. Questions and concerns answered.      Emilia Chavez PA-C  12/23/21  21:48 EST      Dictated Utilizing Dragon Dictation.

## 2021-12-22 ENCOUNTER — TELEPHONE (OUTPATIENT)
Dept: URGENT CARE | Facility: CLINIC | Age: 56
End: 2021-12-22

## 2021-12-22 DIAGNOSIS — B37.9 YEAST INFECTION: Primary | ICD-10-CM

## 2021-12-22 RX ORDER — FLUCONAZOLE 150 MG/1
150 TABLET ORAL ONCE
Qty: 1 TABLET | Refills: 0 | Status: SHIPPED | OUTPATIENT
Start: 2021-12-22 | End: 2021-12-22

## 2021-12-30 ENCOUNTER — TELEMEDICINE (OUTPATIENT)
Dept: FAMILY MEDICINE CLINIC | Facility: TELEHEALTH | Age: 56
End: 2021-12-30

## 2021-12-30 DIAGNOSIS — R06.2 WHEEZING: ICD-10-CM

## 2021-12-30 DIAGNOSIS — R05.9 COUGH: Primary | ICD-10-CM

## 2021-12-30 PROCEDURE — 99213 OFFICE O/P EST LOW 20 MIN: CPT | Performed by: NURSE PRACTITIONER

## 2021-12-30 RX ORDER — DEXAMETHASONE 4 MG/1
1 TABLET ORAL
Qty: 12 G | Refills: 0 | Status: SHIPPED | OUTPATIENT
Start: 2021-12-30 | End: 2022-02-24

## 2021-12-30 RX ORDER — DEXAMETHASONE 4 MG/1
1 TABLET ORAL
Qty: 12 G | Refills: 0 | Status: SHIPPED | OUTPATIENT
Start: 2021-12-30 | End: 2021-12-30

## 2021-12-30 NOTE — PROGRESS NOTES
You have chosen to receive care through a telehealth visit.  Do you consent to use a video/audio connection for your medical care today? Yes     CHIEF COMPLAINT  Chief Complaint   Patient presents with   • Wheezing   • Cough         HPI  Brigid Coelho is a 56 y.o. female  presents with complaint of persistent cough with clear phlegm and mild occasional wheezing. She reports being diagnosed with Bronchitis 2 weeks ago and was on a round of Doxycycline. She is better but her cough and wheezing continues. She also has mild shortness of breath during her coughing spells.  She reports her sat is running 96-97 %. She has a h/o ulcerative colitis and is taking ASA for post ankle surgery clot prophylaxis. She is also using her Albuterol inhaler but doesn't seem to be working well.     Review of Systems   Constitutional: Negative.    HENT: Negative.    Respiratory: Positive for cough, shortness of breath and wheezing.    Cardiovascular: Negative.    Gastrointestinal: Negative.    Musculoskeletal: Negative.    Skin: Negative.    Allergic/Immunologic: Positive for environmental allergies.   Neurological: Negative.    Psychiatric/Behavioral: Negative.        Past Medical History:   Diagnosis Date   • Acute bronchitis    • Anemia    • Anxiety    • Chest pain    • Cholelithiasis    • Claustrophobia    • GERD (gastroesophageal reflux disease)    • Hashimoto's thyroiditis    • Hemorrhoids    • Hypothyroidism    • Low back pain    • Metrorrhagia    • Palpitations    • Polycystic ovary syndrome    • Polyp of sigmoid colon    • Ulcerative colitis (HCC)    • Upper respiratory infection    • UTI (urinary tract infection)        Family History   Problem Relation Age of Onset   • Hyperlipidemia Mother    • CHAD disease Mother    • Cancer Mother    • Diabetes Mother    • Heart disease Mother    • Rheum arthritis Father    • Hyperlipidemia Father    • Heart attack Father 72   • Ovarian cancer Maternal Aunt    • Diabetes Paternal  Grandmother    • Hypertension Paternal Grandmother    • Obesity Paternal Grandmother    • Colon cancer Other    • Arthritis Other    • Cancer Other         uncle; liver, lung    • Thyroid disease Cousin    • Thyroid cancer Cousin    • CHAD disease Daughter    • Obesity Brother    • Sleep apnea Brother        Social History     Socioeconomic History   • Marital status:    Tobacco Use   • Smoking status: Never Smoker   • Smokeless tobacco: Never Used   Vaping Use   • Vaping Use: Never used   Substance and Sexual Activity   • Alcohol use: No   • Drug use: No   • Sexual activity: Defer         There were no vitals taken for this visit.    PHYSICAL EXAM  Physical Exam   Constitutional: She is oriented to person, place, and time. She appears well-developed and well-nourished. She does not have a sickly appearance. She does not appear ill. No distress.   HENT:   Head: Normocephalic and atraumatic.   Pulmonary/Chest: Effort normal. No accessory muscle usage or stridor.  No respiratory distress. She no audible wheeze...  Neurological: She is alert and oriented to person, place, and time.   Psychiatric: She has a normal mood and affect.   Vitals reviewed.      Results for orders placed or performed during the hospital encounter of 12/17/21   COVID-19 PCR, [facility], NP SWAB IN LEX[facility] VIRAL TRANSPORT MEDIA/ORAL SWISH 24-30 HR TAT - Swab, Nasopharynx    Specimen: Nasopharynx; Swab   Result Value Ref Range    SARS-CoV-2 FLAVIO Not Detected Not Detected   POCT Influenza A/B    Specimen: Swab   Result Value Ref Range    Rapid Influenza A Ag Negative Negative    Rapid Influenza B Ag Negative Negative    Internal Control Passed Passed    Lot Number [id number]     Expiration Date 3,312,022        Diagnoses and all orders for this visit:    1. Cough (Primary)  -     fluticasone (Flovent HFA) 110 MCG/ACT inhaler; Inhale 1 puff 2 (Two) Times a Day.  Dispense: 12 g; Refill: 0    2. Wheezing  -     fluticasone (Flovent HFA) 110 MCG/ACT  inhaler; Inhale 1 puff 2 (Two) Times a Day.  Dispense: 12 g; Refill: 0    continue albuterol HFA as directed.   Rinse mouth after inhaler usage.   Mucinex dm as directed prn cough.   Increase fluids and rest       FOLLOW-UP  As discussed during visit with PCP/[facility] if no improvement or Urgent Care/Emergency Department if worsening of symptoms    Patient verbalizes understanding of medication dosage, comfort measures, instructions for treatment and follow-up.    JATIN Damon  12/30/2021  10:02 EST    This visit was performed via Telehealth.  This patient has been instructed to follow-up with their primary care provider if their symptoms worsen or the treatment provided does not resolve their illness.

## 2021-12-30 NOTE — PATIENT INSTRUCTIONS
Cough, Adult  A cough helps to clear your throat and lungs. A cough may be a sign of an illness or another medical condition.  An acute cough may only last 2-3 weeks, while a chronic cough may last 8 or more weeks.  Many things can cause a cough. They include:  · Germs (viruses or bacteria) that attack the airway.  · Breathing in things that bother (irritate) your lungs.  · Allergies.  · Asthma.  · Mucus that runs down the back of your throat (postnasal drip).  · Smoking.  · Acid backing up from the stomach into the tube that moves food from the mouth to the stomach (gastroesophageal reflux).  · Some medicines.  · Lung problems.  · Other medical conditions, such as heart failure or a blood clot in the lung (pulmonary embolism).  Follow these instructions at home:  Medicines  · Take over-the-counter and prescription medicines only as told by your doctor.  · Talk with your doctor before you take medicines that stop a cough (cough suppressants).  Lifestyle    · Do not smoke, and try not to be around smoke. Do not use any products that contain nicotine or tobacco, such as cigarettes, e-cigarettes, and chewing tobacco. If you need help quitting, ask your doctor.  · Drink enough fluid to keep your pee (urine) pale yellow.  · Avoid caffeine.  · Do not drink alcohol if your doctor tells you not to drink.    General instructions    · Watch for any changes in your cough. Tell your doctor about them.  · Always cover your mouth when you cough.  · Stay away from things that make you cough, such as perfume, candles, campfire smoke, or cleaning products.  · If the air is dry, use a cool mist vaporizer or humidifier in your home.  · If your cough is worse at night, try using extra pillows to raise your head up higher while you sleep.  · Rest as needed.  · Keep all follow-up visits as told by your doctor. This is important.    Contact a doctor if:  · You have new symptoms.  · You cough up pus.  · Your cough does not get better after  2-3 weeks, or your cough gets worse.  · Cough medicine does not help your cough and you are not sleeping well.  · You have pain that gets worse or pain that is not helped with medicine.  · You have a fever.  · You are losing weight and you do not know why.  · You have night sweats.  Get help right away if:  · You cough up blood.  · You have trouble breathing.  · Your heartbeat is very fast.  These symptoms may be an emergency. Do not wait to see if the symptoms will go away. Get medical help right away. Call your local emergency services (911 in the [place] Do not drive yourself to the hospital.  Summary  · A cough helps to clear your throat and lungs. Many things can cause a cough.  · Take over-the-counter and prescription medicines only as told by your doctor.  · Always cover your mouth when you cough.  · Contact a doctor if you have new symptoms or you have a cough that does not get better or gets worse.  This information is not intended to replace advice given to you by your health care provider. Make sure you discuss any questions you have with your health care provider.  Document Revised: 02/05/2021 Document Reviewed: 01/06/2020  [employer] Patient Education © 2021 [employer]      Bronchospasm, Adult    Bronchospasm is when the small airways in the lungs narrow. This can make it very hard to breathe. Swelling and more mucus than normal can add to this problem.  What are the causes?  Common causes of this condition include:  · Having a cold.  · Exercise.  · The smell from sprays, perfumes, candles, and .  · Cold air.  · Stress, laughing, or crying.  What increases the risk?  · Having asthma.  · Smoking.  · Having allergies.  · Being allergic to certain foods, medicine, or bug bites or stings.  What are the signs or symptoms?  Symptoms of this condition include:  · Making whistling sounds when you breathe (wheezing).  · Coughing.  · A tight feeling in your chest.  · Feeling like you cannot catch your  breath.  · Feeling like you have no energy to exercise.  · Breathing that is noisy.  · A cough that has a high pitch.  How is this treated?  This condition may be treated by:  · Using medicines that you breathe in. These open up the airways and help you breathe. Medicines can be taken with a metered dose inhaler or a nebulizer device.  · Taking medicines to reduce swelling.  · Getting rid of what started the bronchospasm.  Follow these instructions at home:  Medicines  · Take over-the-counter and prescription medicines only as told by your doctor.  · If you need to use an inhaler or nebulizer to take your medicine, ask your doctor how to use it.  · You may be given a spacer to use with your inhaler. This makes it easier to get the medicine from the inhaler into your lungs.  Lifestyle  · Do not use any products that have nicotine or tobacco. This includes cigarettes, e-cigarettes, and chewing tobacco. If you need help quitting, ask your doctor.  · Keep track of things that start your bronchospasm. Avoid these if you are able to.  · When pollen, air pollution, or humidity is bad, keep windows closed. Use an air conditioner if you have one.  · Find ways to cope with stress and your feelings.  Activity  Some people have bronchospasm when they exercise. This is called exercise-induced bronchoconstriction (EIB). If you have this problem, talk with your doctor about how to deal with EIB. Some tips include:  · Use your inhaler before exercise.  · Exercise indoors if it is very cold, humid, or the pollen and mold count is high.  · Warm up and cool down before and after exercise.  · Stop your exercise right away if your symptoms start or get worse.  General instructions  · If you have asthma, make sure you have an asthma action plan.  · Stay up to date on your shots (immunizations).  · Keep all follow-up visits as told by your doctor. This is important.  Get help right away if:  · You have trouble breathing.  · You wheeze and  cough and this does not get better after you take medicine.  · You have chest pain.  · You have trouble speaking more than one word in a sentence.  These symptoms may be an emergency. Do not wait to see if the symptoms will go away. Get medical help right away. Call your local emergency services (911 in the [place] Do not drive yourself to the hospital.  Summary  · Bronchospasm is when the small airways in the lungs narrow. Swelling and more mucus than normal can add to this problem. This can make it very hard to breathe.  · Get help right away if you wheeze and cough and this does not get better after you take medicine.  · Do not use any products that have nicotine or tobacco. This includes cigarettes, e-cigarettes, and chewing tobacco.  This information is not intended to replace advice given to you by your health care provider. Make sure you discuss any questions you have with your health care provider.  Document Revised: 01/26/2021 Document Reviewed: 01/26/2021  [employer] Patient Education © 2021 [employer]

## 2022-01-04 ENCOUNTER — OFFICE VISIT (OUTPATIENT)
Dept: ORTHOPEDIC SURGERY | Facility: CLINIC | Age: 57
End: 2022-01-04

## 2022-01-04 DIAGNOSIS — S93.432D SYNDESMOTIC DISRUPTION OF LEFT ANKLE, SUBSEQUENT ENCOUNTER: ICD-10-CM

## 2022-01-04 DIAGNOSIS — S82.852D CLOSED TRIMALLEOLAR FRACTURE OF LEFT ANKLE WITH ROUTINE HEALING, SUBSEQUENT ENCOUNTER: ICD-10-CM

## 2022-01-04 DIAGNOSIS — Z98.890 STATUS POST OPEN REDUCTION AND INTERNAL FIXATION (ORIF) OF FRACTURE: Primary | ICD-10-CM

## 2022-01-04 DIAGNOSIS — Z87.81 STATUS POST OPEN REDUCTION AND INTERNAL FIXATION (ORIF) OF FRACTURE: Primary | ICD-10-CM

## 2022-01-04 PROCEDURE — 99024 POSTOP FOLLOW-UP VISIT: CPT | Performed by: PHYSICIAN ASSISTANT

## 2022-01-04 NOTE — PROGRESS NOTES
[facility] [place] Orthopaedic Surgery Clinic Note        Subjective     Post-op (2 week recheck- 6 weeks s/p ORIF ankle (11/3/2021) and 8 weeks s/p ORIF syndesmosis with debridement medial gutter (12/6/2021))       KEVIN Coelho is a 56 y.o. female.  Patient returns today following placement of syndesmotic screw and debridement of medial gutter performed on 12/6/2021.  This was preceded by ORIF for trimalleolar fracture 11/3/2021.    Patient denies any pain.  No reported numbness or tingling.  Saphenous neuritis is resolved.  She is remain nonweightbearing as directed.    No reported fever, chills, night sweats or other constitutional symptoms.        Objective      Physical Exam  There were no vitals taken for this visit.    There is no height or weight on file to calculate BMI.        Ortho Exam  Left ankle  Skin: Surgical incision well-healed without redness, warmth, drainage or evidence of infection.  Motion: She is able to wiggle her toes.  Dorsiflexors/plantar flexors fire but limited motion secondary to procedures and immobilization.  Motor/sensory: Grossly intact L2-S1.  Vascular: 2+ PT/DP.      Imaging Reviewed:  Ordered left ankle plain films.  Imaging read/interpreted by Dr. Daniels.    Imaging Results (Last 24 Hours)     Procedure Component Value Units Date/Time    XR Ankle 3+ View Left [[id number]] Resulted: 01/04/22 1227     Updated: 01/04/22 1229    Narrative:      Left Ankle X-Ray    Indication: s/p ORIF  Views: AP, Lateral, Mortise    Comparison: Left ankle 12/21/2021    Findings:   Patient is s/p ORIF of the left medial malleolus, lateral malleolus, and   syndesmosis.  There is interval but incomplete healing demonstrated.    The implants remain intact  No bony lesion  Soft tissues normal  Mortise: Well aligned  Syndesmosis:  no evidence of syndesmosis widening    Impression: s/p ORIF left lateral malleolus, medial malleolus, and   syndesmosis with interval but incomplete healing.           Assessment:  1. Status post open reduction and internal fixation (ORIF) of fracture    2. Syndesmotic disruption of left ankle, subsequent encounter    3. Closed trimalleolar fracture of left ankle with routine healing, subsequent encounter        Plan:  1. Status post placement of syndesmotic screw and ORIF left ankle--stable and doing well.  2. Patient to remain weightbearing to left ankle.  3. She will continue wearing tall pneumatic boot, including sleeping in it.  She may remove periodically throughout the day for gentle range of motion of the ankle.  4. Continue with over-the-counter pain medications as needed.  5. Follow-up in 6 weeks for repeat evaluation which will include preclinic imaging of the left ankle.  At that appointment anticipate scheduling her for syndesmotic screw removal.  6. Questions and concerns answered.      Emilia Chavez PA-C  01/04/22  12:36 EST      Dictated Utilizing Dragon Dictation.

## 2022-01-11 RX ORDER — MONTELUKAST SODIUM 10 MG/1
TABLET ORAL
Qty: 90 TABLET | Refills: 0 | Status: SHIPPED | OUTPATIENT
Start: 2022-01-11 | End: 2022-01-11 | Stop reason: SDUPTHER

## 2022-01-11 RX ORDER — MONTELUKAST SODIUM 10 MG/1
10 TABLET ORAL
Qty: 90 TABLET | Refills: 0 | Status: SHIPPED | OUTPATIENT
Start: 2022-01-11 | End: 2022-09-15 | Stop reason: SDUPTHER

## 2022-01-18 ENCOUNTER — OFFICE VISIT (OUTPATIENT)
Dept: ORTHOPEDIC SURGERY | Facility: CLINIC | Age: 57
End: 2022-01-18

## 2022-01-18 DIAGNOSIS — Z98.890 STATUS POST OPEN REDUCTION AND INTERNAL FIXATION (ORIF) OF FRACTURE: Primary | ICD-10-CM

## 2022-01-18 DIAGNOSIS — S93.432D SYNDESMOTIC DISRUPTION OF LEFT ANKLE, SUBSEQUENT ENCOUNTER: ICD-10-CM

## 2022-01-18 DIAGNOSIS — Z87.81 STATUS POST OPEN REDUCTION AND INTERNAL FIXATION (ORIF) OF FRACTURE: Primary | ICD-10-CM

## 2022-01-18 DIAGNOSIS — S82.852D CLOSED TRIMALLEOLAR FRACTURE OF LEFT ANKLE WITH ROUTINE HEALING, SUBSEQUENT ENCOUNTER: ICD-10-CM

## 2022-01-18 PROCEDURE — 99024 POSTOP FOLLOW-UP VISIT: CPT | Performed by: PHYSICIAN ASSISTANT

## 2022-01-18 NOTE — PROGRESS NOTES
[facility] [place] Orthopaedic Surgery Clinic Note        Subjective     Post-op ( (2 week recheck- 8 weeks s/p ORIF ankle (11/3/2021) and 10 weeks s/p ORIF syndesmosis with debridement medial gutter (12/6/2021)))       KEVIN Coelho is a 56 y.o. female.  Patient returns today for evaluation of her left ankle.      Upon questioning she noted some indentations from the boot with a sore to the anterior aspect of the ankle.  With further questioning patient has only remove the boot approximately 3 different times to wash the foot and ankle since her last visit on 1/4/2022.    No significant pain to the ankle.  No reported numbness or tingling.    Patient denies any fever, chills, night sweats or other constitutional symptoms.  She is remaining nonweightbearing as directed.        Objective      Physical Exam  There were no vitals taken for this visit.    There is no height or weight on file to calculate BMI.        Ortho Exam  Left ankle  Skin: Surgical incision well-healed without redness, warmth, drainage or evidence of infection.  Patient does have extremely dry/flaking skin noted about the foot and ankle.    Motion: Able to wiggle her toes.  Dorsiflexors/plantar flexors fire.  Motor/sensory: Grossly intact L2-S1.  Vascular: 2+ PT/DP.      Imaging Reviewed:  No new imaging today.      Assessment:  1. Status post open reduction and internal fixation (ORIF) of fracture    2. Syndesmotic disruption of left ankle, subsequent encounter    3. Closed trimalleolar fracture of left ankle with routine healing, subsequent encounter        Plan:  1. Status post syndesmotic screw placement and ORIF of left ankle, stable.  2. Patient to remain nonweightbearing to the left ankle.  3. Continue wearing the pneumatic boot including sleeping in it.  4. She is instructed that she needs to remove daily and wash the foot and ankle as well as provide lotion to her dry skin 3-4 times a day.  Was explained to the patient that  breakdown of the skin can lead to infection.  5. Encourage range of motion of the hip and knee.  6. Patient to keep follow-up appointment on 2/17/2022.  She will require repeat imaging at that appointment.  It is anticipated that we will be scheduling her for her syndesmotic screw removal at that appointment.  7. Questions and concerns answered.    Case was discussed with Dr. Daniels who agrees with the above assessment and plan.      Emilia Chavez PA-C  01/20/22  11:39 EST      Dictated Utilizing Dragon Dictation.

## 2022-01-19 ENCOUNTER — TELEMEDICINE (OUTPATIENT)
Dept: FAMILY MEDICINE CLINIC | Facility: TELEHEALTH | Age: 57
End: 2022-01-19

## 2022-01-19 VITALS — HEIGHT: 67 IN | TEMPERATURE: 98.5 F | BODY MASS INDEX: 40.02 KG/M2 | WEIGHT: 255 LBS

## 2022-01-19 DIAGNOSIS — R05.9 COUGH: Primary | ICD-10-CM

## 2022-01-19 PROCEDURE — 99213 OFFICE O/P EST LOW 20 MIN: CPT | Performed by: NURSE PRACTITIONER

## 2022-01-19 RX ORDER — BROMPHENIRAMINE MALEATE, PSEUDOEPHEDRINE HYDROCHLORIDE, AND DEXTROMETHORPHAN HYDROBROMIDE 2; 30; 10 MG/5ML; MG/5ML; MG/5ML
SYRUP ORAL
Qty: 240 ML | Refills: 0 | Status: SHIPPED | OUTPATIENT
Start: 2022-01-19 | End: 2022-02-24

## 2022-01-19 RX ORDER — PREDNISONE 20 MG/1
TABLET ORAL
Qty: 13 TABLET | Refills: 0 | Status: SHIPPED | OUTPATIENT
Start: 2022-01-19 | End: 2022-02-24

## 2022-01-19 NOTE — PATIENT INSTRUCTIONS
Cough, Adult  Coughing is a reflex that clears your throat and your airways (respiratory system). Coughing helps to heal and protect your lungs. It is normal to cough occasionally, but a cough that happens with other symptoms or lasts a long time may be a sign of a condition that needs treatment. An acute cough may only last 2-3 weeks, while a chronic cough may last 8 or more weeks.  Coughing is commonly caused by:  · Infection of the respiratory systemby viruses or bacteria.  · Breathing in substances that irritate your lungs.  · Allergies.  · Asthma.  · Mucus that runs down the back of your throat (postnasal drip).  · Smoking.  · Acid backing up from the stomach into the esophagus (gastroesophageal reflux).  · Certain medicines.  · Chronic lung problems.  · Other medical conditions such as heart failure or a blood clot in the lung (pulmonary embolism).  Follow these instructions at home:  Medicines  · Take over-the-counter and prescription medicines only as told by your health care provider.  · Talk with your health care provider before you take a cough suppressant medicine.  Lifestyle    · Avoid cigarette smoke. Do not use any products that contain nicotine or tobacco, such as cigarettes, e-cigarettes, and chewing tobacco. If you need help quitting, ask your health care provider.  · Drink enough fluid to keep your urine pale yellow.  · Avoid caffeine.  · Do not drink alcohol if your health care provider tells you not to drink.    General instructions    · Pay close attention to changes in your cough. Tell your health care provider about them.  · Always cover your mouth when you cough.  · Avoid things that make you cough, such as perfume, candles, cleaning products, or campfire or tobacco smoke.  · If the air is dry, use a cool mist vaporizer or humidifier in your bedroom or your home to help loosen secretions.  · If your cough is worse at night, try to sleep in a semi-upright position.  · Rest as needed.  · Keep  all follow-up visits as told by your health care provider. This is important.    Contact a health care provider if you:  · Have new symptoms.  · Cough up pus.  · Have a cough that does not get better after 2-3 weeks or gets worse.  · Cannot control your cough with cough suppressant medicines and you are losing sleep.  · Have pain that gets worse or pain that is not helped with medicine.  · Have a fever.  · Have unexplained weight loss.  · Have night sweats.  Get help right away if:  · You cough up blood.  · You have difficulty breathing.  · Your heartbeat is very fast.  These symptoms may represent a serious problem that is an emergency. Do not wait to see if the symptoms will go away. Get medical help right away. Call your local emergency services (911 in the [place] Do not drive yourself to the hospital.  Summary  · Coughing is a reflex that clears your throat and your airways. It is normal to cough occasionally, but a cough that happens with other symptoms or lasts a long time may be a sign of a condition that needs treatment.  · Take over-the-counter and prescription medicines only as told by your health care provider.  · Always cover your mouth when you cough.  · Contact a health care provider if you have new symptoms or a cough that does not get better after 2-3 weeks or gets worse.  This information is not intended to replace advice given to you by your health care provider. Make sure you discuss any questions you have with your health care provider.  Document Revised: 01/06/2020 Document Reviewed: 01/06/2020  [employer] Patient Education © 2021 [employer]

## 2022-01-19 NOTE — PROGRESS NOTES
You have chosen to receive care through a telehealth visit.  Do you consent to use a video/audio connection for your medical care today? Yes     CHIEF COMPLAINT  Cc: cough    HPI  Brigid Coelho is a 56 y.o. female  presents with complaint of a cough. Symptoms include; cough, post nasal drainage and mild runny nose. She has been having problems since 12/017/2022 when she was seen in the Urgent Care and diagnosed with a lower respiratory infection. Her COVID-19 test was negative at that time. She was prescribed doxycycline and an inhaler at that time. She was seen again on 12/30/2022 via [facility] and diagnosed with cough and wheezing. Flovent HFA was prescribed at that time. She was also advised to continue her inhaler and Mucinex DM. She is also taking her Singulair as ordered by her PCP office. Today she has a productive cough at times clear with a tint of yellow. Additionally she reports that she still has a lot mucous and that her cough is worse during the day. No fever is reported. Her mobility is limited because her ankle is broken.    Review of Systems   Constitutional: Negative for fatigue and fever.   HENT: Positive for postnasal drip and rhinorrhea (mild). Negative for congestion, sinus pressure, sinus pain, sneezing, sore throat and tinnitus.         No loss of taste and smell   Respiratory: Positive for cough. Negative for chest tightness, shortness of breath and wheezing.    Cardiovascular: Negative for chest pain.   Gastrointestinal: Negative for diarrhea, nausea and vomiting.   Musculoskeletal: Negative for myalgias.   Neurological: Negative for headaches.       Past Medical History:   Diagnosis Date   • Acute bronchitis    • Anemia    • Anxiety    • Chest pain    • Cholelithiasis    • Claustrophobia    • GERD (gastroesophageal reflux disease)    • Hashimoto's thyroiditis    • Hemorrhoids    • Hypothyroidism    • Low back pain    • Metrorrhagia    • Palpitations    • Polycystic ovary  "syndrome    • Polyp of sigmoid colon    • Ulcerative colitis (HCC)    • Upper respiratory infection    • UTI (urinary tract infection)        Family History   Problem Relation Age of Onset   • Hyperlipidemia Mother    • CHAD disease Mother    • Cancer Mother    • Diabetes Mother    • Heart disease Mother    • Rheum arthritis Father    • Hyperlipidemia Father    • Heart attack Father 72   • Ovarian cancer Maternal Aunt    • Diabetes Paternal Grandmother    • Hypertension Paternal Grandmother    • Obesity Paternal Grandmother    • Colon cancer Other    • Arthritis Other    • Cancer Other         uncle; liver, lung    • Thyroid disease Cousin    • Thyroid cancer Cousin    • CHAD disease Daughter    • Obesity Brother    • Sleep apnea Brother        Social History     Socioeconomic History   • Marital status:    Tobacco Use   • Smoking status: Never Smoker   • Smokeless tobacco: Never Used   Vaping Use   • Vaping Use: Never used   Substance and Sexual Activity   • Alcohol use: No   • Drug use: No   • Sexual activity: Defer         Temp 98.5 °F (36.9 °C)   Ht 170.2 cm (67\")   Wt 116 kg (255 lb)   BMI 39.94 kg/m²     PHYSICAL EXAM  Physical Exam   Constitutional: She appears well-developed and well-nourished.   HENT:   Head: Normocephalic and atraumatic.   Right Ear: External ear normal.   Left Ear: External ear normal.   Nose: Nose normal. Right sinus exhibits no maxillary sinus tenderness (patient directed exam) and no frontal sinus tenderness (patient directed exam). Left sinus exhibits no maxillary sinus tenderness (patient directed exam) and no frontal sinus tenderness (patient directed exam).   Mouth/Throat: Mouth/Lips are normal.  Eyes: Lids are normal. Right eye exhibits no discharge and no exudate. Left eye exhibits no discharge and no exudate. Right conjunctiva is not injected. Left conjunctiva is not injected.   Pulmonary/Chest: No accessory muscle usage. No tachypnea and no bradypnea.  No respiratory " distress (tight cough noted at visit).No use of oxygen by nasal cannulaNo use of oxygen by mask noted.  Abdominal: Abdomen appears normal.   Neurological: She is alert. No cranial nerve deficit.   Skin: Her skin appears normal.  Psychiatric: She has a normal mood and affect. Her speech is normal and behavior is normal. Judgment and thought content normal.       Results for orders placed or performed during the hospital encounter of 12/17/21   COVID-19 PCR, [facility], NP SWAB IN [facility] V[facility]AL TRANSPORT MEDIA/ORAL SWISH 24-30 HR TAT - Swab, Nasopharynx    Specimen: Nasopharynx; Swab   Result Value Ref Range    SARS-CoV-2 FLAVIO Not Detected Not Detected   POCT Influenza A/B    Specimen: Swab   Result Value Ref Range    Rapid Influenza A Ag Negative Negative    Rapid Influenza B Ag Negative Negative    Internal Control Passed Passed    Lot Number [id number]     Expiration Date 3,312,022        Diagnoses and all orders for this visit:    1. Cough (Primary)    Other orders  -     predniSONE (DELTASONE) 20 MG tablet; Prednisone 20mg tabs, 3 for 2 days, 2 for 2 days, 1 for 2 days, 1/2 for 2 days take with food or milk  Dispense: 13 tablet; Refill: 0  -     brompheniramine-pseudoephedrine-DM (Bromfed DM) 30-2-10 MG/5ML syrup; 5 to 10 cc every 4 hours as needed for cough, congestion, allergies  Dispense: 240 mL; Refill: 0      Do not take Bromfed within 4 hours of mucinex or cetirizine  Take prednisone with food as early in the day as possible  Do not take prednisone with nsaids such as ibuprofen, aleve, or aspirin  May take tylenol for pain or fever  Continue Mucinex with plenty of fluids especially water to thin secretions and help with congestion.  May continue inhaler as needed  Cough, deep breathe every two hours    FOLLOW-UP  If symptoms worsen or persist follow up with PCP/Virtual Care or Urgent Care      Patient verbalizes understanding of medication dosage, comfort measures, instructions for treatment and  follow-up.    Susanodilon Hoang, APRN  01/19/2022  18:27 EST    This visit was performed via Telehealth.  This patient has been instructed to follow-up with their primary care provider if their symptoms worsen or the treatment provided does not resolve their illness.

## 2022-02-17 ENCOUNTER — OFFICE VISIT (OUTPATIENT)
Dept: ORTHOPEDIC SURGERY | Facility: CLINIC | Age: 57
End: 2022-02-17

## 2022-02-17 DIAGNOSIS — Z87.81 STATUS POST OPEN REDUCTION AND INTERNAL FIXATION (ORIF) OF FRACTURE: ICD-10-CM

## 2022-02-17 DIAGNOSIS — Z87.81 STATUS POST OPEN REDUCTION AND INTERNAL FIXATION (ORIF) OF FRACTURE: Primary | ICD-10-CM

## 2022-02-17 DIAGNOSIS — S93.432D SYNDESMOTIC DISRUPTION OF LEFT ANKLE, SUBSEQUENT ENCOUNTER: Primary | ICD-10-CM

## 2022-02-17 DIAGNOSIS — Z98.890 STATUS POST OPEN REDUCTION AND INTERNAL FIXATION (ORIF) OF FRACTURE: Primary | ICD-10-CM

## 2022-02-17 DIAGNOSIS — S93.432D SYNDESMOTIC DISRUPTION OF LEFT ANKLE, SUBSEQUENT ENCOUNTER: ICD-10-CM

## 2022-02-17 DIAGNOSIS — Z98.890 STATUS POST OPEN REDUCTION AND INTERNAL FIXATION (ORIF) OF FRACTURE: ICD-10-CM

## 2022-02-17 PROCEDURE — 99024 POSTOP FOLLOW-UP VISIT: CPT | Performed by: PHYSICIAN ASSISTANT

## 2022-02-17 RX ORDER — METFORMIN HYDROCHLORIDE 500 MG/1
TABLET, EXTENDED RELEASE ORAL
COMMUNITY
Start: 2022-01-11 | End: 2022-03-17

## 2022-02-17 NOTE — PROGRESS NOTES
[facility] [place] [facility] Note        Subjective     Post-op (4 week recheck-  11 weeks s/p ORIF syndesmosis with debridement medial gutter 12/6/2021)       KEVIN Coelho is a 56 y.o. female.  Patient returns today for preop evaluation to make sure she is ready to proceed with removal of the syndesmotic screw.  Initial ORIF was performed 11/3/2021 with debridement of medial gutter and placement of syndesmotic screw 12/6/2021 by Dr. Daniels.  Patient has been nonweightbearing since time of surgery.    Overall she still has some discomfort to the ankle.  Her main complaint at this time is stiffness.  After her last appointment we discussed the importance of washing the foot cleaning off the dead skin which she is done well.  She does have a small spot medial aspect of the great toe--upon further evaluation this is a wart.    No reported fever, chills, night sweats or other constitutional symptoms.  Patient denies any numbness or tingling in the extremity.          Objective      Physical Exam  There were no vitals taken for this visit.    There is no height or weight on file to calculate BMI.        Ortho Exam  Left ankle  Skin: Surgical incisions well-healed without redness, warmth, drainage or evidence of infection.    Previous noted dry skin is all gone.  She does have a pencil eraser size head wart noted medial aspect great toe.   Motion: Able to wiggle her toes.  Dorsiflexors/plantar flexors fire.  Motor/sensory: Grossly intact L2-S1.  Vascular: 2+ PT/DP.      Imaging Reviewed:  Ordered left ankle plain films.  Imaging read/interpreted by Dr. Daniels.    Left Ankle X-Ray     Indication: s/p ORIF  Views: AP, Lateral, Mortise     Comparison: Left ankle 1/4/2022     Findings:   Patient is s/p ORIF of the medial lateral malleoli and syndesmosis.  There is interval and complete healing demonstrated.  Posterior malleolus fracture seen on the lateral x-ray is also healed  radiographically.  The implants remain intact  No bony lesion  Soft tissues normal  Mortise: Well aligned  Syndesmosis:  no evidence of syndesmosis widening     Impression: s/p ORIF medial and lateral malleolus and syndesmosis with interval healing       Assessment:  1. Status post open reduction and internal fixation (ORIF) of fracture    2. Syndesmotic disruption of left ankle, subsequent encounter        Plan:  1. Status post ORIF trimalleolar fracture with syndesmotic screw placement of left ankle, stable.  2. Patient to remain nonweightbearing to the left ankle for now.  3. Continue wearing the pneumatic boot including sleeping in it.  4. To contact PCP regarding treatment for wart noted to great toe.  5. Continue with hip and knee range of motion.  6. Schedule for syndesmotic screw removal 3/4/2022 with Dr. Daniels.  7. Questions and concerns answered.     Case was discussed with Dr. Daniels who agrees with the above assessment and plan.    Indications, risks, benefits of surgical treatment were discussed with the patient. Surgical risks include but are not limited to pain, bleeding, infection, failure to relieve symptoms, need for further procedures, recurrence of symptoms, damage to healthy adjacent structures, hardware loosening/failure, stiffness, weakness, scar, DVT/PE, loss of limb or life. We also discussed the postoperative protocol and expected outcome. All questions were answered; the patient would like to proceed with surgical intervention.       Emilia Chavez PA-C  02/17/22  13:08 EST      Dictated Utilizing Dragon Dictation.

## 2022-02-17 NOTE — PROGRESS NOTES
I have reviewed the notes, assessments, and/or procedures performed by Emilia Chavez PA-C, I concur with her documentation of Brigid Coelho.      Patient now almost 12 weeks out from revision surgery requiring syndesmotic screw placement on 12/6/2021.  Plan will be for syndesmotic screw removal and reevaluation of the syndesmotic integrity and if there is any laxity, screw will be replaced.  Hopefully the screw can be left out and we can initiate formal PT.  Risk, benefits, potential hazards reviewed with the patient.

## 2022-02-24 ENCOUNTER — LAB (OUTPATIENT)
Dept: LAB | Facility: HOSPITAL | Age: 57
End: 2022-02-24

## 2022-02-24 ENCOUNTER — OFFICE VISIT (OUTPATIENT)
Dept: FAMILY MEDICINE CLINIC | Facility: CLINIC | Age: 57
End: 2022-02-24

## 2022-02-24 VITALS
DIASTOLIC BLOOD PRESSURE: 80 MMHG | HEART RATE: 96 BPM | WEIGHT: 250 LBS | TEMPERATURE: 97.8 F | HEIGHT: 67 IN | SYSTOLIC BLOOD PRESSURE: 116 MMHG | OXYGEN SATURATION: 96 % | BODY MASS INDEX: 39.24 KG/M2

## 2022-02-24 DIAGNOSIS — R73.03 PREDIABETES: Primary | ICD-10-CM

## 2022-02-24 DIAGNOSIS — Z23 IMMUNIZATION DUE: ICD-10-CM

## 2022-02-24 DIAGNOSIS — R73.03 PREDIABETES: ICD-10-CM

## 2022-02-24 DIAGNOSIS — Z51.81 THERAPEUTIC DRUG MONITORING: ICD-10-CM

## 2022-02-24 DIAGNOSIS — F41.9 ANXIETY: Chronic | ICD-10-CM

## 2022-02-24 DIAGNOSIS — Z12.31 ENCOUNTER FOR SCREENING MAMMOGRAM FOR MALIGNANT NEOPLASM OF BREAST: ICD-10-CM

## 2022-02-24 LAB — HBA1C MFR BLD: 5.7 % (ref 4.8–5.6)

## 2022-02-24 PROCEDURE — 90732 PPSV23 VACC 2 YRS+ SUBQ/IM: CPT | Performed by: FAMILY MEDICINE

## 2022-02-24 PROCEDURE — 90686 IIV4 VACC NO PRSV 0.5 ML IM: CPT | Performed by: FAMILY MEDICINE

## 2022-02-24 PROCEDURE — G0008 ADMIN INFLUENZA VIRUS VAC: HCPCS | Performed by: FAMILY MEDICINE

## 2022-02-24 PROCEDURE — G0009 ADMIN PNEUMOCOCCAL VACCINE: HCPCS | Performed by: FAMILY MEDICINE

## 2022-02-24 PROCEDURE — 99214 OFFICE O/P EST MOD 30 MIN: CPT | Performed by: FAMILY MEDICINE

## 2022-02-24 PROCEDURE — 83036 HEMOGLOBIN GLYCOSYLATED A1C: CPT

## 2022-02-24 RX ORDER — DULOXETIN HYDROCHLORIDE 20 MG/1
20 CAPSULE, DELAYED RELEASE ORAL DAILY
Qty: 30 CAPSULE | Refills: 1 | Status: SHIPPED | OUTPATIENT
Start: 2022-02-24 | End: 2022-07-05

## 2022-02-24 RX ORDER — ALPRAZOLAM 0.5 MG/1
0.5 TABLET ORAL DAILY PRN
Qty: 30 TABLET | Refills: 0 | Status: SHIPPED | OUTPATIENT
Start: 2022-02-24 | End: 2022-06-24 | Stop reason: SDUPTHER

## 2022-02-24 NOTE — PROGRESS NOTES
Follow Up Office Visit      Patient Name: Brigid Coelho  : 1965   MRN: [id number]     Chief Complaint:    Chief Complaint   Patient presents with   • Establish Care   • Anxiety     med refills        History of Present Illness: Brigid Coelho is a 56 y.o. female who is here today to establish care, off-boarding Dr. Kennedy.  Patient is here with anxiety, left ankle fracture, prediabetes and hyperlipidemia.  She needs a mammogram ordered today.  Patient is interested in getting 2 vaccines today as well.    Anxiety--has had anxiety since she was 26. Takes 0.25 mg xanax as needed, needs refills today. Still taking zoloft but states it doesn't seem to help much. Does not want to increase dose due to fear of weight gain. Has tried vistaril before but gave her more anxiety. Also has tried buspar and states it made constipation and she gained 20lbs in 1 week. Interested in possibly switching to cymbalta.     Left ankle fracture--fractured in 2021. Has surgery scheduled next week for screw removal. Currently non-weight bearing.    prediabetes--Dx in . stopped taking metformin a few years ago because she states it caused hypoglycemia. Unsure what her readings were that day.     Tingling--in chest and arms when in the recliner intermittently. Having to sleep in chair since her foot injury.    Wants her cholesterol checked, a mammogram scheduled, the pneumo vaccine and flu vaccine.      Review of systems was positive for left ankle pain    Physical exam: Patient sitting in wheelchair with left ankle boot.  Patient's heart and lung exam was normal without rales rhonchi's or murmurs.  Patient's affect was anxious.  Patient very talkative on exam      Subjective        I have reviewed and the following portions of the patient's history were updated as appropriate: past family history, past medical history, past social history, past surgical history and problem list.    Medications:      Current Outpatient Medications:   •  acetaminophen (TYLENOL) 650 MG 8 hr tablet, Take 500 mg by mouth Every 8 (Eight) Hours As Needed for Mild Pain ., Disp: , Rfl:   •  ALPRAZolam (Xanax) 0.5 MG tablet, Take 1 tablet by mouth Daily As Needed for Anxiety., Disp: 30 tablet, Rfl: 0  •  ascorbic acid (VITAMIN C) 1000 MG tablet, Take 1,000 mg by mouth Daily., Disp: , Rfl:   •  Biotin 1000 MCG chewable tablet, Chew., Disp: , Rfl:   •  famotidine (PEPCID) 20 MG tablet, Take 20 mg by mouth At Night As Needed for Heartburn., Disp: , Rfl:   •  ferrous sulfate 140 (45 Fe) MG tablet controlled-release tablet, Take 140 mg by mouth Daily With Breakfast., Disp: , Rfl:   •  folic acid (FOLVITE) 400 MCG tablet, Take 400 mcg by mouth Daily., Disp: , Rfl:   •  gabapentin (NEURONTIN) 100 MG capsule, Take 1 capsule by mouth 3 (Three) Times a Day., Disp: 90 capsule, Rfl: 0  •  Garlic 1000 MG capsule, Take 1 tablet by mouth Daily., Disp: , Rfl:   •  Loratadine (CLARITIN) 10 MG capsule, Take 1 tablet by mouth daily., Disp: , Rfl:   •  Melatonin 2.5 MG capsule, Take  by mouth., Disp: , Rfl:   •  mesalamine (APRISO) 0.375 g 24 hr capsule, Take 375 mg by mouth As Needed., Disp: , Rfl:   •  metFORMIN ER (GLUCOPHAGE-XR) 500 MG 24 hr tablet, , Disp: , Rfl:   •  montelukast (SINGULAIR) 10 MG tablet, Take 1 tablet by mouth every night at bedtime., Disp: 90 tablet, Rfl: 0  •  Selenium 200 MCG capsule, Take 1 tablet by mouth Daily., Disp: , Rfl:   •  sertraline (Zoloft) 50 MG tablet, Take 1 tablet by mouth Daily., Disp: 90 tablet, Rfl: 0  •  Vitamin A 2400 MCG (8000 UT) tablet, Take 1 tablet by mouth Daily., Disp: , Rfl:   •  Zinc 25 MG tablet, Take 0.5 tablets by mouth Daily., Disp: , Rfl:   •  DULoxetine (Cymbalta) 20 MG capsule, Take 1 capsule by mouth Daily., Disp: 30 capsule, Rfl: 1  •  levothyroxine (Synthroid) 88 MCG tablet, Take 1 tablet by mouth Daily., Disp: 90 tablet, Rfl: 0    Allergies:   Allergies   Allergen Reactions   • Adhesive  "Tape      Rash     • Erythromycin Other (See Comments)     Sores in mouth   • Nickel Itching   • Epinephrine Palpitations   • Latex Rash   • Nitroglycerin Palpitations       Objective     Physical Exam: Please see above  Vital Signs:   Vitals:    02/24/22 1457   BP: 116/80   Pulse: 96   Temp: 97.8 °F (36.6 °C)   TempSrc: Temporal   SpO2: 96%   Weight: 113 kg (250 lb)  Comment: pt reported   Height: 170.2 cm (67\")   PainSc: 0-No pain     Body mass index is 39.16 kg/m².          Assessment / Plan      Assessment/Plan:   Diagnoses and all orders for this visit:    1. Prediabetes (Primary)  -     Hemoglobin A1c; Future    2. Anxiety  -     Ambulatory Referral to Behavioral Health  -     DULoxetine (Cymbalta) 20 MG capsule; Take 1 capsule by mouth Daily.  Dispense: 30 capsule; Refill: 1  -     ALPRAZolam (Xanax) 0.5 MG tablet; Take 1 tablet by mouth Daily As Needed for Anxiety.  Dispense: 30 tablet; Refill: 0    3. Encounter for screening mammogram for malignant neoplasm of breast  -     Mammo Screening Digital Tomosynthesis Bilateral With CAD; Future    4. Therapeutic drug monitoring  -     Compliance Drug Analysis, Ur - Urine, Clean Catch    5. Immunization due  -     FluLaval/Fluarix/Fluzone >6 Months ([id number])  -     Pneumococcal Polysaccharide Vaccine 23-Valent (PPSV23) Greater Than or Equal To 3yo Subcutaneous / IM    Discussed urine drug screen drug contract with the patient.  I am okay with continuing Xanax 0.25 mg daily as needed panic attacks and anxiety.  If dosing needs to be adjusted in future would send patient to behavioral health.    Will check up on patient's prediabetes with repeat A1c.  Ordered mammogram.  Patient okay with flu shot and Pneumovax.    I attest that I have reviewed the student note and that the components of the history of the present illness, the physical exam, and the assessment and plan documented were performed by me or were performed in my presence by the student and verified by " me.    Follow Up:   Return in about 3 months (around 5/24/2022).    Demetrio Lo DO  [facility] [place]

## 2022-02-24 NOTE — PATIENT INSTRUCTIONS
Calorie Counting for Weight Loss  Calories are units of energy. Your body needs a certain number of calories from food to keep going throughout the day. When you eat or drink more calories than your body needs, your body stores the extra calories mostly as fat. When you eat or drink fewer calories than your body needs, your body burns fat to get the energy it needs.  Calorie counting means keeping track of how many calories you eat and drink each day. Calorie counting can be helpful if you need to lose weight. If you eat fewer calories than your body needs, you should lose weight. Ask your health care provider what a healthy weight is for you.  For calorie counting to work, you will need to eat the right number of calories each day to lose a healthy amount of weight per week. A dietitian can help you figure out how many calories you need in a day and will suggest ways to reach your calorie goal.  · A healthy amount of weight to lose each week is usually 1-2 lb (0.5-0.9 kg). This usually means that your daily calorie intake should be reduced by 500-750 calories.  · Eating 1,200-1,500 calories a day can help most women lose weight.  · Eating 1,500-1,800 calories a day can help most men lose weight.  What do I need to know about calorie counting?  Work with your health care provider or dietitian to determine how many calories you should get each day. To meet your daily calorie goal, you will need to:  · Find out how many calories are in each food that you would like to eat. Try to do this before you eat.  · Decide how much of the food you plan to eat.  · Keep a food log. Do this by writing down what you ate and how many calories it had.  To successfully lose weight, it is important to balance calorie counting with a healthy lifestyle that includes regular activity.  Where do I find calorie information?    The number of calories in a food can be found on a Nutrition Facts label. If a food does not have a Nutrition Facts  label, try to look up the calories online or ask your dietitian for help.  Remember that calories are listed per serving. If you choose to have more than one serving of a food, you will have to multiply the calories per serving by the number of servings you plan to eat. For example, the label on a package of bread might say that a serving size is 1 slice and that there are 90 calories in a serving. If you eat 1 slice, you will have eaten 90 calories. If you eat 2 slices, you will have eaten 180 calories.  How do I keep a food log?  After each time that you eat, record the following in your food log as soon as possible:  · What you ate. Be sure to include toppings, sauces, and other extras on the food.  · How much you ate. This can be measured in cups, ounces, or number of items.  · How many calories were in each food and drink.  · The total number of calories in the food you ate.  Keep your food log near you, such as in a pocket-sized notebook or on an elyssa or website on your mobile phone. Some programs will calculate calories for you and show you how many calories you have left to meet your daily goal.  What are some portion-control tips?  · Know how many calories are in a serving. This will help you know how many servings you can have of a certain food.  · Use a measuring cup to measure serving sizes. You could also try weighing out portions on a kitchen scale. With time, you will be able to estimate serving sizes for some foods.  · Take time to put servings of different foods on your favorite plates or in your favorite bowls and cups so you know what a serving looks like.  · Try not to eat straight from a food's packaging, such as from a bag or box. Eating straight from the package makes it hard to see how much you are eating and can lead to overeating. Put the amount you would like to eat in a cup or on a plate to make sure you are eating the right portion.  · Use smaller plates, glasses, and bowls for smaller  portions and to prevent overeating.  · Try not to multitask. For example, avoid watching TV or using your computer while eating. If it is time to eat, sit down at a table and enjoy your food. This will help you recognize when you are full. It will also help you be more mindful of what and how much you are eating.  What are tips for following this plan?  Reading food labels  · Check the calorie count compared with the serving size. The serving size may be smaller than what you are used to eating.  · Check the source of the calories. Try to choose foods that are high in protein, fiber, and vitamins, and low in saturated fat, trans fat, and sodium.  Shopping  · Read nutrition labels while you shop. This will help you make healthy decisions about which foods to buy.  · Pay attention to nutrition labels for low-fat or fat-free foods. These foods sometimes have the same number of calories or more calories than the full-fat versions. They also often have added sugar, starch, or salt to make up for flavor that was removed with the fat.  · Make a grocery list of lower-calorie foods and stick to it.  Cooking  · Try to cook your favorite foods in a healthier way. For example, try baking instead of frying.  · Use low-fat dairy products.  Meal planning  · Use more fruits and vegetables. One-half of your plate should be fruits and vegetables.  · Include lean proteins, such as chicken, turkey, and fish.  Lifestyle  Each week, aim to do one of the following:  · 150 minutes of moderate exercise, such as walking.  · 75 minutes of vigorous exercise, such as running.  General information  · Know how many calories are in the foods you eat most often. This will help you calculate calorie counts faster.  · Find a way of tracking calories that works for you. Get creative. Try different apps or programs if writing down calories does not work for you.  What foods should I eat?    · Eat nutritious foods. It is better to have a nutritious,  high-calorie food, such as an avocado, than a food with few nutrients, such as a bag of potato chips.  · Use your calories on foods and drinks that will fill you up and will not leave you hungry soon after eating.  ? Examples of foods that fill you up are nuts and nut butters, vegetables, lean proteins, and high-fiber foods such as whole grains. High-fiber foods are foods with more than 5 g of fiber per serving.  · Pay attention to calories in drinks. Low-calorie drinks include water and unsweetened drinks.  The items listed above may not be a complete list of foods and beverages you can eat. Contact a dietitian for more information.  What foods should I limit?  Limit foods or drinks that are not good sources of vitamins, minerals, or protein or that are high in unhealthy fats. These include:  · Candy.  · Other sweets.  · Sodas, specialty coffee drinks, alcohol, and juice.  The items listed above may not be a complete list of foods and beverages you should avoid. Contact a dietitian for more information.  How do I count calories when eating out?  · Pay attention to portions. Often, portions are much larger when eating out. Try these tips to keep portions smaller:  ? Consider sharing a meal instead of getting your own.  ? If you get your own meal, eat only half of it. Before you start eating, ask for a container and put half of your meal into it.  ? When available, consider ordering smaller portions from the menu instead of full portions.  · Pay attention to your food and drink choices. Knowing the way food is cooked and what is included with the meal can help you eat fewer calories.  ? If calories are listed on the menu, choose the lower-calorie options.  ? Choose dishes that include vegetables, fruits, whole grains, low-fat dairy products, and lean proteins.  ? Choose items that are boiled, broiled, grilled, or steamed. Avoid items that are buttered, battered, fried, or served with cream sauce. Items labeled as  crispy are usually fried, unless stated otherwise.  ? Choose water, low-fat milk, unsweetened iced tea, or other drinks without added sugar. If you want an alcoholic beverage, choose a lower-calorie option, such as a glass of wine or light beer.  ? Ask for dressings, sauces, and syrups on the side. These are usually high in calories, so you should limit the amount you eat.  ? If you want a salad, choose a garden salad and ask for grilled meats. Avoid extra toppings such as busby, cheese, or fried items. Ask for the dressing on the side, or ask for olive oil and vinegar or lemon to use as dressing.  · Estimate how many servings of a food you are given. Knowing serving sizes will help you be aware of how much food you are eating at restaurants.  Where to find more information  · Centers for Disease Control and Prevention: www.cdc.gov  · [place]. Department of Agriculture: myplate.gov  Summary  · Calorie counting means keeping track of how many calories you eat and drink each day. If you eat fewer calories than your body needs, you should lose weight.  · A healthy amount of weight to lose per week is usually 1-2 lb (0.5-0.9 kg). This usually means reducing your daily calorie intake by 500-750 calories.  · The number of calories in a food can be found on a Nutrition Facts label. If a food does not have a Nutrition Facts label, try to look up the calories online or ask your dietitian for help.  · Use smaller plates, glasses, and bowls for smaller portions and to prevent overeating.  · Use your calories on foods and drinks that will fill you up and not leave you hungry shortly after a meal.  This information is not intended to replace advice given to you by your health care provider. Make sure you discuss any questions you have with your health care provider.  Document Revised: 01/28/2021 Document Reviewed: 01/28/2021  Elsevier Patient Education © 2021 [employer]

## 2022-03-01 ENCOUNTER — LAB (OUTPATIENT)
Dept: PREADMISSION TESTING | Facility: HOSPITAL | Age: 57
End: 2022-03-01

## 2022-03-01 DIAGNOSIS — Z87.81 STATUS POST OPEN REDUCTION AND INTERNAL FIXATION (ORIF) OF FRACTURE: ICD-10-CM

## 2022-03-01 DIAGNOSIS — S93.432D SYNDESMOTIC DISRUPTION OF LEFT ANKLE, SUBSEQUENT ENCOUNTER: ICD-10-CM

## 2022-03-01 DIAGNOSIS — Z98.890 STATUS POST OPEN REDUCTION AND INTERNAL FIXATION (ORIF) OF FRACTURE: ICD-10-CM

## 2022-03-01 LAB — SARS-COV-2 RNA PNL SPEC NAA+PROBE: NOT DETECTED

## 2022-03-01 PROCEDURE — C9803 HOPD COVID-19 SPEC COLLECT: HCPCS

## 2022-03-01 PROCEDURE — U0004 COV-19 TEST NON-CDC HGH THRU: HCPCS

## 2022-03-02 LAB — DRUGS UR: NORMAL

## 2022-03-04 ENCOUNTER — TELEPHONE (OUTPATIENT)
Dept: ORTHOPEDIC SURGERY | Facility: CLINIC | Age: 57
End: 2022-03-04

## 2022-03-04 DIAGNOSIS — Z98.890 STATUS POST OPEN REDUCTION AND INTERNAL FIXATION (ORIF) OF FRACTURE: ICD-10-CM

## 2022-03-04 DIAGNOSIS — Z87.81 STATUS POST OPEN REDUCTION AND INTERNAL FIXATION (ORIF) OF FRACTURE: ICD-10-CM

## 2022-03-04 DIAGNOSIS — S93.432D SYNDESMOTIC DISRUPTION OF LEFT ANKLE, SUBSEQUENT ENCOUNTER: Primary | ICD-10-CM

## 2022-03-04 RX ORDER — ONDANSETRON 4 MG/1
4 TABLET, FILM COATED ORAL EVERY 8 HOURS PRN
Qty: 10 TABLET | Refills: 1 | Status: SHIPPED | OUTPATIENT
Start: 2022-03-04 | End: 2022-03-17

## 2022-03-04 RX ORDER — DOCUSATE SODIUM 100 MG/1
100 CAPSULE, LIQUID FILLED ORAL 2 TIMES DAILY PRN
Qty: 62 CAPSULE | Refills: 0 | Status: SHIPPED | OUTPATIENT
Start: 2022-03-04 | End: 2022-03-17

## 2022-03-04 RX ORDER — OXYCODONE HYDROCHLORIDE 5 MG/1
5 TABLET ORAL EVERY 6 HOURS PRN
Qty: 15 TABLET | Refills: 0 | Status: SHIPPED | OUTPATIENT
Start: 2022-03-04 | End: 2022-03-29

## 2022-03-04 NOTE — TELEPHONE ENCOUNTER
Pt called in to state that she arrived at her SX location to find out that her SX arrival time had been changed.     Spoke with Dr Jeremiah Dee's SX  and was provided information and paperwork that suggested she call our office and confirm that she received a VM confirming Pre-op instructions.    Pt stated that she didn't see the office number so she didn't check her VM to get confirmation.      Transferred to Sx Dept.

## 2022-03-07 ENCOUNTER — TELEPHONE (OUTPATIENT)
Dept: ORTHOPEDIC SURGERY | Facility: CLINIC | Age: 57
End: 2022-03-07

## 2022-03-07 NOTE — TELEPHONE ENCOUNTER
"Caller: ROXANNE SAHNI    Relationship to patient: SELF    Best call back number: [phone]    Patient is needing: UNABLE TO WARM TRANSFER - PATIENT HAS POST OP QUESTIONS NEEDS CLINICAL ADVICE AND THEN ALSO, NEEDS REFERRAL FOR \"PT\" - DUE TO MEDICARE.  PATIENT WAS ADVISED SENT OVER AS HIGH PRIORITY AND SOMEONE YORDAN CALL HER BACK.  "

## 2022-03-09 ENCOUNTER — TELEPHONE (OUTPATIENT)
Dept: ORTHOPEDIC SURGERY | Facility: CLINIC | Age: 57
End: 2022-03-09

## 2022-03-09 NOTE — TELEPHONE ENCOUNTER
Spoke with pt regarding her questions; We went over discharge instructions based off of Dr. Daniels's operative note from 3/4/22; These instructions were:    Patient to be weightbearing as tolerated on left lower extremity, to come back in for wound check/suture removal in 2 weeks, and when she comes back in she was to transition out of her boot then transition into Aircast and regular shoe-wear.    Patient reports that upon discharge, she was told that she no longer needed the boot and could wear a regular shoe; She states she has not been wearing the boot and regular shoes were not fitting; She also reports barely putting any weight down through that foot and wants to clarify the instructions of whether she should be in a boot vs Aircast with regular shoe. Also, pt reports she has already removed dressing and steri-strips are in place but wasn't given any other instructions at discharge.    I told pt that Dr. Daniels was not in the office but first thing in the morning I would discuss with him our concerns and get back with her as soon as I had clarification. She understood and is going to remain non-weightbearing without boot until she hears back from me.    Aminata

## 2022-03-09 NOTE — TELEPHONE ENCOUNTER
Patient had surgery on her left ankle on 03- and she would like to know when she is able to get her incision wet and also she states that she can't get any shoe on her foot and when she stands up it seems that her leg is shorter than the other and she is concerned.    Please advise and call the patient back at [phone] .

## 2022-03-10 ENCOUNTER — TREATMENT (OUTPATIENT)
Dept: PHYSICAL THERAPY | Facility: CLINIC | Age: 57
End: 2022-03-10

## 2022-03-10 DIAGNOSIS — S82.851S CLOSED TRIMALLEOLAR FRACTURE OF RIGHT ANKLE, SEQUELA: Primary | ICD-10-CM

## 2022-03-10 PROCEDURE — 97161 PT EVAL LOW COMPLEX 20 MIN: CPT | Performed by: PHYSICAL THERAPIST

## 2022-03-10 PROCEDURE — 97110 THERAPEUTIC EXERCISES: CPT | Performed by: PHYSICAL THERAPIST

## 2022-03-10 PROCEDURE — 97140 MANUAL THERAPY 1/> REGIONS: CPT | Performed by: PHYSICAL THERAPIST

## 2022-03-10 NOTE — TELEPHONE ENCOUNTER
Patient called this morning to follow up on our previous message from yesterday; I was able to speak with Dr. Daniels in office who reports that his discharge instructions were correct in stating that plan was for patient to resume boot wear, however, he states if patient would rather come in earlier to get an Aircast instead of wearing the boot he is okay with this; I explained this to patient and she was agreeable to coming in today to pick this up; She will follow up as scheduled.    Aminata

## 2022-03-10 NOTE — PROGRESS NOTES
Physical Therapy Initial Evaluation and Plan of Care      Patient: Brigid Coelho   : 1965  Diagnosis/ICD-10 Code:  Closed trimalleolar fracture of right ankle, sequela [S82.851S]  Referring practitioner: Giovani Daniels MD  NPI:  [id number]  Date of Initial Visit: 3/10/2022  Today's Date: 3/10/2022  Patient seen for 1 sessions           Subjective Questionnaire: LEFS: 27      Subjective Evaluation    History of Present Illness  Mechanism of injury: R ankle fx Oct 30th,  Nov 3 first surgery,  Second surgery Dec 6th.    Screw removed 3/4/22.   She has been NWB on the L LE for 4 months.      She is now WBAT with support per patient.      Pain  Current pain ratin             Objective          Observations     Right Ankle/Foot   Positive for atrophy (calf / quad) and edema.       Neurological Testing     Sensation     Ankle/Foot     Right Ankle/Foot   Hypersensation: light touch    Active Range of Motion   Left Knee   Flexion: 102 degrees with pain  Left Ankle/Foot   Dorsiflexion (ke): with pain  Plantar flexion: 19 degrees with pain  Inversion: 18 degrees     Additional Active Range of Motion Details  DF- L LE -13 degrees from neutral.      Eversion -6 degrees from neutral.     Passive Range of Motion     Additional Passive Range of Motion Details  Very firm end feel.       Joint Play     Right Ankle/Foot  Hypomobile in the distal tibiofibular joint, talocrural joint, subtalar joint, midfoot and forefoot.     Swelling   Left Ankle/Foot   Metatarsal heads: 58.4 cm  Malleoli: 29 cm    Ambulation     Comments   Pt arrived in W/C without boot or brace.  She has some edema noted.  Slight hypersensitivity noted in the foot and ankle with palpation.         Access Code: [email or web address]  URL: [email or web address]  Date: 03/10/2022  Prepared by: Cosme Dash    Exercises  Sidelying Hip Abduction - 1 x daily - 7 x weekly - 3 sets - 10 reps  Supine Active Straight Leg Raise - 1 x daily - 7 x  weekly - 3 sets - 10 reps  Prone Hip Extension - 1 x daily - 7 x weekly - 3 sets - 10 reps  Prone Knee Flexion AAROM with Overpressure - 1 x daily - 7 x weekly - 3 sets - 10 reps  Long Sitting Calf Stretch with Strap - 1 x daily - 7 x weekly - 3 sets - 10 reps  Long Sitting Ankle Plantar Flexion with Resistance - 1 x daily - 7 x weekly - 3 sets - 10 reps  Seated Soleus Stretch - 1 x daily - 7 x weekly - 3 sets - 10 reps    Assessment & Plan     Assessment  Impairments: abnormal gait, abnormal or restricted ROM, activity intolerance, impaired balance, impaired physical strength, lacks appropriate home exercise program, pain with function and weight-bearing intolerance  Functional Limitations: walking, uncomfortable because of pain, standing and unable to perform repetitive tasks  Assessment details: Patient is a 56 year old female who comes to physical therapy with complicated tri-malleolar fx. Signs and symptoms are consistent with diagnosis.  She has limited DF and limited ROM and strength in the LE.  The patient currently has pain, decreased ROM, decreased strength, and inability to perform all essential functional activities. Pt will benefit from skilled PT services to address the above issues.     Prognosis: fair  Prognosis details:     SHORT TERM GOALS:     6 weeks  1. Pt independent with HEP   2. Pt to demonstrate ability to ambulate in the clinic without walking boot and with no reports of increased pain  3. Pt to demonstrate ability to ambulate in the clinic without antalgic gait   3. Pt to demonstrate ability to perform double leg heel raise on the right without increase in pain      LONG TERM GOALS:   12 weeks  1. Pt to demonstrate ability to perform full functional squat with good form and no increase in pain in the right foot/ankle  2. Pt to demonstrate ability to ambulate on TM for 5 minutes with normal gait pattern without increase in pain  3. Pt to report being able to work half day without increase  in pain in the right ankle/foot  4. Pt to demonstrate ability to perform SLS on the right lower extremity for 10 seconds without LOB or increased pain       Plan  Therapy options: will be seen for skilled therapy services  Planned modality interventions: cryotherapy, contrast bath immersion and thermotherapy (hydrocollator packs)  Planned therapy interventions: gait training, functional ROM exercises, flexibility, home exercise program, IADL retraining, joint mobilization, therapeutic activities, stretching, soft tissue mobilization, motor coordination training, manual therapy, strengthening, balance/weight-bearing training and ADL retraining  Duration in weeks: 12  Treatment plan discussed with: patient  Plan details: Pt will be seen 2 x/week.         Visit Diagnoses:    ICD-10-CM ICD-9-CM   1. Closed trimalleolar fracture of right ankle, sequela  S82.851S 905.4       Timed:  Manual Therapy:    12     mins  88389;  Therapeutic Exercise:    13     mins  75018;     Neuromuscular Oksana:        mins  66267;    Therapeutic Activity:          mins  34120;     Gait Training:           mins  20240;     Ultrasound:         mins  19342;    Electrical Stimulation:         mins  04559 ( );    Untimed:  Electrical Stimulation:         mins  26056 ( );  Mechanical Traction:         mins  94517;     Timed Treatment:   25   mins   Total Treatment:     50   mins    PT SIGNATURE: Cosme Dash, PT         Medicare Initial Certification  Certification Period: 3/10/2022 thru 6/8/2022  I certify that the therapy services are furnished while this patient is under my care.  The services outlined above are required by this patient, and will be reviewed every 90 days.     PHYSICIAN: Giovani Daniels MD      DATE:     Please sign and return via fax to [email or web address] . Thank you, [facility].

## 2022-03-11 ENCOUNTER — TELEPHONE (OUTPATIENT)
Dept: ORTHOPEDIC SURGERY | Facility: CLINIC | Age: 57
End: 2022-03-11

## 2022-03-11 NOTE — TELEPHONE ENCOUNTER
I spoke with Cosme from PT.  He did not mean for the patient to be non- weightbearing. He was trying educate the patient that this will be a process and she will not be able to walk as before without taking her time and doing the therapy.  He reiterated to bear weight as tolerated.  I called the patient to let her know that she is to weight bear as tolerated in the air cast. She asked about wearing a normal shoe.  I told her she could do like a slipper or larger shoe. She will be re-evaluated with Emilia on 03.22.2022 for the next steps. She verbalized understanding

## 2022-03-11 NOTE — TELEPHONE ENCOUNTER
Pt called in to let us know that at her physical therapy appt yesterday she was advised not to walk on her foot because of limited ROM causing her foot to incorrectly hit the ground. Physical therapy told her she only has 6 degrees of motion in every direction. She would like a call back if there are any recommendations to walking sooner as she said at her last appt she was told that she is able to start walking with the brace.

## 2022-03-17 ENCOUNTER — OFFICE VISIT (OUTPATIENT)
Dept: ORTHOPEDIC SURGERY | Facility: CLINIC | Age: 57
End: 2022-03-17

## 2022-03-17 VITALS — TEMPERATURE: 97.3 F

## 2022-03-17 DIAGNOSIS — S82.852D CLOSED TRIMALLEOLAR FRACTURE OF LEFT ANKLE WITH ROUTINE HEALING, SUBSEQUENT ENCOUNTER: ICD-10-CM

## 2022-03-17 DIAGNOSIS — S93.432D SYNDESMOTIC DISRUPTION OF LEFT ANKLE, SUBSEQUENT ENCOUNTER: ICD-10-CM

## 2022-03-17 DIAGNOSIS — Z98.890 S/P HARDWARE REMOVAL: Primary | ICD-10-CM

## 2022-03-17 DIAGNOSIS — Z98.890 STATUS POST OPEN REDUCTION AND INTERNAL FIXATION (ORIF) OF FRACTURE: ICD-10-CM

## 2022-03-17 DIAGNOSIS — Z87.81 STATUS POST OPEN REDUCTION AND INTERNAL FIXATION (ORIF) OF FRACTURE: ICD-10-CM

## 2022-03-17 PROCEDURE — 99024 POSTOP FOLLOW-UP VISIT: CPT | Performed by: PHYSICIAN ASSISTANT

## 2022-03-17 RX ORDER — IBUPROFEN 400 MG/1
400 TABLET ORAL EVERY 6 HOURS PRN
COMMUNITY

## 2022-03-17 NOTE — PROGRESS NOTES
[facility] Note        Subjective     Pain of the Left Ankle (13 days s/p syndesmotic screw removal of left ankle 3/4/22)       KEVIN Coelho is a 56 y.o. female.  Patient returns today for follow-up after having undergone syndesmotic screw removal left ankle.  History of ORIF left ankle for trimalleolar fracture.    She endorses a pain scale of 5/10.  She is currently undergoing formal PT.  She is complaining of stiffness and swelling to the ankle.  She is hesitant to weight-bear.  She contacted the clinic regarding postop instructions and also came in last week.  Patient was given postoperative instructions that she is weightbearing as tolerated.  Additionally because the boot was uncomfortable she was changed from a fracture boot to an Aircast splint. No reported numbness or tingling.    Patient denies any fever, chills, night sweats or other constitutional symptoms.        Objective      Physical Exam  Temp 97.3 °F (36.3 °C)     There is no height or weight on file to calculate BMI.        Ortho Exam  Left ankle  Skin: Surgical incisions well-healed without redness, warmth, drainage or evidence of infection.  Incision from removal of the syndesmotic screw is healing well with sutures in place.  These will be removed today.  Positive soft tissue swelling noted throughout the ankle and lower leg.  Calf is soft and compressible.  Motion: Plantar and dorsiflexors of the ankle fire but extreme limitation in range of motion secondary to stiffness from being immobilized for the past 3 months.  She is able to wiggle her toes.  Motor/sensory: Grossly intact L2-S1.  Vascular: 2+ PT/DP.      Imaging Reviewed:  Ordered left ankle plain films.  Imaging read/interpreted by Dr. Daniels.    Left Ankle X-Ray     Indication: s/p ORIF  Views: AP, Lateral, Mortise     Comparison: Left ankle 2/17/2022     Findings:   Patient is s/p ORIF of the medial and lateral malleolar line.  There is  complete healing demonstrated.  In the interval between the 2 x-rays, syndesmotic screw has been removed.  The implants remain intact  No bony lesion  Soft tissues normal  Mortise: No widening  Syndesmosis:  no evidence of syndesmosis widening     Impression: s/p ORIF medial lateral malleoli with complete healing and removal of syndesmotic screw       Assessment:  1. S/P hardware removal    2. Syndesmotic disruption of left ankle, subsequent encounter    3. Status post open reduction and internal fixation (ORIF) of fracture    4. Closed trimalleolar fracture of left ankle with routine healing, subsequent encounter        Plan:  1. Status post syndesmotic screw removal for syndesmotic disruption, stable.  2. History of ORIF left ankle due to trimalleolar fracture, stable and healed  3. Sutures were removed today from the syndesmotic screw removal incision.  4. Patient needs to be wearing compression socks to help assist with inflammation and swelling.  Additionally recommend icing and elevation.  5. Continue with formal PT working on range of motion stretching and strengthening.  6. Again she is weightbearing as tolerated with the Aircast given to her last week.  7. Recommend over-the-counter pain medication as needed.  8. Will cancel appointment for 3/22/2022.  Follow-up in 4 weeks for repeat evaluation.  9. Questions and concerns answered.      Emilia Chavez PA-C  03/21/22  08:10 EDT      Dictated Utilizing Dragon Dictation.

## 2022-03-19 ENCOUNTER — HOSPITAL ENCOUNTER (EMERGENCY)
Facility: HOSPITAL | Age: 57
Discharge: HOME | End: 2022-03-20
Attending: EMERGENCY MEDICINE | Admitting: EMERGENCY MEDICINE

## 2022-03-19 ENCOUNTER — APPOINTMENT (OUTPATIENT)
Dept: GENERAL RADIOLOGY | Facility: HOSPITAL | Age: 57
End: 2022-03-19

## 2022-03-19 VITALS
BODY MASS INDEX: 40.81 KG/M2 | TEMPERATURE: 98.5 F | SYSTOLIC BLOOD PRESSURE: 131 MMHG | OXYGEN SATURATION: 97 % | DIASTOLIC BLOOD PRESSURE: 84 MMHG | RESPIRATION RATE: 18 BRPM | WEIGHT: 260 LBS | HEART RATE: 101 BPM | HEIGHT: 67 IN

## 2022-03-19 DIAGNOSIS — S93.509A SPRAIN OF TOE, INITIAL ENCOUNTER: Primary | ICD-10-CM

## 2022-03-19 PROCEDURE — 73630 X-RAY EXAM OF FOOT: CPT

## 2022-03-19 PROCEDURE — 99283 EMERGENCY DEPT VISIT LOW MDM: CPT

## 2022-03-19 RX ORDER — HYDROCODONE BITARTRATE AND ACETAMINOPHEN 5; 325 MG/1; MG/1
1 TABLET ORAL ONCE
Status: COMPLETED | OUTPATIENT
Start: 2022-03-19 | End: 2022-03-19

## 2022-03-19 RX ADMIN — HYDROCODONE BITARTRATE AND ACETAMINOPHEN 1 TABLET: 5; 325 TABLET ORAL at 22:59

## 2022-03-20 NOTE — ED PROVIDER NOTES
[place]    EMERGENCY DEPARTMENT ENCOUNTER      Pt Name: Brigid Coelho  MRN: [id number]  YOB: 1965  Date of evaluation: 3/19/2022  Provider: James Gipson DO    CHIEF COMPLAINT       Chief Complaint   Patient presents with   • Foot Pain         HISTORY OF PRESENT ILLNESS  (Location/Symptom, Timing/Onset, Context/Setting, Quality, Duration, Modifying Factors, Severity.)   Brigid Coelho is a 56 y.o. female who presents to the emergency department for evaluation of injury to the left foot, she is status post a hardware screw removal on March 4 as she is post a trimalleolar fracture repair with plates and screws.  She has been weightbearing, she put a larger shoe on today, lost her balance and tripped, causing a sprain type injury to the left foot third fourth and fifth digit/toes.  She is on chronic pain medication.  The patient denies any other extremity injury or pain.  States her ankle feels okay, she still has some limited range of motion after her recent surgical intervention.  Patient denies any other extremity injury or pain.  No loss of sensation.  Patient denies any other acute systemic complaints at this time.  ORIF on 11/3 with Dr. Tapia.      Nursing notes were reviewed.    REVIEW OF SYSTEMS    (2-9 systems for level 4, 10 or more for level 5)   ROS:  General:  No fevers, no chills, no weakness  Cardiovascular:  No chest pain, no palpitations  Respiratory:  No shortness of breath, no cough, no wheezing  Gastrointestinal:  No pain, no nausea, no vomiting, no diarrhea  Musculoskeletal:  No muscle pain, no join left third fourth fifth toe pain headache  Skin:  No rash  Neurologic:  No headache  Psychiatric:  No anxiety  Genitourinary:  No dysuria, no hematuria    Except as noted above the remainder of the review of systems was reviewed and negative.       PAST MEDICAL HISTORY     Past Medical History:   Diagnosis Date   • Acute bronchitis    • Anemia    •  Anxiety    • Arthritis of back    • Chest pain    • Cholelithiasis    • Claustrophobia    • Fracture of ankle 10/30/2021   • GERD (gastroesophageal reflux disease)    • Hashimoto's thyroiditis    • Hemorrhoids    • Hypothyroidism    • Low back pain    • Low back strain 10/12/2011   • Lumbosacral disc disease 10/12/2011   • Metrorrhagia    • Palpitations    • Polycystic ovary syndrome    • Polyp of sigmoid colon    • Ulcerative colitis (HCC)    • Upper respiratory infection    • UTI (urinary tract infection)          SURGICAL HISTORY       Past Surgical History:   Procedure Laterality Date   • ANKLE OPEN REDUCTION INTERNAL FIXATION Left 2021    Procedure: TRIMALLEOLAR FRACTURE OPEN REDUCTION INTERNAL FIXATION LEFT;  Surgeon: Giovani Daniels MD;  Location: [facility];  Service: Orthopedics;  Laterality: Left;   • ANKLE OPEN REDUCTION INTERNAL FIXATION Left 2021    s/p ORIF L ankle Dr. Daniels [facility]   • APPENDECTOMY     • CARDIAC CATHETERIZATION     •  SECTION      x 2   • CHOLECYSTECTOMY     • COLONOSCOPY     • DILATATION AND CURETTAGE      Of Cervical Stump   • HARDWARE REMOVAL FOOT / ANKLE Left 2022    syndesmotic screw removal Dr. Daniels   • MYOMECTOMY     • SMALL INTESTINE SURGERY     • TUBAL ABDOMINAL LIGATION           CURRENT MEDICATIONS     No current facility-administered medications for this encounter.    Current Outpatient Medications:   •  acetaminophen (TYLENOL) 650 MG 8 hr tablet, Take 500 mg by mouth Every 8 (Eight) Hours As Needed for Mild Pain ., Disp: , Rfl:   •  ALPRAZolam (Xanax) 0.5 MG tablet, Take 1 tablet by mouth Daily As Needed for Anxiety., Disp: 30 tablet, Rfl: 0  •  ascorbic acid (VITAMIN C) 1000 MG tablet, Take 1,000 mg by mouth Daily., Disp: , Rfl:   •  Biotin 1000 MCG chewable tablet, Chew., Disp: , Rfl:   •  DULoxetine (Cymbalta) 20 MG capsule, Take 1 capsule by mouth Daily., Disp: 30 capsule, Rfl: 1  •  famotidine (PEPCID) 20 MG tablet, Take 20 mg  by mouth At Night As Needed for Heartburn., Disp: , Rfl:   •  ferrous sulfate 140 (45 Fe) MG tablet controlled-release tablet, Take 140 mg by mouth Daily With Breakfast., Disp: , Rfl:   •  folic acid (FOLVITE) 400 MCG tablet, Take 400 mcg by mouth Daily., Disp: , Rfl:   •  gabapentin (NEURONTIN) 100 MG capsule, Take 1 capsule by mouth 3 (Three) Times a Day., Disp: 90 capsule, Rfl: 0  •  Garlic 1000 MG capsule, Take 1 tablet by mouth Daily., Disp: , Rfl:   •  ibuprofen (ADVIL,MOTRIN) 400 MG tablet, Take 400 mg by mouth Every 6 (Six) Hours As Needed for Mild Pain ., Disp: , Rfl:   •  levothyroxine (Synthroid) 88 MCG tablet, Take 1 tablet by mouth Daily., Disp: 90 tablet, Rfl: 0  •  Loratadine 10 MG capsule, Take 1 tablet by mouth daily., Disp: , Rfl:   •  Melatonin 2.5 MG capsule, Take  by mouth., Disp: , Rfl:   •  mesalamine (APRISO) 0.375 g 24 hr capsule, Take 375 mg by mouth As Needed., Disp: , Rfl:   •  montelukast (SINGULAIR) 10 MG tablet, Take 1 tablet by mouth every night at bedtime., Disp: 90 tablet, Rfl: 0  •  oxyCODONE (ROXICODONE) 5 MG immediate release tablet, Take 1 tablet by mouth Every 6 (Six) Hours As Needed for Severe Pain ., Disp: 15 tablet, Rfl: 0  •  Selenium 200 MCG capsule, Take 1 tablet by mouth Daily., Disp: , Rfl:   •  sertraline (Zoloft) 50 MG tablet, Take 1 tablet by mouth Daily., Disp: 90 tablet, Rfl: 0  •  Vitamin A 2400 MCG (8000 UT) tablet, Take 1 tablet by mouth Daily., Disp: , Rfl:   •  Zinc 25 MG tablet, Take 0.5 tablets by mouth Daily., Disp: , Rfl:     ALLERGIES     Adhesive tape, Erythromycin, Nickel, Epinephrine, Latex, and Nitroglycerin    FAMILY HISTORY       Family History   Problem Relation Age of Onset   • Hyperlipidemia Mother    • CHAD disease Mother    • Cancer Mother    • Diabetes Mother    • Heart disease Mother    • Rheum arthritis Father    • Hyperlipidemia Father    • Heart attack Father 72   • Ovarian cancer Maternal Aunt    • Diabetes Paternal Grandmother    •  "Hypertension Paternal Grandmother    • Obesity Paternal Grandmother    • Colon cancer Other    • Arthritis Other    • Cancer Other         uncle; liver, lung    • Thyroid disease Cousin    • Thyroid cancer Cousin    • CHAD disease Daughter    • Obesity Brother    • Sleep apnea Brother           SOCIAL HISTORY       Social History     Socioeconomic History   • Marital status:    Tobacco Use   • Smoking status: Never Smoker   • Smokeless tobacco: Never Used   Vaping Use   • Vaping Use: Never used   Substance and Sexual Activity   • Alcohol use: No   • Drug use: No   • Sexual activity: Not Currently     Partners: Male     Birth control/protection: Abstinence, Surgical         PHYSICAL EXAM    (up to 7 for level 4, 8 or more for level 5)     Vitals:    03/19/22 2200   BP: 131/84   BP Location: Left arm   Patient Position: Sitting   Pulse: 101   Resp: 18   Temp: 98.5 °F (36.9 °C)   TempSrc: Oral   SpO2: 97%   Weight: 118 kg (260 lb)   Height: 170.2 cm (67\")       Physical Exam  General : Patient is awake, alert, oriented, in no acute distress, nontoxic appearing  HEENT: Pupils are equally round and reactive to light, EOMI, conjunctivae clear, sclerae white  Neck: Neck is supple, full range of motion, trachea midline  Cardiac: Heart regular rate, rhythm, no murmurs, rubs, or gallops  Lungs: Lungs are clear to auscultation, there is no wheezing, rhonchi, or rales. There is no use of accessory muscles  Abdomen: Abdomen is soft, nontender, nondistended. There are no firm or pulsatile masses, no rebound rigidity or guarding.   Musculoskeletal: Patient's ankle splint was removed, the surgical incisions are well-healed, there is limited mobility at the left ankle joint with her recent surgery, no soft tissue swelling is appreciated.  There is tenderness to palpation on the left foot third fourth and fifth digit without any obvious deformity.  Capillary refills less than 3 seconds, no tenderness along the metatarsals.  5 " "out of 5 strength in all remaining extremities.  No focal muscle deficits are appreciated  Neuro: Motor intact, sensory intact, level of consciousness is normal  Dermatology: Skin is warm and dry  Psych: Mentation is grossly normal, cognition is grossly normal. Affect is appropriate.      DIAGNOSTIC RESULTS     EKG: All EKGs are interpreted by the Emergency Department Physician who either signs or Co-signs this chart in the absence of a cardiologist.    No orders to display       RADIOLOGY:   Non-plain film images such as CT, Ultrasound and MRI are read by the radiologist. Plain radiographic images are visualized and preliminarily interpreted by the emergency physician with the below findings:      [] Radiologist's Report Reviewed:  XR Foot 3+ View Left   Final Result   No fracture.      Electronically signed by:  Burke Torres M.D.     3/19/2022 10:06 PM Mountain Time            ED BEDSIDE ULTRASOUND:   Performed by ED Physician - none    LABS:    I have reviewed and interpreted all of the currently available lab results from this visit (if applicable):  Results for orders placed or performed in visit on 02/24/22   Hemoglobin A1c    Specimen: Blood   Result Value Ref Range    Hemoglobin A1C 5.70 (H) 4.80 - 5.60 %        All other labs were within normal range or not returned as of this dictation.      EMERGENCY DEPARTMENT COURSE and DIFFERENTIAL DIAGNOSIS/MDM:   Vitals:    Vitals:    03/19/22 2200   BP: 131/84   BP Location: Left arm   Patient Position: Sitting   Pulse: 101   Resp: 18   Temp: 98.5 °F (36.9 °C)   TempSrc: Oral   SpO2: 97%   Weight: 118 kg (260 lb)   Height: 170.2 cm (67\")            Patient with a left foot injury as she tripped while she was wearing shoes earlier this evening.  Has tenderness along the third, fourth and fifth toes.  No obvious deformity.  X-ray imaging obtained.  No acute fracture on x-ray.  We discussed that continuing with symptomatic treatment, following up with her orthopedic " specialist as previously planned.    I encouraged the patient to return to the emergency department immediately for ANY concerns, worsening, new complaints, or if symptoms persist and unable to seek follow-up in a timely fashion.  The patient/family expressed understanding and agreement with this plan.  The patient will follow-up with their PCP in 1-2 days for reevaluation.       MEDICATIONS ADMINISTERED IN ED:  Medications   HYDROcodone-acetaminophen (NORCO) 5-325 MG per tablet 1 tablet (1 tablet Oral Given 3/19/22 4025)       PROCEDURES:  Procedures    CRITICAL CARE TIME    Total Critical Care time was 0 minutes, excluding separately reportable procedures.   There was a high probability of clinically significant/life threatening deterioration in the patient's condition which required my urgent intervention.      FINAL IMPRESSION      1. Sprain of toe, initial encounter          DISPOSITION/PLAN     ED Disposition     ED Disposition   Discharge    Condition   Stable    Comment   --             PATIENT REFERRED TO:  [facility] Emergency Department  [address] [address] [phone]  In 2 days        DISCHARGE MEDICATIONS:     Medication List      CONTINUE taking these medications    acetaminophen 650 MG 8 hr tablet  Commonly known as: TYLENOL     ALPRAZolam 0.5 MG tablet  Commonly known as: Xanax  Take 1 tablet by mouth Daily As Needed for Anxiety.     ascorbic acid 1000 MG tablet  Commonly known as: VITAMIN C     Biotin 1000 MCG chewable tablet     DULoxetine 20 MG capsule  Commonly known as: Cymbalta  Take 1 capsule by mouth Daily.     famotidine 20 MG tablet  Commonly known as: PEPCID     ferrous sulfate 140 (45 Fe) MG tablet controlled-release tablet     folic acid 400 MCG tablet  Commonly known as: FOLVITE     gabapentin 100 MG capsule  Commonly known as: NEURONTIN  Take 1 capsule by mouth 3 (Three) Times a Day.     Garlic 1000 MG capsule     ibuprofen 400 MG  tablet  Commonly known as: ADVIL,MOTRIN     levothyroxine 88 MCG tablet  Commonly known as: Synthroid  Take 1 tablet by mouth Daily.     Loratadine 10 MG capsule     Melatonin 2.5 MG capsule     mesalamine 0.375 g 24 hr capsule  Commonly known as: APRISO     montelukast 10 MG tablet  Commonly known as: SINGULAIR  Take 1 tablet by mouth every night at bedtime.     oxyCODONE 5 MG immediate release tablet  Commonly known as: ROXICODONE  Take 1 tablet by mouth Every 6 (Six) Hours As Needed for Severe Pain .     Selenium 200 MCG capsule     sertraline 50 MG tablet  Commonly known as: Zoloft  Take 1 tablet by mouth Daily.     Vitamin A 2400 MCG (8000 UT) tablet     Zinc 25 MG tablet                Comment: Please note this report has been produced using speech recognition software.      James Gipson DO  Attending Emergency Physician               James Gipson DO  03/20/22 0037

## 2022-03-21 ENCOUNTER — TELEPHONE (OUTPATIENT)
Dept: ORTHOPEDIC SURGERY | Facility: CLINIC | Age: 57
End: 2022-03-21

## 2022-03-21 NOTE — TELEPHONE ENCOUNTER
----- Message from Brigid Coelho sent at 3/20/2022 12:13 PM EDT -----  Regarding: Emergency room visit March19, 2022  Hello I just saw you for post op and about my.concerns on March 17, 2022. On Saturday March 19, 2022. I tripped and fell slightly backwards when attempting weight bearing. Fell back into wheelchair. My last 2 or 3 toes on my left foot ( the foot with ankle surgeries ) were mainly affected, just badly sprained per xray at [facility] xray. So you and Dr. BEAR may want to view my left ankle xray from March 29. 2021 in ER to make sure nothing was missed. Thank you, Brigid Coelho

## 2022-03-22 ENCOUNTER — TELEPHONE (OUTPATIENT)
Dept: ORTHOPEDIC SURGERY | Facility: CLINIC | Age: 57
End: 2022-03-22

## 2022-03-22 NOTE — TELEPHONE ENCOUNTER
----- Message from Brigid Coelho sent at 3/21/2022  7:10 PM EDT -----  Regarding: Emergency room visit March19, 2022  I'm scared since I have no balance now and worried  if this is permanent..Brigid Ochoa Catherine R, PA-C 12 hours ago (7:10 PM)           I'm scared since I have no balance now and worried  if this is permanent..Brigid Ochoa Catherine R, PA-C 12 hours ago (7:09 PM)           Ad usual no call back yet. Like when I had questions after my March 4 surgery. I know your office is very busy though.          Marcella Sauer R.T.(LEILA) routed conversation to You 15 hours ago (3:30 PM)     Brigid Coelho Catherine R, PA-C 16 hours ago (3:18 PM)           Thank you. I have balance issues too. So im.hoping it is a temporary thing. I read online that 1 in 4 end up with balance issues after type of surgeries and injury I've had to my ankle. I'm sure my physical therapist can address it with me also.         You  Brigid Coelho 17 hours ago (1:44 PM)     JR      I sent the message to Emilia this morning and I am waiting for a response.     Brigid Calvillo Catherine R, PA-C 2 days ago           Hello I just saw you for post op and about my.concerns on March 17, 2022. On Saturday March 19, 2022. I tripped and fell slightly backwards when attempting weight bearing. Fell back into wheelchair. My last 2 or 3 toes on my left foot ( the foot with ankle surgeries ) were mainly affected, just badly sprained per xray at [facility] xray. So you and Dr. BEAR may want to view my left ankle xray from March 29. 2021 in ER to make sure nothing was missed. Thank you, Brigid Coelho

## 2022-03-22 NOTE — TELEPHONE ENCOUNTER
Sorry I was waiting for Dr. Daniels to get in the office this morning, so he could look at the x-rays with me to see if he saw anything.    Recommend she return to wearing her boot but continue with weightbearing as tolerated and performing formal PT.    If she remains painful then would recommend repeat imaging at her follow-up appointment on 4/12/2022.

## 2022-03-29 ENCOUNTER — TELEMEDICINE (OUTPATIENT)
Dept: FAMILY MEDICINE CLINIC | Facility: TELEHEALTH | Age: 57
End: 2022-03-29

## 2022-03-29 DIAGNOSIS — J06.9 VIRAL URI: Primary | ICD-10-CM

## 2022-03-29 DIAGNOSIS — J02.9 EXUDATIVE PHARYNGITIS: ICD-10-CM

## 2022-03-29 PROCEDURE — 99213 OFFICE O/P EST LOW 20 MIN: CPT | Performed by: NURSE PRACTITIONER

## 2022-03-29 RX ORDER — AMOXICILLIN AND CLAVULANATE POTASSIUM 875; 125 MG/1; MG/1
1 TABLET, FILM COATED ORAL 2 TIMES DAILY
Qty: 20 TABLET | Refills: 0 | Status: SHIPPED | OUTPATIENT
Start: 2022-03-29 | End: 2022-04-08

## 2022-03-29 NOTE — PROGRESS NOTES
Subjective   Chief Complaint   Patient presents with   • URI   • Sore Throat       Brigid Coelho is a 56 y.o. female.     Pt reports sore throat, congestion, fatigue, cough x 3 days. Throat is red with white patches. Cough is productive with yellow sputum, She thinks she may have strep throat, she has had this in the past with similar symptoms. Several members in her household are sick as well. She plans to take an at home COVID test. She has not been vax against COVID and no known hx of COVID.     URI   This is a new problem. Episode onset: 3 days. There has been no fever. Associated symptoms include congestion, coughing and a sore throat. Pertinent negatives include no abdominal pain, diarrhea, nausea, vomiting or wheezing. Treatments tried: claritin, benadryl, dymatap.        Allergies   Allergen Reactions   • Adhesive Tape      Rash     • Erythromycin Other (See Comments)     Sores in mouth   • Nickel Itching   • Epinephrine Palpitations   • Latex Rash   • Nitroglycerin Palpitations       Past Medical History:   Diagnosis Date   • Acute bronchitis    • Anemia    • Anxiety    • Arthritis of back    • Chest pain    • Cholelithiasis    • Claustrophobia    • Fracture of ankle 10/30/2021   • GERD (gastroesophageal reflux disease)    • Hashimoto's thyroiditis    • Hemorrhoids    • Hypothyroidism    • Low back pain    • Low back strain 10/12/2011   • Lumbosacral disc disease 10/12/2011   • Metrorrhagia    • Palpitations    • Polycystic ovary syndrome    • Polyp of sigmoid colon    • Ulcerative colitis (HCC)    • Upper respiratory infection    • UTI (urinary tract infection)        Past Surgical History:   Procedure Laterality Date   • ANKLE OPEN REDUCTION INTERNAL FIXATION Left 11/03/2021    Procedure: TRIMALLEOLAR FRACTURE OPEN REDUCTION INTERNAL FIXATION LEFT;  Surgeon: Giovani Daniels MD;  Location: [facility];  Service: Orthopedics;  Laterality: Left;   • ANKLE OPEN REDUCTION INTERNAL FIXATION  Left 2021    s/p ORIF L ankle Dr. Daniels [facility]   • APPENDECTOMY     • CARDIAC CATHETERIZATION     •  SECTION      x 2   • CHOLECYSTECTOMY     • COLONOSCOPY     • DILATATION AND CURETTAGE      Of Cervical Stump   • HARDWARE REMOVAL FOOT / ANKLE Left 2022    syndesmotic screw removal Dr. Daniels   • MYOMECTOMY     • SMALL INTESTINE SURGERY     • TUBAL ABDOMINAL LIGATION         Social History     Socioeconomic History   • Marital status:    Tobacco Use   • Smoking status: Never Smoker   • Smokeless tobacco: Never Used   Vaping Use   • Vaping Use: Never used   Substance and Sexual Activity   • Alcohol use: No   • Drug use: No   • Sexual activity: Not Currently     Partners: Male     Birth control/protection: Abstinence, Surgical       Family History   Problem Relation Age of Onset   • Hyperlipidemia Mother    • CHAD disease Mother    • Cancer Mother    • Diabetes Mother    • Heart disease Mother    • Rheum arthritis Father    • Hyperlipidemia Father    • Heart attack Father 72   • Ovarian cancer Maternal Aunt    • Diabetes Paternal Grandmother    • Hypertension Paternal Grandmother    • Obesity Paternal Grandmother    • Colon cancer Other    • Arthritis Other    • Cancer Other         uncle; liver, lung    • Thyroid disease Cousin    • Thyroid cancer Cousin    • CHAD disease Daughter    • Obesity Brother    • Sleep apnea Brother          Current Outpatient Medications:   •  acetaminophen (TYLENOL) 650 MG 8 hr tablet, Take 500 mg by mouth Every 8 (Eight) Hours As Needed for Mild Pain ., Disp: , Rfl:   •  ALPRAZolam (Xanax) 0.5 MG tablet, Take 1 tablet by mouth Daily As Needed for Anxiety., Disp: 30 tablet, Rfl: 0  •  amoxicillin-clavulanate (Augmentin) 875-125 MG per tablet, Take 1 tablet by mouth 2 (Two) Times a Day for 10 days., Disp: 20 tablet, Rfl: 0  •  ascorbic acid (VITAMIN C) 1000 MG tablet, Take 1,000 mg by mouth Daily., Disp: , Rfl:   •  Biotin 1000 MCG chewable tablet, Chew.,  Disp: , Rfl:   •  DULoxetine (Cymbalta) 20 MG capsule, Take 1 capsule by mouth Daily., Disp: 30 capsule, Rfl: 1  •  famotidine (PEPCID) 20 MG tablet, Take 20 mg by mouth At Night As Needed for Heartburn., Disp: , Rfl:   •  ferrous sulfate 140 (45 Fe) MG tablet controlled-release tablet, Take 140 mg by mouth Daily With Breakfast., Disp: , Rfl:   •  folic acid (FOLVITE) 400 MCG tablet, Take 400 mcg by mouth Daily., Disp: , Rfl:   •  gabapentin (NEURONTIN) 100 MG capsule, Take 1 capsule by mouth 3 (Three) Times a Day., Disp: 90 capsule, Rfl: 0  •  Garlic 1000 MG capsule, Take 1 tablet by mouth Daily., Disp: , Rfl:   •  ibuprofen (ADVIL,MOTRIN) 400 MG tablet, Take 400 mg by mouth Every 6 (Six) Hours As Needed for Mild Pain ., Disp: , Rfl:   •  levothyroxine (Synthroid) 88 MCG tablet, Take 1 tablet by mouth Daily., Disp: 90 tablet, Rfl: 0  •  Loratadine 10 MG capsule, Take 1 tablet by mouth daily., Disp: , Rfl:   •  Melatonin 2.5 MG capsule, Take  by mouth., Disp: , Rfl:   •  mesalamine (APRISO) 0.375 g 24 hr capsule, Take 375 mg by mouth As Needed., Disp: , Rfl:   •  montelukast (SINGULAIR) 10 MG tablet, Take 1 tablet by mouth every night at bedtime., Disp: 90 tablet, Rfl: 0  •  Selenium 200 MCG capsule, Take 1 tablet by mouth Daily., Disp: , Rfl:   •  sertraline (Zoloft) 50 MG tablet, Take 1 tablet by mouth Daily., Disp: 90 tablet, Rfl: 0  •  Vitamin A 2400 MCG (8000 UT) tablet, Take 1 tablet by mouth Daily., Disp: , Rfl:   •  Zinc 25 MG tablet, Take 0.5 tablets by mouth Daily., Disp: , Rfl:       Review of Systems   Constitutional: Positive for fatigue. Negative for chills, diaphoresis and fever.   HENT: Positive for congestion, postnasal drip and sore throat.    Respiratory: Positive for cough. Negative for chest tightness, shortness of breath and wheezing.    Gastrointestinal: Negative for abdominal distention, abdominal pain, diarrhea, nausea and vomiting.   Musculoskeletal: Positive for myalgias.   Neurological:  Negative for headache.        There were no vitals filed for this visit.    Objective   Physical Exam  Constitutional:       General: She is not in acute distress.     Appearance: Normal appearance. She is not ill-appearing, toxic-appearing or diaphoretic.   HENT:      Head: Normocephalic.      Nose: Congestion present.      Mouth/Throat:      Lips: Pink.      Mouth: Mucous membranes are moist.      Pharynx: Oropharyngeal exudate and posterior oropharyngeal erythema present.      Tonsils: Tonsillar exudate present.      Comments: Per pt on self exam  Pulmonary:      Effort: Pulmonary effort is normal.   Neurological:      Mental Status: She is alert and oriented to person, place, and time.   Psychiatric:         Mood and Affect: Mood normal.         Behavior: Behavior normal.          Procedures     Assessment/Plan   Diagnoses and all orders for this visit:    1. Viral URI (Primary)    2. Exudative pharyngitis  -     amoxicillin-clavulanate (Augmentin) 875-125 MG per tablet; Take 1 tablet by mouth 2 (Two) Times a Day for 10 days.  Dispense: 20 tablet; Refill: 0      Continue over the counter medicine.   Alternate tylenol and motrin for pain and/or fever, stay hydrated and rest.   Salt water gargles.   Change toothbrush after 1-2 days on antibiotics.   If symptoms worsen or do not improve follow up with your PCP or visit your nearest Urgent Care Center or ER.          PLAN: Discussed dosing, side effects, recommended other symptomatic care.  Patient should follow up with primary care provider if symptoms worsen, fail to resolve or other symptoms need attention. Patient/family agree to the above.         JATIN Navarrete     This visit was performed via Telehealth.  This patient has been instructed to follow-up with their primary care provider if their symptoms worsen or the treatment provided does not resolve their illness.

## 2022-03-29 NOTE — PATIENT INSTRUCTIONS
Continue over the counter medicine.   Alternate tylenol and motrin for pain and/or fever, stay hydrated and rest.   Salt water gargles.   Change toothbrush after 1-2 days on antibiotics.   If symptoms worsen or do not improve follow up with your PCP or visit your nearest Urgent Care Center or ER.

## 2022-04-07 ENCOUNTER — TREATMENT (OUTPATIENT)
Dept: PHYSICAL THERAPY | Facility: CLINIC | Age: 57
End: 2022-04-07

## 2022-04-07 DIAGNOSIS — S82.851S CLOSED TRIMALLEOLAR FRACTURE OF RIGHT ANKLE, SEQUELA: Primary | ICD-10-CM

## 2022-04-07 PROCEDURE — 97110 THERAPEUTIC EXERCISES: CPT | Performed by: PHYSICAL THERAPIST

## 2022-04-07 PROCEDURE — 97530 THERAPEUTIC ACTIVITIES: CPT | Performed by: PHYSICAL THERAPIST

## 2022-04-07 NOTE — PROGRESS NOTES
Physical Therapy Daily Treatment Note    Re-eval     Patient: Brigid Coelho   : 1965  Referring practitioner: Giovani Daniels,*  Date of Initial Visit: Type: THERAPY  Noted: 3/10/2022  Today's Date: 2022  Patient seen for 2 sessions           Brigid Coelho reports 1/10 pain at rest.  Pt reports injury to the foot 2 weeks ago with and she has not done any exercises since.               Objective Pt arrives to PT today with moderate distress noted with with rest.  pt is walking into PT area with walker that is too low and she is keeping her leg rigid and out in front of her.  She is fearful of weight bearing even in seated position.  She is keeping her leg elevated even in sitting.     AROM:  L knee ext -16 degrees,  Flexion 97  Degrees     L ankle DF -7 degrees from neutral,  PF 24 degrees.     Hip flexor and adductor tightness and spasm due to tightness and guarding.  Prone lying is helping reduce tension.     See Exercise, Manual, and Modality Logs for complete treatment.       Assessment/Plan Pt is very fearful of WB and she has not been doing HEP at all.  I explained to her the need to do her HEP and the importance of lightly WB and gradually improving tolerance to WB.  She is understanding how to slowly progress and not create secondary issues.     Visit Diagnoses:    ICD-10-CM ICD-9-CM   1. Closed trimalleolar fracture of right ankle, sequela  S82.851S 905.4       Progress strengthening /stabilization /functional activity            Timed:  Manual Therapy:         mins  45062;  Therapeutic Exercise:    29     mins  06501;     Neuromuscular Oksana:        mins  95084;    Therapeutic Activity:     25     mins  11831;     Gait Training:           mins  73493;     Ultrasound:          mins  09530;    Electrical Stimulation:         mins  08420 ( );    Untimed:  Electrical Stimulation:         mins  36195 ( );  Mechanical Traction:         mins  94632;     Timed  Treatment:   54   mins   Total Treatment:     54   mins  Cosme Dash, PT  Physical Therapist

## 2022-04-11 NOTE — PROGRESS NOTES
Cosme,    Thanks for the heads up and update.  She has been quite fearful from the get go.  Will take awhile to restore her mobility but we should both plan on it taking longer than normal.      Have a great week.    JEFF

## 2022-04-19 ENCOUNTER — TELEPHONE (OUTPATIENT)
Dept: ORTHOPEDIC SURGERY | Facility: CLINIC | Age: 57
End: 2022-04-19

## 2022-04-19 NOTE — TELEPHONE ENCOUNTER
Pt called in to see if she should be wearing the brace at all times....    Would like a call back please

## 2022-04-28 ENCOUNTER — TREATMENT (OUTPATIENT)
Dept: PHYSICAL THERAPY | Facility: CLINIC | Age: 57
End: 2022-04-28

## 2022-04-28 ENCOUNTER — OFFICE VISIT (OUTPATIENT)
Dept: ORTHOPEDIC SURGERY | Facility: CLINIC | Age: 57
End: 2022-04-28

## 2022-04-28 VITALS
HEIGHT: 67 IN | DIASTOLIC BLOOD PRESSURE: 64 MMHG | SYSTOLIC BLOOD PRESSURE: 100 MMHG | BODY MASS INDEX: 40.81 KG/M2 | WEIGHT: 260 LBS

## 2022-04-28 DIAGNOSIS — Z87.81 STATUS POST ORIF OF FRACTURE OF ANKLE: Primary | ICD-10-CM

## 2022-04-28 DIAGNOSIS — S82.852D CLOSED TRIMALLEOLAR FRACTURE OF LEFT ANKLE WITH ROUTINE HEALING, SUBSEQUENT ENCOUNTER: ICD-10-CM

## 2022-04-28 DIAGNOSIS — Z98.890 STATUS POST ORIF OF FRACTURE OF ANKLE: Primary | ICD-10-CM

## 2022-04-28 DIAGNOSIS — M25.672 ANKLE STIFFNESS, LEFT: ICD-10-CM

## 2022-04-28 DIAGNOSIS — S93.432D SYNDESMOTIC DISRUPTION OF LEFT ANKLE, SUBSEQUENT ENCOUNTER: ICD-10-CM

## 2022-04-28 DIAGNOSIS — Z98.890 S/P HARDWARE REMOVAL: ICD-10-CM

## 2022-04-28 DIAGNOSIS — S82.852D CLOSED TRIMALLEOLAR FRACTURE OF LEFT ANKLE WITH ROUTINE HEALING, SUBSEQUENT ENCOUNTER: Primary | ICD-10-CM

## 2022-04-28 PROCEDURE — 99024 POSTOP FOLLOW-UP VISIT: CPT | Performed by: PHYSICIAN ASSISTANT

## 2022-04-28 PROCEDURE — 97530 THERAPEUTIC ACTIVITIES: CPT | Performed by: PHYSICAL THERAPIST

## 2022-04-28 PROCEDURE — 97110 THERAPEUTIC EXERCISES: CPT | Performed by: PHYSICAL THERAPIST

## 2022-04-28 PROCEDURE — 97140 MANUAL THERAPY 1/> REGIONS: CPT | Performed by: PHYSICAL THERAPIST

## 2022-04-28 NOTE — PROGRESS NOTES
Physical Therapy Daily Progress Note    Subjective   Brigid Coelho reports: she thinks she tweaked her ankle while coming down steps at her parents house a week ago. She had her brace on. Since then she has not been bearing weight. She states that previously she had started walking more and felt like she was getting better. She is worried she did damage to it. She feels like she is very stiff still. She is 20 min late.    Today's Pain ratin/10    Objective          Active Range of Motion   Left Knee   Flexion: 110 degrees   Left Ankle/Foot   Plantar flexion: 42 degrees     Additional Active Range of Motion Details  Extension= -9 degrees  Left ankle DF: -10 degrees      See Exercise, Manual, and Modality Logs for complete treatment.       Assessment/Plan   Pt is a 56 year old female coming to PT with left trimalleolar fracture. She has not been coming to her appointments. She also has not been compliant with her WB status in the last week because she thought she damaged her ankle. I assured her that she did not damage her ankle and that she should be weight bearing as much as possible. Her ankle DF and knee extension are still quite limited. She was educated on an updated HEP and on the importance of trying to walk normally. She was also instructed on the importance of coming to her appointments on time. Pt would benefit from continued skilled PT.     Progress per Plan of Care           Manual Therapy:    12     mins  11144;  Therapeutic Exercise:    18     mins  08098;     Neuromuscular Oksnaa:        mins  31619;    Therapeutic Activity:     10     mins  77241;     Gait Training:          mins  66324;     Ultrasound:          mins  24843;    Electrical Stimulation:         mins  42696 ( );  E-Stim Attended:         mins  26200  Iontophoresis          mins 80692   Traction          mins  74194  Fluidotherapy          mins  74699  Dry Needling          mins self-pay - No Charge  Paraffin           mins  55693    Timed Treatment:   40   mins   Total Treatment:     40   mins    Ambreen Montez, PT, DPT  Physical Therapist

## 2022-05-05 ENCOUNTER — TREATMENT (OUTPATIENT)
Dept: PHYSICAL THERAPY | Facility: CLINIC | Age: 57
End: 2022-05-05

## 2022-05-05 DIAGNOSIS — S82.852D CLOSED TRIMALLEOLAR FRACTURE OF LEFT ANKLE WITH ROUTINE HEALING, SUBSEQUENT ENCOUNTER: Primary | ICD-10-CM

## 2022-05-05 PROCEDURE — 97110 THERAPEUTIC EXERCISES: CPT | Performed by: PHYSICAL THERAPIST

## 2022-05-05 PROCEDURE — 97530 THERAPEUTIC ACTIVITIES: CPT | Performed by: PHYSICAL THERAPIST

## 2022-05-05 NOTE — PROGRESS NOTES
Physical Therapy Daily Progress Note    Subjective   Brigid Coelho reports: she is still very scared to walk. She was instructed to not wear a brace anymore, but she is still wearing a soft ankle brace for protection. She drove herself to this appointment and it was her first time driving. She is 10 min late today.      Objective   See Exercise, Manual, and Modality Logs for complete treatment.       Assessment/Plan   Pt tolerated treatment well. Her limited dorsiflexion is inhibiting her ability to ambulate. I reiterated that is is completely safe for her to walk on it and try to put more weight through that leg. She was instructed on an updated HEP. Pt would benefit from continued skilled PT.    Progress per Plan of Care           Manual Therapy:         mins  62703;  Therapeutic Exercise:    10     mins  78497;     Neuromuscular Oksana:        mins  82057;    Therapeutic Activity:     15     mins  87167;     Gait Training:           mins  15478;     Ultrasound:          mins  35958;    Electrical Stimulation:         mins  39525 ( );  E-Stim Attended:         mins  84286  Iontophoresis          mins 81333   Traction          mins  34164  Fluidotherapy          mins  62062  Dry Needling          mins self-pay - No Charge  Paraffin          mins  24438    Timed Treatment:   25   mins   Total Treatment:     50   mins    Ambreen Montez, PT, DPT  Physical Therapist

## 2022-05-13 ENCOUNTER — APPOINTMENT (OUTPATIENT)
Dept: GENERAL RADIOLOGY | Facility: HOSPITAL | Age: 57
End: 2022-05-13

## 2022-05-13 ENCOUNTER — HOSPITAL ENCOUNTER (EMERGENCY)
Facility: HOSPITAL | Age: 57
Discharge: HOME | End: 2022-05-13
Attending: EMERGENCY MEDICINE | Admitting: EMERGENCY MEDICINE

## 2022-05-13 ENCOUNTER — TELEMEDICINE (OUTPATIENT)
Dept: FAMILY MEDICINE CLINIC | Facility: TELEHEALTH | Age: 57
End: 2022-05-13

## 2022-05-13 VITALS
HEART RATE: 89 BPM | DIASTOLIC BLOOD PRESSURE: 70 MMHG | OXYGEN SATURATION: 97 % | SYSTOLIC BLOOD PRESSURE: 136 MMHG | WEIGHT: 255 LBS | HEIGHT: 67 IN | TEMPERATURE: 97.6 F | BODY MASS INDEX: 40.02 KG/M2 | RESPIRATION RATE: 16 BRPM

## 2022-05-13 DIAGNOSIS — E03.8 HYPOTHYROIDISM DUE TO HASHIMOTO'S THYROIDITIS: ICD-10-CM

## 2022-05-13 DIAGNOSIS — M25.572 ACUTE LEFT ANKLE PAIN: Primary | ICD-10-CM

## 2022-05-13 DIAGNOSIS — K04.7 TOOTH ABSCESS: Primary | ICD-10-CM

## 2022-05-13 DIAGNOSIS — E06.3 HYPOTHYROIDISM DUE TO HASHIMOTO'S THYROIDITIS: ICD-10-CM

## 2022-05-13 PROCEDURE — 99213 OFFICE O/P EST LOW 20 MIN: CPT | Performed by: NURSE PRACTITIONER

## 2022-05-13 PROCEDURE — 73610 X-RAY EXAM OF ANKLE: CPT

## 2022-05-13 PROCEDURE — 73590 X-RAY EXAM OF LOWER LEG: CPT

## 2022-05-13 PROCEDURE — 99282 EMERGENCY DEPT VISIT SF MDM: CPT

## 2022-05-13 RX ORDER — AMOXICILLIN AND CLAVULANATE POTASSIUM 875; 125 MG/1; MG/1
1 TABLET, FILM COATED ORAL 2 TIMES DAILY
Qty: 20 TABLET | Refills: 0 | Status: SHIPPED | OUTPATIENT
Start: 2022-05-13 | End: 2022-05-23

## 2022-05-13 NOTE — PROGRESS NOTES
CHIEF COMPLAINT  Chief Complaint   Patient presents with   • Dental Problem         HPI  Brigid Coelho is a 57 y.o. female  presents with complaint of abscess tooth on both left upper and left lower teeth. She has an appointment with the oral surgeon for a consult on June 7, 2022.     Review of Systems   Constitutional: Negative for chills, diaphoresis, fatigue and fever.   HENT: Positive for dental problem and facial swelling.        Past Medical History:   Diagnosis Date   • Acute bronchitis    • Anemia    • Anxiety    • Arthritis of back    • Chest pain    • Cholelithiasis    • Claustrophobia    • Fracture of ankle 10/30/2021   • GERD (gastroesophageal reflux disease)    • Hashimoto's thyroiditis    • Hemorrhoids    • Hypothyroidism    • Low back pain    • Low back strain 10/12/2011   • Lumbosacral disc disease 10/12/2011   • Metrorrhagia    • Palpitations    • Polycystic ovary syndrome    • Polyp of sigmoid colon    • Ulcerative colitis (HCC)    • Upper respiratory infection    • UTI (urinary tract infection)        Family History   Problem Relation Age of Onset   • Hyperlipidemia Mother    • CHAD disease Mother    • Cancer Mother    • Diabetes Mother    • Heart disease Mother    • Rheum arthritis Father    • Hyperlipidemia Father    • Heart attack Father 72   • Ovarian cancer Maternal Aunt    • Diabetes Paternal Grandmother    • Hypertension Paternal Grandmother    • Obesity Paternal Grandmother    • Colon cancer Other    • Arthritis Other    • Cancer Other         uncle; liver, lung    • Thyroid disease Cousin    • Thyroid cancer Cousin    • CHAD disease Daughter    • Obesity Brother    • Sleep apnea Brother        Social History     Socioeconomic History   • Marital status:    Tobacco Use   • Smoking status: Never Smoker   • Smokeless tobacco: Never Used   Vaping Use   • Vaping Use: Never used   Substance and Sexual Activity   • Alcohol use: No   • Drug use: No   • Sexual activity: Not  Currently     Partners: Male     Birth control/protection: Abstinence, Surgical       Brigid Coelho  reports that she has never smoked. She has never used smokeless tobacco.            There were no vitals taken for this visit.    PHYSICAL EXAM  Physical Exam   Constitutional: She is oriented to person, place, and time. She appears well-developed and well-nourished. She does not have a sickly appearance. She does not appear ill. No distress.   HENT:   Head: Normocephalic and atraumatic.   Mouth/Throat: Mouth/Lips are normal.Uvula is midline. Abnormal dentition. Dental abscesses (left upper 3rd molar. Unable to get a good view of left lower. ) and dental caries present.   Neck: Neck normal appearance.  Pulmonary/Chest: Effort normal.  No respiratory distress.  Neurological: She is alert and oriented to person, place, and time.   Skin: Skin is dry.   Psychiatric: She has a normal mood and affect.         Diagnoses and all orders for this visit:    1. Tooth abscess (Primary)    Other orders  -     amoxicillin-clavulanate (Augmentin) 875-125 MG per tablet; Take 1 tablet by mouth 2 (Two) Times a Day for 10 days.  Dispense: 20 tablet; Refill: 0    Appointment with oral surgeon on 6/7/2022.     The use of a video visit has been reviewed with the patient and verbal informd consent has een obtained. Myself and Brigid Coelho participated in this visit. The patient is located in [address] [place] [address]. I am located in [place], [place]. Mychart and Zoom were utilized. I spent 8 minutes in the patient's chart for this visit.           Jillian Vasquez, APRN  05/13/2022  14:24 EDT

## 2022-05-13 NOTE — TELEPHONE ENCOUNTER
Patient called to reschedule her appointment she was scheduled with Dr Chavez on 06/24/2022 but had to reschedule due to the provider being out of the office.    The patient would like to get a  Refill on her LEVOTHYROXINE MEDICATION until her next schedule appointment (10-)    The patient would like for that to be filled for a 90 supply and she would like to have this sent to the [facility] that is in her chart.    Please advise.

## 2022-05-13 NOTE — PATIENT INSTRUCTIONS
Appointment with oral surgeon on 6/7/2022    Dental Abscess    A dental abscess is an area of pus in or around a tooth. It comes from an infection. It can cause pain and other symptoms. Treatment will help with symptoms and prevent the infection from spreading.  Follow these instructions at home:  Medicines  Take over-the-counter and prescription medicines only as told by your dentist.  If you were prescribed an antibiotic medicine, take it as told by your dentist. Do not stop taking it even if you start to feel better.  If you were prescribed a gel that has numbing medicine in it, use it exactly as told.  Do not drive or use heavy machinery (like a ) while taking prescription pain medicine.  General instructions    Rinse out your mouth often with salt water.  To make salt water, dissolve ½-1 tsp of salt in 1 cup of warm water.  Eat a soft diet while your mouth is healing.  Drink enough fluid to keep your urine pale yellow.  Do not apply heat to the outside of your mouth.  Do not use any products that contain nicotine or tobacco. These include cigarettes and e-cigarettes. If you need help quitting, ask your doctor.  Keep all follow-up visits as told by your dentist. This is important.     Prevent an abscess  Brush your teeth every morning and every night. Use fluoride toothpaste.  Floss your teeth each day.  Get dental cleanings as often as told by your dentist.  Think about getting dental sealant put on teeth that have deep holes (decay).  Drink water that has fluoride in it.  Most tap water has fluoride.  Check the label on bottled water to see if it has fluoride in it.  Drink water instead of sugary drinks.  Eat healthy meals and snacks.  Wear a mouth guard or face shield when you play sports.  Contact a doctor if:  Your pain is worse, and medicine does not help.  Get help right away if:  You have a fever or chills.  Your symptoms suddenly get worse.  You have a very bad headache.  You have problems  breathing or swallowing.  You have trouble opening your mouth.  You have swelling in your neck or close to your eye.  Summary  A dental abscess is an area of pus in or around a tooth. It is caused by an infection.  Treatment will help with symptoms and prevent the infection from spreading.  Take over-the-counter and prescription medicines only as told by your dentist.  To prevent an abscess, take good care of your teeth. Brush your teeth every morning and night. Use floss every day.  Get dental cleanings as often as told by your dentist.  This information is not intended to replace advice given to you by your health care provider. Make sure you discuss any questions you have with your health care provider.  Document Revised: 07/09/2021 Document Reviewed: 07/09/2021  Elsevier Patient Education © 2021 [employer]           age(85 years old or older)

## 2022-05-16 RX ORDER — LEVOTHYROXINE SODIUM 88 UG/1
88 TABLET ORAL DAILY
Qty: 90 TABLET | Refills: 1 | Status: SHIPPED | OUTPATIENT
Start: 2022-05-16 | End: 2022-09-05 | Stop reason: SDUPTHER

## 2022-05-17 DIAGNOSIS — B37.9 ANTIBIOTIC-INDUCED YEAST INFECTION: Primary | ICD-10-CM

## 2022-05-17 DIAGNOSIS — T36.95XA ANTIBIOTIC-INDUCED YEAST INFECTION: Primary | ICD-10-CM

## 2022-05-17 RX ORDER — FLUCONAZOLE 150 MG/1
TABLET ORAL
Qty: 2 TABLET | Refills: 0 | Status: SHIPPED | OUTPATIENT
Start: 2022-05-17 | End: 2022-07-05

## 2022-05-19 ENCOUNTER — TREATMENT (OUTPATIENT)
Dept: PHYSICAL THERAPY | Facility: CLINIC | Age: 57
End: 2022-05-19

## 2022-05-19 DIAGNOSIS — S82.852D CLOSED TRIMALLEOLAR FRACTURE OF LEFT ANKLE WITH ROUTINE HEALING, SUBSEQUENT ENCOUNTER: Primary | ICD-10-CM

## 2022-05-19 PROCEDURE — 97140 MANUAL THERAPY 1/> REGIONS: CPT | Performed by: PHYSICAL THERAPIST

## 2022-05-19 PROCEDURE — 97530 THERAPEUTIC ACTIVITIES: CPT | Performed by: PHYSICAL THERAPIST

## 2022-05-19 PROCEDURE — 97110 THERAPEUTIC EXERCISES: CPT | Performed by: PHYSICAL THERAPIST

## 2022-05-19 NOTE — PROGRESS NOTES
Physical Therapy Daily Progress Note    Subjective   Brigid Coelho reports: she went to the ER last week because she was having some pain in the front of her ankle. They told her everything looked fine but that she had some loss of bone mass in the left leg.      Objective          Active Range of Motion     Additional Active Range of Motion Details  Dorsiflexion: -6 degrees      See Exercise, Manual, and Modality Logs for complete treatment.       Assessment/Plan   Pt tolerated treatment well. She was instructed to work on standing with her feet even in order to work towards more dorsiflexion. She is reminded every visit of the importance of her HEP. She was also instructed to leave her wheelchair in the garage so she is not tempted to use it in the house. Pt would benefit from continued skilled PT.    Progress per Plan of Care           Manual Therapy:    10     mins  87369;  Therapeutic Exercise:    18     mins  98467;     Neuromuscular Oksana:        mins  97943;    Therapeutic Activity:     10     mins  47684;     Gait Training:           mins  80418;     Ultrasound:          mins  43720;    Electrical Stimulation:         mins  02350 ( );  E-Stim Attended:         mins  28983  Iontophoresis          mins 89330   Traction          mins  45050  Fluidotherapy         mins  84255  Dry Needling          mins self-pay - No Charge  Paraffin          mins  76780    Timed Treatment:   38   mins   Total Treatment:     38   mins    Ambreen Montez, PT, DPT  Physical Therapist

## 2022-05-27 ENCOUNTER — TELEPHONE (OUTPATIENT)
Dept: ORTHOPEDIC SURGERY | Facility: CLINIC | Age: 57
End: 2022-05-27

## 2022-05-27 NOTE — TELEPHONE ENCOUNTER
Patient called in today stating that her leg is more swollen and she feels that it looks funny. She is concerned that one of her plates has shifted in her ankle. She presented to [facility] ER on 05/13/22, at that time they took xrays and recommended that she follow up with Dr Daniels as needed. I had Emilia review xrays and she states that all hardware is in place on those films. Patient denies any falls since that time. She does state that she has been walking more. She denies any fever, chills, nightsweats. She is going to come in for appointment on 05/31/2022 at 1:30 to have ankle checked. She was happy with this.       Zee

## 2022-05-31 NOTE — TELEPHONE ENCOUNTER
PATIENT CALLED STATING THAT SHE WOULD RATHER JUST KEEP ORIGINAL APPOINTMENT AND NOT GET WORKED IN TODAY

## 2022-06-03 ENCOUNTER — TELEPHONE (OUTPATIENT)
Dept: ORTHOPEDIC SURGERY | Facility: CLINIC | Age: 57
End: 2022-06-03

## 2022-06-03 DIAGNOSIS — Z98.890 STATUS POST ORIF OF FRACTURE OF ANKLE: Primary | ICD-10-CM

## 2022-06-03 DIAGNOSIS — Z87.81 STATUS POST ORIF OF FRACTURE OF ANKLE: Primary | ICD-10-CM

## 2022-06-03 NOTE — TELEPHONE ENCOUNTER
Patient called, stated she was in the process of getting her Physical Therapy referral changed over to [facility] on [facility]. Patient has an appointment set up 06/08, 5pm. But need Medicare referral sent over by then. Please advise.

## 2022-06-06 NOTE — ED PROVIDER NOTES
Subjective   Pt is a pleasant 57 year old female who presents with left ankle pain.  She had a left trimalleolar fracture 11/2/21 and associated surgery with Dr. Tapia.  She has had slow but steady improvement since that time, requiring an additional surgery on 12/6/21.  Lately, she has been more active with physical therapy and has been experiencing increased pain in the left ankle, resulting in her visit to the ED today.  Pain has been worsening for the past one week.  Denies significant trauma or other acute complaint.  Denies fever, chills, chest pain, shortness of breath, abdominal pain, nausea, vomiting, or diarrhea.  Denies motor or sensory deficits.      Ankle Pain  Location:  Ankle  Time since incident:  7 days  Ankle location:  L ankle  Pain details:     Quality:  Aching    Radiates to:  Does not radiate    Pain severity now: moderate to severe.    Onset quality:  Gradual    Timing:  Constant    Progression:  Waxing and waning  Chronicity:  Recurrent  Associated symptoms: decreased ROM and stiffness    Associated symptoms: no back pain, no fatigue, no fever and no numbness        Review of Systems   Constitutional: Negative for fatigue and fever.   Musculoskeletal: Positive for stiffness. Negative for back pain.   All other systems reviewed and are negative.      Past Medical History:   Diagnosis Date   • Acute bronchitis    • Anemia    • Anxiety    • Arthritis of back    • Chest pain    • Cholelithiasis    • Claustrophobia    • Fracture of ankle 10/30/2021   • GERD (gastroesophageal reflux disease)    • Hashimoto's thyroiditis    • Hemorrhoids    • Hypothyroidism    • Low back pain    • Low back strain 10/12/2011   • Lumbosacral disc disease 10/12/2011   • Metrorrhagia    • Palpitations    • Polycystic ovary syndrome    • Polyp of sigmoid colon    • Ulcerative colitis (HCC)    • Upper respiratory infection    • UTI (urinary tract infection)        Allergies   Allergen Reactions   • Adhesive Tape       Rash     • Erythromycin Other (See Comments)     Sores in mouth   • Nickel Itching   • Epinephrine Palpitations   • Latex Rash   • Nitroglycerin Palpitations       Past Surgical History:   Procedure Laterality Date   • ANKLE OPEN REDUCTION INTERNAL FIXATION Left 2021    Procedure: TRIMALLEOLAR FRACTURE OPEN REDUCTION INTERNAL FIXATION LEFT;  Surgeon: Giovani Daniels MD;  Location: [facility];  Service: Orthopedics;  Laterality: Left;   • ANKLE OPEN REDUCTION INTERNAL FIXATION Left 2021    s/p ORIF L ankle Dr. Daniels [facility]   • APPENDECTOMY     • CARDIAC CATHETERIZATION     •  SECTION      x 2   • CHOLECYSTECTOMY     • COLONOSCOPY     • DILATATION AND CURETTAGE      Of Cervical Stump   • HARDWARE REMOVAL FOOT / ANKLE Left 2022    syndesmotic screw removal Dr. Daniels   • MYOMECTOMY     • SMALL INTESTINE SURGERY     • TUBAL ABDOMINAL LIGATION         Family History   Problem Relation Age of Onset   • Hyperlipidemia Mother    • CHAD disease Mother    • Cancer Mother    • Diabetes Mother    • Heart disease Mother    • Rheum arthritis Father    • Hyperlipidemia Father    • Heart attack Father 72   • Ovarian cancer Maternal Aunt    • Diabetes Paternal Grandmother    • Hypertension Paternal Grandmother    • Obesity Paternal Grandmother    • Colon cancer Other    • Arthritis Other    • Cancer Other         uncle; liver, lung    • Thyroid disease Cousin    • Thyroid cancer Cousin    • CHAD disease Daughter    • Obesity Brother    • Sleep apnea Brother        Social History     Socioeconomic History   • Marital status:    Tobacco Use   • Smoking status: Never Smoker   • Smokeless tobacco: Never Used   Vaping Use   • Vaping Use: Never used   Substance and Sexual Activity   • Alcohol use: No   • Drug use: No   • Sexual activity: Not Currently     Partners: Male     Birth control/protection: Abstinence, Surgical           Objective   Physical Exam  Vitals and nursing note reviewed.    Constitutional:       General: She is not in acute distress.  HENT:      Head: Normocephalic and atraumatic.   Eyes:      Extraocular Movements: Extraocular movements intact.      Conjunctiva/sclera: Conjunctivae normal.   Neck:      Thyroid: No thyromegaly.   Pulmonary:      Effort: Pulmonary effort is normal.   Musculoskeletal:         General: Normal range of motion.      Comments: Mild pain with palpation and ROM left ankle.  Incisions well healed.  No overlying erythema or sign of infection.   Skin:     General: Skin is warm and dry.      Capillary Refill: Capillary refill takes 2 to 3 seconds.      Findings: No erythema.   Neurological:      General: No focal deficit present.      Mental Status: She is alert and oriented to person, place, and time. Mental status is at baseline.   Psychiatric:         Mood and Affect: Mood normal.         Behavior: Behavior normal.         Thought Content: Thought content normal.         Procedures           ED Course      XR Tibia Fibula 2 View Left    Result Date: 5/13/2022  DATE OF EXAM: 5/13/2022 6:45 PM  PROCEDURE: XR ANKLE 3+ VW LEFT-, XR TIBIA FIBULA 2 VW LEFT-  INDICATIONS: PAIN  COMPARISON: 2/17/2022 and prior  TECHNIQUE: A minimum of three radiologic views of the ankle and 2 views of the left tibia and fibula were obtained.  FINDINGS: Left ankle: Subtle findings limited by osteopenia  Lag screws through the medial malleolus noted. Lateral compression plate of the fibula and interfragmentary screws noted. The orthopedic hardware appears intact. No definite acute fracture is identified given underlying osteopenia. The ankle mortise is intact. Small amount of diffuse soft tissue swelling is noted overlying the ankle and foot. Mild spurring at the plantar fascial and Achilles attachment to the calcaneus noted. Mild soft tissue swelling with lower leg.  Left tibia and fibula: Proximal portions of the tibia and fibula demonstrate no acute osseous abnormality. No focal  soft tissue swelling noted proximally. Degenerative changes noted at the knee      Diffuse osteopenia limits detection of subtle abnormalities.  Patient is status post ORIF of the distal tibia and fibula without acute osseous abnormality given limitations from underlying osteopenia  Mild soft tissue swelling over the lower leg, ankle and foot  This report was finalized on 5/13/2022 8:00 PM by Hiram Patel.      XR Ankle 3+ View Left    Result Date: 5/13/2022  DATE OF EXAM: 5/13/2022 6:45 PM  PROCEDURE: XR ANKLE 3+ VW LEFT-, XR TIBIA FIBULA 2 VW LEFT-  INDICATIONS: PAIN  COMPARISON: 2/17/2022 and prior  TECHNIQUE: A minimum of three radiologic views of the ankle and 2 views of the left tibia and fibula were obtained.  FINDINGS: Left ankle: Subtle findings limited by osteopenia  Lag screws through the medial malleolus noted. Lateral compression plate of the fibula and interfragmentary screws noted. The orthopedic hardware appears intact. No definite acute fracture is identified given underlying osteopenia. The ankle mortise is intact. Small amount of diffuse soft tissue swelling is noted overlying the ankle and foot. Mild spurring at the plantar fascial and Achilles attachment to the calcaneus noted. Mild soft tissue swelling with lower leg.  Left tibia and fibula: Proximal portions of the tibia and fibula demonstrate no acute osseous abnormality. No focal soft tissue swelling noted proximally. Degenerative changes noted at the knee      Diffuse osteopenia limits detection of subtle abnormalities.  Patient is status post ORIF of the distal tibia and fibula without acute osseous abnormality given limitations from underlying osteopenia  Mild soft tissue swelling over the lower leg, ankle and foot  This report was finalized on 5/13/2022 8:00 PM by Hiram Patel.                                               MDM    Final diagnoses:   Acute left ankle pain       ED Disposition  ED Disposition     ED Disposition    Discharge    Condition   Stable    Comment   --           DISCHARGE    Patient discharged in stable condition.    Reviewed implications of results, diagnosis, meds, responsibility to follow up, warning signs and symptoms of possible worsening, potential complications and reasons to return to ER.    Patient/Family voiced understanding of above instructions.    Discussed plan for discharge, as there is no emergent indication for admission.  Pt/family is agreeable and understands need for follow up and possible repeat testing.  Pt/family is aware that discharge does not mean that nothing is wrong but that it indicates no emergency is currently present that requires admission and they must continue care with follow-up as given below or with a physician of their choice.     FOLLOW-UP  Demetrio Lo DO  [address] [facility] [address]  [place] [address]  [phone]    Schedule an appointment as soon as possible for a visit       Giovani Daniels MD  [address] [facility]  [address]  [place] [address]  [phone]      As needed    [facility] Emergency Department  [address] [place] [id number] [phone]    If symptoms worsen         Medication List      ASK your doctor about these medications    amoxicillin-clavulanate 875-125 MG per tablet  Commonly known as: Augmentin  Take 1 tablet by mouth 2 (Two) Times a Day for 10 days.  Ask about: Should I take this medication?               Nehemias Davalos DO  06/06/22 0805

## 2022-06-07 ENCOUNTER — TELEPHONE (OUTPATIENT)
Dept: ORTHOPEDIC SURGERY | Facility: CLINIC | Age: 57
End: 2022-06-07

## 2022-06-07 NOTE — TELEPHONE ENCOUNTER
Provider: DICKINSON    Caller: ROXANNE SAHNI    Relationship to Patient: SELF    Phone Number: [phone]    Reason for Call: PATIENT HAD SURGERY BACK IN MARCH AND SHE IS HAVING SOME WEIRD SENSATIONS AND FEELINGS IN HER ANKLE- FEELS LIKE SOMETHING IS WRONG- WANTED TO TO TALK TO MEDICAL ASSISTANT- NEEDS TO DETERMINE IF SHE NEEDS TO BE SEEN SOONER, NEEDS TO GO TO ER FOR IMAGING, OR JUST NEEDS REST.     SPOKE WITH  AND WAS ADVISED TO SEND TELEPHONE ENCOUNTER- PLEASE CALL AND ADVISE PATIENT.TY

## 2022-06-07 NOTE — TELEPHONE ENCOUNTER
Patient says she is walking and using ankle as directed.  She is having concerns that the hardware has shifted from the increased use. She says there is a rubbing feeling on the inside of her ankle.  I have scheduled her a sooner appointment to have ankle evaluated.    Dorcas Sauer RT(R)

## 2022-06-09 ENCOUNTER — APPOINTMENT (OUTPATIENT)
Dept: MAMMOGRAPHY | Facility: HOSPITAL | Age: 57
End: 2022-06-09

## 2022-06-24 DIAGNOSIS — F41.9 ANXIETY: Chronic | ICD-10-CM

## 2022-06-24 RX ORDER — ALPRAZOLAM 0.5 MG/1
0.5 TABLET ORAL DAILY PRN
Qty: 30 TABLET | Refills: 0 | Status: CANCELLED | OUTPATIENT
Start: 2022-06-24

## 2022-06-24 RX ORDER — ALPRAZOLAM 0.5 MG/1
0.5 TABLET ORAL DAILY PRN
Qty: 30 TABLET | Refills: 0 | Status: SHIPPED | OUTPATIENT
Start: 2022-06-24 | End: 2022-06-28 | Stop reason: SDUPTHER

## 2022-06-24 NOTE — TELEPHONE ENCOUNTER
Caller: Brigid Coelho    Relationship: Self    Best call back number: [phone]    Requested Prescriptions:   Requested Prescriptions     Pending Prescriptions Disp Refills   • ALPRAZolam (Xanax) 0.5 MG tablet 30 tablet 0     Sig: Take 1 tablet by mouth Daily As Needed for Anxiety.        Pharmacy where request should be sent[facility] [address] [facility] [address] - [phone]  - [phone] FX     Additional details provided by patient: WOULD PROVIDER REFILL MEDICATION SHE HAS APPOINTMENT TOMORROW WITH ANOTHER PROVIDER     PLEASE ADVISE     Does the patient have less than a 3 day supply:  [x] Yes  [] No    Jesus Kovacs Rep   06/24/22 12:51 EDT

## 2022-06-25 ENCOUNTER — TELEMEDICINE (OUTPATIENT)
Dept: FAMILY MEDICINE CLINIC | Facility: CLINIC | Age: 57
End: 2022-06-25

## 2022-06-25 DIAGNOSIS — F41.9 ANXIETY: Primary | ICD-10-CM

## 2022-06-25 DIAGNOSIS — R93.6 ABNORMAL FINDINGS ON DIAGNOSTIC IMAGING OF LIMBS: ICD-10-CM

## 2022-06-25 DIAGNOSIS — M85.80 OSTEOPENIA DETERMINED BY X-RAY: ICD-10-CM

## 2022-06-25 PROCEDURE — 99213 OFFICE O/P EST LOW 20 MIN: CPT | Performed by: NURSE PRACTITIONER

## 2022-06-25 NOTE — ASSESSMENT & PLAN NOTE
This is a chronic problem that is stable with current treatment.   Xanax was refilled per her PCP and she is agreeable to behavioral health referral for counseling.   Follow up as scheduled with PCP

## 2022-06-25 NOTE — PROGRESS NOTES
"Chief Complaint  No chief complaint on file.    Subjective        Brigid Coelho presents to [facility]  Patient presents to telemedicine video visit and provides verbal consent for visit type. Patient presents for follow up anxiety. States Dr. Lo already refilled Xanax and she requests counseling to help with anxiety as well. Agreeable to behavioral health referral. States ankle fracture has contributed to more anxiety due to pain and having to use walker. Reports  fractured left ankle and x-ray are showing osteopenia.She states she is still not walking without a walker and still has limp. States her teeth are weaker as well and questions whether calcium is needed. She has never had a DEXA scan and reports this is due to claustrophobia.  She is agreeable to having procedure ordered and states she will take Xanax before to calm her.       Objective   Vital Signs:  There were no vitals taken for this visit.  Estimated body mass index is 39.94 kg/m² as calculated from the following:    Height as of 5/13/22: 170.2 cm (67\").    Weight as of 5/13/22: 116 kg (255 lb).           Physical Exam  Vitals reviewed: limited video visit exam.   Constitutional:       General: She is not in acute distress.     Appearance: Normal appearance.   HENT:      Head: Normocephalic and atraumatic.   Pulmonary:      Effort: Pulmonary effort is normal.   Neurological:      Mental Status: She is alert and oriented to person, place, and time.   Psychiatric:         Mood and Affect: Mood normal.         Behavior: Behavior normal.         Thought Content: Thought content normal.         Judgment: Judgment normal.        Result Review :      Data reviewed: Radiologic studies left ankle and tibia/fibula x-ray 5-13-22          Assessment and Plan   Diagnoses and all orders for this visit:    1. Anxiety (Primary)  Assessment & Plan:  This is a chronic problem that is stable with current treatment. "   Xanax was refilled per her PCP and she is agreeable to behavioral health referral for counseling.   Follow up as scheduled with PCP      2. Osteopenia determined by x-ray  -     DEXA Bone Density Axial; Future    3. Abnormal findings on diagnostic imaging of limbs   -     DEXA Bone Density Axial; Future         I spent 18 minutes caring for Brigid on this date of service. This time includes time spent by me in the following activities:preparing for the visit, reviewing tests, obtaining and/or reviewing a separately obtained history, performing a medically appropriate examination and/or evaluation , counseling and educating the patient/family/caregiver, ordering medications, tests, or procedures, documenting information in the medical record and independently interpreting results and communicating that information with the patient/family/caregiver  Follow Up   Return if symptoms worsen or fail to improve, for as scheduled upcoming with PCP.  Patient was given instructions and counseling regarding her condition or for health maintenance advice. Please see specific information pulled into the AVS if appropriate.

## 2022-06-28 DIAGNOSIS — F41.9 ANXIETY: Chronic | ICD-10-CM

## 2022-06-28 RX ORDER — ALPRAZOLAM 0.5 MG/1
0.5 TABLET ORAL DAILY PRN
Qty: 30 TABLET | Refills: 0 | Status: SHIPPED | OUTPATIENT
Start: 2022-06-28 | End: 2022-09-15 | Stop reason: SDUPTHER

## 2022-07-04 ENCOUNTER — HOSPITAL ENCOUNTER (EMERGENCY)
Facility: HOSPITAL | Age: 57
Discharge: HOME | End: 2022-07-04
Attending: EMERGENCY MEDICINE | Admitting: EMERGENCY MEDICINE

## 2022-07-04 ENCOUNTER — APPOINTMENT (OUTPATIENT)
Dept: GENERAL RADIOLOGY | Facility: HOSPITAL | Age: 57
End: 2022-07-04

## 2022-07-04 VITALS
OXYGEN SATURATION: 98 % | HEIGHT: 67 IN | TEMPERATURE: 99.2 F | HEART RATE: 92 BPM | DIASTOLIC BLOOD PRESSURE: 60 MMHG | WEIGHT: 256 LBS | SYSTOLIC BLOOD PRESSURE: 111 MMHG | RESPIRATION RATE: 18 BRPM | BODY MASS INDEX: 40.18 KG/M2

## 2022-07-04 DIAGNOSIS — R50.9 ACUTE FEBRILE ILLNESS: Primary | ICD-10-CM

## 2022-07-04 DIAGNOSIS — N39.0 ACUTE UTI: ICD-10-CM

## 2022-07-04 LAB
ALBUMIN SERPL-MCNC: 4 G/DL (ref 3.5–5.2)
ALBUMIN/GLOB SERPL: 1.4 G/DL
ALP SERPL-CCNC: 88 U/L (ref 39–117)
ALT SERPL W P-5'-P-CCNC: 19 U/L (ref 1–33)
ANION GAP SERPL CALCULATED.3IONS-SCNC: 11 MMOL/L (ref 5–15)
AST SERPL-CCNC: 19 U/L (ref 1–32)
BACTERIA UR QL AUTO: ABNORMAL /HPF
BASOPHILS # BLD AUTO: 0.06 10*3/MM3 (ref 0–0.2)
BASOPHILS NFR BLD AUTO: 1.3 % (ref 0–1.5)
BILIRUB SERPL-MCNC: <0.2 MG/DL (ref 0–1.2)
BILIRUB UR QL STRIP: NEGATIVE
BUN SERPL-MCNC: 10 MG/DL (ref 6–20)
BUN/CREAT SERPL: 14.3 (ref 7–25)
CALCIUM SPEC-SCNC: 8.7 MG/DL (ref 8.6–10.5)
CHLORIDE SERPL-SCNC: 104 MMOL/L (ref 98–107)
CLARITY UR: ABNORMAL
CO2 SERPL-SCNC: 25 MMOL/L (ref 22–29)
COD CRY URNS QL: ABNORMAL /HPF
COLOR UR: YELLOW
CREAT SERPL-MCNC: 0.7 MG/DL (ref 0.57–1)
D-LACTATE SERPL-SCNC: 0.8 MMOL/L (ref 0.5–2)
DEPRECATED RDW RBC AUTO: 41.9 FL (ref 37–54)
EGFRCR SERPLBLD CKD-EPI 2021: 100.4 ML/MIN/1.73
EOSINOPHIL # BLD AUTO: 0.09 10*3/MM3 (ref 0–0.4)
EOSINOPHIL NFR BLD AUTO: 2 % (ref 0.3–6.2)
ERYTHROCYTE [DISTWIDTH] IN BLOOD BY AUTOMATED COUNT: 15.5 % (ref 12.3–15.4)
GLOBULIN UR ELPH-MCNC: 2.9 GM/DL
GLUCOSE SERPL-MCNC: 93 MG/DL (ref 65–99)
GLUCOSE UR STRIP-MCNC: NEGATIVE MG/DL
HCT VFR BLD AUTO: 29.9 % (ref 34–46.6)
HGB BLD-MCNC: 9 G/DL (ref 12–15.9)
HGB UR QL STRIP.AUTO: NEGATIVE
HYALINE CASTS UR QL AUTO: ABNORMAL /LPF
IMM GRANULOCYTES # BLD AUTO: 0.01 10*3/MM3 (ref 0–0.05)
IMM GRANULOCYTES NFR BLD AUTO: 0.2 % (ref 0–0.5)
KETONES UR QL STRIP: ABNORMAL
LEUKOCYTE ESTERASE UR QL STRIP.AUTO: NEGATIVE
LYMPHOCYTES # BLD AUTO: 0.73 10*3/MM3 (ref 0.7–3.1)
LYMPHOCYTES NFR BLD AUTO: 16.4 % (ref 19.6–45.3)
MCH RBC QN AUTO: 22.3 PG (ref 26.6–33)
MCHC RBC AUTO-ENTMCNC: 30.1 G/DL (ref 31.5–35.7)
MCV RBC AUTO: 74 FL (ref 79–97)
MONOCYTES # BLD AUTO: 0.65 10*3/MM3 (ref 0.1–0.9)
MONOCYTES NFR BLD AUTO: 14.6 % (ref 5–12)
MUCOUS THREADS URNS QL MICRO: ABNORMAL /HPF
NEUTROPHILS NFR BLD AUTO: 2.92 10*3/MM3 (ref 1.7–7)
NEUTROPHILS NFR BLD AUTO: 65.5 % (ref 42.7–76)
NITRITE UR QL STRIP: NEGATIVE
NRBC BLD AUTO-RTO: 0 /100 WBC (ref 0–0.2)
PH UR STRIP.AUTO: 5.5 [PH] (ref 5–8)
PLATELET # BLD AUTO: 338 10*3/MM3 (ref 140–450)
PMV BLD AUTO: 10.2 FL (ref 6–12)
POTASSIUM SERPL-SCNC: 3.7 MMOL/L (ref 3.5–5.2)
PROCALCITONIN SERPL-MCNC: 0.18 NG/ML (ref 0–0.25)
PROT SERPL-MCNC: 6.9 G/DL (ref 6–8.5)
PROT UR QL STRIP: ABNORMAL
RBC # BLD AUTO: 4.04 10*6/MM3 (ref 3.77–5.28)
RBC # UR STRIP: ABNORMAL /HPF
REF LAB TEST METHOD: ABNORMAL
SODIUM SERPL-SCNC: 140 MMOL/L (ref 136–145)
SP GR UR STRIP: 1.04 (ref 1–1.03)
SQUAMOUS #/AREA URNS HPF: ABNORMAL /HPF
UROBILINOGEN UR QL STRIP: ABNORMAL
WBC # UR STRIP: ABNORMAL /HPF
WBC NRBC COR # BLD: 4.46 10*3/MM3 (ref 3.4–10.8)

## 2022-07-04 PROCEDURE — 25010000002 CEFTRIAXONE PER 250 MG: Performed by: EMERGENCY MEDICINE

## 2022-07-04 PROCEDURE — 36415 COLL VENOUS BLD VENIPUNCTURE: CPT

## 2022-07-04 PROCEDURE — 87040 BLOOD CULTURE FOR BACTERIA: CPT | Performed by: EMERGENCY MEDICINE

## 2022-07-04 PROCEDURE — 71045 X-RAY EXAM CHEST 1 VIEW: CPT

## 2022-07-04 PROCEDURE — 85025 COMPLETE CBC W/AUTO DIFF WBC: CPT | Performed by: EMERGENCY MEDICINE

## 2022-07-04 PROCEDURE — U0004 COV-19 TEST NON-CDC HGH THRU: HCPCS | Performed by: PHYSICIAN ASSISTANT

## 2022-07-04 PROCEDURE — 87086 URINE CULTURE/COLONY COUNT: CPT | Performed by: EMERGENCY MEDICINE

## 2022-07-04 PROCEDURE — 81001 URINALYSIS AUTO W/SCOPE: CPT | Performed by: EMERGENCY MEDICINE

## 2022-07-04 PROCEDURE — 84145 PROCALCITONIN (PCT): CPT | Performed by: EMERGENCY MEDICINE

## 2022-07-04 PROCEDURE — U0004 COV-19 TEST NON-CDC HGH THRU: HCPCS | Performed by: EMERGENCY MEDICINE

## 2022-07-04 PROCEDURE — 83605 ASSAY OF LACTIC ACID: CPT | Performed by: EMERGENCY MEDICINE

## 2022-07-04 PROCEDURE — C9803 HOPD COVID-19 SPEC COLLECT: HCPCS

## 2022-07-04 PROCEDURE — 99283 EMERGENCY DEPT VISIT LOW MDM: CPT

## 2022-07-04 PROCEDURE — 96365 THER/PROPH/DIAG IV INF INIT: CPT

## 2022-07-04 PROCEDURE — 80053 COMPREHEN METABOLIC PANEL: CPT | Performed by: EMERGENCY MEDICINE

## 2022-07-04 RX ORDER — CEFDINIR 300 MG/1
300 CAPSULE ORAL 2 TIMES DAILY
Qty: 14 CAPSULE | Refills: 0 | Status: SHIPPED | OUTPATIENT
Start: 2022-07-04 | End: 2022-08-25

## 2022-07-04 RX ADMIN — SODIUM CHLORIDE 2 G: 900 INJECTION INTRAVENOUS at 21:08

## 2022-07-04 NOTE — ED PROVIDER NOTES
EMERGENCY DEPARTMENT ENCOUNTER    Pt Name: Brigid Coelho  MRN: [id number]  Pt :   1965  Room Number:    Date of encounter:  2022  PCP: Demetrio Lo DO  ED Provider: David Andersen MD    Historian: Patient      HPI:  Chief Complaint: Acute febrile illness        Context: Brigid Coelho is a 57 y.o. female who presents to the ED c/o acute febrile illness starting last night with a fever of 102 degrees.  The patient is feeling chilled intermittently.  She has taken ibuprofen occasionally for fevers.  She presented to an urgent treatment center and was told she tested negative for UTI as far as a dipstick test.  A rapid COVID test was negative as well.  She was sent to the emergency department for further evaluation.  The patient notes she has had dental caries, fractures, infections on an ongoing basis.  She has been unable to see a dentist in some time.  She notes that some of her teeth simply crumble.  She was suffering from dental swelling in her left upper gum region few weeks ago.  She was on Augmentin and the swelling resolved.  She has had no increase in dental pain.  She denies any dysuria although she notes she has suffered from recurrent UTIs.  She denies known COVID exposures, sick contacts.  The patient suffered a trimalleolar fracture dislocation late last year when I treated her in our emergency department.  She has been going under rehabilitation secondary to this injury.  She notes improvement in her ankle and no increase in pain recently.  She has had no erythema or other changes that are concerning to her in regard to the ankle.  No history of recreational drugs especially including IV drug use.  Nondrinker.  Non-smoker.      PAST MEDICAL HISTORY  Past Medical History:   Diagnosis Date   • Acute bronchitis    • Anemia    • Anxiety    • Arthritis of back    • Chest pain    • Cholelithiasis    • Claustrophobia    • Fracture of ankle 10/30/2021   • GERD  (gastroesophageal reflux disease)    • Hashimoto's thyroiditis    • Hemorrhoids    • Hypothyroidism    • Low back pain    • Low back strain 10/12/2011   • Lumbosacral disc disease 10/12/2011   • Metrorrhagia    • Palpitations    • Polycystic ovary syndrome    • Polyp of sigmoid colon    • Ulcerative colitis (HCC)    • Upper respiratory infection    • UTI (urinary tract infection)          PAST SURGICAL HISTORY  Past Surgical History:   Procedure Laterality Date   • ANKLE OPEN REDUCTION INTERNAL FIXATION Left 2021    Procedure: TRIMALLEOLAR FRACTURE OPEN REDUCTION INTERNAL FIXATION LEFT;  Surgeon: Giovani Daniels MD;  Location: [facility];  Service: Orthopedics;  Laterality: Left;   • ANKLE OPEN REDUCTION INTERNAL FIXATION Left 2021    s/p ORIF L ankle Dr. Daniels [facility]   • APPENDECTOMY     • CARDIAC CATHETERIZATION     •  SECTION      x 2   • CHOLECYSTECTOMY     • COLONOSCOPY     • DILATATION AND CURETTAGE      Of Cervical Stump   • HARDWARE REMOVAL FOOT / ANKLE Left 2022    syndesmotic screw removal Dr. Daniels   • MYOMECTOMY     • SMALL INTESTINE SURGERY     • TUBAL ABDOMINAL LIGATION           FAMILY HISTORY  Family History   Problem Relation Age of Onset   • Hyperlipidemia Mother    • CHAD disease Mother    • Cancer Mother    • Diabetes Mother    • Heart disease Mother    • Rheum arthritis Father    • Hyperlipidemia Father    • Heart attack Father 72   • Ovarian cancer Maternal Aunt    • Diabetes Paternal Grandmother    • Hypertension Paternal Grandmother    • Obesity Paternal Grandmother    • Colon cancer Other    • Arthritis Other    • Cancer Other         uncle; liver, lung    • Thyroid disease Cousin    • Thyroid cancer Cousin    • CHAD disease Daughter    • Obesity Brother    • Sleep apnea Brother          SOCIAL HISTORY  Social History     Socioeconomic History   • Marital status:    Tobacco Use   • Smoking status: Never Smoker   • Smokeless tobacco: Never Used    Vaping Use   • Vaping Use: Never used   Substance and Sexual Activity   • Alcohol use: No   • Drug use: No   • Sexual activity: Not Currently     Partners: Male     Birth control/protection: Abstinence, Surgical         ALLERGIES  Adhesive tape, Erythromycin, Nickel, Epinephrine, Latex, and Nitroglycerin        REVIEW OF SYSTEMS  Review of Systems       All systems reviewed and negative except for those discussed in HPI.       PHYSICAL EXAM    I have reviewed the triage vital signs and nursing notes.    ED Triage Vitals [07/04/22 1656]   Temp Heart Rate Resp BP SpO2   99.2 °F (37.3 °C) 92 18 125/79 95 %      Temp src Heart Rate Source Patient Position BP Location FiO2 (%)   Oral Monitor Sitting Left arm --       Physical Exam  GENERAL:   Appears very pleasant and known to me from her interactions during her visit last year.  HENT: Nares patent.  Multiple dental caries with fractures.  The intraoral region was meticulously palpated and there are no areas of tenderness, erythema, fluctuance.  No cervical lymphadenopathy.  Full range of motion of the neck without any discomfort.  EYES: No scleral icterus.  CV: Regular rhythm, regular rate.  No murmurs gallops rubs.  RESPIRATORY: Normal effort.  No audible wheezes, rales or rhonchi.  Clear to auscultation.  ABDOMEN: Soft, nontender to deep palpation.  MUSCULOSKELETAL: No deformities.   NEURO: Alert, moves all extremities, follows commands.  SKIN: Warm, dry, no rash visualized.        LAB RESULTS  Recent Results (from the past 24 hour(s))   POCT Rapid Strep A    Collection Time: 07/04/22  4:24 PM    Specimen: Swab   Result Value Ref Range    Rapid Strep A Screen Negative Negative, VALID, INVALID, Not Performed    Internal Control Passed Passed    Lot Number [id number]     Expiration Date 09/30/2010    POC Urinalysis Dipstick, Multipro (Automated dipstick)    Collection Time: 07/04/22  4:24 PM    Specimen: Urine   Result Value Ref Range    Color Yellow Yellow, Straw,  Dark Yellow, [place]    Clarity, UA Cloudy (A) Clear    Glucose, UA Negative Negative mg/dL    Bilirubin Moderate (2+) (A) Negative    Ketones, UA Negative Negative    Specific Gravity  1.030 1.005 - 1.030    Blood, UA Negative Negative    pH, Urine 6.0 5.0 - 8.0    Protein, POC Trace (A) Negative mg/dL    Urobilinogen, UA Normal Normal    Nitrite, UA Negative Negative    Leukocytes Negative Negative   POCT Influenza A/B    Collection Time: 07/04/22  4:25 PM    Specimen: Swab   Result Value Ref Range    Rapid Influenza A Ag Negative Negative    Rapid Influenza B Ag Negative Negative    Internal Control Passed Passed    Lot Number [id number]     Expiration Date 10/20/2023    Comprehensive Metabolic Panel    Collection Time: 07/04/22  7:51 PM    Specimen: Blood   Result Value Ref Range    Glucose 93 65 - 99 mg/dL    BUN 10 6 - 20 mg/dL    Creatinine 0.70 0.57 - 1.00 mg/dL    Sodium 140 136 - 145 mmol/L    Potassium 3.7 3.5 - 5.2 mmol/L    Chloride 104 98 - 107 mmol/L    CO2 25.0 22.0 - 29.0 mmol/L    Calcium 8.7 8.6 - 10.5 mg/dL    Total Protein 6.9 6.0 - 8.5 g/dL    Albumin 4.00 3.50 - 5.20 g/dL    ALT (SGPT) 19 1 - 33 U/L    AST (SGOT) 19 1 - 32 U/L    Alkaline Phosphatase 88 39 - 117 U/L    Total Bilirubin <0.2 0.0 - 1.2 mg/dL    Globulin 2.9 gm/dL    A/G Ratio 1.4 g/dL    BUN/Creatinine Ratio 14.3 7.0 - 25.0    Anion Gap 11.0 5.0 - 15.0 mmol/L    eGFR 100.4 >60.0 mL/min/1.73   Lactic Acid, Plasma    Collection Time: 07/04/22  7:51 PM    Specimen: Blood   Result Value Ref Range    Lactate 0.8 0.5 - 2.0 mmol/L   Procalcitonin    Collection Time: 07/04/22  7:51 PM    Specimen: Blood   Result Value Ref Range    Procalcitonin 0.18 0.00 - 0.25 ng/mL   CBC Auto Differential    Collection Time: 07/04/22  7:51 PM    Specimen: Blood   Result Value Ref Range    WBC 4.46 3.40 - 10.80 10*3/mm3    RBC 4.04 3.77 - 5.28 10*6/mm3    Hemoglobin 9.0 (L) 12.0 - 15.9 g/dL    Hematocrit 29.9 (L) 34.0 - 46.6 %    MCV 74.0 (L) 79.0 - 97.0 fL     MCH 22.3 (L) 26.6 - 33.0 pg    MCHC 30.1 (L) 31.5 - 35.7 g/dL    RDW 15.5 (H) 12.3 - 15.4 %    RDW-SD 41.9 37.0 - 54.0 fl    MPV 10.2 6.0 - 12.0 fL    Platelets 338 140 - 450 10*3/mm3    Neutrophil % 65.5 42.7 - 76.0 %    Lymphocyte % 16.4 (L) 19.6 - 45.3 %    Monocyte % 14.6 (H) 5.0 - 12.0 %    Eosinophil % 2.0 0.3 - 6.2 %    Basophil % 1.3 0.0 - 1.5 %    Immature Grans % 0.2 0.0 - 0.5 %    Neutrophils, Absolute 2.92 1.70 - 7.00 10*3/mm3    Lymphocytes, Absolute 0.73 0.70 - 3.10 10*3/mm3    Monocytes, Absolute 0.65 0.10 - 0.90 10*3/mm3    Eosinophils, Absolute 0.09 0.00 - 0.40 10*3/mm3    Basophils, Absolute 0.06 0.00 - 0.20 10*3/mm3    Immature Grans, Absolute 0.01 0.00 - 0.05 10*3/mm3    nRBC 0.0 0.0 - 0.2 /100 WBC   Urinalysis With Culture If Indicated - Urine, Clean Catch    Collection Time: 07/04/22  8:02 PM    Specimen: Urine, Clean Catch   Result Value Ref Range    Color, UA Yellow Yellow, Straw    Appearance, UA Cloudy (A) Clear    pH, UA 5.5 5.0 - 8.0    Specific Gravity, UA 1.045 (H) 1.001 - 1.030    Glucose, UA Negative Negative    Ketones, UA 15 mg/dL (1+) (A) Negative    Bilirubin, UA Negative Negative    Blood, UA Negative Negative    Protein, UA 30 mg/dL (1+) (A) Negative    Leuk Esterase, UA Negative Negative    Nitrite, UA Negative Negative    Urobilinogen, UA 1.0 E.U./dL 0.2 - 1.0 E.U./dL   Urinalysis, Microscopic Only - Urine, Clean Catch    Collection Time: 07/04/22  8:02 PM    Specimen: Urine, Clean Catch   Result Value Ref Range    RBC, UA 0-2 None Seen, 0-2 /HPF    WBC, UA 6-12 (A) None Seen, 0-2 /HPF    Bacteria, UA 3+ (A) None Seen, Trace /HPF    Squamous Epithelial Cells, UA 3-6 (A) None Seen, 0-2 /HPF    Hyaline Casts, UA 7-12 0 - 6 /LPF    Calcium Oxalate Crystals, UA Large/3+ None Seen /HPF    Mucus, UA Moderate/2+ (A) None Seen, Trace /HPF    Methodology Manual Light Microscopy        If labs were ordered, I independently reviewed the results.        RADIOLOGY  XR Chest 1  View    Result Date: 7/4/2022  DATE OF EXAM: 7/4/2022 7:36 PM  PROCEDURE: XR CHEST 1 VW-  INDICATIONS: Fever and cough.  COMPARISON: 12/17/2021.  TECHNIQUE: Single radiographic view of the chest was obtained.  FINDINGS: The heart size is normal. The pulmonary vascular markings are normal. The lungs and pleural spaces are clear of active disease.  There is spondylosis of the thoracic spine.      No active disease.  This report was finalized on 7/4/2022 7:47 PM by Igor Car MD.        I ordered and reviewed the above noted radiographic studies.      I viewed images of chest x-ray which showed no active disease per my independent interpretation.    See radiologist's dictation for official interpretation.        PROCEDURES    Procedures    No orders to display       MEDICATIONS GIVEN IN ER    Medications   cefTRIAXone (ROCEPHIN) 2 g/100 mL 0.9% NS IVPB (MBP) (0 g Intravenous Stopped 7/4/22 2220)         PROGRESS, DATA ANALYSIS, CONSULTS, AND MEDICAL DECISION MAKING    All labs have been independently reviewed by me.  All radiology studies have been reviewed by me and the radiologist dictating the report.   EKG's have been independently viewed and interpreted by me.      Differential diagnoses: Occult febrile illness of unclear etiology.  This could be secondary to UTI, occult pneumonia, etc.                 AS OF 02:17 EDT VITALS:    BP - 111/60  HR - 92  TEMP - 99.2 °F (37.3 °C) (Oral)  O2 SATS - 98%                  DIAGNOSIS  Final diagnoses:   Acute febrile illness   Acute UTI         DISPOSITION  DISCHARGE    Patient discharged in stable condition.    Reviewed implications of results, diagnosis, meds, responsibility to follow up, warning signs and symptoms of possible worsening, potential complications and reasons to return to ER.    Patient/Family voiced understanding of above instructions.    Discussed plan for discharge, as there is no emergent indication for admission.  Pt/family is agreeable and understands  need for follow up and possible repeat testing.  Pt/family is aware that discharge does not mean that nothing is wrong but that it indicates no emergency is currently present that requires admission and they must continue care with follow-up as given below or with a physician of their choice.     FOLLOW-UP  Demetrio Lo DO  [address]  [facility] [id number] [phone]      NEXT AVAILABLE APPOINTMENT - RECHECK OF CONDITION    [facility] Emergency Department  [address] [facility] [address] [phone]    IF YOU HAVE ANY CONCERN OF WORSENING CONDITION         Medication List      New Prescriptions    cefdinir 300 MG capsule  Commonly known as: OMNICEF  Take 1 capsule by mouth 2 (Two) Times a Day.        Stop    amoxicillin-clavulanate 875-125 MG per tablet  Commonly known as: AUGMENTIN           Where to Get Your Medications      These medications were sent to [facility]/pharm[facility] #[id number] - [place], [place] - [address]  - [phone]  - [phone] FX  [address] [facility] [address]    Hours: 24-hours Phone: [phone]   · cefdinir 300 MG capsule                  David Andersen MD  07/05/22 3400

## 2022-07-05 ENCOUNTER — TELEPHONE (OUTPATIENT)
Dept: FAMILY MEDICINE CLINIC | Facility: CLINIC | Age: 57
End: 2022-07-05

## 2022-07-05 ENCOUNTER — TELEPHONE (OUTPATIENT)
Dept: URGENT CARE | Facility: CLINIC | Age: 57
End: 2022-07-05

## 2022-07-05 LAB
BACTERIA SPEC AEROBE CULT: NORMAL
SARS-COV-2 RNA PNL SPEC NAA+PROBE: DETECTED

## 2022-07-05 NOTE — DISCHARGE INSTRUCTIONS
Push fluids.  Utilize Tylenol and ibuprofen for fever.    Take next dose of antibiotics, Omnicef/cefdinir tomorrow morning.    If you have any concern of worsening condition please return to the emergency department.    Please review the medications you are supposed to be taking according to prior physician recommendations. I have not changed your home medications during this visit. If your discharge instructions indicate that I have changed your home medications, this is not the case, and you should disregard. If you have any questions about the medication you should be taking at home, please call your physician.

## 2022-07-05 NOTE — TELEPHONE ENCOUNTER
----- Message from PABLO Figueroa sent at 7/5/2022  1:55 PM EDT -----  Notify Pt. Covid +.Quarantine.

## 2022-07-09 LAB
BACTERIA SPEC AEROBE CULT: NORMAL
BACTERIA SPEC AEROBE CULT: NORMAL

## 2022-07-12 ENCOUNTER — TELEPHONE (OUTPATIENT)
Dept: FAMILY MEDICINE CLINIC | Facility: CLINIC | Age: 57
End: 2022-07-12

## 2022-07-12 RX ORDER — FLUCONAZOLE 150 MG/1
150 TABLET ORAL ONCE
Qty: 1 TABLET | Refills: 1 | Status: SHIPPED | OUTPATIENT
Start: 2022-07-12 | End: 2022-07-12

## 2022-07-12 NOTE — TELEPHONE ENCOUNTER
Caller: Brigid Coelho    Relationship: Self    Best call back number: [phone]     What medication are you requesting: DIFLUCAN     What are your current symptoms: VAGINAL ITCHING AND BURNING     How long have you been experiencing symptoms: TWO DAY     Have you had these symptoms before:    [x] Yes  [] No    Have you been treated for these symptoms before:   [x] Yes  [] No    If a prescription is needed, what is your preferred pharmacy and phone number: [facility] [address] AT [facility] - [phone]  - [phone] FX     Additional notes: PATIENT WAS PRESCRIBED ANTIBIOTICS FOR A UTI

## 2022-07-19 ENCOUNTER — TELEPHONE (OUTPATIENT)
Dept: FAMILY MEDICINE CLINIC | Facility: CLINIC | Age: 57
End: 2022-07-19

## 2022-07-19 NOTE — TELEPHONE ENCOUNTER
Caller: Brigid Coelho    Relationship to patient: Self    Best call back number: [phone]    Date of positive COVID19 test: 7/4/22    COVID19 symptoms: COUGH AND YELLOW MUCUS    Additional information or concerns: PATIENT STATES THAT HER UTI IS STILL HERE AND SHE IS HAVING SYMPTOMS OF COVID AFTER TESTING NEGATIVE ON HOE COVID TEST     What is the patients preferred pharmacy: [facility]

## 2022-07-24 ENCOUNTER — TELEMEDICINE (OUTPATIENT)
Dept: FAMILY MEDICINE CLINIC | Facility: TELEHEALTH | Age: 57
End: 2022-07-24

## 2022-07-24 DIAGNOSIS — J40 BRONCHITIS: Primary | ICD-10-CM

## 2022-07-24 PROCEDURE — 99213 OFFICE O/P EST LOW 20 MIN: CPT | Performed by: NURSE PRACTITIONER

## 2022-07-24 RX ORDER — FLUCONAZOLE 150 MG/1
150 TABLET ORAL ONCE
Qty: 1 TABLET | Refills: 0 | Status: SHIPPED | OUTPATIENT
Start: 2022-07-24 | End: 2022-07-24

## 2022-07-24 RX ORDER — DOXYCYCLINE HYCLATE 100 MG/1
100 CAPSULE ORAL 2 TIMES DAILY
Qty: 14 CAPSULE | Refills: 0 | Status: SHIPPED | OUTPATIENT
Start: 2022-07-24 | End: 2022-07-31

## 2022-07-24 NOTE — PROGRESS NOTES
You have chosen to receive care through a telehealth visit.  Do you consent to use a video/audio connection for your medical care today? Yes     CHIEF COMPLAINT  Chief Complaint   Patient presents with   • Bronchitis     Cough with green production         HPI  Brigid Coelho is a 57 y.o. female  presents with complaint of recent diagnosis of Covid 19 on 7/4/2022. She has now tested negative but states she has developed a secondary chest infection and has Bronchitis. She reports having this in the past and feels this is the same. She reports no fever, shortness of breath or wheezing. She is using Mucienx DM for symptoms without relief. She feels this is worsening.     Review of Systems   Constitutional: Negative.    HENT: Negative.    Respiratory: Positive for cough. Negative for shortness of breath, wheezing and stridor.    Musculoskeletal: Negative.    Skin: Negative.    Neurological: Negative.    Hematological: Negative.    Psychiatric/Behavioral: Negative.        Past Medical History:   Diagnosis Date   • Acute bronchitis    • Anemia    • Anxiety    • Arthritis of back    • Chest pain    • Cholelithiasis    • Claustrophobia    • Fracture of ankle 10/30/2021   • GERD (gastroesophageal reflux disease)    • Hashimoto's thyroiditis    • Hemorrhoids    • Hypothyroidism    • Low back pain    • Low back strain 10/12/2011   • Lumbosacral disc disease 10/12/2011   • Metrorrhagia    • Palpitations    • Polycystic ovary syndrome    • Polyp of sigmoid colon    • Ulcerative colitis (HCC)    • Upper respiratory infection    • UTI (urinary tract infection)        Family History   Problem Relation Age of Onset   • Hyperlipidemia Mother    • CHAD disease Mother    • Cancer Mother    • Diabetes Mother    • Heart disease Mother    • Broken bones Mother         Broke both wrists ...different occasions due to falls   • Rheum arthritis Father    • Hyperlipidemia Father    • Heart attack Father 72   • Ovarian cancer Maternal  Aunt    • Diabetes Paternal Grandmother    • Hypertension Paternal Grandmother    • Obesity Paternal Grandmother    • Colon cancer Other    • Arthritis Other    • Cancer Other         uncle; liver, lung    • Thyroid disease Cousin    • Thyroid cancer Cousin    • CHAD disease Daughter    • Obesity Brother    • Sleep apnea Brother        Social History     Socioeconomic History   • Marital status:    Tobacco Use   • Smoking status: Never Smoker   • Smokeless tobacco: Never Used   Vaping Use   • Vaping Use: Never used   Substance and Sexual Activity   • Alcohol use: Never   • Drug use: Never   • Sexual activity: Not Currently     Partners: Male     Birth control/protection: Abstinence, Surgical       Brigid Coelho  reports that she has never smoked. She has never used smokeless tobacco..     There were no vitals taken for this visit.    PHYSICAL EXAM  Physical Exam   Constitutional: She is oriented to person, place, and time. She appears well-developed and well-nourished. She does not have a sickly appearance. She does not appear ill. No distress.   HENT:   Head: Normocephalic and atraumatic.   Pulmonary/Chest: Effort normal. No stridor.  No respiratory distress. She no audible wheeze...  Neurological: She is alert and oriented to person, place, and time.   Psychiatric: She has a normal mood and affect.   Vitals reviewed.      Results for orders placed or performed during the hospital encounter of 07/04/22   Blood Culture - Blood, Hand, Left    Specimen: Hand, Left; Blood   Result Value Ref Range    Blood Culture No growth at 5 days    Blood Culture - Blood, Arm, Left    Specimen: Arm, Left; Blood   Result Value Ref Range    Blood Culture No growth at 5 days    Urine Culture - Urine, Urine, Clean Catch    Specimen: Urine, Clean Catch   Result Value Ref Range    Urine Culture 25,000 CFU/mL Mixed [place] Isolated    Comprehensive Metabolic Panel    Specimen: Blood   Result Value Ref Range    Glucose 93  65 - 99 mg/dL    BUN 10 6 - 20 mg/dL    Creatinine 0.70 0.57 - 1.00 mg/dL    Sodium 140 136 - 145 mmol/L    Potassium 3.7 3.5 - 5.2 mmol/L    Chloride 104 98 - 107 mmol/L    CO2 25.0 22.0 - 29.0 mmol/L    Calcium 8.7 8.6 - 10.5 mg/dL    Total Protein 6.9 6.0 - 8.5 g/dL    Albumin 4.00 3.50 - 5.20 g/dL    ALT (SGPT) 19 1 - 33 U/L    AST (SGOT) 19 1 - 32 U/L    Alkaline Phosphatase 88 39 - 117 U/L    Total Bilirubin <0.2 0.0 - 1.2 mg/dL    Globulin 2.9 gm/dL    A/G Ratio 1.4 g/dL    BUN/Creatinine Ratio 14.3 7.0 - 25.0    Anion Gap 11.0 5.0 - 15.0 mmol/L    eGFR 100.4 >60.0 mL/min/1.73   Lactic Acid, Plasma    Specimen: Blood   Result Value Ref Range    Lactate 0.8 0.5 - 2.0 mmol/L   Procalcitonin    Specimen: Blood   Result Value Ref Range    Procalcitonin 0.18 0.00 - 0.25 ng/mL   CBC Auto Differential    Specimen: Blood   Result Value Ref Range    WBC 4.46 3.40 - 10.80 10*3/mm3    RBC 4.04 3.77 - 5.28 10*6/mm3    Hemoglobin 9.0 (L) 12.0 - 15.9 g/dL    Hematocrit 29.9 (L) 34.0 - 46.6 %    MCV 74.0 (L) 79.0 - 97.0 fL    MCH 22.3 (L) 26.6 - 33.0 pg    MCHC 30.1 (L) 31.5 - 35.7 g/dL    RDW 15.5 (H) 12.3 - 15.4 %    RDW-SD 41.9 37.0 - 54.0 fl    MPV 10.2 6.0 - 12.0 fL    Platelets 338 140 - 450 10*3/mm3    Neutrophil % 65.5 42.7 - 76.0 %    Lymphocyte % 16.4 (L) 19.6 - 45.3 %    Monocyte % 14.6 (H) 5.0 - 12.0 %    Eosinophil % 2.0 0.3 - 6.2 %    Basophil % 1.3 0.0 - 1.5 %    Immature Grans % 0.2 0.0 - 0.5 %    Neutrophils, Absolute 2.92 1.70 - 7.00 10*3/mm3    Lymphocytes, Absolute 0.73 0.70 - 3.10 10*3/mm3    Monocytes, Absolute 0.65 0.10 - 0.90 10*3/mm3    Eosinophils, Absolute 0.09 0.00 - 0.40 10*3/mm3    Basophils, Absolute 0.06 0.00 - 0.20 10*3/mm3    Immature Grans, Absolute 0.01 0.00 - 0.05 10*3/mm3    nRBC 0.0 0.0 - 0.2 /100 WBC   Urinalysis With Culture If Indicated - Urine, Clean Catch    Specimen: Urine, Clean Catch   Result Value Ref Range    Color, UA Yellow Yellow, Straw    Appearance, UA Cloudy (A) Clear     pH, UA 5.5 5.0 - 8.0    Specific Gravity, UA 1.045 (H) 1.001 - 1.030    Glucose, UA Negative Negative    Ketones, UA 15 mg/dL (1+) (A) Negative    Bilirubin, UA Negative Negative    Blood, UA Negative Negative    Protein, UA 30 mg/dL (1+) (A) Negative    Leuk Esterase, UA Negative Negative    Nitrite, UA Negative Negative    Urobilinogen, UA 1.0 E.U./dL 0.2 - 1.0 E.U./dL   Urinalysis, Microscopic Only - Urine, Clean Catch    Specimen: Urine, Clean Catch   Result Value Ref Range    RBC, UA 0-2 None Seen, 0-2 /HPF    WBC, UA 6-12 (A) None Seen, 0-2 /HPF    Bacteria, UA 3+ (A) None Seen, Trace /HPF    Squamous Epithelial Cells, UA 3-6 (A) None Seen, 0-2 /HPF    Hyaline Casts, UA 7-12 0 - 6 /LPF    Calcium Oxalate Crystals, UA Large/3+ None Seen /HPF    Mucus, UA Moderate/2+ (A) None Seen, Trace /HPF    Methodology Manual Light Microscopy        Diagnoses and all orders for this visit:    1. Bronchitis (Primary)  -     doxycycline (VIBRAMYCIN) 100 MG capsule; Take 1 capsule by mouth 2 (Two) Times a Day for 7 days.  Dispense: 14 capsule; Refill: 0  -     fluconazole (Diflucan) 150 MG tablet; Take 1 tablet by mouth 1 (One) Time for 1 dose.  Dispense: 1 tablet; Refill: 0      Increase fluids and rest.   Follow up for worsening s/s with PCP or UTC>     FOLLOW-UP  As discussed during visit with PCP/[facility] if no improvement or Urgent Care/Emergency Department if worsening of symptoms    Patient verbalizes understanding of medication dosage, comfort measures, instructions for treatment and follow-up.    Fifi Santana, JATIN  07/24/2022  15:25 EDT    The use of a video visit has been reviewed with the patient and verbal informed consent has been obtained. Myself and Brigid Coelho participated in this visit. The patient is located in [address] [place] [address].    I am located in [place], [place]. Mychart and Zoom were utilized. I spent  15  minutes in the patient's chart for this visit.

## 2022-07-29 ENCOUNTER — TELEPHONE (OUTPATIENT)
Dept: FAMILY MEDICINE CLINIC | Facility: CLINIC | Age: 57
End: 2022-07-29

## 2022-07-29 ENCOUNTER — TELEPHONE (OUTPATIENT)
Dept: ORTHOPEDIC SURGERY | Facility: CLINIC | Age: 57
End: 2022-07-29

## 2022-07-29 DIAGNOSIS — L98.9 SKIN LESION: Primary | ICD-10-CM

## 2022-07-29 NOTE — TELEPHONE ENCOUNTER
Caller: Brigid Coelho    Relationship to patient: Self    Best call back number:     Chief complaint: LEFT ANKLE     Type of visit: FUP     Requested date: ASAP     If rescheduling, when is the original appointment: 8/15/22    Additional notes:PATIENT SAYS HER AND PHYSICAL THERAPIST THINK SOMETHING IS WRONG AND NEEDS TO BE SEEN SOONER.  PLEASE CONTACT PATIENT

## 2022-07-29 NOTE — TELEPHONE ENCOUNTER
Caller: Brigid Coelho    Relationship: Self    Best call back number: [phone]    What is the medical concern/diagnosis: MOES    What specialty or service is being requested: DERMATOLOGY    What is the provider, practice or medical service name: [facility]    What is the office location: [place]    What is the office phone number: [phone]    Any additional details:

## 2022-08-02 DIAGNOSIS — L98.9 SKIN LESION: Primary | ICD-10-CM

## 2022-08-02 NOTE — TELEPHONE ENCOUNTER
PATIENT CALLED IN TO STATE THAT SHE WAS REFERRED TO THE WRONG OFFICE THAT SHE SPECIFICALLY WANTED TO BE REFERRED TO DR ELIZABETH ON [address]. SHE STATED THAT SHE CANNOT WALK UP THE STAIRS AND THAT THE OFFICE SHE WAS REFERRED TO WAS ON THE SECOND FLOOR AND THAT SHE DOES NOT DO ELEVATORS.

## 2022-08-25 ENCOUNTER — OFFICE VISIT (OUTPATIENT)
Dept: ORTHOPEDIC SURGERY | Facility: CLINIC | Age: 57
End: 2022-08-25

## 2022-08-25 VITALS
SYSTOLIC BLOOD PRESSURE: 124 MMHG | WEIGHT: 254 LBS | DIASTOLIC BLOOD PRESSURE: 88 MMHG | BODY MASS INDEX: 39.87 KG/M2 | HEIGHT: 67 IN

## 2022-08-25 DIAGNOSIS — Z87.81 STATUS POST ORIF OF FRACTURE OF ANKLE: Primary | ICD-10-CM

## 2022-08-25 DIAGNOSIS — M25.672 ANKLE STIFFNESS, LEFT: ICD-10-CM

## 2022-08-25 DIAGNOSIS — Z98.890 STATUS POST ORIF OF FRACTURE OF ANKLE: Primary | ICD-10-CM

## 2022-08-25 DIAGNOSIS — S82.852D CLOSED TRIMALLEOLAR FRACTURE OF LEFT ANKLE WITH ROUTINE HEALING, SUBSEQUENT ENCOUNTER: ICD-10-CM

## 2022-08-25 DIAGNOSIS — S93.432D SYNDESMOTIC DISRUPTION OF LEFT ANKLE, SUBSEQUENT ENCOUNTER: ICD-10-CM

## 2022-08-25 DIAGNOSIS — Z98.890 S/P HARDWARE REMOVAL: ICD-10-CM

## 2022-08-25 DIAGNOSIS — M85.80 OSTEOPENIA DUE TO DISUSE: ICD-10-CM

## 2022-08-25 PROCEDURE — 99213 OFFICE O/P EST LOW 20 MIN: CPT | Performed by: PHYSICIAN ASSISTANT

## 2022-08-25 NOTE — PROGRESS NOTES
"    [facility] Note        Subjective     CC: Follow-up (4 month follow up; 5 months s/p syndesmotic screw removal of left ankle 3/4/22)      KEVIN Coelho is a 57 y.o. female.  Patient returns today for follow-up of her left ankle.  Her main complaint is swelling and stiffness.  She states her physical therapist told her at this point this should be very minimal.  She is working with formal PT.    Did not start fully weightbearing on the ankle until May/Susan timeframe.    Pain scale maximum 2/10.  Patient is using a cane to assist with ambulation.  Still has persistent swelling and stiffness to the ankle.  She has had improvement with therapy.  Walking, standing and stair climbing to exacerbate her symptoms.  Resting, sitting, icing and elevating do help    Overall, patient's symptoms are improved.    ROS:    Constiutional:Pt denies fever, chills, nausea, or vomiting.  MSK:as above        Objective      Past Medical History  Past Medical History:   Diagnosis Date   • Acute bronchitis    • Anemia    • Anxiety    • Arthritis of back    • Chest pain    • Cholelithiasis    • Claustrophobia    • Fracture of ankle 10/30/2021   • GERD (gastroesophageal reflux disease)    • Hashimoto's thyroiditis    • Hemorrhoids    • Hypothyroidism    • Low back pain    • Low back strain 10/12/2011   • Lumbosacral disc disease 10/12/2011   • Metrorrhagia    • Palpitations    • Polycystic ovary syndrome    • Polyp of sigmoid colon    • Ulcerative colitis (HCC)    • Upper respiratory infection    • UTI (urinary tract infection)          Physical Exam  /88   Ht 170.2 cm (67.01\")   Wt 115 kg (254 lb)   BMI 39.77 kg/m²     Body mass index is 39.77 kg/m².    Patient is well nourished and well developed.        Ortho Exam  Left ankle  Skin: All surgical incisions well-healed without redness, warmth or evidence of infection.    Motion: Remains stiff.  Dorsiflexion neutral, plantarflexion " approximately 30/40 degrees with overpressure.  Motor/sensory: Grossly intact L2-S1.  Vascular: 2+ PT/DP.      Imaging/Labs/EMG Reviewed:  Ordered left ankle plain films.  Imaging read/interpreted by Dr. Daniels.    Left Ankle X-Ray     Indication: s/p ORIF  Views: AP, Lateral, Mortise     Comparison: Left ankle 5/13/2022     Findings:   Patient is s/p ORIF of the medial and lateral malleoli with evidence of prior fixation of the syndesmosis.  There is complete healing demonstrated.    The implants remain intact  Significant improvement in the appearance of the disuse osteopenia  Soft tissues normal  Mortise: Well aligned but with narrowing of the lateral joint space  Syndesmosis:  no evidence of syndesmosis widening     Impression: s/p ORIF medial and lateral malleoli with evidence of complete radiographic healing and improved appearance of the disuse osteopenia       Assessment:  1. Status post ORIF of fracture of ankle    2. S/P hardware removal    3. Closed trimalleolar fracture of left ankle with routine healing, subsequent encounter    4. Syndesmotic disruption of left ankle, subsequent encounter    5. Ankle stiffness, left    6. 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 Status post left ankle ORIF (trimalleolar fracture) and syndesmotic screw removal for syndesmotic disruption--stable and healed.  2. Ankle stiffness--patient needs aggressive with PT as well as exercises on her own for range of motion and stretching.   3. Disuse osteopenia--walking and weightbearing exercises will help with this.    4. Recommend patient speak with PCP regarding calcium and vitamin D3.  5. Recommend compression socks to help assist with inflammation and swelling.    6. Continue with formal PT working on range of motion stretching and strengthening.  7. Patient is full weight bearing to the left lower extremity.   8. Recommend over-the-counter pain medication as needed.  9. Follow up as needed.  10. Questions and concerns  answered.      Emilia Chavez PA-C  08/30/22  10:37 EDT      Dictated Utilizing Dragon Dictation.

## 2022-08-26 ENCOUNTER — TELEPHONE (OUTPATIENT)
Dept: ORTHOPEDIC SURGERY | Facility: CLINIC | Age: 57
End: 2022-08-26

## 2022-08-26 NOTE — TELEPHONE ENCOUNTER
Patient called stating she was looking into her x-ray and noticed that there was a gap in the joint where cartilage should be and she is wanting to stay proactive. She is wanting to know if injections are an option to fill the gap and maybe help alleviate her pain. Please advise.

## 2022-08-26 NOTE — TELEPHONE ENCOUNTER
Left voicemail letting patient know you cannot see cartilage on an x-ray.  The space she is seeing is her joint space and will appear empty on the x-ray as cartilage is not seen on x-ray. I advised if she wants an MRI, that could be a possibility but that would need to be discussed with her provider.    Dorcas Sauer RT(R)

## 2022-08-30 ENCOUNTER — TELEMEDICINE (OUTPATIENT)
Dept: FAMILY MEDICINE CLINIC | Facility: TELEHEALTH | Age: 57
End: 2022-08-30

## 2022-08-30 DIAGNOSIS — B37.31 VAGINAL YEAST INFECTION: ICD-10-CM

## 2022-08-30 DIAGNOSIS — R39.89 SUSPECTED UTI: Primary | ICD-10-CM

## 2022-08-30 PROCEDURE — 99213 OFFICE O/P EST LOW 20 MIN: CPT | Performed by: NURSE PRACTITIONER

## 2022-08-30 RX ORDER — SULFAMETHOXAZOLE AND TRIMETHOPRIM 800; 160 MG/1; MG/1
1 TABLET ORAL 2 TIMES DAILY
Qty: 10 TABLET | Refills: 0 | Status: SHIPPED | OUTPATIENT
Start: 2022-08-30 | End: 2022-09-04

## 2022-08-30 RX ORDER — FLUCONAZOLE 150 MG/1
150 TABLET ORAL
Qty: 2 TABLET | Refills: 0 | Status: SHIPPED | OUTPATIENT
Start: 2022-08-30 | End: 2022-09-03

## 2022-08-30 NOTE — PROGRESS NOTES
CHIEF COMPLAINT  Chief Complaint   Patient presents with   • Urinary Tract Infection   • Vaginitis         HPI  Brigid Coelho is a 57 y.o. female  presents with complaint of urinary urgency, frequency and burning x 2 days. She also feels she has a yeast infection with symptoms of burning/itching. No vaginal discharge.   She gets bactrim or a cephalosporin and both work. She takes the Bactrim for 5 days. She also gets several doses of Diflucan.     Review of Systems   Constitutional: Negative for fever.   Gastrointestinal: Negative for nausea and vomiting.   Genitourinary: Positive for dysuria, frequency and urgency. Negative for flank pain, hematuria, pelvic pain and vaginal discharge.       Past Medical History:   Diagnosis Date   • Acute bronchitis    • Anemia    • Anxiety    • Arthritis of back    • Chest pain    • Cholelithiasis    • Claustrophobia    • Fracture of ankle 10/30/2021   • GERD (gastroesophageal reflux disease)    • Hashimoto's thyroiditis    • Hemorrhoids    • Hypothyroidism    • Low back pain    • Low back strain 10/12/2011   • Lumbosacral disc disease 10/12/2011   • Metrorrhagia    • Palpitations    • Polycystic ovary syndrome    • Polyp of sigmoid colon    • Ulcerative colitis (HCC)    • Upper respiratory infection    • UTI (urinary tract infection)        Family History   Problem Relation Age of Onset   • Hyperlipidemia Mother    • CHAD disease Mother    • Cancer Mother    • Diabetes Mother    • Heart disease Mother    • Broken bones Mother         Broke both wrists ...different occasions due to falls   • Rheum arthritis Father    • Hyperlipidemia Father    • Heart attack Father 72   • Ovarian cancer Maternal Aunt    • Diabetes Paternal Grandmother    • Hypertension Paternal Grandmother    • Obesity Paternal Grandmother    • Colon cancer Other    • Arthritis Other    • Cancer Other         uncle; liver, lung    • Thyroid disease Cousin    • Thyroid cancer Cousin    • CHAD disease  Daughter    • Obesity Brother    • Sleep apnea Brother        Social History     Socioeconomic History   • Marital status:    Tobacco Use   • Smoking status: Never Smoker   • Smokeless tobacco: Never Used   Vaping Use   • Vaping Use: Never used   Substance and Sexual Activity   • Alcohol use: Never   • Drug use: Never   • Sexual activity: Not Currently     Partners: Male     Birth control/protection: Abstinence, Surgical       Brigid Coelho  reports that she has never smoked. She has never used smokeless tobacco.   There were no vitals taken for this visit.      PHYSICAL EXAM  Physical Exam   Constitutional: She is oriented to person, place, and time. She appears well-developed and well-nourished. She does not have a sickly appearance. She does not appear ill. No distress.   HENT:   Head: Normocephalic and atraumatic.   Eyes: EOM are normal.   Neck: Neck normal appearance.  Pulmonary/Chest: Effort normal.  No respiratory distress.  Neurological: She is alert and oriented to person, place, and time.   Skin: Skin is dry.   Psychiatric: She has a normal mood and affect.         Diagnoses and all orders for this visit:    1. Suspected UTI (Primary)    2. Vaginal yeast infection    Other orders  -     sulfamethoxazole-trimethoprim (Bactrim DS) 800-160 MG per tablet; Take 1 tablet by mouth 2 (Two) Times a Day for 5 days.  Dispense: 10 tablet; Refill: 0  -     fluconazole (Diflucan) 150 MG tablet; Take 1 tablet by mouth Every 3 (Three) Days for 2 doses.  Dispense: 2 tablet; Refill: 0        The use of a video visit has been reviewed with the patient and verbal informd consent has een obtained. Myself and Brigid Coelho participated in this visit. The patient is located in [address] [place] [address]. I am located in [place], [place]. Zigabidt and emids were utilized.     Note Disclaimer: At [facility], we believe that sharing information builds trust and better   relationships.  You are receiving this note because you recently visited [facility]. It is possible you   will see health information before a provider has talked with you about it. This kind of information can   be easy to misunderstand. To help you fully understand what it means for your health, we urge you to   discuss this note with your provider.    JATIN Burns  08/30/2022  11:53 EDT

## 2022-08-30 NOTE — PATIENT INSTRUCTIONS
Drink plenty of water and avoid caffeine  If symptoms do not improve in 3-5 days follow up with your primary care provider or urgent care   Urinary Tract Infection, Adult  A urinary tract infection (UTI) is an infection of any part of the urinary tract. The urinary tract includes:  The kidneys.  The ureters.  The bladder.  The urethra.  These organs make, store, and get rid of pee (urine) in the body.  What are the causes?  This infection is caused by germs (bacteria) in your genital area. These germs grow and cause swelling (inflammation) of your urinary tract.  What increases the risk?  The following factors may make you more likely to develop this condition:  Using a small, thin tube (catheter) to drain pee.  Not being able to control when you pee or poop (incontinence).  Being female. If you are female, these things can increase the risk:  Using these methods to prevent pregnancy:  A medicine that kills sperm (spermicide).  A device that blocks sperm (diaphragm).  Having low levels of a female hormone (estrogen).  Being pregnant.  You are more likely to develop this condition if:  You have genes that add to your risk.  You are sexually active.  You take antibiotic medicines.  You have trouble peeing because of:  A prostate that is bigger than normal, if you are male.  A blockage in the part of your body that drains pee from the bladder.  A kidney stone.  A nerve condition that affects your bladder.  Not getting enough to drink.  Not peeing often enough.  You have other conditions, such as:  Diabetes.  A weak disease-fighting system (immune system).  Sickle cell disease.  Gout.  Injury of the spine.  What are the signs or symptoms?  Symptoms of this condition include:  Needing to pee right away.  Peeing small amounts often.  Pain or burning when peeing.  Blood in the pee.  Pee that smells bad or not like normal.  Trouble peeing.  Pee that is cloudy.  Fluid coming from the vagina, if you are female.  Pain in the  belly or lower back.  Other symptoms include:  Vomiting.  Not feeling hungry.  Feeling mixed up (confused). This may be the first symptom in older adults.  Being tired and grouchy (irritable).  A fever.  Watery poop (diarrhea).  How is this treated?  Taking antibiotic medicine.  Taking other medicines.  Drinking enough water.  In some cases, you may need to see a specialist.  Follow these instructions at home:    Medicines  Take over-the-counter and prescription medicines only as told by your doctor.  If you were prescribed an antibiotic medicine, take it as told by your doctor. Do not stop taking it even if you start to feel better.  General instructions  Make sure you:  Pee until your bladder is empty.  Do not hold pee for a long time.  Empty your bladder after sex.  Wipe from front to back after peeing or pooping if you are a female. Use each tissue one time when you wipe.  Drink enough fluid to keep your pee pale yellow.  Keep all follow-up visits.  Contact a doctor if:  You do not get better after 1-2 days.  Your symptoms go away and then come back.  Get help right away if:  You have very bad back pain.  You have very bad pain in your lower belly.  You have a fever.  You have chills.  You feeling like you will vomit or you vomit.  Summary  A urinary tract infection (UTI) is an infection of any part of the urinary tract.  This condition is caused by germs in your genital area.  There are many risk factors for a UTI.  Treatment includes antibiotic medicines.  Drink enough fluid to keep your pee pale yellow.  This information is not intended to replace advice given to you by your health care provider. Make sure you discuss any questions you have with your health care provider.  Document Revised: 07/30/2021 Document Reviewed: 07/30/2021  [employer] Patient Education © 2021 [employer]

## 2022-09-01 ENCOUNTER — TELEPHONE (OUTPATIENT)
Dept: FAMILY MEDICINE CLINIC | Facility: CLINIC | Age: 57
End: 2022-09-01

## 2022-09-05 DIAGNOSIS — E06.3 HYPOTHYROIDISM DUE TO HASHIMOTO'S THYROIDITIS: ICD-10-CM

## 2022-09-05 DIAGNOSIS — E03.8 HYPOTHYROIDISM DUE TO HASHIMOTO'S THYROIDITIS: ICD-10-CM

## 2022-09-07 RX ORDER — LEVOTHYROXINE SODIUM 88 UG/1
88 TABLET ORAL DAILY
Qty: 90 TABLET | Refills: 0 | Status: SHIPPED | OUTPATIENT
Start: 2022-09-07 | End: 2022-12-13 | Stop reason: SDUPTHER

## 2022-09-15 ENCOUNTER — OFFICE VISIT (OUTPATIENT)
Dept: FAMILY MEDICINE CLINIC | Facility: CLINIC | Age: 57
End: 2022-09-15

## 2022-09-15 VITALS
DIASTOLIC BLOOD PRESSURE: 78 MMHG | TEMPERATURE: 99 F | BODY MASS INDEX: 41.75 KG/M2 | WEIGHT: 266 LBS | HEART RATE: 85 BPM | OXYGEN SATURATION: 98 % | SYSTOLIC BLOOD PRESSURE: 118 MMHG | HEIGHT: 67 IN

## 2022-09-15 DIAGNOSIS — Z76.0 MEDICATION REFILL: ICD-10-CM

## 2022-09-15 DIAGNOSIS — F41.1 GENERALIZED ANXIETY DISORDER: Primary | Chronic | ICD-10-CM

## 2022-09-15 DIAGNOSIS — D22.9 CHANGE IN SKIN MOLE: ICD-10-CM

## 2022-09-15 DIAGNOSIS — J30.2 SEASONAL ALLERGIES: ICD-10-CM

## 2022-09-15 PROBLEM — E66.01 MORBID OBESITY WITH BMI OF 40.0-44.9, ADULT (HCC): Status: RESOLVED | Noted: 2019-11-21 | Resolved: 2022-09-15

## 2022-09-15 PROBLEM — R91.1 LUNG NODULE, SOLITARY: Status: RESOLVED | Noted: 2019-08-21 | Resolved: 2022-09-15

## 2022-09-15 PROCEDURE — 99214 OFFICE O/P EST MOD 30 MIN: CPT | Performed by: NURSE PRACTITIONER

## 2022-09-15 RX ORDER — MONTELUKAST SODIUM 10 MG/1
10 TABLET ORAL
Qty: 90 TABLET | Refills: 0 | Status: SHIPPED | OUTPATIENT
Start: 2022-09-15 | End: 2023-02-24 | Stop reason: SDUPTHER

## 2022-09-15 RX ORDER — ALPRAZOLAM 0.5 MG/1
0.5 TABLET ORAL DAILY PRN
Qty: 30 TABLET | Refills: 0 | Status: SHIPPED | OUTPATIENT
Start: 2022-09-15 | End: 2022-12-13 | Stop reason: SDUPTHER

## 2022-09-15 NOTE — PROGRESS NOTES
Follow Up Office Note     Patient Name: Brigid Coelho  : 1965   MRN: [id number]     Chief Complaint:    Chief Complaint   Patient presents with   • Anxiety     Medication refill   • Suspicious Skin Lesion   • Allergies       History of Present Illness: Brigid Coelho is a 57 y.o. female who presents today for re-evaluation/management chronic WILLIAM. Patient states that her symptoms have been stable on current medication. She states that she is planning to establish with behavioral health services soon for counseling.     Patient requesting referral to dermatology for mole on back of her neck that is increasing in size. She states that the mole has been there for several years but is changing in color as well as getting bigger.    Patient requesting refill on Singulair. She has seasonal allergies.      Subjective      Review of Systems:   Review of Systems   Constitutional: Negative.    HENT: Positive for congestion. Negative for ear pain, sinus pain and sore throat.    Respiratory: Negative.    Cardiovascular: Negative.    Gastrointestinal: Negative.    Skin: Positive for color change (mole on neck).   Allergic/Immunologic: Positive for environmental allergies.   Psychiatric/Behavioral: The patient is nervous/anxious.         Past Medical History:   Past Medical History:   Diagnosis Date   • Acute bronchitis    • Anemia    • Anxiety    • Arthritis of back    • Chest pain    • Cholelithiasis    • Claustrophobia    • Fracture of ankle 10/30/2021   • GERD (gastroesophageal reflux disease)    • Hashimoto's thyroiditis    • Hemorrhoids    • Hypothyroidism    • Low back pain    • Low back strain 10/12/2011   • Lumbosacral disc disease 10/12/2011   • Metrorrhagia    • Palpitations    • Polycystic ovary syndrome    • Polyp of sigmoid colon    • Ulcerative colitis (HCC)    • Upper respiratory infection    • UTI (urinary tract infection)          Medications:     Current Outpatient Medications:    •  acetaminophen (TYLENOL) 650 MG 8 hr tablet, Take 500 mg by mouth Every 8 (Eight) Hours As Needed for Mild Pain ., Disp: , Rfl:   •  ALPRAZolam (Xanax) 0.5 MG tablet, Take 1 tablet by mouth Daily As Needed for Anxiety., Disp: 30 tablet, Rfl: 0  •  ascorbic acid (VITAMIN C) 1000 MG tablet, Take 1,000 mg by mouth Daily., Disp: , Rfl:   •  Biotin 1000 MCG chewable tablet, Chew., Disp: , Rfl:   •  famotidine (PEPCID) 20 MG tablet, Take 20 mg by mouth At Night As Needed for Heartburn., Disp: , Rfl:   •  ferrous sulfate 140 (45 Fe) MG tablet controlled-release tablet, Take 140 mg by mouth Daily With Breakfast., Disp: , Rfl:   •  folic acid (FOLVITE) 400 MCG tablet, Take 400 mcg by mouth Daily., Disp: , Rfl:   •  Garlic 1000 MG capsule, Take 1 tablet by mouth Daily., Disp: , Rfl:   •  ibuprofen (ADVIL,MOTRIN) 400 MG tablet, Take 400 mg by mouth Every 6 (Six) Hours As Needed for Mild Pain ., Disp: , Rfl:   •  levothyroxine (Synthroid) 88 MCG tablet, Take 1 tablet by mouth Daily for 180 days., Disp: 90 tablet, Rfl: 0  •  Loratadine 10 MG capsule, Take 1 tablet by mouth daily., Disp: , Rfl:   •  Melatonin 2.5 MG capsule, Take  by mouth., Disp: , Rfl:   •  mesalamine (APRISO) 0.375 g 24 hr capsule, Take 375 mg by mouth As Needed., Disp: , Rfl:   •  montelukast (SINGULAIR) 10 MG tablet, Take 1 tablet by mouth every night at bedtime., Disp: 90 tablet, Rfl: 0  •  Selenium 200 MCG capsule, Take 1 tablet by mouth Daily., Disp: , Rfl:   •  sertraline (Zoloft) 50 MG tablet, Take 1 tablet by mouth Daily., Disp: 90 tablet, Rfl: 0  •  Vitamin A 2400 MCG (8000 UT) tablet, Take 1 tablet by mouth Daily., Disp: , Rfl:   •  Zinc 25 MG tablet, Take 0.5 tablets by mouth Daily., Disp: , Rfl:     Allergies:   Allergies   Allergen Reactions   • Adhesive Tape      Rash     • Erythromycin Other (See Comments)     Sores in mouth   • Nickel Itching   • Epinephrine Palpitations   • Latex Rash   • Nitroglycerin Palpitations         Objective  "    Physical Exam:  Vital Signs:   Vitals:    09/15/22 1556 09/15/22 1611   BP: 140/82 118/78   BP Location: Right arm    Patient Position: Sitting    Cuff Size: Large Adult    Pulse: 85    Temp: 99 °F (37.2 °C)    TempSrc: Infrared    SpO2: 98%    Weight: 121 kg (266 lb)    Height: 170.2 cm (67.01\")    PainSc: 0-No pain      Body mass index is 41.65 kg/m².     Physical Exam  Vitals and nursing note reviewed.   Constitutional:       General: She is not in acute distress.     Appearance: Normal appearance. She is well-developed. She is not ill-appearing, toxic-appearing or diaphoretic.   HENT:      Head: Normocephalic and atraumatic.   Cardiovascular:      Rate and Rhythm: Normal rate and regular rhythm.      Heart sounds: Normal heart sounds.   Pulmonary:      Effort: Pulmonary effort is normal. No respiratory distress.      Breath sounds: Normal breath sounds. No stridor. No wheezing.   Skin:     General: Skin is warm and dry.          Neurological:      General: No focal deficit present.      Mental Status: She is alert and oriented to person, place, and time.   Psychiatric:         Attention and Perception: Attention and perception normal.         Mood and Affect: Mood and affect normal.         Speech: Speech normal.         Behavior: Behavior normal. Behavior is cooperative.         Thought Content: Thought content normal.         Judgment: Judgment normal.         Assessment / Plan      Assessment/Plan:   Diagnoses and all orders for this visit:    1. Generalized anxiety disorder (Primary)  Assessment & Plan:  Psychological condition is stable.  Continue current treatment regimen. Continue with plan to establish with behavioral health services.  Psychological condition  will be reassessed at the next regular appointment.    Orders:  -     ALPRAZolam (Xanax) 0.5 MG tablet; Take 1 tablet by mouth Daily As Needed for Anxiety.  Dispense: 30 tablet; Refill: 0    2. Change in skin mole  -     Ambulatory Referral to " Dermatology    3. Seasonal allergies  -     montelukast (SINGULAIR) 10 MG tablet; Take 1 tablet by mouth every night at bedtime.  Dispense: 90 tablet; Refill: 0    4. Medication refill  -     ALPRAZolam (Xanax) 0.5 MG tablet; Take 1 tablet by mouth Daily As Needed for Anxiety.  Dispense: 30 tablet; Refill: 0  -     montelukast (SINGULAIR) 10 MG tablet; Take 1 tablet by mouth every night at bedtime.  Dispense: 90 tablet; Refill: 0    ES query complete. Treatment plan to include limited course of prescribed controlled substance. Controlled substance agreement signed.  F/U with PCP Dr. Lo for further refills.      Follow Up:   PRN and at next scheduled appointment(s) with PCP.    Discussed the nature of the medical condition(s) risks, complications, implications, management, safe and proper use of medications. Encouraged medication compliance, and keeping scheduled follow up appointments with me and any other providers.      RTC if symptoms fail to improve, to ER if symptoms worsen.        *Dictated Utilizing Dragon Dictation   Please note that portions of this note were completed with a voice recognition program.   Part of this note may be an electronic transcription/translation of spoken language to printed text using the Dragon Dictation System.          JATIN Welch  [facility] [place]

## 2022-09-16 NOTE — ASSESSMENT & PLAN NOTE
Psychological condition is stable.  Continue current treatment regimen. Continue with plan to establish with behavioral health services.  Psychological condition  will be reassessed at the next regular appointment.

## 2022-10-07 ENCOUNTER — TELEPHONE (OUTPATIENT)
Dept: ORTHOPEDIC SURGERY | Facility: CLINIC | Age: 57
End: 2022-10-07

## 2022-10-07 NOTE — TELEPHONE ENCOUNTER
Attempted to call patient, she called earlier and I am unaware of what she needed. I had sent her a my chart message about her upcoming appt with Dr. Cordoba, and if she needed to change it, she could call our office back. Await call. If she just needs to change the appointment, I do not need to speak with her.

## 2022-10-13 ENCOUNTER — TELEPHONE (OUTPATIENT)
Dept: FAMILY MEDICINE CLINIC | Facility: CLINIC | Age: 57
End: 2022-10-13

## 2022-10-19 ENCOUNTER — TELEPHONE (OUTPATIENT)
Dept: ORTHOPEDIC SURGERY | Facility: CLINIC | Age: 57
End: 2022-10-19

## 2022-10-19 NOTE — TELEPHONE ENCOUNTER
CALLED PATIENT 3X TO RESCHEDULE APPOINTMENT WITH DR PANDYA TO A DIFFERENT DAY. HE WILL BE IN SURGERY ALL DAY ON 10/19. LEFT VOICEMAIL.

## 2022-10-24 ENCOUNTER — OFFICE VISIT (OUTPATIENT)
Dept: ORTHOPEDIC SURGERY | Facility: CLINIC | Age: 57
End: 2022-10-24

## 2022-10-24 VITALS
DIASTOLIC BLOOD PRESSURE: 74 MMHG | HEIGHT: 67 IN | SYSTOLIC BLOOD PRESSURE: 134 MMHG | BODY MASS INDEX: 39.74 KG/M2 | WEIGHT: 253.2 LBS

## 2022-10-24 DIAGNOSIS — M25.672 ANKLE STIFFNESS, LEFT: Primary | ICD-10-CM

## 2022-10-24 DIAGNOSIS — M21.862 GASTROCNEMIUS EQUINUS, LEFT: ICD-10-CM

## 2022-10-24 PROBLEM — M62.462 GASTROCNEMIUS EQUINUS, LEFT: Status: ACTIVE | Noted: 2022-10-24

## 2022-10-24 PROCEDURE — 99213 OFFICE O/P EST LOW 20 MIN: CPT | Performed by: ORTHOPAEDIC SURGERY

## 2022-10-24 NOTE — PROGRESS NOTES
[facility] Office Visit     Office Visit     Date: 10/24/2022   Patient Name: Brigid Coelho  MRN: [id number]  YOB: 1965    Chief Complaint:   Chief Complaint   Patient presents with   • Left Ankle - Pain     Status post ORIF of fracture of ankle        Referring Physician: No ref. provider found     History of Present Illness: Brigid Coelho is a 57 y.o. female who is here today For chief complaint of left ankle stiffness.  Denies pain.  Is approximately 1 year out from ORIF for trimalleolar fracture.  Subsequently had surgery about 1 month later for fixation across the syndesmosis and then underwent a third surgery for removal of syndesmotic screws.  Has been involved in physical therapy for the last several months focusing particularly on ankle range of motion.  Currently unhappy with her lack of motion.    Subjective   Review of Systems   Constitutional: Positive for activity change. Negative for chills, fatigue and fever.   HENT: Positive for dental problem and ear discharge. Negative for congestion.    Eyes: Negative.  Negative for blurred vision.   Respiratory: Negative.  Negative for shortness of breath.    Cardiovascular: Negative.  Negative for leg swelling.   Gastrointestinal: Positive for anal bleeding, blood in stool and diarrhea. Negative for abdominal pain.   Endocrine: Positive for cold intolerance. Negative for polyuria.   Genitourinary: Positive for frequency and pelvic pain. Negative for difficulty urinating.   Musculoskeletal: Positive for arthralgias, back pain and gait problem.   Skin: Negative.    Allergic/Immunologic: Positive for environmental allergies and food allergies.   Hematological: Negative.  Negative for adenopathy.   Psychiatric/Behavioral: Negative for behavioral problems. The patient is nervous/anxious.         Past Medical History:   Past Medical History:    Diagnosis Date   • Acute bronchitis    • Anemia    • Anxiety    • Arthritis of back    • Chest pain    • Cholelithiasis    • Claustrophobia    • Fracture of ankle 10/30/2021   • GERD (gastroesophageal reflux disease)    • Hashimoto's thyroiditis    • Hemorrhoids    • Hypothyroidism    • Low back pain    • Low back strain 10/12/2011   • Lumbosacral disc disease 10/12/2011   • Metrorrhagia    • Palpitations    • Polycystic ovary syndrome    • Polyp of sigmoid colon    • Ulcerative colitis (HCC)    • Upper respiratory infection    • UTI (urinary tract infection)        Past Surgical History:   Past Surgical History:   Procedure Laterality Date   • ANKLE OPEN REDUCTION INTERNAL FIXATION Left 2021    Procedure: TRIMALLEOLAR FRACTURE OPEN REDUCTION INTERNAL FIXATION LEFT;  Surgeon: Giovani Daniels MD;  Location: [facility];  Service: Orthopedics;  Laterality: Left;   • ANKLE OPEN REDUCTION INTERNAL FIXATION Left 2021    s/p ORIF L ankle Dr. Daniels [facility]   • APPENDECTOMY     • CARDIAC CATHETERIZATION     •  SECTION      x 2   • CHOLECYSTECTOMY     • COLONOSCOPY     • DILATATION AND CURETTAGE      Of Cervical Stump   • HARDWARE REMOVAL FOOT / ANKLE Left 2022    syndesmotic screw removal Dr. Daniels   • MYOMECTOMY     • SMALL INTESTINE SURGERY     • TUBAL ABDOMINAL LIGATION         Family History:   Family History   Problem Relation Age of Onset   • Hyperlipidemia Mother    • CHAD disease Mother    • Cancer Mother    • Diabetes Mother    • Heart disease Mother    • Broken bones Mother         Broke both wrists ...different occasions due to falls   • Rheum arthritis Father    • Hyperlipidemia Father    • Heart attack Father 72   • Rheumatologic disease Father         My dad has severe RA   • Ovarian cancer Maternal Aunt    • Diabetes Paternal Grandmother    • Hypertension Paternal Grandmother    • Obesity Paternal Grandmother    • Colon cancer Other    • Arthritis Other    • Cancer Other          uncle; liver, lung    • Thyroid disease Cousin    • Thyroid cancer Cousin    • CHAD disease Daughter    • Obesity Brother    • Sleep apnea Brother        Social History:   Social History     Socioeconomic History   • Marital status:    Tobacco Use   • Smoking status: Never   • Smokeless tobacco: Never   Vaping Use   • Vaping Use: Never used   Substance and Sexual Activity   • Alcohol use: Never   • Drug use: Never   • Sexual activity: Not Currently     Partners: Male     Birth control/protection: Surgical, Abstinence, Tubal ligation       Medications:   Current Outpatient Medications:   •  Alpha-Lipoic Acid (LIPOIC ACID PO), , Disp: , Rfl:   •  acetaminophen (TYLENOL) 650 MG 8 hr tablet, Take 500 mg by mouth Every 8 (Eight) Hours As Needed for Mild Pain ., Disp: , Rfl:   •  ALPRAZolam (Xanax) 0.5 MG tablet, Take 1 tablet by mouth Daily As Needed for Anxiety., Disp: 30 tablet, Rfl: 0  •  ascorbic acid (VITAMIN C) 1000 MG tablet, Take 1,000 mg by mouth Daily., Disp: , Rfl:   •  Biotin 1000 MCG chewable tablet, Chew., Disp: , Rfl:   •  famotidine (PEPCID) 20 MG tablet, Take 20 mg by mouth At Night As Needed for Heartburn., Disp: , Rfl:   •  ferrous sulfate 140 (45 Fe) MG tablet controlled-release tablet, Take 140 mg by mouth Daily With Breakfast., Disp: , Rfl:   •  folic acid (FOLVITE) 400 MCG tablet, Take 400 mcg by mouth Daily., Disp: , Rfl:   •  Garlic 1000 MG capsule, Take 1 tablet by mouth Daily., Disp: , Rfl:   •  ibuprofen (ADVIL,MOTRIN) 400 MG tablet, Take 400 mg by mouth Every 6 (Six) Hours As Needed for Mild Pain ., Disp: , Rfl:   •  levothyroxine (Synthroid) 88 MCG tablet, Take 1 tablet by mouth Daily for 180 days., Disp: 90 tablet, Rfl: 0  •  Loratadine 10 MG capsule, Take 1 tablet by mouth daily., Disp: , Rfl:   •  Melatonin 2.5 MG capsule, Take  by mouth., Disp: , Rfl:   •  mesalamine (APRISO) 0.375 g 24 hr capsule, Take 375 mg by mouth As Needed., Disp: , Rfl:   •  montelukast (SINGULAIR)  "10 MG tablet, Take 1 tablet by mouth every night at bedtime., Disp: 90 tablet, Rfl: 0  •  Selenium 200 MCG capsule, Take 1 tablet by mouth Daily., Disp: , Rfl:   •  sertraline (Zoloft) 50 MG tablet, Take 1 tablet by mouth Daily., Disp: 90 tablet, Rfl: 0  •  Vitamin A 2400 MCG (8000 UT) tablet, Take 1 tablet by mouth Daily., Disp: , Rfl:   •  Zinc 25 MG tablet, Take 0.5 tablets by mouth Daily., Disp: , Rfl:     Allergies:   Allergies   Allergen Reactions   • Adhesive Tape      Rash     • Erythromycin Other (See Comments)     Sores in mouth   • Nickel Itching   • Epinephrine Palpitations   • Latex Rash   • Nitroglycerin Palpitations       I reviewed the patient's chief complaint, history of present illness, review of systems, past medical history, surgical history, family history, social history, medications and allergy list   Objective    Vital Signs:   Vitals:    10/24/22 1507   BP: 134/74   Weight: 115 kg (253 lb 3.2 oz)   Height: 170.2 cm (67.01\")       Ortho Exam:  left LE Foot and Ankle Exam:   Normal gait pattern. Hindfoot alignment is neutral. Plantigrade foot.   Neurological exam of the superficial peroneal, deep peroneal, plantar, sural and saphenous nerves demonstrates intact sensation and normal motor function.   Peripheral pulses including posterior tibial artery and deep peroneal artery are intact and palpable. There is good perfusion to the toes.   The skin is intact throughout the foot and ankle without ulceration.   Range of motion of midfoot and toes is within normal limits.   There is no tenderness to palpation.  Incisions all well-healed.    Positive Silfverskiold test.  Ankle dorsiflexion to just shy of neutral, plantar flexion to about 30 degrees.  No pain with terminal ankle dorsiflexion.  No palpable mechanical block with terminal ankle dorsiflexion.    Results Review:   XR Ankle 3+ View Left  Left Ankle X-Ray    Indication: s/p ORIF  Views: AP, Lateral, Mortise    Comparison: Left ankle " 5/13/2022    Findings:   Patient is s/p ORIF of the medial and lateral malleoli with evidence of   prior fixation of the syndesmosis.  There is complete healing   demonstrated.    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 Ankle stiffness, left (Primary)  -     Ambulatory Referral to Physical Therapy Evaluate and treat, Ortho    2. Gastrocnemius equinus, left  -     Ambulatory Referral to Physical Therapy Evaluate and treat, Ortho      Discussed pathology with patient as well as treatment options at length.  Although patient is hoping that she can achieve some additional ankle range of motion did  patient that her postoperative course following her severe ankle fracture is pain-free at less than 1 year postop which is a very remarkable thing.  Patient did inquire about removing hardware which could be done however I do not feel that removal of hardware would likely improve patient range of motion.  Arthroscopic ankle debridement could be considered however additional surgery could create the formation of additional scar tissue and exacerbate her symptoms of stiffness.  This exam points to tight posterior structures as opposed to anterior tibiotalar joint mechanical block.  Recommend continuing therapy focusing on stretching and strengthening with follow-up as needed.    Follow Up:   No follow-ups on file.      José Manuel Cordoba,  MD  [facility] [place]

## 2022-10-28 DIAGNOSIS — S82.852D CLOSED TRIMALLEOLAR FRACTURE OF LEFT ANKLE WITH ROUTINE HEALING, SUBSEQUENT ENCOUNTER: ICD-10-CM

## 2022-10-28 DIAGNOSIS — M25.672 ANKLE STIFFNESS, LEFT: Primary | ICD-10-CM

## 2022-10-28 DIAGNOSIS — Z98.890 STATUS POST ORIF OF FRACTURE OF ANKLE: ICD-10-CM

## 2022-10-28 DIAGNOSIS — S93.432D SYNDESMOTIC DISRUPTION OF LEFT ANKLE, SUBSEQUENT ENCOUNTER: ICD-10-CM

## 2022-10-28 DIAGNOSIS — Z87.81 STATUS POST ORIF OF FRACTURE OF ANKLE: ICD-10-CM

## 2022-10-28 DIAGNOSIS — Z98.890 S/P HARDWARE REMOVAL: ICD-10-CM

## 2022-11-02 ENCOUNTER — TELEPHONE (OUTPATIENT)
Dept: ORTHOPEDIC SURGERY | Facility: CLINIC | Age: 57
End: 2022-11-02

## 2022-11-02 NOTE — TELEPHONE ENCOUNTER
Per Dr. Daniels, he placed a referral for Ms. Coelho to see Dr. Martinez as she requested.  Dr. Daniels contacted Ms. Coelho on Friday, 10/28/22 and left her a message informing her of the referral. I tried to reach Ms. Coelho on 2 occasions today but her mobile number seems to be disconnected or not working.  I tried to call the home number on file but it only rang and no option to leave a message.  Calling to see if she received Dr. Daniels's message.  She is more than welcome to contact us should she need further assistance.

## 2022-12-08 ENCOUNTER — TELEMEDICINE (OUTPATIENT)
Dept: FAMILY MEDICINE CLINIC | Facility: TELEHEALTH | Age: 57
End: 2022-12-08

## 2022-12-08 DIAGNOSIS — J02.0 STREP THROAT: Primary | ICD-10-CM

## 2022-12-08 DIAGNOSIS — K04.7 DENTAL INFECTION: ICD-10-CM

## 2022-12-08 PROCEDURE — 99213 OFFICE O/P EST LOW 20 MIN: CPT | Performed by: NURSE PRACTITIONER

## 2022-12-08 RX ORDER — AMOXICILLIN AND CLAVULANATE POTASSIUM 875; 125 MG/1; MG/1
1 TABLET, FILM COATED ORAL 2 TIMES DAILY
Qty: 20 TABLET | Refills: 0 | Status: SHIPPED | OUTPATIENT
Start: 2022-12-08 | End: 2022-12-18

## 2022-12-08 NOTE — PROGRESS NOTES
You have chosen to receive care through a telehealth visit.  Do you consent to use a video/audio connection for your medical care today? Yes     CHIEF COMPLAINT  No chief complaint on file.        HPI  Brigid Coelho is a 57 y.o. female  presents with complaint of exposed to strep throat by granddaughter. 1 day history of possible fever, lightheadedness this morning, also has dental infection.  Has dental surgery on 12/27/22    Review of Systems   See HPI    Past Medical History:   Diagnosis Date   • Acute bronchitis    • Anemia    • Anxiety    • Arthritis of back    • Chest pain    • Cholelithiasis    • Claustrophobia    • Fracture of ankle 10/30/2021   • GERD (gastroesophageal reflux disease)    • Hashimoto's thyroiditis    • Hemorrhoids    • Hypothyroidism    • Low back pain    • Low back strain 10/12/2011   • Lumbosacral disc disease 10/12/2011   • Metrorrhagia    • Palpitations    • Polycystic ovary syndrome    • Polyp of sigmoid colon    • Ulcerative colitis (HCC)    • Upper respiratory infection    • UTI (urinary tract infection)        Family History   Problem Relation Age of Onset   • Hyperlipidemia Mother    • CHAD disease Mother    • Cancer Mother    • Diabetes Mother    • Heart disease Mother    • Broken bones Mother         Broke both wrists ...different occasions due to falls   • Rheum arthritis Father    • Hyperlipidemia Father    • Heart attack Father 72   • Rheumatologic disease Father         My dad has severe RA   • Ovarian cancer Maternal Aunt    • Diabetes Paternal Grandmother    • Hypertension Paternal Grandmother    • Obesity Paternal Grandmother    • Colon cancer Other    • Arthritis Other    • Cancer Other         uncle; liver, lung    • Thyroid disease Cousin    • Thyroid cancer Cousin    • CHAD disease Daughter    • Obesity Brother    • Sleep apnea Brother        Social History     Socioeconomic History   • Marital status:    Tobacco Use   • Smoking status: Never   •  Smokeless tobacco: Never   Vaping Use   • Vaping Use: Never used   Substance and Sexual Activity   • Alcohol use: Never   • Drug use: Never   • Sexual activity: Not Currently     Partners: Male     Birth control/protection: Surgical, Abstinence, Tubal ligation       Brigid Coelho  reports that she has never smoked. She has never used smokeless tobacco..              There were no vitals taken for this visit.    PHYSICAL EXAM  Physical Exam   Constitutional: She is oriented to person, place, and time. She appears well-developed and well-nourished. She does not have a sickly appearance. She does not appear ill.   HENT:   Head: Normocephalic and atraumatic.   Pulmonary/Chest: Effort normal.  No respiratory distress.  Neurological: She is alert and oriented to person, place, and time.         Diagnoses and all orders for this visit:    1. Strep throat (Primary)  -     amoxicillin-clavulanate (AUGMENTIN) 875-125 MG per tablet; Take 1 tablet by mouth 2 (Two) Times a Day for 10 days.  Dispense: 20 tablet; Refill: 0    2. Dental infection  -     amoxicillin-clavulanate (AUGMENTIN) 875-125 MG per tablet; Take 1 tablet by mouth 2 (Two) Times a Day for 10 days.  Dispense: 20 tablet; Refill: 0    --take medications as prescribed  --increase fluids, rest as needed, tylenol or ibuprofen for pain  --f/u in 5-7 days if no improvement        FOLLOW-UP  As discussed during visit with PCP/[facility] [place] if no improvement or Urgent Care/Emergency Department if worsening of symptoms    Patient verbalizes understanding of medication dosage, comfort measures, instructions for treatment and follow-up.    Janell Hernández, JATIN  12/08/2022  16:55 EST    The use of a video visit has been reviewed with the patient and verbal informed consent has been obtained. Myself and Brigid Coelho participated in this visit. The patient is located in [address] [place] [address].    I am located in [place], [place]. [facility] and  Zoom were utilized. I spent 8 minutes in the patient's chart for this visit.

## 2022-12-08 NOTE — PATIENT INSTRUCTIONS
Dental Abscess  A dental abscess is an infection around a tooth that may involve pain, swelling, and a collection of pus, as well as other symptoms. Treatment is important to help with symptoms and to prevent the infection from spreading.  The general types of dental abscesses are:  Pulpal abscess. This abscess may form from the inner part of the tooth (pulp).  Periodontal abscess. This abscess may form from the gum.  What are the causes?  This condition is caused by a bacterial infection in or around the tooth. It may result from:  Severe tooth decay (cavities).  Trauma to the tooth, such as a broken or chipped tooth.  What increases the risk?  This condition is more likely to develop in males. It is also more likely to develop in people who:  Have cavities.  Have severe gum disease.  Eat sugary snacks between meals.  Use tobacco products.  Have diabetes.  Have a weakened disease-fighting system (immune system).  Do not brush and care for their teeth regularly.  What are the signs or symptoms?  Mild symptoms of this condition include:  Tenderness.  Bad breath.  Fever.  A bitter taste in the mouth.  Pain in and around the infected tooth.  Moderate symptoms of this condition include:  Swollen neck glands.  Chills.  Pus drainage.  Swelling and redness around the infected tooth, in the mouth, or in the face.  Severe pain in and around the infected tooth.  Severe symptoms of this condition include:  Difficulty swallowing.  Difficulty opening the mouth.  Nausea.  Vomiting.  How is this diagnosed?  This condition is diagnosed based on:  Your symptoms and your medical and dental history.  An examination of the infected tooth. During the exam, your dental care provider may tap on the infected tooth.  You may also need to have X-rays taken of the affected area.  How is this treated?  This condition is treated by getting rid of the infection. This may be done with:  Antibiotic medicines. These may be used in certain  situations.  Antibacterial mouth rinse.  Incision and drainage. This procedure is done by making an incision in the abscess to drain out the pus. Removing pus is the first priority in treating an abscess.  A root canal. This may be performed to save the tooth. Your dental care provider accesses the visible part of your tooth (crown) with a drill and removes any infected pulp. Then the space is filled and sealed off.  Tooth extraction. The tooth is pulled out if it cannot be saved by other treatment.  You may also receive treatment for pain, such as:  Acetaminophen or NSAIDs.  Gels that contain a numbing medicine.  An injection to block the pain near your nerve.  Follow these instructions at home:  Medicines  Take over-the-counter and prescription medicines only as told by your dental care provider.  If you were prescribed an antibiotic, take it as told by your dental care provider. Do not stop taking the antibiotic even if you start to feel better.  If you were prescribed a gel that contains a numbing medicine, use it exactly as told in the directions. Do not use these gels for children who are younger than 2 years of age.  Use an antibacterial mouth rinse as told by your dental care provider.  General instructions    Gargle with a mixture of salt and water 3-4 times a day or as needed. To make salt water, completely dissolve ½-1 tsp (3-6 g) of salt in 1 cup (237 mL) of warm water.  Eat a soft diet while your abscess is healing.  Drink enough fluid to keep your urine pale yellow.  Do not apply heat to the outside of your mouth.  Do not use any products that contain nicotine or tobacco. These products include cigarettes, chewing tobacco, and vaping devices, such as e-cigarettes. If you need help quitting, ask your dental care provider.  Keep all follow-up visits. This is important.  How is this prevented?    Excellent dental home care, which includes brushing your teeth every morning and night with fluoride  toothpaste. Floss one time each day.  Get regularly scheduled dental cleanings.  Consider having a dental sealant applied on teeth that have deep grooves to prevent cavities.  Drink fluoridated water regularly. This includes most tap water. Check the label on bottled water to see if it contains fluoride.  Reduce or eliminate sugary drinks.  Eat healthy meals and snacks.  Wear a mouth guard or face shield to protect your teeth while playing sports.  Contact a health care provider if:  Your pain is worse and is not helped by medicine.  You have swelling.  You see pus around the tooth.  You have a fever or chills.  Get help right away if:  Your symptoms suddenly get worse.  You have a very bad headache.  You have problems breathing or swallowing.  You have trouble opening your mouth.  You have swelling in your neck or around your eye.  These symptoms may represent a serious problem that is an emergency. Do not wait to see if the symptoms will go away. Get medical help right away. Call your local emergency services (911 in the [place] Do not drive yourself to the hospital.  Summary  A dental abscess is a collection of pus in or around a tooth that results from an infection.  A dental abscess may result from severe tooth decay, trauma to the tooth, or severe gum disease around a tooth.  Symptoms include severe pain, swelling, redness, and drainage of pus in and around the infected tooth.  The first priority in treating a dental abscess is to drain out the pus. Treatment may also involve removing damage inside the tooth (root canal) or extracting the tooth.  This information is not intended to replace advice given to you by your health care provider. Make sure you discuss any questions you have with your health care provider.  Document Revised: 02/24/2022 Document Reviewed: 02/24/2022  Elsevier Patient Education © 2022 [employer]  Strep Throat, Adult  Strep throat is an infection in the throat that is caused by  bacteria. It is common during the cold months of the year. It mostly affects children who are 5-15 years old. However, people of all ages can get it at any time of the year. This infection spreads from person to person (is contagious) through coughing, sneezing, or having close contact.  Your health care provider may use other names to describe the infection. When strep throat affects the tonsils, it is called tonsillitis. When it affects the back of the throat, it is called pharyngitis.  What are the causes?  This condition is caused by the Streptococcus pyogenes bacteria.  What increases the risk?  You are more likely to develop this condition if:  You care for school-age children, or are around school-age children. Children are more likely to get strep throat and may spread it to others.  You spend time in crowded places where the infection can spread easily.  You have close contact with someone who has strep throat.  What are the signs or symptoms?  Symptoms of this condition include:  Fever or chills.  Redness, swelling, or pain in the tonsils or throat.  Pain or difficulty when swallowing.  White or yellow spots on the tonsils or throat.  Tender glands in the neck and under the jaw.  Bad smelling breath.  Red rash all over the body. This is rare.  How is this diagnosed?  This condition is diagnosed by tests that check for the presence and the amount of bacteria that cause strep throat. They are:  Rapid strep test. Your throat is swabbed and checked for the presence of bacteria. Results are usually ready in minutes.  Throat culture test. Your throat is swabbed. The sample is placed in a cup that allows infections to grow. Results are usually ready in 1 or 2 days.  How is this treated?  This condition may be treated with:  Medicines that kill germs (antibiotics).  Medicines that relieve pain or fever. These include:  Ibuprofen or acetaminophen.  Aspirin, only for people who are over the age of 18.  Throat  lozenges.  Throat sprays.  Follow these instructions at home:  Medicines    Take over-the-counter and prescription medicines only as told by your health care provider.  Take your antibiotic medicine as told by your health care provider. Do not stop taking the antibiotic even if you start to feel better.  Eating and drinking    If you have trouble swallowing, try eating soft foods until your sore throat feels better.  Drink enough fluid to keep your urine pale yellow.  To help relieve pain, you may have:  Warm fluids, such as soup and tea.  Cold fluids, such as frozen desserts or popsicles.  General instructions  Gargle with a salt-water mixture 3-4 times a day or as needed. To make a salt-water mixture, completely dissolve ½-1 tsp (3-6 g) of salt in 1 cup (237 mL) of warm water.  Get plenty of rest.  Stay home from work or school until you have been taking antibiotics for 24 hours.  Do not use any products that contain nicotine or tobacco. These products include cigarettes, chewing tobacco, and vaping devices, such as e-cigarettes. If you need help quitting, ask your health care provider.  It is up to you to get your test results. Ask your health care provider, or the department that is doing the test, when your results will be ready.  Keep all follow-up visits. This is important.  How is this prevented?    Do not share food, drinking cups, or personal items that could cause the infection to spread to other people.  Wash your hands often with soap and water for at least 20 seconds. If soap and water are not available, use hand . Make sure that all people in your house wash their hands well.  Have family members tested if they have a sore throat or fever. They may need an antibiotic if they have strep throat.  Contact a health care provider if:  You have swelling in your neck that keeps getting bigger.  You develop a rash, cough, or earache.  You cough up a thick mucus that is green, yellow-brown, or  bloody.  You have pain or discomfort that does not get better with medicine.  Your symptoms seem to be getting worse.  You have a fever.  Get help right away if:  You have new symptoms, such as vomiting, severe headache, stiff or painful neck, chest pain, or shortness of breath.  You have severe throat pain, drooling, or changes in your voice.  You have swelling of the neck, or the skin on the neck becomes red and tender.  You have signs of dehydration, such as tiredness (fatigue), dry mouth, and decreased urination.  You become increasingly sleepy, or you cannot wake up completely.  Your joints become red or painful.  These symptoms may represent a serious problem that is an emergency. Do not wait to see if the symptoms will go away. Get medical help right away. Call your local emergency services (911 in the [place] Do not drive yourself to the hospital.  Summary  Strep throat is an infection in the throat that is caused by the Streptococcus pyogenes bacteria. This infection is spread from person to person (is contagious) through coughing, sneezing, or having close contact.  Take your medicines, including antibiotics, as told by your health care provider. Do not stop taking the antibiotic even if you start to feel better.  To prevent the spread of germs, wash your hands well with soap and water. Have others do the same. Do not share food, drinking cups, or personal items.  Get help right away if you have new symptoms, such as vomiting, severe headache, stiff or painful neck, chest pain, or shortness of breath.  This information is not intended to replace advice given to you by your health care provider. Make sure you discuss any questions you have with your health care provider.  Document Revised: 04/12/2022 Document Reviewed: 04/12/2022  Elsevier Patient Education © 2022 [employer] Inc[employer]

## 2022-12-13 ENCOUNTER — OFFICE VISIT (OUTPATIENT)
Dept: FAMILY MEDICINE CLINIC | Facility: CLINIC | Age: 57
End: 2022-12-13

## 2022-12-13 VITALS
OXYGEN SATURATION: 98 % | TEMPERATURE: 98.1 F | WEIGHT: 252.2 LBS | HEIGHT: 67 IN | SYSTOLIC BLOOD PRESSURE: 128 MMHG | DIASTOLIC BLOOD PRESSURE: 78 MMHG | HEART RATE: 87 BPM | BODY MASS INDEX: 39.58 KG/M2

## 2022-12-13 DIAGNOSIS — E06.3 HYPOTHYROIDISM DUE TO HASHIMOTO'S THYROIDITIS: ICD-10-CM

## 2022-12-13 DIAGNOSIS — Z23 NEED FOR INFLUENZA VACCINATION: ICD-10-CM

## 2022-12-13 DIAGNOSIS — F41.1 GENERALIZED ANXIETY DISORDER: Primary | Chronic | ICD-10-CM

## 2022-12-13 DIAGNOSIS — F41.9 ANXIETY: Chronic | ICD-10-CM

## 2022-12-13 DIAGNOSIS — E03.8 HYPOTHYROIDISM DUE TO HASHIMOTO'S THYROIDITIS: ICD-10-CM

## 2022-12-13 DIAGNOSIS — R73.03 PREDIABETES: ICD-10-CM

## 2022-12-13 LAB
EXPIRATION DATE: NORMAL
HBA1C MFR BLD: 5.7 %
Lab: NORMAL

## 2022-12-13 PROCEDURE — 83036 HEMOGLOBIN GLYCOSYLATED A1C: CPT | Performed by: PHYSICIAN ASSISTANT

## 2022-12-13 PROCEDURE — 99213 OFFICE O/P EST LOW 20 MIN: CPT | Performed by: PHYSICIAN ASSISTANT

## 2022-12-13 PROCEDURE — G0008 ADMIN INFLUENZA VIRUS VAC: HCPCS | Performed by: PHYSICIAN ASSISTANT

## 2022-12-13 PROCEDURE — 3044F HG A1C LEVEL LT 7.0%: CPT | Performed by: PHYSICIAN ASSISTANT

## 2022-12-13 PROCEDURE — 90686 IIV4 VACC NO PRSV 0.5 ML IM: CPT | Performed by: PHYSICIAN ASSISTANT

## 2022-12-13 RX ORDER — LEVOTHYROXINE SODIUM 88 UG/1
88 TABLET ORAL DAILY
Qty: 90 TABLET | Refills: 1 | Status: SHIPPED | OUTPATIENT
Start: 2022-12-13 | End: 2023-06-11

## 2022-12-13 RX ORDER — AMOXICILLIN 500 MG/1
CAPSULE ORAL
COMMUNITY
Start: 2022-10-31 | End: 2022-12-23

## 2022-12-13 RX ORDER — ALPRAZOLAM 0.5 MG/1
0.5 TABLET ORAL DAILY PRN
Qty: 30 TABLET | Refills: 0 | Status: SHIPPED | OUTPATIENT
Start: 2022-12-13 | End: 2023-03-16 | Stop reason: SDUPTHER

## 2022-12-13 NOTE — PROGRESS NOTES
Follow Up Office Visit      Date: 2022   Patient Name: Brigid Coelho  : 1965   MRN: [id number]     Chief Complaint:    Chief Complaint   Patient presents with   • Anxiety     Med refill  Refill her xanax       History of Present Illness: Brigid Coelho is a 57 y.o. female who is here today to follow up on anxiety.  She needs a refill of her medications sertraline Xanax and Synthroid.  She does see an endocrinologist for the levothyroxine but does not see her until about February and she needs medication to last until then.  She has been on the Xanax for a while, given by her PCP Dr. Lo.  Looks like she refills this every 3 to 4 months for #30 with 0.  The sertraline is working well.    She also has prediabetes.  She does need her A1c checked today.      Subjective      Review of systems:  Review of Systems   Constitutional: Negative for fatigue and fever.   HENT: Negative for trouble swallowing.    Eyes: Negative for visual disturbance.   Respiratory: Negative for shortness of breath.    Cardiovascular: Negative for chest pain and leg swelling.   Psychiatric/Behavioral: The patient is nervous/anxious.         I have reviewed and the following portions of the patient's history were updated as appropriate: past family history, past medical history, past social history, past surgical history and problem list.    Medications:     Current Outpatient Medications:   •  acetaminophen (TYLENOL) 650 MG 8 hr tablet, Take 500 mg by mouth Every 8 (Eight) Hours As Needed for Mild Pain ., Disp: , Rfl:   •  Alpha-Lipoic Acid (LIPOIC ACID PO), , Disp: , Rfl:   •  ALPRAZolam (Xanax) 0.5 MG tablet, Take 1 tablet by mouth Daily As Needed for Anxiety., Disp: 30 tablet, Rfl: 0  •  amoxicillin-clavulanate (AUGMENTIN) 875-125 MG per tablet, Take 1 tablet by mouth 2 (Two) Times a Day for 10 days., Disp: 20 tablet, Rfl: 0  •  ascorbic acid (VITAMIN C) 1000 MG tablet, Take 1,000 mg by mouth Daily.,  "Disp: , Rfl:   •  Biotin 1000 MCG chewable tablet, Chew., Disp: , Rfl:   •  famotidine (PEPCID) 20 MG tablet, Take 20 mg by mouth At Night As Needed for Heartburn., Disp: , Rfl:   •  ferrous sulfate 140 (45 Fe) MG tablet controlled-release tablet, Take 140 mg by mouth Daily With Breakfast., Disp: , Rfl:   •  folic acid (FOLVITE) 400 MCG tablet, Take 400 mcg by mouth Daily., Disp: , Rfl:   •  Garlic 1000 MG capsule, Take 1 tablet by mouth Daily., Disp: , Rfl:   •  ibuprofen (ADVIL,MOTRIN) 400 MG tablet, Take 400 mg by mouth Every 6 (Six) Hours As Needed for Mild Pain ., Disp: , Rfl:   •  levothyroxine (Synthroid) 88 MCG tablet, Take 1 tablet by mouth Daily for 180 days., Disp: 90 tablet, Rfl: 1  •  Loratadine 10 MG capsule, Take 1 tablet by mouth daily., Disp: , Rfl:   •  Melatonin 2.5 MG capsule, Take  by mouth., Disp: , Rfl:   •  mesalamine (APRISO) 0.375 g 24 hr capsule, Take 375 mg by mouth As Needed., Disp: , Rfl:   •  montelukast (SINGULAIR) 10 MG tablet, Take 1 tablet by mouth every night at bedtime., Disp: 90 tablet, Rfl: 0  •  Selenium 200 MCG capsule, Take 1 tablet by mouth Daily., Disp: , Rfl:   •  sertraline (Zoloft) 50 MG tablet, Take 1 tablet by mouth Daily., Disp: 90 tablet, Rfl: 0  •  Vitamin A 2400 MCG (8000 UT) tablet, Take 1 tablet by mouth Daily., Disp: , Rfl:   •  Zinc 25 MG tablet, Take 0.5 tablets by mouth Daily., Disp: , Rfl:   •  amoxicillin (AMOXIL) 500 MG capsule, , Disp: , Rfl:     Allergies:   Allergies   Allergen Reactions   • Adhesive Tape      Rash     • Erythromycin Other (See Comments)     Sores in mouth   • Nickel Itching   • Epinephrine Palpitations   • Latex Rash   • Nitroglycerin Palpitations       Objective     Vital Signs:   Vitals:    12/13/22 0932   BP: 128/78   Pulse: 87   Temp: 98.1 °F (36.7 °C)   TempSrc: Infrared   SpO2: 98%   Weight: 114 kg (252 lb 3.2 oz)   Height: 170.2 cm (67.01\")   PainSc: 0-No pain     Body mass index is 39.49 kg/m².   Class 2 Severe Obesity (BMI " >=35 and <=39.9). Obesity-related health conditions include the following: dyslipidemias, GERD and osteoarthritis. Obesity is unchanged. BMI is is above average; BMI management plan is completed. We discussed low calorie, low carb based diet program, portion control and increasing exercise.      Physical Exam:   Physical Exam  Vitals and nursing note reviewed.   Constitutional:       Appearance: Normal appearance.   HENT:      Head: Normocephalic and atraumatic.   Cardiovascular:      Rate and Rhythm: Normal rate and regular rhythm.   Pulmonary:      Effort: Pulmonary effort is normal.      Breath sounds: Normal breath sounds. No decreased breath sounds, wheezing, rhonchi or rales.   Musculoskeletal:      Cervical back: Neck supple.      Right lower leg: No edema.      Left lower leg: No edema.   Lymphadenopathy:      Cervical: No cervical adenopathy.   Neurological:      Mental Status: She is alert.   Psychiatric:         Mood and Affect: Mood normal.          Assessment / Plan      Assessment/Plan:   Diagnoses and all orders for this visit:    1. Generalized anxiety disorder (Primary)  -     ALPRAZolam (Xanax) 0.5 MG tablet; Take 1 tablet by mouth Daily As Needed for Anxiety.  Dispense: 30 tablet; Refill: 0    2. Prediabetes  -     POC Glycosylated Hemoglobin (Hb A1C)    3. Anxiety  -     sertraline (Zoloft) 50 MG tablet; Take 1 tablet by mouth Daily.  Dispense: 90 tablet; Refill: 0    4. Medication refill  -     ALPRAZolam (Xanax) 0.5 MG tablet; Take 1 tablet by mouth Daily As Needed for Anxiety.  Dispense: 30 tablet; Refill: 0    5. Hypothyroidism due to Hashimoto's thyroiditis  -     levothyroxine (Synthroid) 88 MCG tablet; Take 1 tablet by mouth Daily for 180 days.  Dispense: 90 tablet; Refill: 1    6. Need for influenza vaccination  -     FluLaval/Fluzone >6 mos ([id number])    Hemoglobin A1c is stable at 5.7%.  Synthroid and sertraline refilled.  I have refilled the alprazolam 0.5 mg 1 p.o. daily as needed  #30 and 0.  Medication use reviewed along with side effects.  Moses has been reviewed and printed.  UDS is in chart February 2022.  CSA in chart.  She already has annual scheduled in March with Dr. Lo.    Follow Up:   No follow-ups on file.    Sarai Marinelli PA-C   [facility] [place]

## 2022-12-16 ENCOUNTER — TELEPHONE (OUTPATIENT)
Dept: ORTHOPEDIC SURGERY | Facility: CLINIC | Age: 57
End: 2022-12-16

## 2022-12-16 NOTE — TELEPHONE ENCOUNTER
Tried to contact Ms. Coelho today. Left her voice message.  She called earlier needing to speak to me about Dr. Cordoba.  Please direct her call to me if she calls back.    A-

## 2022-12-19 ENCOUNTER — OFFICE VISIT (OUTPATIENT)
Dept: FAMILY MEDICINE CLINIC | Facility: CLINIC | Age: 57
End: 2022-12-19

## 2022-12-19 ENCOUNTER — LAB (OUTPATIENT)
Dept: LAB | Facility: HOSPITAL | Age: 57
End: 2022-12-19

## 2022-12-19 VITALS
TEMPERATURE: 97.8 F | SYSTOLIC BLOOD PRESSURE: 108 MMHG | WEIGHT: 249.8 LBS | DIASTOLIC BLOOD PRESSURE: 60 MMHG | BODY MASS INDEX: 39.21 KG/M2 | HEART RATE: 86 BPM | OXYGEN SATURATION: 98 % | HEIGHT: 67 IN

## 2022-12-19 DIAGNOSIS — Z01.818 PRE-OP EXAM: Primary | ICD-10-CM

## 2022-12-19 DIAGNOSIS — K02.9 DENTAL CARIES: ICD-10-CM

## 2022-12-19 DIAGNOSIS — D50.9 IRON DEFICIENCY ANEMIA, UNSPECIFIED IRON DEFICIENCY ANEMIA TYPE: ICD-10-CM

## 2022-12-19 PROCEDURE — 83540 ASSAY OF IRON: CPT

## 2022-12-19 PROCEDURE — 84466 ASSAY OF TRANSFERRIN: CPT

## 2022-12-19 PROCEDURE — 85027 COMPLETE CBC AUTOMATED: CPT

## 2022-12-19 PROCEDURE — 99213 OFFICE O/P EST LOW 20 MIN: CPT | Performed by: PHYSICIAN ASSISTANT

## 2022-12-19 PROCEDURE — 36415 COLL VENOUS BLD VENIPUNCTURE: CPT

## 2022-12-19 PROCEDURE — 80053 COMPREHEN METABOLIC PANEL: CPT

## 2022-12-19 NOTE — PROGRESS NOTES
Follow Up Office Visit      Date: 2022   Patient Name: Brigid Coelho  : 1965   MRN: [id number]     Chief Complaint:    Chief Complaint   Patient presents with   • Pre-op Exam     Dental       History of Present Illness: Brigid Coelho is a 57 y.o. female who is here today to follow up for pre-op exam. She will be having nine teeth removed, Dr. Valadez. Will be under general anesthesia. Fax number [phone] attn Ana Dr. Valadez's office.  She denies any problems with anesthesia.  No cardiac or respiratory disease.  No recent illness, fever.  Does have iron deficiency anemia.    Subjective      Review of systems:  Review of Systems   Constitutional: Negative for fatigue and fever.   HENT: Positive for dental problem.    Eyes: Negative for visual disturbance.   Respiratory: Negative for shortness of breath.    Cardiovascular: Negative for chest pain and leg swelling.   Gastrointestinal: Negative for abdominal pain.   Neurological: Negative for dizziness.        I have reviewed and the following portions of the patient's history were updated as appropriate: past family history, past medical history, past social history, past surgical history and problem list.    Medications:     Current Outpatient Medications:   •  acetaminophen (TYLENOL) 650 MG 8 hr tablet, Take 500 mg by mouth Every 8 (Eight) Hours As Needed for Mild Pain ., Disp: , Rfl:   •  Alpha-Lipoic Acid (LIPOIC ACID PO), , Disp: , Rfl:   •  ALPRAZolam (Xanax) 0.5 MG tablet, Take 1 tablet by mouth Daily As Needed for Anxiety., Disp: 30 tablet, Rfl: 0  •  amoxicillin (AMOXIL) 500 MG capsule, , Disp: , Rfl:   •  ascorbic acid (VITAMIN C) 1000 MG tablet, Take 1,000 mg by mouth Daily., Disp: , Rfl:   •  Biotin 1000 MCG chewable tablet, Chew., Disp: , Rfl:   •  famotidine (PEPCID) 20 MG tablet, Take 20 mg by mouth At Night As Needed for Heartburn., Disp: , Rfl:   •  ferrous sulfate 140 (45 Fe) MG tablet controlled-release  "tablet, Take 140 mg by mouth Daily With Breakfast., Disp: , Rfl:   •  folic acid (FOLVITE) 400 MCG tablet, Take 400 mcg by mouth Daily., Disp: , Rfl:   •  Garlic 1000 MG capsule, Take 1 tablet by mouth Daily., Disp: , Rfl:   •  ibuprofen (ADVIL,MOTRIN) 400 MG tablet, Take 400 mg by mouth Every 6 (Six) Hours As Needed for Mild Pain ., Disp: , Rfl:   •  levothyroxine (Synthroid) 88 MCG tablet, Take 1 tablet by mouth Daily for 180 days., Disp: 90 tablet, Rfl: 1  •  Loratadine 10 MG capsule, Take 1 tablet by mouth daily., Disp: , Rfl:   •  Melatonin 2.5 MG capsule, Take  by mouth., Disp: , Rfl:   •  mesalamine (APRISO) 0.375 g 24 hr capsule, Take 375 mg by mouth As Needed., Disp: , Rfl:   •  montelukast (SINGULAIR) 10 MG tablet, Take 1 tablet by mouth every night at bedtime., Disp: 90 tablet, Rfl: 0  •  Selenium 200 MCG capsule, Take 1 tablet by mouth Daily., Disp: , Rfl:   •  sertraline (Zoloft) 50 MG tablet, Take 1 tablet by mouth Daily., Disp: 90 tablet, Rfl: 0  •  Vitamin A 2400 MCG (8000 UT) tablet, Take 1 tablet by mouth Daily., Disp: , Rfl:   •  Zinc 25 MG tablet, Take 0.5 tablets by mouth Daily., Disp: , Rfl:     Allergies:   Allergies   Allergen Reactions   • Adhesive Tape      Rash     • Erythromycin Other (See Comments)     Sores in mouth   • Nickel Itching   • Epinephrine Palpitations   • Latex Rash   • Nitroglycerin Palpitations       Objective     Vital Signs:   Vitals:    12/19/22 1544   BP: 108/60   Pulse: 86   Temp: 97.8 °F (36.6 °C)   TempSrc: Infrared   SpO2: 98%   Weight: 113 kg (249 lb 12.8 oz)   Height: 170.2 cm (67.01\")   PainSc: 0-No pain     Body mass index is 39.11 kg/m².   Class 2 Severe Obesity (BMI >=35 and <=39.9). Obesity-related health conditions include the following: dyslipidemias. Obesity is unchanged. BMI is is above average; BMI management plan is completed. We discussed low calorie, low carb based diet program, portion control and increasing exercise.      Physical Exam:   Physical " Exam  Vitals and nursing note reviewed.   Constitutional:       Appearance: Normal appearance.   HENT:      Head: Normocephalic and atraumatic.      Right Ear: Tympanic membrane and ear canal normal.      Left Ear: Tympanic membrane and ear canal normal.      Nose: No congestion or rhinorrhea.      Mouth/Throat:      Mouth: Mucous membranes are moist.      Dentition: Dental caries present.      Pharynx: Oropharynx is clear. No posterior oropharyngeal erythema.   Cardiovascular:      Rate and Rhythm: Normal rate and regular rhythm.   Pulmonary:      Effort: Pulmonary effort is normal.      Breath sounds: Normal breath sounds. No decreased breath sounds, wheezing, rhonchi or rales.   Abdominal:      Palpations: Abdomen is soft.      Tenderness: There is no abdominal tenderness.   Musculoskeletal:      Cervical back: Neck supple.      Right lower leg: No edema.      Left lower leg: No edema.   Lymphadenopathy:      Cervical: No cervical adenopathy.   Skin:     Findings: No bruising.   Neurological:      General: No focal deficit present.      Mental Status: She is alert.   Psychiatric:         Mood and Affect: Mood normal.       ECG 12 Lead    Date/Time: 12/20/2022 12:56 PM  Performed by: Sarai Marinelli PA  Authorized by: Sarai Marinelli PA   Comparison: compared with previous ECG from 5/17/2021  Similar to previous ECG  Rhythm: sinus rhythm  Rate: normal  QRS axis: normal    Clinical impression: normal ECG            Assessment / Plan      Assessment/Plan:   Diagnoses and all orders for this visit:    1. Pre-op exam (Primary)    2. Dental caries    3. Iron deficiency anemia, unspecified iron deficiency anemia type  -     CBC No Differential; Future  -     Iron and TIBC; Future  -     Comprehensive metabolic panel; Future    Cleared for dental surgery.  Labs today.  We will fax all of this to Dr. Valadez's office.    Follow Up:   No follow-ups on file.    Sarai Marinelli PA-C   [facility] [place]

## 2022-12-20 ENCOUNTER — TELEPHONE (OUTPATIENT)
Dept: FAMILY MEDICINE CLINIC | Facility: CLINIC | Age: 57
End: 2022-12-20

## 2022-12-20 LAB
ALBUMIN SERPL-MCNC: 4.6 G/DL (ref 3.5–5.2)
ALBUMIN/GLOB SERPL: 1.6 G/DL
ALP SERPL-CCNC: 103 U/L (ref 39–117)
ALT SERPL W P-5'-P-CCNC: 21 U/L (ref 1–33)
ANION GAP SERPL CALCULATED.3IONS-SCNC: 7.8 MMOL/L (ref 5–15)
AST SERPL-CCNC: 26 U/L (ref 1–32)
BILIRUB SERPL-MCNC: <0.2 MG/DL (ref 0–1.2)
BUN SERPL-MCNC: 11 MG/DL (ref 6–20)
BUN/CREAT SERPL: 14.3 (ref 7–25)
CALCIUM SPEC-SCNC: 9.6 MG/DL (ref 8.6–10.5)
CHLORIDE SERPL-SCNC: 104 MMOL/L (ref 98–107)
CO2 SERPL-SCNC: 28.2 MMOL/L (ref 22–29)
CREAT SERPL-MCNC: 0.77 MG/DL (ref 0.57–1)
DEPRECATED RDW RBC AUTO: 45.3 FL (ref 37–54)
EGFRCR SERPLBLD CKD-EPI 2021: 89.5 ML/MIN/1.73
ERYTHROCYTE [DISTWIDTH] IN BLOOD BY AUTOMATED COUNT: 17.1 % (ref 12.3–15.4)
GLOBULIN UR ELPH-MCNC: 2.9 GM/DL
GLUCOSE SERPL-MCNC: 92 MG/DL (ref 65–99)
HCT VFR BLD AUTO: 35.4 % (ref 34–46.6)
HGB BLD-MCNC: 11.4 G/DL (ref 12–15.9)
IRON 24H UR-MRATE: 30 MCG/DL (ref 37–145)
IRON SATN MFR SERPL: 7 % (ref 20–50)
MCH RBC QN AUTO: 23.4 PG (ref 26.6–33)
MCHC RBC AUTO-ENTMCNC: 32.2 G/DL (ref 31.5–35.7)
MCV RBC AUTO: 72.7 FL (ref 79–97)
PLATELET # BLD AUTO: 369 10*3/MM3 (ref 140–450)
PMV BLD AUTO: 11 FL (ref 6–12)
POTASSIUM SERPL-SCNC: 4.2 MMOL/L (ref 3.5–5.2)
PROT SERPL-MCNC: 7.5 G/DL (ref 6–8.5)
RBC # BLD AUTO: 4.87 10*6/MM3 (ref 3.77–5.28)
SODIUM SERPL-SCNC: 140 MMOL/L (ref 136–145)
TIBC SERPL-MCNC: 459 MCG/DL (ref 298–536)
TRANSFERRIN SERPL-MCNC: 308 MG/DL (ref 200–360)
WBC NRBC COR # BLD: 9.22 10*3/MM3 (ref 3.4–10.8)

## 2022-12-20 PROCEDURE — 93000 ELECTROCARDIOGRAM COMPLETE: CPT | Performed by: PHYSICIAN ASSISTANT

## 2022-12-20 NOTE — TELEPHONE ENCOUNTER
Caller: Brigid Coelho    Relationship: Self    Best call back number: [phone]    What form or medical record are you requesting: PRE-OP INFORMATION    Who is requesting this form or medical record from you: [facility] ORAL SURGERY    How would you like to receive the form or medical records (pick-up, mail, fax): EMAIL [email or web address]      Timeframe paperwork needed: AS SOON AS POSSIBLE.    Additional notes: PATIENT STATES THAT THE OFFICE'S FAX IS DOWN DUE TO [employer] AND THEY NEED IT EMAILED. PLEASE EMAIL IF POSSIBLE. CALL PATIENT OTHERWISE.     PHONE NUMBER FOR THE OFFICE: [phone]

## 2022-12-21 ENCOUNTER — TELEPHONE (OUTPATIENT)
Dept: FAMILY MEDICINE CLINIC | Facility: CLINIC | Age: 57
End: 2022-12-21

## 2022-12-21 NOTE — TELEPHONE ENCOUNTER
Caller: Meliton Brigidmehul Naranjo    Relationship: Self    Best call back number: [phone]    What form or medical record are you requesting: HISTORY AND PHYSICAL LETTER ALONG WITH THE INFORMATION FROM 12/19/2022 PRE OP APPOINTMENT     Who is requesting this form or medical record from you: PATIENTS DENTIST [facility] ORAL SURGERY DOCTOR LEEANNE    How would you like to receive the form or medical records (pick-up, mail, fax):[phone] ATTENTION ELEANOR    Timeframe paperwork needed: AS SOON AS POSSIBLE     Additional notes: THE PATIENT STATES THAT SHE IS HAVING DENTAL SURGERY ON 12/27/2022 THE PATIENT STATES THAT SHE NEEDS TO HAVE ALL THE PAPERWORK SENT TO THE DENTIST BY TODAY OR TOMORROW PLEASE CALL PATIENT TO LET HER KNOW IF THAT CAN BE DONE

## 2022-12-21 NOTE — TELEPHONE ENCOUNTER
PATIENT NEEDS THE NOTES, LABS, AND EKG FROM HER PREOP VISIT ON 12/19 PRINTED AND READY TO  BY 3:30 pm THIS AFTERNOON.  THE DENTIST OFFICE, THAT NEEDS THESE RECORDS, FAX MACHINE IS DOWN AND WE CAN'T EMAIL THE RECORDS SO SHE IS GOING TO TAKE THEM OVER TO THEM TODAY.

## 2022-12-21 NOTE — TELEPHONE ENCOUNTER
Spoke with pt and she downloaded record form [employer] then emailed it to the dentist. Offered her medical recod phone number but she declined because she thinks this would be enough.

## 2022-12-21 NOTE — TELEPHONE ENCOUNTER
I have written letter and this is in chart. Please fax letter. Gladys Farrar faxed the rest of information to Dr. Valadez's office.

## 2022-12-21 NOTE — TELEPHONE ENCOUNTER
Patient actually needs a typed out letter saying that she body is well enough to have this dental procedure. She called this a history and physical letter and the fax number is included in this note as well. thanks

## 2022-12-22 NOTE — TELEPHONE ENCOUNTER
Caller: Brigid Coelho    Relationship: Self    Best call back number: [phone]    What form or medical record are you requesting: HISTORY AND PHYSICAL LETTER TO CLEAR PATIENT FOR SURGERY    Who is requesting this form or medical record from you: DR FALLON  If fax, what is the fax number: [phone] AMY WEBSTER    Timeframe paperwork needed: TODAY    Additional notes: PLEASE RE FAX PAPERWORK AND LETTER TO CORRECT FAX NUMBER ABOVE AND CALL PATIENT WHEN THIS HAS BEEN COMPLETED

## 2022-12-23 ENCOUNTER — TELEPHONE (OUTPATIENT)
Dept: FAMILY MEDICINE CLINIC | Facility: CLINIC | Age: 57
End: 2022-12-23

## 2022-12-23 DIAGNOSIS — Z01.818 PRE-OP EXAM: Primary | ICD-10-CM

## 2022-12-23 RX ORDER — AMOXICILLIN AND CLAVULANATE POTASSIUM 875; 125 MG/1; MG/1
1 TABLET, FILM COATED ORAL 2 TIMES DAILY
Qty: 14 TABLET | Refills: 0 | Status: SHIPPED | OUTPATIENT
Start: 2022-12-23 | End: 2023-01-13

## 2022-12-23 RX ORDER — METRONIDAZOLE 500 MG/1
500 TABLET ORAL 3 TIMES DAILY
Qty: 15 TABLET | Refills: 0 | Status: SHIPPED | OUTPATIENT
Start: 2022-12-23 | End: 2023-02-24

## 2022-12-23 RX ORDER — FLUCONAZOLE 150 MG/1
150 TABLET ORAL ONCE
Qty: 1 TABLET | Refills: 0 | Status: SHIPPED | OUTPATIENT
Start: 2022-12-23 | End: 2022-12-23

## 2022-12-23 NOTE — TELEPHONE ENCOUNTER
Caller: Brigid Coelho    Relationship: Self    Best call back number: [phone]    What medication are you requesting: ANTIBIOTIC AND DIFLUCAN     What are your current symptoms: TOOTH BROKEN OFF AND INFECTED AND SWELLING IS COMING UP AROUND THE TOOTH AND IS VERY RED    How long have you been experiencing symptoms: 12/22/22    Have you had these symptoms before:    [x] Yes  [] No    Have you been treated for these symptoms before:   [x] Yes  [] No    If a prescription is needed, what is your preferred pharmacy and phone number: [facility] [id number] [place] [id number] [facility]  AT [facility] [address] - [phone]  - [phone] FX     Additional notes: PATIENT STATES SHE HAS SURGERY SCHEDULED WITH HER ORAL SURGEON ON 12/27/22 AND HAS TRIED TO CALL THE DENTIST TO GET AN ANTIBIOTIC HOWEVER THE OFFICE IS CLOSED TODAY DUE TO [employer]. PATIENT STATES IF SHE SPIKES A FEVER THEY WILL NOT DO THE SURGERY. PATIENT STATES IF A TELEHEATH VISIT IS REQUIRED PLEASE LET HER KNOW.

## 2022-12-23 NOTE — TELEPHONE ENCOUNTER
HUB CAN READ    MYCHART sent as well    I will do a one-time prescription because the patient's already been seen twice this month.  She will need to take 2 antibiotics to cover a mouth infection.  Metronidazole is taken 3 times a day for 5 days and Augmentin twice a day will be taken for 1 week.

## 2022-12-23 NOTE — TELEPHONE ENCOUNTER
PATIENT CALLED TO FIND OUT IF ANTIBIOTIC SENT IN WAS IN THE SAME FAMILY AS ERYTHROMYCIN, SHE'S ALLERGIC.  SHE WOULD ALSO LIKE A DIFLUCAN CALLED IN BECAUSE SHE GETS YEAST EVERY TIME SHE TAKES ANTIBIOTICS.     PHARMACY:  [facility]    PLEASE CALL [phone] OR MESSAGE IN [employer]

## 2022-12-29 RX ORDER — FLUCONAZOLE 150 MG/1
150 TABLET ORAL ONCE
Qty: 1 TABLET | Refills: 0 | Status: SHIPPED | OUTPATIENT
Start: 2022-12-29 | End: 2022-12-29

## 2022-12-29 NOTE — TELEPHONE ENCOUNTER
Okay for the HUB to read the below message to the pt from Dr. Lo.    I called the pt and LVM advising to call the office.

## 2023-01-01 NOTE — ED PROVIDER NOTES
EMERGENCY DEPARTMENT ENCOUNTER      Pt Name: Brigid Coelho  MRN: [id number]  YOB: 1965  Date of evaluation: 8/1/2020  Provider: Woo Houston MD    CHIEF COMPLAINT       Chief Complaint   Patient presents with   • Back Pain         HISTORY OF PRESENT ILLNESS  (Location/Symptom, Timing/Onset, Context/Setting, Quality, Duration, Modifying Factors, Severity.)   Brigid Coelho is a 55 y.o. female who presents to the emergency department with low back pain for the past 3 weeks as well as generalized muscle cramping for the past several days.  Patient has history of ulcerative colitis and was concerned about the possibility of an electrolyte abnormality.  She has been taking potassium supplements at home but her cramping has persisted.  She describes her back pain as atraumatic in nature and cramping across the lower aspect of the back without any radicular symptoms.  It is mild in severity.  She has reportedly already had negative plain films of her lower back and is currently scheduled for MRI on August 18.  Patient denies any history of trauma, unexplained weight loss, neurologic deficits including bowel or bladder dysfunction/lower extremity weakness/lower extremity numbness/saddle anesthesia, fever, history of IV drug use/alcohol abuse/HIV/use of immunosuppressive medications/diabetes mellitus, chronic steroid use, or history of cancer.        Nursing notes were reviewed.    REVIEW OF SYSTEMS    (2-9 systems for level 4, 10 or more for level 5)   ROS:  General:  No fevers, no chills, no weakness  Cardiovascular:  No chest pain, no palpitations  Respiratory:  No shortness of breath, no cough, no wheezing  Gastrointestinal:  No pain, no nausea, no vomiting, no diarrhea  Musculoskeletal:  + back pain, muscle cramping  Skin:  No rash, no easy bruising  Neurologic:  No speech problems, no headache, no extremity numbness, no extremity tingling, no extremity weakness  Psychiatric:   No anxiety  Genitourinary:  No dysuria, no hematuria    Except as noted above the remainder of the review of systems was reviewed and negative.       PAST MEDICAL HISTORY     Past Medical History:   Diagnosis Date   • Acute bronchitis    • Anxiety    • Chest pain    • Cholelithiasis    • GERD (gastroesophageal reflux disease)    • Hashimoto's thyroiditis    • Hemorrhoids    • Hypothyroidism    • Metrorrhagia    • Palpitations    • Polycystic ovary syndrome    • Polyp of sigmoid colon    • Ulcerative colitis (CMS/HCC)    • Upper respiratory infection    • UTI (urinary tract infection)          SURGICAL HISTORY       Past Surgical History:   Procedure Laterality Date   • APPENDECTOMY     •  SECTION     • CHOLECYSTECTOMY     • DILATATION AND CURETTAGE      Of Cervical Stump   • MYOMECTOMY     • SMALL INTESTINE SURGERY     • TUBAL ABDOMINAL LIGATION           CURRENT MEDICATIONS     No current facility-administered medications for this encounter.     Current Outpatient Medications:   •  ALPRAZolam (Xanax) 0.5 MG tablet, Take 1 tablet by mouth 2 (Two) Times a Day As Needed for Anxiety., Disp: 30 tablet, Rfl: 0  •  ascorbic acid (VITAMIN C) 1000 MG tablet, Take 1,000 mg by mouth Daily., Disp: , Rfl:   •  cyclobenzaprine (FLEXERIL) 10 MG tablet, Take 1 tablet by mouth 2 (Two) Times a Day As Needed for Muscle Spasms., Disp: 60 tablet, Rfl: 0  •  diclofenac (VOLTAREN) 1 % gel gel, Apply 4 g topically to the appropriate area as directed 3 (Three) Times a Day., Disp: 100 g, Rfl: 0  •  ferrous sulfate 140 (45 Fe) MG tablet controlled-release tablet, Take 140 mg by mouth Daily With Breakfast., Disp: , Rfl:   •  folic acid (FOLVITE) 400 MCG tablet, Take 400 mcg by mouth Daily., Disp: , Rfl:   •  Garlic 1000 MG capsule, Take 1 tablet by mouth Daily., Disp: , Rfl:   •  levothyroxine (Synthroid) 88 MCG tablet, Take 1 tablet by mouth Daily., Disp: 90 tablet, Rfl: 1  •  Loratadine (CLARITIN) 10 MG capsule, Take 1 tablet by  mouth daily., Disp: , Rfl:   •  mesalamine (APRISO) 0.375 g 24 hr capsule, Take 375 mg by mouth As Needed., Disp: , Rfl:   •  metFORMIN ER (GLUCOPHAGE-XR) 500 MG 24 hr tablet, Take 1 tablet by mouth Daily With Breakfast., Disp: 90 tablet, Rfl: 3  •  montelukast (SINGULAIR) 10 MG tablet, Take 1 tablet by mouth every night at bedtime., Disp: 90 tablet, Rfl: 1  •  Selenium 200 MCG capsule, Take 1 tablet by mouth Daily., Disp: , Rfl:   •  sertraline (ZOLOFT) 25 MG tablet, , Disp: , Rfl:   •  Vitamin A 2400 MCG (8000 UT) tablet, Take 1 tablet by mouth Daily., Disp: , Rfl:   •  Zinc 25 MG tablet, Take 0.5 tablets by mouth Daily., Disp: , Rfl:     ALLERGIES     Adhesive tape; Erythromycin; Epinephrine; and Nitroglycerin    FAMILY HISTORY       Family History   Problem Relation Age of Onset   • Hyperlipidemia Mother    • CHAD disease Mother    • Cancer Mother    • Diabetes Mother    • Heart disease Mother    • Rheum arthritis Father    • Hyperlipidemia Father    • Heart attack Father 72   • Ovarian cancer Maternal Aunt    • Diabetes Paternal Grandmother    • Hypertension Paternal Grandmother    • Obesity Paternal Grandmother    • Colon cancer Other    • Arthritis Other    • Cancer Other         uncle; liver, lung    • Thyroid disease Cousin    • Thyroid cancer Cousin    • CHAD disease Daughter    • Obesity Brother    • Sleep apnea Brother           SOCIAL HISTORY       Social History     Socioeconomic History   • Marital status:      Spouse name: Not on file   • Number of children: Not on file   • Years of education: Not on file   • Highest education level: Not on file   Tobacco Use   • Smoking status: Never Smoker   • Smokeless tobacco: Never Used   Substance and Sexual Activity   • Alcohol use: No   • Drug use: No   • Sexual activity: Defer   Social History Narrative    Single    Caffeine: 3 sodas daily         PHYSICAL EXAM    (up to 7 for level 4, 8 or more for level 5)     Vitals:    08/01/20 1550   BP: 139/100  pediatric floor "  BP Location: Left arm   Patient Position: Sitting   Pulse: 91   Resp: 18   Temp: 97.5 °F (36.4 °C)   TempSrc: Oral   SpO2: 99%   Weight: 118 kg (260 lb)   Height: 170.2 cm (67\")       Physical Exam  General: Awake, alert, no acute distress.  HEENT: Conjunctiva normal.  Neck: Trachea midline.  Cardiac: Heart regular rate, rhythm, no murmurs, rubs, or gallops  Lungs: Lungs are clear to auscultation, there is no wheezing, rhonchi, or rales. There is no use of accessory muscles.  Chest wall: There is no tenderness to palpation over the chest wall or over ribs  Abdomen: Abdomen is soft, nontender, nondistended. There is no firm or pulsatile masses, no rebound rigidity or guarding.   Musculoskeletal: There is mild paraspinal lumbar tenderness without any tenderness or step-off in the midline. There is no asymmetry of BLE. DP/PT pulses are 2+ bilaterally.  Neuro: Motor and sensory function intact in BLE. There is no saddle anesthesia. Patellar/achilles reflexes are 1+ bilaterally.  Dermatology: Skin is warm and dry  Psych: Mentation is grossly normal, cognition is grossly normal. Affect is appropriate.        DIAGNOSTIC RESULTS       LABS:    I have reviewed and interpreted all of the currently available lab results from this visit (if applicable):  Results for orders placed or performed during the hospital encounter of 08/01/20   Basic Metabolic Panel   Result Value Ref Range    Glucose 116 (H) 65 - 99 mg/dL    BUN 12 6 - 20 mg/dL    Creatinine 0.88 0.57 - 1.00 mg/dL    Sodium 140 136 - 145 mmol/L    Potassium 4.2 3.5 - 5.2 mmol/L    Chloride 104 98 - 107 mmol/L    CO2 26.0 22.0 - 29.0 mmol/L    Calcium 8.9 8.6 - 10.5 mg/dL    eGFR Non African Amer 66 >60 mL/min/1.73    BUN/Creatinine Ratio 13.6 7.0 - 25.0    Anion Gap 10.0 5.0 - 15.0 mmol/L   CBC Auto Differential   Result Value Ref Range    WBC 10.07 3.40 - 10.80 10*3/mm3    RBC 5.00 3.77 - 5.28 10*6/mm3    Hemoglobin 12.1 12.0 - 15.9 g/dL    Hematocrit 39.8 34.0 - " "46.6 %    MCV 79.6 79.0 - 97.0 fL    MCH 24.2 (L) 26.6 - 33.0 pg    MCHC 30.4 (L) 31.5 - 35.7 g/dL    RDW 17.8 (H) 12.3 - 15.4 %    RDW-SD 51.1 37.0 - 54.0 fl    MPV 10.1 6.0 - 12.0 fL    Platelets 363 140 - 450 10*3/mm3    Neutrophil % 74.5 42.7 - 76.0 %    Lymphocyte % 16.9 (L) 19.6 - 45.3 %    Monocyte % 5.9 5.0 - 12.0 %    Eosinophil % 1.5 0.3 - 6.2 %    Basophil % 0.8 0.0 - 1.5 %    Immature Grans % 0.4 0.0 - 0.5 %    Neutrophils, Absolute 7.51 (H) 1.70 - 7.00 10*3/mm3    Lymphocytes, Absolute 1.70 0.70 - 3.10 10*3/mm3    Monocytes, Absolute 0.59 0.10 - 0.90 10*3/mm3    Eosinophils, Absolute 0.15 0.00 - 0.40 10*3/mm3    Basophils, Absolute 0.08 0.00 - 0.20 10*3/mm3    Immature Grans, Absolute 0.04 0.00 - 0.05 10*3/mm3    nRBC 0.0 0.0 - 0.2 /100 WBC   CK   Result Value Ref Range    Creatine Kinase 454 (H) 20 - 180 U/L   TSH   Result Value Ref Range    TSH 3.060 0.270 - 4.200 uIU/mL        All other labs were within normal range or not returned as of this dictation.      EMERGENCY DEPARTMENT COURSE and DIFFERENTIAL DIAGNOSIS/MDM:   Vitals:    Vitals:    08/01/20 1550   BP: 139/100   BP Location: Left arm   Patient Position: Sitting   Pulse: 91   Resp: 18   Temp: 97.5 °F (36.4 °C)   TempSrc: Oral   SpO2: 99%   Weight: 118 kg (260 lb)   Height: 170.2 cm (67\")       ED Course as of Aug 01 1719   Sat Aug 01, 2020   1705 Diagnostic chills reviewed.  Patient continues to be very well-appearing.  Gave strict return precautions and instructions for patient to follow-up with her PCP for further evaluation.    [NS]      ED Course User Index  [NS] Woo Houston MD       Patient well-appearing throughout the duration of her stay in the emergency department.  Her primary complaint today is this generalized cramping.  Laboratory evaluation demonstrates no evidence of thyroid abnormality or electrolyte derangement.  She does appear to have some myopathy with an elevated CPK, however states that this is chronic for her and " she demonstrates no sign of any emergent process related to this.  In regards to her back pain, it seems to be mostly musculoskeletal in nature with no red flags or concerning findings on physical examination that would raise concern about pathologic fracture, infectious process such as epidural abscess or osteomyelitis, metastatic disease, or cord compression/cauda equina/conus medullaris.  She is appropriate for discharge home and follow-up with her primary care provider.    I had a discussion with the patient/family regarding diagnosis, diagnostic results, treatment plan, and medications.  The patient/family indicated understanding of these instructions.  I spent adequate time at the bedside proceeding discharge necessary to personally discuss the aftercare instructions, giving patient education, providing explanations of the results of our evaluations/findings, and my decision making to assure that the patient/family understand the plan of care.  Time was allotted to answer questions at that time and throughout the ED course.  Emphasis was placed on timely follow-up after discharge.  I also discussed the potential for the development of an acute emergent condition requiring further evaluation, admission, or even surgical intervention. I discussed that we found nothing during the visit today indicating the need for further workup, admission, or the presence of an unstable medical condition.  I encouraged the patient to return to the emergency department immediately for ANY concerns, worsening, new complaints, or if symptoms persist and unable to seek follow-up in a timely fashion.  The patient/family expressed understanding and agreement with this plan.  The patient will follow-up with their PCP in 1-2 days for reevaluation.           FINAL IMPRESSION      1. Acute bilateral low back pain without sciatica    2. Muscle cramps    3. Elevated CPK          DISPOSITION/PLAN     ED Disposition     ED Disposition  Condition Comment    Discharge Stable                 Comment: Please note this report has been produced using speech recognition software.      Woo Houston MD  Attending Emergency Physician               Woo Houston MD  08/01/20 3743

## 2023-01-13 ENCOUNTER — OFFICE VISIT (OUTPATIENT)
Dept: FAMILY MEDICINE CLINIC | Facility: CLINIC | Age: 58
End: 2023-01-13
Payer: MEDICARE

## 2023-01-13 VITALS
HEART RATE: 84 BPM | TEMPERATURE: 98.2 F | SYSTOLIC BLOOD PRESSURE: 126 MMHG | DIASTOLIC BLOOD PRESSURE: 94 MMHG | BODY MASS INDEX: 39.77 KG/M2 | HEIGHT: 67 IN | WEIGHT: 253.4 LBS | OXYGEN SATURATION: 98 %

## 2023-01-13 DIAGNOSIS — Z01.818 PREPROCEDURAL EXAMINATION: Primary | ICD-10-CM

## 2023-01-13 PROCEDURE — 99213 OFFICE O/P EST LOW 20 MIN: CPT | Performed by: FAMILY MEDICINE

## 2023-01-13 PROCEDURE — 93000 ELECTROCARDIOGRAM COMPLETE: CPT | Performed by: FAMILY MEDICINE

## 2023-01-13 NOTE — PROGRESS NOTES
"Chief Complaint  Pre-op Exam    Subjective        Brigid Coelho presents to [facility]  History of Present Illness     Patient presents today to for pre proceduaral evaluation. She has a procedure scheduled for revision after an ankle fracture She is scheduled with Dr. Ankuhs Martinez   She has had surgeries before with no issues with anesthesia. She recently had a dental procedure 12.27.22 with no complications.       Objective   Vital Signs:  /94   Pulse 84   Temp 98.2 °F (36.8 °C) (Infrared)   Ht 170.2 cm (67.01\")   Wt 115 kg (253 lb 6.4 oz)   SpO2 98%   BMI 39.68 kg/m²   Estimated body mass index is 39.68 kg/m² as calculated from the following:    Height as of this encounter: 170.2 cm (67.01\").    Weight as of this encounter: 115 kg (253 lb 6.4 oz).       Class 2 Severe Obesity (BMI >=35 and <=39.9). Obesity-related health conditions include the following: dyslipidemias. Obesity is unchanged. BMI is is above average; BMI management plan is completed. We discussed low calorie, low carb based diet program, portion control and increasing exercise.      Physical Exam  Constitutional:       Appearance: She is normal weight.   Cardiovascular:      Rate and Rhythm: Normal rate and regular rhythm.      Pulses: Normal pulses.      Heart sounds: No murmur heard.  Pulmonary:      Effort: Pulmonary effort is normal. No respiratory distress.      Breath sounds: No wheezing.   Abdominal:      General: Abdomen is flat. Bowel sounds are normal.   Musculoskeletal:         General: No swelling or tenderness.      Cervical back: Normal range of motion.   Neurological:      General: No focal deficit present.      Mental Status: She is alert. Mental status is at baseline.   Psychiatric:         Mood and Affect: Mood normal.         Behavior: Behavior normal.         Thought Content: Thought content normal.         Judgment: Judgment normal.        Result Review :    EKG: " normal EKG, normal sinus rhythm, unchanged from previous tracings, normal sinus rhythm.           Assessment and Plan   Diagnoses and all orders for this visit:    1. Preprocedural examination (Primary)  -     ECG 12 Lead  -     XR Chest 2 View; Future  -     Basic metabolic panel; Future  -     CBC w AUTO Differential; Future     No apparent contraindications to anesthesia.          Follow Up   Return for Next scheduled follow up.  Patient was given instructions and counseling regarding her condition or for health maintenance advice. Please see specific information pulled into the AVS if appropriate.         This document has been electronically signed by Abbey George DO   January 13, 2023 15:46 EST    Dictated Utilizing Dragon Dictation: Part of this note may be an electronic transcription/translation of spoken language to printed text using the Dragon Dictation System.    Abbey George D.O.  [facility]

## 2023-01-17 ENCOUNTER — LAB (OUTPATIENT)
Dept: LAB | Facility: HOSPITAL | Age: 58
End: 2023-01-17
Payer: MEDICARE

## 2023-01-17 DIAGNOSIS — Z01.818 PREPROCEDURAL EXAMINATION: ICD-10-CM

## 2023-01-17 PROCEDURE — 85025 COMPLETE CBC W/AUTO DIFF WBC: CPT

## 2023-01-17 PROCEDURE — 80048 BASIC METABOLIC PNL TOTAL CA: CPT

## 2023-01-17 PROCEDURE — 36415 COLL VENOUS BLD VENIPUNCTURE: CPT

## 2023-01-18 LAB
ANION GAP SERPL CALCULATED.3IONS-SCNC: 9 MMOL/L (ref 5–15)
BASOPHILS # BLD AUTO: 0.05 10*3/MM3 (ref 0–0.2)
BASOPHILS NFR BLD AUTO: 0.8 % (ref 0–1.5)
BUN SERPL-MCNC: 8 MG/DL (ref 6–20)
BUN/CREAT SERPL: 10.7 (ref 7–25)
CALCIUM SPEC-SCNC: 9.7 MG/DL (ref 8.6–10.5)
CHLORIDE SERPL-SCNC: 103 MMOL/L (ref 98–107)
CO2 SERPL-SCNC: 28 MMOL/L (ref 22–29)
CREAT SERPL-MCNC: 0.75 MG/DL (ref 0.57–1)
DEPRECATED RDW RBC AUTO: 44.8 FL (ref 37–54)
EGFRCR SERPLBLD CKD-EPI 2021: 92.4 ML/MIN/1.73
EOSINOPHIL # BLD AUTO: 0.23 10*3/MM3 (ref 0–0.4)
EOSINOPHIL NFR BLD AUTO: 3.7 % (ref 0.3–6.2)
ERYTHROCYTE [DISTWIDTH] IN BLOOD BY AUTOMATED COUNT: 16.8 % (ref 12.3–15.4)
GLUCOSE SERPL-MCNC: 61 MG/DL (ref 65–99)
HCT VFR BLD AUTO: 35.9 % (ref 34–46.6)
HGB BLD-MCNC: 11.3 G/DL (ref 12–15.9)
IMM GRANULOCYTES # BLD AUTO: 0.03 10*3/MM3 (ref 0–0.05)
IMM GRANULOCYTES NFR BLD AUTO: 0.5 % (ref 0–0.5)
LYMPHOCYTES # BLD AUTO: 1.56 10*3/MM3 (ref 0.7–3.1)
LYMPHOCYTES NFR BLD AUTO: 24.8 % (ref 19.6–45.3)
MCH RBC QN AUTO: 23.8 PG (ref 26.6–33)
MCHC RBC AUTO-ENTMCNC: 31.5 G/DL (ref 31.5–35.7)
MCV RBC AUTO: 75.6 FL (ref 79–97)
MONOCYTES # BLD AUTO: 0.65 10*3/MM3 (ref 0.1–0.9)
MONOCYTES NFR BLD AUTO: 10.4 % (ref 5–12)
NEUTROPHILS NFR BLD AUTO: 3.76 10*3/MM3 (ref 1.7–7)
NEUTROPHILS NFR BLD AUTO: 59.8 % (ref 42.7–76)
NRBC BLD AUTO-RTO: 0 /100 WBC (ref 0–0.2)
PLATELET # BLD AUTO: 415 10*3/MM3 (ref 140–450)
PMV BLD AUTO: 10.2 FL (ref 6–12)
POTASSIUM SERPL-SCNC: 4 MMOL/L (ref 3.5–5.2)
RBC # BLD AUTO: 4.75 10*6/MM3 (ref 3.77–5.28)
SODIUM SERPL-SCNC: 140 MMOL/L (ref 136–145)
WBC NRBC COR # BLD: 6.28 10*3/MM3 (ref 3.4–10.8)

## 2023-01-19 ENCOUNTER — TELEPHONE (OUTPATIENT)
Dept: FAMILY MEDICINE CLINIC | Facility: CLINIC | Age: 58
End: 2023-01-19
Payer: MEDICARE

## 2023-01-19 NOTE — TELEPHONE ENCOUNTER
Pt called needing an order for an A1C lab. Pt is having surgery on the 24th and is needing this lab done before then. Pt had her A1C checked on 12/13/2022 but was unsure whether that would be okay to use for her pre-op.

## 2023-02-02 ENCOUNTER — TELEPHONE (OUTPATIENT)
Dept: FAMILY MEDICINE CLINIC | Facility: CLINIC | Age: 58
End: 2023-02-02

## 2023-02-02 ENCOUNTER — HOSPITAL ENCOUNTER (EMERGENCY)
Facility: HOSPITAL | Age: 58
Discharge: HOME | End: 2023-02-02
Attending: EMERGENCY MEDICINE | Admitting: EMERGENCY MEDICINE
Payer: MEDICARE

## 2023-02-02 ENCOUNTER — APPOINTMENT (OUTPATIENT)
Dept: GENERAL RADIOLOGY | Facility: HOSPITAL | Age: 58
End: 2023-02-02
Payer: MEDICARE

## 2023-02-02 VITALS
TEMPERATURE: 98.9 F | DIASTOLIC BLOOD PRESSURE: 76 MMHG | HEIGHT: 67 IN | BODY MASS INDEX: 39.08 KG/M2 | OXYGEN SATURATION: 98 % | HEART RATE: 79 BPM | SYSTOLIC BLOOD PRESSURE: 134 MMHG | RESPIRATION RATE: 17 BRPM | WEIGHT: 249 LBS

## 2023-02-02 DIAGNOSIS — M25.572 ACUTE LEFT ANKLE PAIN: ICD-10-CM

## 2023-02-02 DIAGNOSIS — G89.18 ACUTE POST-OPERATIVE PAIN: Primary | ICD-10-CM

## 2023-02-02 DIAGNOSIS — Z87.81 HISTORY OF ANKLE FRACTURE: ICD-10-CM

## 2023-02-02 DIAGNOSIS — E06.3 HASHIMOTO'S THYROIDITIS: ICD-10-CM

## 2023-02-02 DIAGNOSIS — Z98.890 HISTORY OF ANKLE SURGERY: ICD-10-CM

## 2023-02-02 DIAGNOSIS — Z87.19 HISTORY OF ULCERATIVE COLITIS: ICD-10-CM

## 2023-02-02 DIAGNOSIS — Z86.59 HISTORY OF ANXIETY: ICD-10-CM

## 2023-02-02 PROCEDURE — 63710000001 ONDANSETRON ODT 4 MG TABLET DISPERSIBLE: Performed by: EMERGENCY MEDICINE

## 2023-02-02 PROCEDURE — 99283 EMERGENCY DEPT VISIT LOW MDM: CPT

## 2023-02-02 PROCEDURE — 73610 X-RAY EXAM OF ANKLE: CPT

## 2023-02-02 RX ORDER — ONDANSETRON 4 MG/1
4 TABLET, ORALLY DISINTEGRATING ORAL ONCE
Status: COMPLETED | OUTPATIENT
Start: 2023-02-02 | End: 2023-02-02

## 2023-02-02 RX ORDER — OXYCODONE HYDROCHLORIDE AND ACETAMINOPHEN 5; 325 MG/1; MG/1
1 TABLET ORAL ONCE
Status: COMPLETED | OUTPATIENT
Start: 2023-02-02 | End: 2023-02-02

## 2023-02-02 RX ADMIN — ONDANSETRON 4 MG: 4 TABLET, ORALLY DISINTEGRATING ORAL at 03:19

## 2023-02-02 RX ADMIN — OXYCODONE HYDROCHLORIDE AND ACETAMINOPHEN 1 TABLET: 5; 325 TABLET ORAL at 03:19

## 2023-02-02 NOTE — ED PROVIDER NOTES
Subjective   History of Present Illness  This is a 57-year-old female that presents the ER with left ankle pain after recent surgery per Dr. Ankush Martinez, orthopedist on 1/24/2023.  Patient has extensive orthopedic history after trimalleolar fracture in October, 2021.  Patient says that she sustained fracture at that time and underwent surgery on 11/3/2021 per Dr. Daniels.  She then had subsequent surgery on 12/6/2021 and had a screw removed in March, 2022.  Patient lost significant range of motion and got a second opinion with one of Dr. Daniels's partners, Dr. Cordoba.  Patient says that they did not get along well and he advised that there was nothing he could do to help her.  She got another opinion with Dr. Ankush Martinez, orthopedic surgeon and ended up having plate and screw removed on 1/24/2023.  Patient says that she did not have any pain initially but started to develop some sharp twinges of pain to the anterior ankle and became concerned.  She is currently nonweightbearing.  She has close follow-up with Dr. Martinez on 2/6/2023 for recheck.  She is currently taking Keflex as well as oxycodone 5 mg by mouth as needed for pain.  She was advised not to remove the dressing after surgery, so she has not looked at her incisions to see if there is any redness or warmth.  She denies any fever, chills, body aches, or nausea/vomiting.  Past medical history is significant for left trimalleolar fracture in October, 2021 with multiple surgeries and complicated post operative course, anxiety, Hashimoto's thyroiditis, ulcerative colitis, osteoarthritis of the back, GERD, PCOS, anemia, and claustrophobia.  Patient denies any falls or injury after recent ankle surgery.    History provided by:  Patient  Ankle Pain  Location:  Ankle  Time since incident:  4 days  Injury: yes (Injury 1 year ago with multiple surgeries.)    Ankle location:  L ankle  Pain details:     Quality:  Sharp (Sharp twinges of pain to anterior  ankle around incision site)    Duration:  4 days  Chronicity:  Chronic  Dislocation: no    Worsened by:  Bearing weight and abduction  Ineffective treatments: Norco taken at 2300 without relief.  Associated symptoms: decreased ROM    Associated symptoms: no fatigue, no fever, no numbness, no swelling and no tingling    Risk factors comment:  Pt had initial trimalleolar fx in October, 2021.  She had initial surgeries 11/3/21 per Dr. Daniels.  Went back for second surgery 12/6/21, and then had screw removed in 3/22.  Pt lost ROM and got second opinion with Dr. Cordoba, and it didn't go well.      Review of Systems   Constitutional: Negative.  Negative for activity change, appetite change, chills, diaphoresis, fatigue and fever.   Respiratory: Negative.  Negative for cough and shortness of breath.    Cardiovascular: Negative.  Negative for leg swelling.   Genitourinary: Negative.    Musculoskeletal: Positive for arthralgias (Left ankle pain s/p recent surgery per Dr Ankush Martinez on 1/24/22 where they removed plate and screws.) and gait problem.        Patient had trimalleolar fracture in October, 2021 with complicated postoperative course and multiple surgeries.   Skin: Positive for wound (surgical wound to left ankle from recent ankle surgery on 1/24/23.  Plate and screws removed per Dr. Ankush Martinez at outpatient [facility] Negative for color change.   Neurological: Negative for numbness.   Psychiatric/Behavioral: The patient is nervous/anxious.         History of anxiety   All other systems reviewed and are negative.      Past Medical History:   Diagnosis Date   • Acute bronchitis    • Anemia    • Anxiety    • Arthritis of back    • Chest pain    • Cholelithiasis    • Claustrophobia    • Fracture of ankle 10/30/2021   • GERD (gastroesophageal reflux disease)    • Hashimoto's thyroiditis    • Hemorrhoids    • Hypothyroidism    • Low back pain    • Low back strain 10/12/2011   •  Lumbosacral disc disease 10/12/2011   • Metrorrhagia    • Palpitations    • Polycystic ovary syndrome    • Polyp of sigmoid colon    • Ulcerative colitis (HCC)    • Upper respiratory infection    • UTI (urinary tract infection)        Allergies   Allergen Reactions   • Adhesive Tape Unknown - Low Severity     Rash     • Azithromycin Unknown - Low Severity   • Erythromycin Other (See Comments)     Sores in mouth   • Nickel Itching   • Epinephrine Palpitations   • Latex Rash   • Nitroglycerin Palpitations       Past Surgical History:   Procedure Laterality Date   • ANKLE OPEN REDUCTION INTERNAL FIXATION Left 2021    Procedure: TRIMALLEOLAR FRACTURE OPEN REDUCTION INTERNAL FIXATION LEFT;  Surgeon: Giovani Daniels MD;  Location: [facility];  Service: Orthopedics;  Laterality: Left;   • ANKLE OPEN REDUCTION INTERNAL FIXATION Left 2021    s/p ORIF L ankle Dr. Daniels [facility]   • APPENDECTOMY     • CARDIAC CATHETERIZATION     •  SECTION      x 2   • CHOLECYSTECTOMY     • COLONOSCOPY     • DILATATION AND CURETTAGE      Of Cervical Stump   • HARDWARE REMOVAL FOOT / ANKLE Left 2022    syndesmotic screw removal Dr. Daniels   • MYOMECTOMY     • SMALL INTESTINE SURGERY     • TUBAL ABDOMINAL LIGATION         Family History   Problem Relation Age of Onset   • Hyperlipidemia Mother    • CHAD disease Mother    • Cancer Mother    • Diabetes Mother    • Heart disease Mother    • Broken bones Mother         Broke both wrists ...different occasions due to falls   • Rheum arthritis Father    • Hyperlipidemia Father    • Heart attack Father 72   • Rheumatologic disease Father         My dad has severe RA   • Ovarian cancer Maternal Aunt    • Diabetes Paternal Grandmother    • Hypertension Paternal Grandmother    • Obesity Paternal Grandmother    • Colon cancer Other    • Arthritis Other    • Cancer Other         uncle; liver, lung    • Thyroid disease Cousin    • Thyroid cancer Cousin    • CHAD disease  Daughter    • Obesity Brother    • Sleep apnea Brother        Social History     Socioeconomic History   • Marital status:    Tobacco Use   • Smoking status: Never     Passive exposure: Never   • Smokeless tobacco: Never   Vaping Use   • Vaping Use: Never used   Substance and Sexual Activity   • Alcohol use: Never   • Drug use: Never   • Sexual activity: Not Currently     Partners: Male     Birth control/protection: Surgical, Abstinence, Tubal ligation           Objective   Physical Exam  Vitals and nursing note reviewed.   Constitutional:       General: She is not in acute distress.     Appearance: Normal appearance. She is not ill-appearing, toxic-appearing or diaphoretic.   HENT:      Head: Normocephalic and atraumatic.      Nose: Nose normal.      Mouth/Throat:      Mouth: Mucous membranes are moist.      Pharynx: Oropharynx is clear.      Comments: Oral mucous membranes are moist  Eyes:      Extraocular Movements: Extraocular movements intact.      Conjunctiva/sclera: Conjunctivae normal.      Pupils: Pupils are equal, round, and reactive to light.   Cardiovascular:      Rate and Rhythm: Normal rate and regular rhythm.  No extrasystoles are present.     Pulses: Normal pulses.           Dorsalis pedis pulses are 2+ on the right side and 2+ on the left side.        Posterior tibial pulses are 2+ on the right side and 2+ on the left side.      Heart sounds: Normal heart sounds.      Comments: Regular rate and rhythm.  No ectopy.  No pedal edema to lower extremities.  Strong bilateral DP and PT pulses and brisk capillary refill.  Pulmonary:      Effort: Pulmonary effort is normal.      Breath sounds: Normal breath sounds.      Comments: Lungs are clear to auscultation bilaterally  Abdominal:      General: Bowel sounds are normal.      Palpations: Abdomen is soft.   Musculoskeletal:         General: Normal range of motion.      Cervical back: Normal range of motion and neck supple.      Right lower leg: No  edema.      Left lower leg: No edema.      Comments: I removed Xeroform dressing, soft roll, and Ace wrap.  Surgical incisions looked excellent and are healing well.  There is an extensive vertical incision to the lateral left ankle.  No wound dehiscence.  Patient also has 2 small incisions to the left anterior ankle and a small incision to the left medial ankle.  No erythema, warmth, or soft tissue swelling.  Patient has some localized tenderness, most notably to one of the small incisions to the left anterior ankle.  No concern for cellulitis or infection.   Skin:     General: Skin is warm and dry.      Findings: No erythema.      Comments: See musculoskeletal exam for details.  Incisions are healing well.  No concerns for cellulitis.   Neurological:      General: No focal deficit present.      Mental Status: She is alert.   Psychiatric:         Mood and Affect: Mood is anxious.         Speech: Speech normal.         Behavior: Behavior normal. Behavior is cooperative.         Thought Content: Thought content normal.         Cognition and Memory: Cognition normal.         Judgment: Judgment normal.         Procedures           ED Course  ED Course as of 02/02/23 0524   Thu Feb 02, 2023   0422 X-rays of the left ankle reveal no acute fracture or dislocation.  Mild generalized subcutaneous edema, nonspecific.  I removed patient's Ace wrap with soft roll and Xeroform dressing.  I cleaned the wound thoroughly with saline and wound wash.  I dried the wound completely and reapplied Xerofoam, soft roll, and Ace wrap.  Patient says ankle feels much better.  I did give her one of her pain pills of oxycodone 5 mg by mouth prior to discharge.  Skin exam revealed healing wounds.  X-rays were within normal limits.  Recommend continued nonweightbearing and close follow-up with Dr. Martinez, orthopedic surgeon, as scheduled on 2/6/2023.  Patient feels reassured.  Return sooner to the ER if any worsening symptoms.  Also complete  "course of Keflex. [FC]      ED Course User Index  [FC] Virginia Caruso, MIREYA           No results found for this or any previous visit (from the past 24 hour(s)).  Note: In addition to lab results from this visit, the labs listed above may include labs taken at another facility or during a different encounter within the last 24 hours. Please correlate lab times with ED admission and discharge times for further clarification of the services performed during this visit.    XR Ankle 3+ View Left   Final Result      1. No acute fracture or dislocation.   2. Mild generalized subcutaneous edema, nonspecific.      Electronically signed by:  Luis Magaña D.O.     2/2/2023 1:25 AM Mountain Time        Vitals:    02/02/23 0125 02/02/23 0134 02/02/23 0415   BP: 143/88  134/76   BP Location: Right arm     Patient Position: Sitting     Pulse: 90  79   Resp: 20  17   Temp:  98.9 °F (37.2 °C)    TempSrc:  Oral    SpO2: 98%  98%   Weight: 113 kg (249 lb)     Height: 170.2 cm (67\")       Medications   oxyCODONE-acetaminophen (PERCOCET) 5-325 MG per tablet 1 tablet (1 tablet Oral Given 2/2/23 0319)   ondansetron ODT (ZOFRAN-ODT) disintegrating tablet 4 mg (4 mg Oral Given 2/2/23 0319)     ECG/EMG Results (last 24 hours)     ** No results found for the last 24 hours. **        No orders to display                                       MDM    Final diagnoses:   Acute post-operative pain   Acute left ankle pain   History of ankle surgery   History of ankle fracture   History of anxiety   Hashimoto's thyroiditis   History of ulcerative colitis       ED Disposition  ED Disposition     ED Disposition   Discharge    Condition   Stable    Comment   --             Ankush Martinez Jr., MD  [address]  [address]  [facility] [address]  [phone]      Follow-up Monday, 2/6/2023 as scheduled.    [facility] Emergency Department  [facility] [address] [phone]    If symptoms worsen       "   Medication List      No changes were made to your prescriptions during this visit.          Virginia Caruso PA-C  02/02/23 0524

## 2023-02-02 NOTE — DISCHARGE INSTRUCTIONS
ER evaluation reveals normal x-rays of the left ankle.  There were no acute bony abnormalities and no evidence of dislocation.  I cleaned the wound thoroughly and there was no sign of skin infection.  Incisions are healing well.  I cleaned the wound and reapply dressing and Ace wrap for stabilization.  Recommend continued nonweightbearing and close follow-up with orthopedic surgeon as scheduled on 2/6/2023 with Dr. Martinez.  Continue with Keflex until course is completed and all other routine medical management.  Return to the ER if worsening symptoms.

## 2023-02-18 ENCOUNTER — TELEMEDICINE (OUTPATIENT)
Dept: FAMILY MEDICINE CLINIC | Facility: TELEHEALTH | Age: 58
End: 2023-02-18
Payer: MEDICARE

## 2023-02-18 DIAGNOSIS — J06.9 UPPER RESPIRATORY TRACT INFECTION, UNSPECIFIED TYPE: Primary | ICD-10-CM

## 2023-02-18 PROCEDURE — 99213 OFFICE O/P EST LOW 20 MIN: CPT | Performed by: NURSE PRACTITIONER

## 2023-02-18 NOTE — PROGRESS NOTES
CHIEF COMPLAINT  Chief Complaint   Patient presents with   • Sore Throat   • Cough         HPI  Brigid Coelho is a 57 y.o. female  presents with complaint of 2 day h/o sore throat and cough with light yellow production. She is concerned about strep throat although she denies known exposure. She is using Mucinex for cough.   Review of Systems   Constitutional: Negative for activity change, appetite change, fatigue and fever.   HENT: Positive for postnasal drip and sore throat.    Respiratory: Positive for cough. Negative for shortness of breath, wheezing and stridor.    Cardiovascular: Negative.    Gastrointestinal: Negative.    Musculoskeletal: Negative.    Skin: Negative.    Neurological: Negative.    Hematological: Negative.    Psychiatric/Behavioral: Negative.        Past Medical History:   Diagnosis Date   • Acute bronchitis    • Anemia    • Anxiety    • Arthritis of back    • Chest pain    • Cholelithiasis    • Claustrophobia    • Fracture of ankle 10/30/2021   • GERD (gastroesophageal reflux disease)    • Hashimoto's thyroiditis    • Hemorrhoids    • Hypothyroidism    • Low back pain    • Low back strain 10/12/2011   • Lumbosacral disc disease 10/12/2011   • Metrorrhagia    • Palpitations    • Polycystic ovary syndrome    • Polyp of sigmoid colon    • Ulcerative colitis (HCC)    • Upper respiratory infection    • UTI (urinary tract infection)        Family History   Problem Relation Age of Onset   • Hyperlipidemia Mother    • CHAD disease Mother    • Cancer Mother    • Diabetes Mother    • Heart disease Mother    • Broken bones Mother         Broke both wrists ...different occasions due to falls   • Rheum arthritis Father    • Hyperlipidemia Father    • Heart attack Father 72   • Rheumatologic disease Father         My dad has severe RA   • Ovarian cancer Maternal Aunt    • Diabetes Paternal Grandmother    • Hypertension Paternal Grandmother    • Obesity Paternal Grandmother    • Colon cancer Other     • Arthritis Other    • Cancer Other         uncle; liver, lung    • Thyroid disease Cousin    • Thyroid cancer Cousin    • CHAD disease Daughter    • Obesity Brother    • Sleep apnea Brother        Social History     Socioeconomic History   • Marital status:    Tobacco Use   • Smoking status: Never     Passive exposure: Never   • Smokeless tobacco: Never   Vaping Use   • Vaping Use: Never used   Substance and Sexual Activity   • Alcohol use: Never   • Drug use: Never   • Sexual activity: Not Currently     Partners: Male     Birth control/protection: Surgical, Abstinence, Tubal ligation         There were no vitals taken for this visit.    PHYSICAL EXAM  Physical Exam   Constitutional: She is oriented to person, place, and time. She appears well-developed and well-nourished. She does not have a sickly appearance. She does not appear ill. No distress.   HENT:   Head: Normocephalic and atraumatic.   Nose: Rhinorrhea present. No congestion.   Mouth/Throat: Mouth/Lips are normal.Oropharynx is clear and moist. Mucous membranes are not erythematous. No oropharyngeal exudate. No tonsillar exudate. no white patchesnot blistered  Pulmonary/Chest: Effort normal.  No respiratory distress.  Lymphadenopathy:     She has no cervical adenopathy.   Neurological: She is alert and oriented to person, place, and time.   Psychiatric: She has a normal mood and affect.   Vitals reviewed.      Results for orders placed or performed in visit on 01/17/23   Basic metabolic panel    Specimen: Blood   Result Value Ref Range    Glucose 61 (L) 65 - 99 mg/dL    BUN 8 6 - 20 mg/dL    Creatinine 0.75 0.57 - 1.00 mg/dL    Sodium 140 136 - 145 mmol/L    Potassium 4.0 3.5 - 5.2 mmol/L    Chloride 103 98 - 107 mmol/L    CO2 28.0 22.0 - 29.0 mmol/L    Calcium 9.7 8.6 - 10.5 mg/dL    BUN/Creatinine Ratio 10.7 7.0 - 25.0    Anion Gap 9.0 5.0 - 15.0 mmol/L    eGFR 92.4 >60.0 mL/min/1.73   CBC Auto Differential    Specimen: Blood   Result Value Ref  Range    WBC 6.28 3.40 - 10.80 10*3/mm3    RBC 4.75 3.77 - 5.28 10*6/mm3    Hemoglobin 11.3 (L) 12.0 - 15.9 g/dL    Hematocrit 35.9 34.0 - 46.6 %    MCV 75.6 (L) 79.0 - 97.0 fL    MCH 23.8 (L) 26.6 - 33.0 pg    MCHC 31.5 31.5 - 35.7 g/dL    RDW 16.8 (H) 12.3 - 15.4 %    RDW-SD 44.8 37.0 - 54.0 fl    MPV 10.2 6.0 - 12.0 fL    Platelets 415 140 - 450 10*3/mm3    Neutrophil % 59.8 42.7 - 76.0 %    Lymphocyte % 24.8 19.6 - 45.3 %    Monocyte % 10.4 5.0 - 12.0 %    Eosinophil % 3.7 0.3 - 6.2 %    Basophil % 0.8 0.0 - 1.5 %    Immature Grans % 0.5 0.0 - 0.5 %    Neutrophils, Absolute 3.76 1.70 - 7.00 10*3/mm3    Lymphocytes, Absolute 1.56 0.70 - 3.10 10*3/mm3    Monocytes, Absolute 0.65 0.10 - 0.90 10*3/mm3    Eosinophils, Absolute 0.23 0.00 - 0.40 10*3/mm3    Basophils, Absolute 0.05 0.00 - 0.20 10*3/mm3    Immature Grans, Absolute 0.03 0.00 - 0.05 10*3/mm3    nRBC 0.0 0.0 - 0.2 /100 WBC       Diagnoses and all orders for this visit:    1. Upper respiratory tract infection, unspecified type (Primary)    fluids and rest  Continue Mucinex DM as directed.   Follow up with PCP or virtual care for worsening s/s.     The use of a video visit has been reviewed with the patient and verbal informd consent has een obtained. Myself and Brigid Coelho participated in this visit. The patient is located in [address] [place] [address]. I am located in [place], [place]. Mychart and Zoom were utilized. I spent 15  minutes in the patient's chart for this visit.           Fifi Santana, APRN  02/18/2023  10:45 EST

## 2023-02-24 ENCOUNTER — OFFICE VISIT (OUTPATIENT)
Dept: FAMILY MEDICINE CLINIC | Facility: CLINIC | Age: 58
End: 2023-02-24
Payer: MEDICARE

## 2023-02-24 ENCOUNTER — LAB (OUTPATIENT)
Dept: LAB | Facility: HOSPITAL | Age: 58
End: 2023-02-24
Payer: MEDICARE

## 2023-02-24 VITALS
TEMPERATURE: 98 F | OXYGEN SATURATION: 98 % | SYSTOLIC BLOOD PRESSURE: 126 MMHG | DIASTOLIC BLOOD PRESSURE: 82 MMHG | WEIGHT: 252.4 LBS | HEART RATE: 95 BPM | HEIGHT: 67 IN | BODY MASS INDEX: 39.62 KG/M2 | RESPIRATION RATE: 23 BRPM

## 2023-02-24 DIAGNOSIS — J32.9 SINUSITIS, UNSPECIFIED CHRONICITY, UNSPECIFIED LOCATION: Primary | ICD-10-CM

## 2023-02-24 DIAGNOSIS — F41.9 ANXIETY: Chronic | ICD-10-CM

## 2023-02-24 DIAGNOSIS — J02.9 SORE THROAT: ICD-10-CM

## 2023-02-24 DIAGNOSIS — J30.2 SEASONAL ALLERGIES: ICD-10-CM

## 2023-02-24 LAB
EXPIRATION DATE: NORMAL
EXPIRATION DATE: NORMAL
FLUAV AG NPH QL: NEGATIVE
FLUBV AG NPH QL: NEGATIVE
INTERNAL CONTROL: NORMAL
INTERNAL CONTROL: NORMAL
Lab: NORMAL
Lab: NORMAL
S PYO AG THROAT QL: NEGATIVE

## 2023-02-24 PROCEDURE — U0004 COV-19 TEST NON-CDC HGH THRU: HCPCS

## 2023-02-24 PROCEDURE — 99213 OFFICE O/P EST LOW 20 MIN: CPT | Performed by: STUDENT IN AN ORGANIZED HEALTH CARE EDUCATION/TRAINING PROGRAM

## 2023-02-24 PROCEDURE — 87880 STREP A ASSAY W/OPTIC: CPT | Performed by: STUDENT IN AN ORGANIZED HEALTH CARE EDUCATION/TRAINING PROGRAM

## 2023-02-24 PROCEDURE — 87804 INFLUENZA ASSAY W/OPTIC: CPT | Performed by: STUDENT IN AN ORGANIZED HEALTH CARE EDUCATION/TRAINING PROGRAM

## 2023-02-24 RX ORDER — AMOXICILLIN AND CLAVULANATE POTASSIUM 875; 125 MG/1; MG/1
1 TABLET, FILM COATED ORAL 2 TIMES DAILY
Qty: 10 TABLET | Refills: 0 | Status: SHIPPED | OUTPATIENT
Start: 2023-02-24 | End: 2023-03-01

## 2023-02-24 RX ORDER — LORATADINE 10 MG/1
10 CAPSULE, LIQUID FILLED ORAL DAILY
Qty: 30 EACH | Refills: 2 | Status: SHIPPED | OUTPATIENT
Start: 2023-02-24

## 2023-02-24 RX ORDER — MONTELUKAST SODIUM 10 MG/1
10 TABLET ORAL
Qty: 90 TABLET | Refills: 1 | Status: SHIPPED | OUTPATIENT
Start: 2023-02-24

## 2023-02-24 NOTE — ASSESSMENT & PLAN NOTE
- Suspect the patient had a viral upper respiratory infection that started approximately 1-1/2 weeks ago, possible that it got better and she has another infection at this time though given duration of symptoms, suspect that patient has a bacterial sinusitis at this point  - Point-of-care flu and strep negative, COVID test obtained and pending  - Start Augmentin and Medrol Dosepak

## 2023-02-24 NOTE — PROGRESS NOTES
Office Note     Name: Brigid Coelho    : 1965     MRN: [id number]     Chief Complaint  Sore Throat (Started about 4 days ago ), Cough, and Nasal Congestion    Subjective     History of Present Illness:  Brigid Coelho is a 57 y.o. female who presents today for sore throat, cough, and congestion. Has been going on for about a week and a half.  Initially, her symptoms were getting better, but in the last 4 days, she reports that her symptoms worsened again.  Denies any fever.  Benen going on for 4 days.  Patient reports a lot of drainage but no sinus pain or pressure.  Denies any shortness of breath, loss of taste or smell.  She reports that may be she has a little bit of chest discomfort near her neck.  She did a COVID test at home and it was negative.      Allergic rhinitis: Patient has a history of allergic rhinitis and ran out of her montelukast last week.  She needs a refill on this.  She also needs a refill on her loratadine.  He reports that when she takes these medications, her symptoms are well controlled.          Objective     Past Medical History:   Diagnosis Date   • Acute bronchitis    • Anemia    • Anxiety    • Arthritis of back    • Chest pain    • Cholelithiasis    • Claustrophobia    • Fracture of ankle 10/30/2021   • GERD (gastroesophageal reflux disease)    • Hashimoto's thyroiditis    • Hemorrhoids    • Hypothyroidism    • Low back pain    • Low back strain 10/12/2011   • Lumbosacral disc disease 10/12/2011   • Metrorrhagia    • Palpitations    • Polycystic ovary syndrome    • Polyp of sigmoid colon    • Ulcerative colitis (HCC)    • Upper respiratory infection    • UTI (urinary tract infection)      Past Surgical History:   Procedure Laterality Date   • ANKLE OPEN REDUCTION INTERNAL FIXATION Left 2021    Procedure: TRIMALLEOLAR FRACTURE OPEN REDUCTION INTERNAL FIXATION LEFT;  Surgeon: Giovani Daniels MD;  Location: [facility];  Service: Orthopedics;  " Laterality: Left;   • ANKLE OPEN REDUCTION INTERNAL FIXATION Left 2021    s/p ORIF L ankle Dr. Daniels [facility]   • APPENDECTOMY     • CARDIAC CATHETERIZATION     •  SECTION      x 2   • CHOLECYSTECTOMY     • COLONOSCOPY     • DILATATION AND CURETTAGE      Of Cervical Stump   • HARDWARE REMOVAL FOOT / ANKLE Left 2022    syndesmotic screw removal Dr. Daniels   • MYOMECTOMY     • SMALL INTESTINE SURGERY     • TUBAL ABDOMINAL LIGATION       Family History   Problem Relation Age of Onset   • Hyperlipidemia Mother    • CHAD disease Mother    • Cancer Mother    • Diabetes Mother    • Heart disease Mother    • Broken bones Mother         Broke both wrists ...different occasions due to falls   • Rheum arthritis Father    • Hyperlipidemia Father    • Heart attack Father 72   • Rheumatologic disease Father         My dad has severe RA   • Ovarian cancer Maternal Aunt    • Diabetes Paternal Grandmother    • Hypertension Paternal Grandmother    • Obesity Paternal Grandmother    • Colon cancer Other    • Arthritis Other    • Cancer Other         uncle; liver, lung    • Thyroid disease Cousin    • Thyroid cancer Cousin    • CHAD disease Daughter    • Obesity Brother    • Sleep apnea Brother        Vital Signs  /82   Pulse 95   Temp 98 °F (36.7 °C) (Infrared)   Resp 23   Ht 170.2 cm (67\")   Wt 114 kg (252 lb 6.4 oz)   SpO2 98%   BMI 39.53 kg/m²   Estimated body mass index is 39.53 kg/m² as calculated from the following:    Height as of this encounter: 170.2 cm (67\").    Weight as of this encounter: 114 kg (252 lb 6.4 oz).    Physical Exam  Vitals reviewed.   Constitutional:       Appearance: Normal appearance.   HENT:      Head: Normocephalic and atraumatic.      Right Ear: Tympanic membrane normal.      Left Ear: Tympanic membrane normal.      Nose: Congestion present.      Mouth/Throat:      Mouth: Mucous membranes are moist.      Pharynx: Posterior oropharyngeal erythema present.   Eyes:      " Conjunctiva/sclera: Conjunctivae normal.   Cardiovascular:      Rate and Rhythm: Normal rate and regular rhythm.   Pulmonary:      Effort: Pulmonary effort is normal.      Breath sounds: Normal breath sounds.   Abdominal:      General: Abdomen is flat.      Palpations: Abdomen is soft.   Neurological:      Mental Status: She is alert.               Assessment and Plan     Diagnoses and all orders for this visit:    1. Sinusitis, unspecified chronicity, unspecified location (Primary)  Assessment & Plan:  - Suspect the patient had a viral upper respiratory infection that started approximately 1-1/2 weeks ago, possible that it got better and she has another infection at this time though given duration of symptoms, suspect that patient has a bacterial sinusitis at this point  - Point-of-care flu and strep negative, COVID test obtained and pending  - Start Augmentin and Medrol Dosepak    Orders:  -     POC Rapid Strep A  -     POC Influenza A / B  -     COVID-19 PCR, [email or web address], NP SWAB IN [facility] VIRAL TRANSPORT MEDIA/ORAL SWISH 24-30 HR TAT - Swab, Nasopharynx; Future  -     amoxicillin-clavulanate (Augmentin) 875-125 MG per tablet; Take 1 tablet by mouth 2 (Two) Times a Day for 5 days.  Dispense: 10 tablet; Refill: 0    2. Seasonal allergies  Assessment & Plan:  - Stable  - Refilled montelukast and loratadine    Orders:  -     Loratadine 10 MG capsule; Take 1 capsule by mouth Daily.  Dispense: 30 each; Refill: 2  -     montelukast (SINGULAIR) 10 MG tablet; Take 1 tablet by mouth every night at bedtime.  Dispense: 90 tablet; Refill: 1        Follow Up  No follow-ups on file.    Rachael Machado MD

## 2023-02-25 LAB — SARS-COV-2 RNA NOSE QL NAA+PROBE: NOT DETECTED

## 2023-03-16 ENCOUNTER — OFFICE VISIT (OUTPATIENT)
Dept: FAMILY MEDICINE CLINIC | Facility: CLINIC | Age: 58
End: 2023-03-16
Payer: MEDICARE

## 2023-03-16 VITALS
WEIGHT: 254 LBS | TEMPERATURE: 98.4 F | DIASTOLIC BLOOD PRESSURE: 74 MMHG | SYSTOLIC BLOOD PRESSURE: 118 MMHG | BODY MASS INDEX: 39.87 KG/M2 | HEART RATE: 81 BPM | OXYGEN SATURATION: 98 % | HEIGHT: 67 IN

## 2023-03-16 DIAGNOSIS — F41.1 GENERALIZED ANXIETY DISORDER: Primary | Chronic | ICD-10-CM

## 2023-03-16 DIAGNOSIS — Z23 IMMUNIZATION DUE: ICD-10-CM

## 2023-03-16 DIAGNOSIS — F41.1 GENERALIZED ANXIETY DISORDER: Chronic | ICD-10-CM

## 2023-03-16 DIAGNOSIS — Z51.81 THERAPEUTIC DRUG MONITORING: ICD-10-CM

## 2023-03-16 PROBLEM — J32.9 SINUSITIS: Status: RESOLVED | Noted: 2023-02-24 | Resolved: 2023-03-16

## 2023-03-16 PROBLEM — M62.462 GASTROCNEMIUS EQUINUS, LEFT: Status: RESOLVED | Noted: 2022-10-24 | Resolved: 2023-03-16

## 2023-03-16 PROBLEM — S82.852A CLOSED TRIMALLEOLAR FRACTURE OF LEFT ANKLE: Status: RESOLVED | Noted: 2021-11-01 | Resolved: 2023-03-16

## 2023-03-16 PROBLEM — M21.862 GASTROCNEMIUS EQUINUS, LEFT: Status: RESOLVED | Noted: 2022-10-24 | Resolved: 2023-03-16

## 2023-03-16 PROBLEM — R93.6 ABNORMAL FINDINGS ON DIAGNOSTIC IMAGING OF LIMBS: Status: RESOLVED | Noted: 2022-06-25 | Resolved: 2023-03-16

## 2023-03-16 PROBLEM — M25.672 ANKLE STIFFNESS, LEFT: Status: RESOLVED | Noted: 2022-10-24 | Resolved: 2023-03-16

## 2023-03-16 PROBLEM — E28.2 POLYCYSTIC OVARIES: Status: RESOLVED | Noted: 2019-05-31 | Resolved: 2023-03-16

## 2023-03-16 PROBLEM — Z01.818 PREPROCEDURAL EXAMINATION: Status: RESOLVED | Noted: 2023-01-13 | Resolved: 2023-03-16

## 2023-03-16 PROCEDURE — G0009 ADMIN PNEUMOCOCCAL VACCINE: HCPCS | Performed by: FAMILY MEDICINE

## 2023-03-16 PROCEDURE — 99214 OFFICE O/P EST MOD 30 MIN: CPT | Performed by: FAMILY MEDICINE

## 2023-03-16 PROCEDURE — 1159F MED LIST DOCD IN RCRD: CPT | Performed by: FAMILY MEDICINE

## 2023-03-16 PROCEDURE — 90677 PCV20 VACCINE IM: CPT | Performed by: FAMILY MEDICINE

## 2023-03-16 RX ORDER — ALPRAZOLAM 0.5 MG/1
0.5 TABLET ORAL DAILY PRN
Qty: 30 TABLET | Refills: 0 | Status: SHIPPED | OUTPATIENT
Start: 2023-03-16 | End: 2023-03-17 | Stop reason: SDUPTHER

## 2023-03-16 RX ORDER — ALPRAZOLAM 0.5 MG/1
0.5 TABLET ORAL DAILY PRN
Qty: 30 TABLET | Refills: 0 | Status: CANCELLED | OUTPATIENT
Start: 2023-03-16

## 2023-03-16 RX ORDER — LORATADINE 10 MG/1
10 TABLET ORAL DAILY
COMMUNITY

## 2023-03-16 RX ORDER — SIMETHICONE 125 MG
125 TABLET,CHEWABLE ORAL
COMMUNITY

## 2023-03-16 RX ORDER — SODIUM, POTASSIUM,MAG SULFATES 17.5-3.13G
SOLUTION, RECONSTITUTED, ORAL ORAL
COMMUNITY
Start: 2023-01-09
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
## 2023-03-16 NOTE — TELEPHONE ENCOUNTER
Caller: Brigid Coelho Marcella    Relationship: Self    Best call back number: [phone]    Requested Prescriptions:   Requested Prescriptions     Pending Prescriptions Disp Refills   • ALPRAZolam (Xanax) 0.5 MG tablet 30 tablet 0     Sig: Take 1 tablet by mouth Daily As Needed for Anxiety.        Pharmacy where request should be sent: [facility] [id number] [address]  AT [facility] [address] - [phone]  - [phone] FX     Additional details provided by patient: PATIENT HAS A COUPLE DAYS LEFT     Does the patient have less than a 3 day supply:  [x] Yes  [] No    Would you like a call back once the refill request has been completed: [] Yes [x] No    If the office needs to give you a call back, can they leave a voicemail: [x] Yes [] No    Cadance Dunaway, RegSched Rep   03/16/23 16:39 EDT

## 2023-03-17 ENCOUNTER — TELEPHONE (OUTPATIENT)
Dept: FAMILY MEDICINE CLINIC | Facility: CLINIC | Age: 58
End: 2023-03-17
Payer: MEDICARE

## 2023-03-17 DIAGNOSIS — F41.1 GENERALIZED ANXIETY DISORDER: Chronic | ICD-10-CM

## 2023-03-17 RX ORDER — ALPRAZOLAM 0.5 MG/1
0.5 TABLET ORAL DAILY
Qty: 30 TABLET | Refills: 0 | Status: SHIPPED | OUTPATIENT
Start: 2023-03-17
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
## 2023-03-23 LAB — DRUGS UR: NORMAL

## 2023-06-07 DIAGNOSIS — E06.3 HYPOTHYROIDISM DUE TO HASHIMOTO'S THYROIDITIS: ICD-10-CM

## 2023-06-07 DIAGNOSIS — E03.8 HYPOTHYROIDISM DUE TO HASHIMOTO'S THYROIDITIS: ICD-10-CM

## 2023-06-07 RX ORDER — LEVOTHYROXINE SODIUM 88 UG/1
TABLET ORAL
Qty: 90 TABLET | Refills: 1 | Status: SHIPPED | OUTPATIENT
Start: 2023-06-07

## 2023-06-07 NOTE — TELEPHONE ENCOUNTER
Rx Refill Note  Requested Prescriptions     Pending Prescriptions Disp Refills    levothyroxine (SYNTHROID, LEVOTHROID) 88 MCG tablet [Pharmacy Med Name: LEVOTHYROXINE 88 MCG TABLET] 90 tablet 1     Sig: TAKE ONE TABLET BY MOUTH DAILY      Last office visit with prescribing clinician: 12/19/2022   Last telemedicine visit with prescribing clinician: Visit date not found   Next office visit with prescribing clinician: 6/9/2023                         Would you like a call back once the refill request has been completed: [] Yes [] No    If the office needs to give you a call back, can they leave a voicemail: [] Yes [] No    Miranda Norton MA  06/07/23, 08:16 EDT

## 2023-06-12 PROBLEM — Z12.11 ENCOUNTER FOR SCREENING COLONOSCOPY: Status: ACTIVE | Noted: 2023-03-28

## 2023-06-12 PROBLEM — R13.10 DYSPHAGIA: Status: ACTIVE | Noted: 2023-03-28

## 2023-06-12 PROBLEM — K51.90 ULCERATIVE COLITIS: Chronic | Status: ACTIVE | Noted: 2023-03-28

## 2023-06-12 PROBLEM — R00.2 PALPITATIONS: Status: ACTIVE | Noted: 2023-03-28

## 2023-06-12 PROBLEM — E66.9 OBESITY: Status: ACTIVE | Noted: 2023-03-28

## 2023-06-12 PROBLEM — G71.09 HEREDITARY PROGRESSIVE MUSCULAR DYSTROPHY: Chronic | Status: ACTIVE | Noted: 2023-03-28

## 2023-07-18 PROBLEM — M25.572 CHRONIC PAIN OF LEFT ANKLE: Status: ACTIVE | Noted: 2023-07-18

## 2023-07-18 PROBLEM — M25.532 LEFT WRIST PAIN: Status: ACTIVE | Noted: 2023-07-18

## 2023-07-18 PROBLEM — E66.813 CLASS 3 SEVERE OBESITY DUE TO EXCESS CALORIES WITH SERIOUS COMORBIDITY AND BODY MASS INDEX (BMI) OF 40.0 TO 44.9 IN ADULT: Status: ACTIVE | Noted: 2019-11-21

## 2023-07-18 PROBLEM — G89.29 CHRONIC PAIN OF LEFT ANKLE: Status: ACTIVE | Noted: 2023-07-18

## 2023-07-24 ENCOUNTER — TELEPHONE (OUTPATIENT)
Dept: FAMILY MEDICINE CLINIC | Facility: CLINIC | Age: 58
End: 2023-07-24

## 2023-07-24 NOTE — TELEPHONE ENCOUNTER
Hub staff attempted to follow warm transfer process and was unsuccessful     Caller: Brigid Coelho    Relationship to patient: Self    Best call back number: [phone]     Patient is needing: PATIENT STATES SHE MISSED A CALL THIS MORNING BUT IS UNSURE WHAT THE CALL WAS REGARDING.          Syncope and Collapse

## 2023-07-24 NOTE — TELEPHONE ENCOUNTER
Caller: Brigid Coelho    Relationship: Self    Best call back number: [phone]     Additional notes: PATIENT HAS CHOSEN TO SEE ANOTHER PROVIDER FOR HER WELLNESS EXAM DUE TO PCP BEING PUSHED OUT TO JANUARY OF 2024.

## 2023-08-04 ENCOUNTER — TELEPHONE (OUTPATIENT)
Dept: FAMILY MEDICINE CLINIC | Facility: CLINIC | Age: 58
End: 2023-08-04
Payer: MEDICARE

## 2023-09-01 DIAGNOSIS — F41.1 GENERALIZED ANXIETY DISORDER: Chronic | ICD-10-CM

## 2023-09-01 RX ORDER — ALPRAZOLAM 0.5 MG/1
0.5 TABLET ORAL DAILY
Qty: 30 TABLET | Refills: 0 | Status: SHIPPED | OUTPATIENT
Start: 2023-09-01

## 2023-09-01 NOTE — TELEPHONE ENCOUNTER
Rx Refill Note  Requested Prescriptions     Pending Prescriptions Disp Refills    ALPRAZolam (Xanax) 0.5 MG tablet 30 tablet 0     Sig: Take 1 tablet by mouth Daily.      Last office visit with prescribing clinician: 3/16/2023   Last telemedicine visit with prescribing clinician: Visit date not found   Next office visit with prescribing clinician: Visit date not found                         Would you like a call back once the refill request has been completed: [] Yes [] No    If the office needs to give you a call back, can they leave a voicemail: [] Yes [] No    Johanna Ellington  09/01/23, 10:42 EDT

## 2023-09-28 LAB
ALBUMIN SERPL-MCNC: 4.2 G/DL (ref 3.5–5.2)
ALBUMIN/GLOB SERPL: 1.4 G/DL
ALP SERPL-CCNC: 99 U/L (ref 39–117)
ALT SERPL W P-5'-P-CCNC: 20 U/L (ref 1–33)
ANION GAP SERPL CALCULATED.3IONS-SCNC: 10 MMOL/L (ref 5–15)
AST SERPL-CCNC: 24 U/L (ref 1–32)
BACTERIA UR QL AUTO: ABNORMAL /HPF
BASOPHILS # BLD AUTO: 0.06 10*3/MM3 (ref 0–0.2)
BASOPHILS NFR BLD AUTO: 0.7 % (ref 0–1.5)
BILIRUB SERPL-MCNC: <0.2 MG/DL (ref 0–1.2)
BILIRUB UR QL STRIP: NEGATIVE
BUN SERPL-MCNC: 9 MG/DL (ref 6–20)
BUN/CREAT SERPL: 14.5 (ref 7–25)
CALCIUM SPEC-SCNC: 9.4 MG/DL (ref 8.6–10.5)
CHLORIDE SERPL-SCNC: 105 MMOL/L (ref 98–107)
CLARITY UR: CLEAR
CO2 SERPL-SCNC: 27 MMOL/L (ref 22–29)
COLOR UR: YELLOW
CREAT SERPL-MCNC: 0.62 MG/DL (ref 0.57–1)
D-LACTATE SERPL-SCNC: 1.4 MMOL/L (ref 0.5–2)
DEPRECATED RDW RBC AUTO: 47 FL (ref 37–54)
EGFRCR SERPLBLD CKD-EPI 2021: 102.7 ML/MIN/1.73
EOSINOPHIL # BLD AUTO: 0.45 10*3/MM3 (ref 0–0.4)
EOSINOPHIL NFR BLD AUTO: 5 % (ref 0.3–6.2)
ERYTHROCYTE [DISTWIDTH] IN BLOOD BY AUTOMATED COUNT: 16.3 % (ref 12.3–15.4)
GLOBULIN UR ELPH-MCNC: 3.1 GM/DL
GLUCOSE SERPL-MCNC: 117 MG/DL (ref 65–99)
GLUCOSE UR STRIP-MCNC: NEGATIVE MG/DL
HCT VFR BLD AUTO: 35.3 % (ref 34–46.6)
HGB BLD-MCNC: 11.1 G/DL (ref 12–15.9)
HGB UR QL STRIP.AUTO: ABNORMAL
HOLD SPECIMEN: NORMAL
HOLD SPECIMEN: NORMAL
HYALINE CASTS UR QL AUTO: ABNORMAL /LPF
IMM GRANULOCYTES # BLD AUTO: 0.03 10*3/MM3 (ref 0–0.05)
IMM GRANULOCYTES NFR BLD AUTO: 0.3 % (ref 0–0.5)
KETONES UR QL STRIP: NEGATIVE
LEUKOCYTE ESTERASE UR QL STRIP.AUTO: ABNORMAL
LIPASE SERPL-CCNC: 29 U/L (ref 13–60)
LYMPHOCYTES # BLD AUTO: 2.29 10*3/MM3 (ref 0.7–3.1)
LYMPHOCYTES NFR BLD AUTO: 25.4 % (ref 19.6–45.3)
MCH RBC QN AUTO: 25.1 PG (ref 26.6–33)
MCHC RBC AUTO-ENTMCNC: 31.4 G/DL (ref 31.5–35.7)
MCV RBC AUTO: 79.7 FL (ref 79–97)
MONOCYTES # BLD AUTO: 0.62 10*3/MM3 (ref 0.1–0.9)
MONOCYTES NFR BLD AUTO: 6.9 % (ref 5–12)
NEUTROPHILS NFR BLD AUTO: 5.57 10*3/MM3 (ref 1.7–7)
NEUTROPHILS NFR BLD AUTO: 61.7 % (ref 42.7–76)
NITRITE UR QL STRIP: NEGATIVE
NRBC BLD AUTO-RTO: 0 /100 WBC (ref 0–0.2)
PH UR STRIP.AUTO: 5.5 [PH] (ref 5–8)
PLATELET # BLD AUTO: 317 10*3/MM3 (ref 140–450)
PMV BLD AUTO: 10.7 FL (ref 6–12)
POTASSIUM SERPL-SCNC: 3.9 MMOL/L (ref 3.5–5.2)
PROT SERPL-MCNC: 7.3 G/DL (ref 6–8.5)
PROT UR QL STRIP: NEGATIVE
RBC # BLD AUTO: 4.43 10*6/MM3 (ref 3.77–5.28)
RBC # UR STRIP: ABNORMAL /HPF
REF LAB TEST METHOD: ABNORMAL
SODIUM SERPL-SCNC: 142 MMOL/L (ref 136–145)
SP GR UR STRIP: 1.02 (ref 1–1.03)
SQUAMOUS #/AREA URNS HPF: ABNORMAL /HPF
UROBILINOGEN UR QL STRIP: ABNORMAL
WBC # UR STRIP: ABNORMAL /HPF
WBC NRBC COR # BLD: 9.02 10*3/MM3 (ref 3.4–10.8)
WHOLE BLOOD HOLD COAG: NORMAL
WHOLE BLOOD HOLD SPECIMEN: NORMAL

## 2023-09-28 PROCEDURE — 83690 ASSAY OF LIPASE: CPT

## 2023-09-28 PROCEDURE — 84443 ASSAY THYROID STIM HORMONE: CPT | Performed by: PHYSICIAN ASSISTANT

## 2023-09-28 PROCEDURE — 84439 ASSAY OF FREE THYROXINE: CPT | Performed by: PHYSICIAN ASSISTANT

## 2023-09-28 PROCEDURE — 85025 COMPLETE CBC W/AUTO DIFF WBC: CPT

## 2023-09-28 PROCEDURE — 80053 COMPREHEN METABOLIC PANEL: CPT

## 2023-09-28 PROCEDURE — 83605 ASSAY OF LACTIC ACID: CPT

## 2023-09-28 PROCEDURE — 81001 URINALYSIS AUTO W/SCOPE: CPT

## 2023-09-28 PROCEDURE — 84484 ASSAY OF TROPONIN QUANT: CPT | Performed by: PHYSICIAN ASSISTANT

## 2023-09-28 PROCEDURE — 99283 EMERGENCY DEPT VISIT LOW MDM: CPT

## 2023-09-28 RX ORDER — SODIUM CHLORIDE 9 MG/ML
10 INJECTION INTRAVENOUS AS NEEDED
Status: DISCONTINUED | OUTPATIENT
Start: 2023-09-28 | End: 2023-09-29 | Stop reason: HOSPADM

## 2023-09-29 ENCOUNTER — HOSPITAL ENCOUNTER (EMERGENCY)
Facility: HOSPITAL | Age: 58
Discharge: HOME | End: 2023-09-29
Attending: EMERGENCY MEDICINE
Payer: MEDICARE

## 2023-09-29 VITALS
WEIGHT: 255 LBS | DIASTOLIC BLOOD PRESSURE: 78 MMHG | RESPIRATION RATE: 18 BRPM | OXYGEN SATURATION: 96 % | HEART RATE: 67 BPM | HEIGHT: 68 IN | BODY MASS INDEX: 38.65 KG/M2 | TEMPERATURE: 98.2 F | SYSTOLIC BLOOD PRESSURE: 119 MMHG

## 2023-09-29 DIAGNOSIS — Z86.39 HISTORY OF HYPOTHYROIDISM: ICD-10-CM

## 2023-09-29 DIAGNOSIS — Z87.19 HISTORY OF ULCERATIVE COLITIS: ICD-10-CM

## 2023-09-29 DIAGNOSIS — R19.7 DIARRHEA, UNSPECIFIED TYPE: ICD-10-CM

## 2023-09-29 DIAGNOSIS — Z87.898 HISTORY OF TACHYCARDIA: ICD-10-CM

## 2023-09-29 DIAGNOSIS — E06.3 HASHIMOTO'S THYROIDITIS: ICD-10-CM

## 2023-09-29 DIAGNOSIS — Z86.59 HISTORY OF ANXIETY: ICD-10-CM

## 2023-09-29 DIAGNOSIS — R00.2 PALPITATIONS: Primary | ICD-10-CM

## 2023-09-29 LAB
HOLD SPECIMEN: NORMAL
QT INTERVAL: 428 MS
QTC INTERVAL: 445 MS
T4 FREE SERPL-MCNC: 0.89 NG/DL (ref 0.93–1.7)
TROPONIN T SERPL HS-MCNC: 10 NG/L
TSH SERPL DL<=0.05 MIU/L-ACNC: 3.21 UIU/ML (ref 0.27–4.2)

## 2023-09-29 PROCEDURE — 93005 ELECTROCARDIOGRAM TRACING: CPT | Performed by: PHYSICIAN ASSISTANT

## 2023-09-29 RX ADMIN — SODIUM CHLORIDE 1000 ML: 9 INJECTION, SOLUTION INTRAVENOUS at 02:37

## 2023-09-29 NOTE — DISCHARGE INSTRUCTIONS
ER evaluation reveals essentially normal work-up with normal CBC, chemistries, and normal urinalysis.  Free T4 was within normal limits and TSH was mildly low at 0.89 with normal range starting at 0.93.  Recommend close PCP follow-up for recheck on hypothyroidism and history of Hashimoto's thyroiditis.  With history of tachycardia and palpitations, will refer patient to our cardiac event monitor clinic.  She may benefit from Holter monitoring and/or echocardiogram.  Increase water intake and avoid caffeine.  Continue with all other current medical management.  Suspect that patient had some mild dehydration with recent diarrhea.  Return to the ER if worsening symptoms.

## 2023-09-29 NOTE — ED PROVIDER NOTES
Subjective   History of Present Illness  This is a 58-year-old female that presents the ER with palpitations that started approximately 7 hours ago.  Patient says that she took care of her young grandchild last week when he had an illness of rhinorrhea and diarrhea.  She watched him for almost 8 days.  Patient started having diarrhea yesterday and has had a total of 4 loose stools in the last 48 hours.  She denies any abdominal pain or nausea/vomiting.  She denies any recent rhinorrhea, nasal congestion, or cough.  She denies any headache.  She denies fever, chills, or body aches.  Patient describes some palpitations this afternoon and heart rate got to around 110.  She has history of tachycardia and has utilize beta-blockers in the past, but she has not had any significant tachycardia lately.  She denies personal history of arrhythmias such as atrial fibrillation or SVT.  Patient denies any recent new medication changes.  Past medical history is significant for anxiety, hypothyroidism, Hashimoto's thyroiditis, sigmoid polyps, lumbosacral disc disease, GERD, PCOS, ulcerative colitis, and history of claustrophobia.  No other concerns at this time.  Patient did drink a caffeinated beverage this afternoon and symptoms started shortly after that.  She tried to drink increase her water intake.  No other stimulant use or over-the-counter herbal medicines.    History provided by:  Patient  Palpitations  Palpitations quality:  Fast  Onset quality:  Sudden  Duration:  7 hours  Timing:  Intermittent  Progression:  Waxing and waning  Chronicity:  New  Context: caffeine (Pt drank a soda today.)    Context comment:  Pt watched her young grandchild last week with illness of RN/diarrhea.  Pt started having diarrhea yesterday x 4 times total.  No n/v.  No HA, RN, URI sxs.  No abd pain.  Relieved by:  Nothing  Worsened by:  Nothing  Ineffective treatments: Increased water intake.  Associated symptoms: no back pain, no chest pain, no  chest pressure, no cough, no diaphoresis, no dizziness, no hemoptysis, no leg pain, no lower extremity edema, no malaise/fatigue, no nausea, no near-syncope, no numbness, no orthopnea, no PND, no shortness of breath, no syncope, no vomiting and no weakness    Risk factors: hx of thyroid disease (History of hypothyroid and Hashimoto's thyroiditis)    Risk factors: no OTC sinus medications      Review of Systems   Constitutional: Negative.  Negative for activity change, appetite change, chills, diaphoresis, fatigue, fever and malaise/fatigue.   HENT: Negative.  Negative for congestion, ear pain, postnasal drip, rhinorrhea, sinus pressure, sinus pain, sneezing and sore throat.    Respiratory: Negative.  Negative for cough, hemoptysis and shortness of breath.    Cardiovascular:  Positive for palpitations. Negative for chest pain, orthopnea, leg swelling, syncope, PND and near-syncope.        History of tachycardia.  No h/o SVT or A Fib or other cardiac arrhythmia.   Gastrointestinal:  Positive for anal bleeding (intermittent bleeding from hemorrhoids.) and diarrhea (Diarrhea x 4 since yesterday.). Negative for abdominal pain, constipation, nausea and vomiting.        History of Ulcerative Colitis; in remission. Pt follows with Tamie Dewitt. Pt is on oral meds.   Genitourinary: Negative.  Negative for dysuria, flank pain, frequency and urgency.   Musculoskeletal: Negative.  Negative for back pain.   Neurological: Negative.  Negative for dizziness, syncope, weakness, numbness and headaches.        No dizziness or near syncope/syncope.   All other systems reviewed and are negative.    Past Medical History:   Diagnosis Date    Acute bronchitis     Anemia     Anxiety     Arthritis of back     Chest pain     Cholelithiasis     Claustrophobia     Fracture of ankle 10/30/2021    GERD (gastroesophageal reflux disease)     Hashimoto's thyroiditis     Hemorrhoids     Hypothyroidism     Low back pain     Low back strain  10/12/2011    Lumbosacral disc disease 10/12/2011    Metrorrhagia     Palpitations     Polycystic ovary syndrome     Polyp of sigmoid colon     Ulcerative colitis     Upper respiratory infection     UTI (urinary tract infection)        Allergies   Allergen Reactions    Adhesive Tape Unknown - Low Severity     Rash      Azithromycin Unknown - Low Severity    Erythromycin Other (See Comments)     Sores in mouth    Nickel Itching    Epinephrine Palpitations    Latex Rash    Nitroglycerin Palpitations       Past Surgical History:   Procedure Laterality Date    ANKLE OPEN REDUCTION INTERNAL FIXATION Left 2021    Procedure: TRIMALLEOLAR FRACTURE OPEN REDUCTION INTERNAL FIXATION LEFT;  Surgeon: Giovani Daniels MD;  Location: [facility];  Service: Orthopedics;  Laterality: Left;    ANKLE OPEN REDUCTION INTERNAL FIXATION Left 2021    s/p ORIF L ankle Dr. Daniels [facility]    APPENDECTOMY      CARDIAC CATHETERIZATION       SECTION      x 2    CHOLECYSTECTOMY      COLONOSCOPY      DILATATION AND CURETTAGE      Of Cervical Stump    HARDWARE REMOVAL FOOT / ANKLE Left 2022    syndesmotic screw removal Dr. Daniels    MYOMECTOMY      SMALL INTESTINE SURGERY      TUBAL ABDOMINAL LIGATION         Family History   Problem Relation Age of Onset    Hyperlipidemia Mother     CHAD disease Mother     Cancer Mother     Diabetes Mother     Heart disease Mother     Broken bones Mother         Broke both wrists ...different occasions due to falls    Rheum arthritis Father     Hyperlipidemia Father     Heart attack Father 72    Rheumatologic disease Father         My dad has severe RA    Ovarian cancer Maternal Aunt     Diabetes Paternal Grandmother     Hypertension Paternal Grandmother     Obesity Paternal Grandmother     Colon cancer Other     Arthritis Other     Cancer Other         uncle; liver, lung     Thyroid disease Cousin     Thyroid cancer Cousin     CHAD disease Daughter     Obesity Brother     Sleep  apnea Brother        Social History     Socioeconomic History    Marital status:    Tobacco Use    Smoking status: Never     Passive exposure: Never    Smokeless tobacco: Never   Vaping Use    Vaping Use: Never used   Substance and Sexual Activity    Alcohol use: Never    Drug use: Never    Sexual activity: Not Currently     Partners: Male     Birth control/protection: Surgical, Abstinence, Tubal ligation           Objective   Physical Exam  Vitals and nursing note reviewed.   Constitutional:       General: She is not in acute distress.     Appearance: Normal appearance. She is obese. She is not ill-appearing, toxic-appearing or diaphoretic.      Comments: Patient does not appear ill.  No acute sign of pain or distress.  Nontoxic.   HENT:      Head: Normocephalic and atraumatic.      Nose: Nose normal.      Mouth/Throat:      Mouth: Mucous membranes are moist.      Pharynx: Oropharynx is clear.   Eyes:      Extraocular Movements: Extraocular movements intact.      Conjunctiva/sclera: Conjunctivae normal.      Pupils: Pupils are equal, round, and reactive to light.   Neck:      Thyroid: No thyromegaly.      Comments: No thyromegaly  Cardiovascular:      Rate and Rhythm: Normal rate and regular rhythm. No extrasystoles are present.     Pulses: Normal pulses.      Heart sounds: Normal heart sounds. No murmur heard.     Comments: No tachycardia or ectopy.  No murmurs appreciated.  No pedal edema to lower extremities.  Pulmonary:      Effort: Pulmonary effort is normal.      Breath sounds: Normal breath sounds.      Comments: Lungs are clear to auscultation bilaterally  Abdominal:      General: Bowel sounds are normal. There is no distension.      Palpations: Abdomen is soft.      Tenderness: There is no abdominal tenderness. There is no right CVA tenderness, left CVA tenderness, guarding or rebound.      Comments: Central obesity.  Soft and nontender.  No flank or CVA tenderness.   Musculoskeletal:          General: Normal range of motion.      Cervical back: Normal range of motion and neck supple.      Right lower leg: No edema.      Left lower leg: No edema.   Skin:     General: Skin is warm and dry.   Neurological:      General: No focal deficit present.      Mental Status: She is alert and oriented to person, place, and time.      Cranial Nerves: Cranial nerves 2-12 are intact.      Sensory: Sensation is intact.      Motor: Motor function is intact.      Coordination: Coordination is intact.       Procedures           ED Course  ED Course as of 09/29/23 0354   Fri Sep 29, 2023   0348 EKG shows normal sinus rhythm.  Rate was 65.  CBC and chemistries were within normal limits.  Lactic acid is 1.4.  TSH is 3.21 and free T40.89.  High-sensitivity troponin is 10.  Urinalysis reveals 1+ leukocytes, trace bacteria, 13-20 white blood cells, 21-30 red blood cells and negative nitrite.  Patient given IV fluid bolus.  Telemetry has revealed heart rate running in the 60s to 70s on average.  Recommend patient to avoid caffeine.  We will refer her to our cardiac event monitor clinic.  She might benefit from echocardiogram and/or Holter monitoring.  Encourage fluids.  Return to the ER if worsening symptoms.  Differential diagnoses includes palpitations from mild dehydration from recent diarrhea.  We also recommend close PCP follow-up for recheck on hypothyroidism with history of Hashimoto's thyroiditis. [FC]      ED Course User Index  [FC] Virginia Caruso PA-C                Recent Results (from the past 24 hour(s))   Comprehensive Metabolic Panel    Collection Time: 09/28/23  8:48 PM    Specimen: Blood   Result Value Ref Range    Glucose 117 (H) 65 - 99 mg/dL    BUN 9 6 - 20 mg/dL    Creatinine 0.62 0.57 - 1.00 mg/dL    Sodium 142 136 - 145 mmol/L    Potassium 3.9 3.5 - 5.2 mmol/L    Chloride 105 98 - 107 mmol/L    CO2 27.0 22.0 - 29.0 mmol/L    Calcium 9.4 8.6 - 10.5 mg/dL    Total Protein 7.3 6.0 - 8.5 g/dL    Albumin 4.2 3.5  - 5.2 g/dL    ALT (SGPT) 20 1 - 33 U/L    AST (SGOT) 24 1 - 32 U/L    Alkaline Phosphatase 99 39 - 117 U/L    Total Bilirubin <0.2 0.0 - 1.2 mg/dL    Globulin 3.1 gm/dL    A/G Ratio 1.4 g/dL    BUN/Creatinine Ratio 14.5 7.0 - 25.0    Anion Gap 10.0 5.0 - 15.0 mmol/L    eGFR 102.7 >60.0 mL/min/1.73   Lipase    Collection Time: 09/28/23  8:48 PM    Specimen: Blood   Result Value Ref Range    Lipase 29 13 - 60 U/L   Lactic Acid, Plasma    Collection Time: 09/28/23  8:48 PM    Specimen: Blood   Result Value Ref Range    Lactate 1.4 0.5 - 2.0 mmol/L   Green Top (Gel)    Collection Time: 09/28/23  8:48 PM   Result Value Ref Range    Extra Tube Hold for add-ons.    Lavender Top    Collection Time: 09/28/23  8:48 PM   Result Value Ref Range    Extra Tube hold for add-on    Gold Top - SST    Collection Time: 09/28/23  8:48 PM   Result Value Ref Range    Extra Tube Hold for add-ons.    Gray Top    Collection Time: 09/28/23  8:48 PM   Result Value Ref Range    Extra Tube Hold for add-ons.    Light Blue Top    Collection Time: 09/28/23  8:48 PM   Result Value Ref Range    Extra Tube Hold for add-ons.    CBC Auto Differential    Collection Time: 09/28/23  8:48 PM    Specimen: Blood   Result Value Ref Range    WBC 9.02 3.40 - 10.80 10*3/mm3    RBC 4.43 3.77 - 5.28 10*6/mm3    Hemoglobin 11.1 (L) 12.0 - 15.9 g/dL    Hematocrit 35.3 34.0 - 46.6 %    MCV 79.7 79.0 - 97.0 fL    MCH 25.1 (L) 26.6 - 33.0 pg    MCHC 31.4 (L) 31.5 - 35.7 g/dL    RDW 16.3 (H) 12.3 - 15.4 %    RDW-SD 47.0 37.0 - 54.0 fl    MPV 10.7 6.0 - 12.0 fL    Platelets 317 140 - 450 10*3/mm3    Neutrophil % 61.7 42.7 - 76.0 %    Lymphocyte % 25.4 19.6 - 45.3 %    Monocyte % 6.9 5.0 - 12.0 %    Eosinophil % 5.0 0.3 - 6.2 %    Basophil % 0.7 0.0 - 1.5 %    Immature Grans % 0.3 0.0 - 0.5 %    Neutrophils, Absolute 5.57 1.70 - 7.00 10*3/mm3    Lymphocytes, Absolute 2.29 0.70 - 3.10 10*3/mm3    Monocytes, Absolute 0.62 0.10 - 0.90 10*3/mm3    Eosinophils, Absolute 0.45  (H) 0.00 - 0.40 10*3/mm3    Basophils, Absolute 0.06 0.00 - 0.20 10*3/mm3    Immature Grans, Absolute 0.03 0.00 - 0.05 10*3/mm3    nRBC 0.0 0.0 - 0.2 /100 WBC   TSH    Collection Time: 09/28/23  8:48 PM    Specimen: Blood   Result Value Ref Range    TSH 3.210 0.270 - 4.200 uIU/mL   T4, Free    Collection Time: 09/28/23  8:48 PM    Specimen: Blood   Result Value Ref Range    Free T4 0.89 (L) 0.93 - 1.70 ng/dL   Single High Sensitivity Troponin T    Collection Time: 09/28/23  8:48 PM    Specimen: Blood   Result Value Ref Range    HS Troponin T 10 (H) <10 ng/L   Urinalysis With Microscopic If Indicated (No Culture) - Urine, Clean Catch    Collection Time: 09/28/23  8:54 PM    Specimen: Urine, Clean Catch   Result Value Ref Range    Color, UA Yellow Yellow, Straw    Appearance, UA Clear Clear    pH, UA 5.5 5.0 - 8.0    Specific Gravity, UA 1.021 1.001 - 1.030    Glucose, UA Negative Negative    Ketones, UA Negative Negative    Bilirubin, UA Negative Negative    Blood, UA Trace (A) Negative    Protein, UA Negative Negative    Leuk Esterase, UA Small (1+) (A) Negative    Nitrite, UA Negative Negative    Urobilinogen, UA 0.2 E.U./dL 0.2 - 1.0 E.U./dL   Urinalysis, Microscopic Only - Urine, Clean Catch    Collection Time: 09/28/23  8:54 PM    Specimen: Urine, Clean Catch   Result Value Ref Range    RBC, UA 21-30 (A) None Seen, 0-2 /HPF    WBC, UA 13-20 (A) None Seen, 0-2 /HPF    Bacteria, UA Trace None Seen, Trace /HPF    Squamous Epithelial Cells, UA 0-2 None Seen, 0-2 /HPF    Hyaline Casts, UA 0-6 0 - 6 /LPF    Methodology Manual Light Microscopy    ECG 12 Lead Other; palpitations    Collection Time: 09/29/23  2:28 AM   Result Value Ref Range    QT Interval 428 ms    QTC Interval 445 ms     Note: In addition to lab results from this visit, the labs listed above may include labs taken at another facility or during a different encounter within the last 24 hours. Please correlate lab times with ED admission and discharge  times for further clarification of the services performed during this visit.    No orders to display     Vitals:    09/29/23 0215 09/29/23 0230 09/29/23 0300 09/29/23 0315   BP: 118/73 112/74 111/54 115/72   Pulse: 63 76 58 61   Resp:       Temp:       TempSrc:       SpO2:  96% 98% 93%   Weight:       Height:         Medications   Sodium Chloride (PF) 0.9 % 10 mL (has no administration in time range)   sodium chloride 0.9 % bolus 1,000 mL (1,000 mL Intravenous New Bag 9/29/23 0237)     ECG/EMG Results (last 24 hours)       ** No results found for the last 24 hours. **          ECG 12 Lead Other; palpitations   Preliminary Result   Test Reason : Other~   Blood Pressure :   */*   mmHG   Vent. Rate :  65 BPM     Atrial Rate :  65 BPM      P-R Int : 170 ms          QRS Dur :  90 ms       QT Int : 428 ms       P-R-T Axes :  44   8  11 degrees      QTc Int : 445 ms      Normal sinus rhythm   Normal ECG   When compared with ECG of 15-MAY-2021 15:43,   No significant change was found      Referred By: EDMD           Confirmed By:                                        Medical Decision Making  Amount and/or Complexity of Data Reviewed  Labs: ordered.  ECG/medicine tests: ordered.    Risk  Prescription drug management.        Final diagnoses:   Palpitations   Diarrhea, unspecified type   History of hypothyroidism   Hashimoto's thyroiditis   History of ulcerative colitis   History of tachycardia   History of anxiety       ED Disposition  ED Disposition       ED Disposition   Discharge    Condition   Stable    Comment   --               Demetrio Lo DO  [address] [id number] [phone]    Schedule an appointment as soon as possible for a visit in 2 days  Close PCP follow-up for recheck    [facility] EMERGENCY DEPARTMENT  [address] [facility] [address] [phone]    If symptoms worsen         Medication List      No changes were made to your prescriptions  during this visit.            Virginia Caruso PA-C  09/29/23 0354

## 2023-10-05 DIAGNOSIS — E06.3 HYPOTHYROIDISM DUE TO HASHIMOTO'S THYROIDITIS: ICD-10-CM

## 2023-10-05 DIAGNOSIS — E03.8 HYPOTHYROIDISM DUE TO HASHIMOTO'S THYROIDITIS: ICD-10-CM

## 2023-10-06 RX ORDER — LEVOTHYROXINE SODIUM 88 UG/1
TABLET ORAL
Qty: 90 TABLET | Refills: 1 | Status: SHIPPED | OUTPATIENT
Start: 2023-10-06

## 2023-10-11 ENCOUNTER — TELEMEDICINE (OUTPATIENT)
Dept: FAMILY MEDICINE CLINIC | Facility: CLINIC | Age: 58
End: 2023-10-11
Payer: MEDICARE

## 2023-10-11 VITALS — HEIGHT: 68 IN | BODY MASS INDEX: 37.72 KG/M2

## 2023-10-11 DIAGNOSIS — S49.92XD INJURY OF LEFT SHOULDER, SUBSEQUENT ENCOUNTER: Primary | ICD-10-CM

## 2023-10-11 NOTE — PROGRESS NOTES
"Chief Complaint  Shoulder Injury (Pt needs Cat scan or MRI, pt is still in so much pain/And pain management)    Subjective          Brigid Coelho presents to [facility]  History of Present Illness  Patient is a 58-year-old female.  She is here for a telehealth video visit.  She is physically located at [address] [place] [address]     This provider is physically located at [facility] practice:  [address],   [place] [address]    Patient is complaining of left shoulder / trapezius pain. She has been seen and had an XR on 10/07/2023 and treated for a trapezius muscle strain. She was seen in urgent care. She was started on  flexeril and prednisone dose pack. There was not fracture or dislocation.   She is continuing to have pain. She states it started after lifting some heavy furniture.She has been taking tylenol with minimal relief. She is requesting a referral to see an orthopedist.        Referral to orthopedics -       The following portions of the patient's history were reviewed and updated as appropriate: allergies, current medications, past family history, past medical history, past social history, past surgical history and problem list.    Review of Systems   Constitutional:  Positive for activity change.   Gastrointestinal: Negative.    Musculoskeletal:  Positive for myalgias.   Skin: Negative.    Neurological: Negative.    Hematological: Negative.    Psychiatric/Behavioral: Negative.           Objective   Vital Signs:   Ht 172.7 cm (67.99\")   BMI 37.72 kg/m²                      Physical Exam  Pulmonary:      Effort: Pulmonary effort is normal.   Musculoskeletal:      Left shoulder: Tenderness present.        Arms:       Comments: This was a telehealth visit - assisted by patient.      Neurological:      Mental Status: She is alert and oriented to person, place, and time.      Cranial Nerves: No cranial nerve deficit. "   Psychiatric:         Mood and Affect: Mood normal.        Result Review :            XR Shoulder 2+ View Left (10/07/2023 15:29)      Assessment and Plan    Diagnoses and all orders for this visit:    1. Injury of left shoulder, subsequent encounter (Primary)  -     MRI Shoulder Left Without Contrast; Future  -     Ambulatory Referral to Orthopedic Surgery    Referral to orthopedics  Referral for MRI   Slight improvement.     Taking tylenol and ibuprofen for pain and flexeril prn.   Aching today. Occassional pain from trapezius area to hand.   Follow up with PCP   If no improvement.       I spent 32 minutes caring for Brigid on this date of service. This time includes time spent by me in the following activities:preparing for the visit, reviewing tests, obtaining and/or reviewing a separately obtained history, counseling and educating the patient/family/caregiver, ordering medications, tests, or procedures, and documenting information in the medical record  Follow Up   Return if symptoms worsen or fail to improve.  Patient was given instructions and counseling regarding her condition or for health maintenance advice. Please see specific information pulled into the AVS if appropriate.

## 2023-12-13 DIAGNOSIS — F41.1 GENERALIZED ANXIETY DISORDER: Chronic | ICD-10-CM

## 2023-12-13 NOTE — TELEPHONE ENCOUNTER
Caller: Brigid Coelhole    Relationship: Self    Best call back number: [phone]     Requested Prescriptions:   Requested Prescriptions     Pending Prescriptions Disp Refills    ALPRAZolam (Xanax) 0.5 MG tablet 30 tablet 0     Sig: Take 1 tablet by mouth Daily.        Pharmacy where request should be sent: [facility] [id number] [address]  AT [facility] [address] - [phone]  - [phone] FX     Last office visit with prescribing clinician: 3/16/2023   Last telemedicine visit with prescribing clinician: 10/11/2023   Next office visit with prescribing clinician: Visit date not found     Additional details provided by patient: PATIENT HAS AN APPT ON 12/19/23    Does the patient have less than a 3 day supply:  [x] Yes  [] No    Would you like a call back once the refill request has been completed: [] Yes [x] No    If the office needs to give you a call back, can they leave a voicemail: [] Yes [x] No    Jesus Rojas Rep   12/13/23 14:22 EST

## 2023-12-14 RX ORDER — ALPRAZOLAM 0.5 MG/1
0.5 TABLET ORAL DAILY
Qty: 30 TABLET | Refills: 0 | Status: SHIPPED | OUTPATIENT
Start: 2023-12-14

## 2023-12-18 ENCOUNTER — TELEPHONE (OUTPATIENT)
Dept: FAMILY MEDICINE CLINIC | Facility: CLINIC | Age: 58
End: 2023-12-18

## 2023-12-18 NOTE — TELEPHONE ENCOUNTER
Provider:     DR HAMMOND    Caller:     PATIENT    Phone Number:     [phone] (Mobile)     Reason for Call:     PATIENT REQUESTED RESCHEDULING OF MWV FOR 12/29, WHICH DR HAMMOND WAS NOT AVAILABLE    PATIENT WAS SCHEDULED WITH YVONNE ORTIZ ON 12/29 AT 11:00

## 2024-01-11 ENCOUNTER — TELEMEDICINE (OUTPATIENT)
Dept: FAMILY MEDICINE CLINIC | Facility: TELEHEALTH | Age: 59
End: 2024-01-11
Payer: MEDICARE

## 2024-01-11 DIAGNOSIS — J22 LOWER RESPIRATORY INFECTION (E.G., BRONCHITIS, PNEUMONIA, PNEUMONITIS, PULMONITIS): Primary | ICD-10-CM

## 2024-01-11 RX ORDER — PREDNISONE 10 MG/1
TABLET ORAL
Qty: 21 TABLET | Refills: 0 | Status: SHIPPED | OUTPATIENT
Start: 2024-01-11

## 2024-01-11 RX ORDER — AMOXICILLIN AND CLAVULANATE POTASSIUM 875; 125 MG/1; MG/1
1 TABLET, FILM COATED ORAL 2 TIMES DAILY
Qty: 20 TABLET | Refills: 0 | Status: SHIPPED | OUTPATIENT
Start: 2024-01-11 | End: 2024-01-21

## 2024-01-11 NOTE — PROGRESS NOTES
You have chosen to receive care through a telehealth visit.  Do you consent to use a video/audio connection for your medical care today? Yes     CHIEF COMPLAINT  No chief complaint on file.        HPI  Brigid Coelho is a 58 y.o. female  presents with complaint of several days history of brown sputum production, cough, sore throat, low grade fever.  Denies wheezing, shortness of breath.    Negative COVID tests x 2.     Review of Systems  See HPI    Past Medical History:   Diagnosis Date    Acute bronchitis     Anemia     Anxiety     Arthritis of back     Chest pain     Cholelithiasis     Claustrophobia     Fracture of ankle 10/30/2021    GERD (gastroesophageal reflux disease)     Hashimoto's thyroiditis     Hemorrhoids     Hypothyroidism     Low back pain     Low back strain 10/12/2011    Lumbosacral disc disease 10/12/2011    Metrorrhagia     Palpitations     Polycystic ovary syndrome     Polyp of sigmoid colon     Ulcerative colitis     Upper respiratory infection     UTI (urinary tract infection)        Family History   Problem Relation Age of Onset    Hyperlipidemia Mother     CHAD disease Mother     Cancer Mother     Diabetes Mother     Heart disease Mother     Broken bones Mother         Broke both wrists ...different occasions due to falls    Rheum arthritis Father     Hyperlipidemia Father     Heart attack Father 72    Rheumatologic disease Father         My dad has severe RA    Ovarian cancer Maternal Aunt     Diabetes Paternal Grandmother     Hypertension Paternal Grandmother     Obesity Paternal Grandmother     Colon cancer Other     Arthritis Other     Cancer Other         uncle; liver, lung     Thyroid disease Cousin     Thyroid cancer Cousin     CHAD disease Daughter     Obesity Brother     Sleep apnea Brother        Social History     Socioeconomic History    Marital status:    Tobacco Use    Smoking status: Never     Passive exposure: Never    Smokeless tobacco: Never   Vaping Use     Vaping Use: Never used   Substance and Sexual Activity    Alcohol use: Never    Drug use: Never    Sexual activity: Not Currently     Partners: Male     Birth control/protection: Surgical, Abstinence, Tubal ligation       Brigid Coelho  reports that she has never smoked. She has never been exposed to tobacco smoke. She has never used smokeless tobacco..              There were no vitals taken for this visit.    PHYSICAL EXAM  Physical Exam   Constitutional: She is oriented to person, place, and time. She appears well-developed and well-nourished. She does not have a sickly appearance. She does not appear ill.   HENT:   Head: Normocephalic and atraumatic.   Pulmonary/Chest: Effort normal.  No respiratory distress.  Neurological: She is alert and oriented to person, place, and time.         Diagnoses and all orders for this visit:    1. Lower respiratory infection (e.g., bronchitis, pneumonia, pneumonitis, pulmonitis) (Primary)  -     amoxicillin-clavulanate (AUGMENTIN) 875-125 MG per tablet; Take 1 tablet by mouth 2 (Two) Times a Day for 10 days.  Dispense: 20 tablet; Refill: 0  -     predniSONE (DELTASONE) 10 MG (21) dose pack; Use as directed on package  Dispense: 21 tablet; Refill: 0    --take medications as prescribed  --increase fluids, rest as needed, tylenol or ibuprofen for pain  --f/u in 5-7 days if no improvement        FOLLOW-UP  As discussed during visit with PCP/[facility] if no improvement or Urgent Care/Emergency Department if worsening of symptoms    Patient verbalizes understanding of medication dosage, comfort measures, instructions for treatment and follow-up.    Janell Hernández, APRN  01/11/2024  10:48 EST    The use of a video visit has been reviewed with the patient and verbal informed consent has been obtained. Myself and Brigid Coelho participated in this visit. The patient is located in [address] [place] [address].    I am located in [place], [place]. Tommy  and Twilio were utilized. I spent 8 minutes in the patient's chart for this visit.

## 2024-02-19 DIAGNOSIS — J30.2 SEASONAL ALLERGIES: ICD-10-CM

## 2024-02-19 RX ORDER — MONTELUKAST SODIUM 10 MG/1
10 TABLET ORAL
Qty: 90 TABLET | Refills: 1 | Status: SHIPPED | OUTPATIENT
Start: 2024-02-19

## 2024-03-06 DIAGNOSIS — F41.1 GENERALIZED ANXIETY DISORDER: Chronic | ICD-10-CM

## 2024-03-06 RX ORDER — ALPRAZOLAM 0.5 MG/1
0.5 TABLET ORAL DAILY
Qty: 30 TABLET | Refills: 0 | OUTPATIENT
Start: 2024-03-06

## 2024-03-06 NOTE — TELEPHONE ENCOUNTER
Caller: Brigid Coelho Marcella    Relationship: Self    Best call back number: [phone]    Requested Prescriptions:   Requested Prescriptions     Pending Prescriptions Disp Refills    ALPRAZolam (Xanax) 0.5 MG tablet 30 tablet 0     Sig: Take 1 tablet by mouth Daily.        Pharmacy where request should be sent:    [facility] [phone]  Last office visit with prescribing clinician: 3/16/2023   Last telemedicine visit with prescribing clinician: 10/11/2023   Next office visit with prescribing clinician: Visit date not found     Additional details provided by patient: PATIENT HAS APPOINTMENT IN MAY AND NEEDS REFILL    Does the patient have less than a 3 day supply:  [x] Yes  [] No    Would you like a call back once the refill request has been completed: [x] Yes [] No    If the office needs to give you a call back, can they leave a voicemail: [x] Yes [] No    Jesus Seals Rep   03/06/24 08:44 EST

## 2024-03-06 NOTE — TELEPHONE ENCOUNTER
Attempted to contact patient, no answer. Left voicemail for patient to call the office back (office # given).     Hub may relay message and schedule appointment.    Please notify patient that she needs an appt for Xanax refills.

## 2024-03-15 ENCOUNTER — TELEPHONE (OUTPATIENT)
Dept: FAMILY MEDICINE CLINIC | Facility: CLINIC | Age: 59
End: 2024-03-15

## 2024-03-15 NOTE — TELEPHONE ENCOUNTER
Caller: Brigid Coelho    Relationship: Self    Best call back number:   [phone] (Mobile)     Requested Prescriptions:   Requested Prescriptions      No prescriptions requested or ordered in this encounter      ALPRAZolam (Xanax) 0.5 MG tablet   sertraline (ZOLOFT) 50 MG tablet   Pharmacy where request should be sent: [facility] [id number] [address]  AT [facility] [address] [place] [address] - [phone]  - [phone] FX     Last office visit with prescribing clinician: 3/16/2023   Last telemedicine visit with prescribing clinician: 10/11/2023   Next office visit with prescribing clinician: 3-18-24 WITH MELLISSA WHARTON     Additional details provided by patient:  PATIENT HAS 2 TABLETS LEFT OF THE MEDICATION AND IS REQUESTING A REFILL  SHE HAS AN APPOINTMENT ON 3-18-24 WITH MELLISSA WHARTON    Does the patient have less than a 3 day supply:  [x] Yes  [] No    Would you like a call back once the refill request has been completed: [] Yes [x] No    If the office needs to give you a call back, can they leave a voicemail: [] Yes [x] No

## 2024-03-18 ENCOUNTER — OFFICE VISIT (OUTPATIENT)
Dept: FAMILY MEDICINE CLINIC | Facility: CLINIC | Age: 59
End: 2024-03-18
Payer: MEDICARE

## 2024-03-18 VITALS
WEIGHT: 258.4 LBS | OXYGEN SATURATION: 98 % | TEMPERATURE: 98.6 F | SYSTOLIC BLOOD PRESSURE: 122 MMHG | HEIGHT: 68 IN | HEART RATE: 82 BPM | BODY MASS INDEX: 39.16 KG/M2 | DIASTOLIC BLOOD PRESSURE: 80 MMHG

## 2024-03-18 DIAGNOSIS — J40 BRONCHITIS: Primary | ICD-10-CM

## 2024-03-18 DIAGNOSIS — Z79.899 HIGH RISK MEDICATION USE: ICD-10-CM

## 2024-03-18 DIAGNOSIS — F41.1 GENERALIZED ANXIETY DISORDER: Chronic | ICD-10-CM

## 2024-03-18 PROCEDURE — 99214 OFFICE O/P EST MOD 30 MIN: CPT | Performed by: PHYSICIAN ASSISTANT

## 2024-03-18 PROCEDURE — 1159F MED LIST DOCD IN RCRD: CPT | Performed by: PHYSICIAN ASSISTANT

## 2024-03-18 PROCEDURE — 1160F RVW MEDS BY RX/DR IN RCRD: CPT | Performed by: PHYSICIAN ASSISTANT

## 2024-03-18 RX ORDER — CEFDINIR 300 MG/1
300 CAPSULE ORAL 2 TIMES DAILY
Qty: 20 CAPSULE | Refills: 0 | Status: SHIPPED | OUTPATIENT
Start: 2024-03-18

## 2024-03-18 RX ORDER — FLUCONAZOLE 150 MG/1
150 TABLET ORAL ONCE
Qty: 2 TABLET | Refills: 0 | Status: SHIPPED | OUTPATIENT
Start: 2024-03-18 | End: 2024-03-18

## 2024-03-18 RX ORDER — ALPRAZOLAM 0.5 MG/1
0.5 TABLET ORAL DAILY PRN
Qty: 30 TABLET | Refills: 0 | Status: SHIPPED | OUTPATIENT
Start: 2024-03-18

## 2024-03-18 NOTE — PROGRESS NOTES
Follow Up Office Visit      Date: 2024   Patient Name: Brigid Sahni  : 1965   MRN: [id number]     Chief Complaint:    Chief Complaint   Patient presents with    Anxiety    Med Refill    Cough     For about 2 weeks  Just drains at night and in the morning       History of Present Illness: Brigid Sahni is a 58 y.o. female who is here today to follow up with anxiety and medication refills.    BRIGID SAHNI her YOB: 1965, she is a 58-year-old female who comes in for a follow-up on anxiety and for medication refills.    She has had a cough for a couple of weeks. She is coughing up yellow-green phlegm. She is not sure if it is just allergies or if she needs an antibiotic. She is not coughing all day. She is around her 11-year-old granddaughter who has been coughing around her a lot. She takes over-the-counter Claritin.    She is not taking metformin because every time she tries to take it, she almost passes out and her blood sugar gets really low. Her A1c has been around 5.7 percent. She thinks her blood glucose is under control. She has been taking apple cider gummies. She has cut down on sodas. She lost a little weight about 3 to 4 months ago. She does not want to take Ozempic injection.    She needs a refill on her Xanax and Zoloft. The Zoloft is working well for her. She is scared to change it because her daughter had more side effects. She is on a low dose of Zoloft. She could probably benefit from a higher dose. She and her daughter both did [employer] testing. They both had a problem with serotonin uptake. She takes folic acid sometimes. She needs to find a counselor.    She always gets yeast every time she takes an antibiotic. She needs a refill on Diflucan.      The alprazolam works well for her and last usually 2 to 3 months per prescription.  She takes 0.25 mg and it works. She is hoping that the lower dose will not put her at risk for dementia  later.     She broke her ankle and is still tight. She was told she will need an ankle replacement in the future. She can walk, but when she gets up in the morning, she is stiff. It does not hurt.    She has a family history of weight issues on her mother's side. Her daughter had weight loss surgery, but she still has weight issues.    She is allergic to ERYTHROMYCIN.    Subjective      Review of systems:  Review of Systems   Constitutional:  Negative for fatigue and fever.   HENT:  Negative for trouble swallowing.    Eyes:  Negative for visual disturbance.   Respiratory:  Negative for cough and shortness of breath.    Cardiovascular:  Negative for chest pain and leg swelling.   Gastrointestinal:  Negative for abdominal pain.        I have reviewed and the following portions of the patient's history were updated as appropriate: past family history, past medical history, past social history, past surgical history and problem list.    Medications:     Current Outpatient Medications:     acetaminophen (TYLENOL) 650 MG 8 hr tablet, Take 500 mg by mouth Every 8 (Eight) Hours As Needed for Mild Pain ., Disp: , Rfl:     Alpha-Lipoic Acid (LIPOIC ACID PO), , Disp: , Rfl:     ALPRAZolam (Xanax) 0.5 MG tablet, Take 1 tablet by mouth Daily As Needed for Anxiety., Disp: 30 tablet, Rfl: 0    ascorbic acid (VITAMIN C) 1000 MG tablet, Take 1 tablet by mouth Daily., Disp: , Rfl:     Biotin 1000 MCG chewable tablet, Chew., Disp: , Rfl:     Diclofenac Sodium (VOLTAREN) 1 % gel gel, Apply 4 g topically to the appropriate area as directed 4 (Four) Times a Day As Needed (Pain in hands)., Disp: 50 g, Rfl: 5    famotidine (PEPCID) 20 MG tablet, Take 1 tablet by mouth At Night As Needed for Heartburn., Disp: , Rfl:     ferrous sulfate 140 (45 Fe) MG tablet controlled-release tablet, Take 1 tablet by mouth Daily With Breakfast., Disp: , Rfl:     folic acid (FOLVITE) 400 MCG tablet, Take 1 tablet by mouth Daily., Disp: , Rfl:     Garlic 1000  "MG capsule, Take 1 capsule by mouth Daily., Disp: , Rfl:     ibuprofen (ADVIL,MOTRIN) 400 MG tablet, Take 1 tablet by mouth Every 6 (Six) Hours As Needed for Mild Pain., Disp: , Rfl:     levothyroxine (SYNTHROID, LEVOTHROID) 88 MCG tablet, TAKE ONE TABLET BY MOUTH DAILY, Disp: 90 tablet, Rfl: 1    Loratadine 10 MG capsule, Take 1 capsule by mouth Daily., Disp: 30 each, Rfl: 2    Melatonin 2.5 MG capsule, Take  by mouth., Disp: , Rfl:     mesalamine (APRISO) 0.375 g 24 hr capsule, Take 1 capsule by mouth As Needed., Disp: , Rfl:     montelukast (SINGULAIR) 10 MG tablet, TAKE ONE TABLET BY MOUTH EVERY NIGHT AT BEDTIME, Disp: 90 tablet, Rfl: 1    sertraline (ZOLOFT) 50 MG tablet, Take 1 tablet by mouth Daily., Disp: 90 tablet, Rfl: 3    simethicone (MYLICON) 125 MG chewable tablet, Chew 1 tablet., Disp: , Rfl:     triamcinolone (KENALOG) 0.5 % cream, Apply 1 application topically to the appropriate area as directed 2 (Two) Times a Day., Disp: 30 g, Rfl: 1    Vitamin A 2400 MCG (8000 UT) tablet, Take 1 tablet by mouth Daily., Disp: , Rfl:     Zinc 25 MG tablet, Take 0.5 tablets by mouth Daily., Disp: , Rfl:     cefdinir (OMNICEF) 300 MG capsule, Take 1 capsule by mouth 2 (Two) Times a Day., Disp: 20 capsule, Rfl: 0    Allergies:   Allergies   Allergen Reactions    Adhesive Tape Unknown - Low Severity     Rash      Azithromycin Unknown - Low Severity    Clarithromycin Unknown - Low Severity     From Dr Moni Spear's notes provided for services 9/27/23.    Erythromycin Other (See Comments)     Sores in mouth    Nickel Itching    Epinephrine Palpitations    Latex Rash    Nitroglycerin Palpitations       Objective     Vital Signs:   Vitals:    03/18/24 1036   BP: 122/80   Pulse: 82   Temp: 98.6 °F (37 °C)   TempSrc: Infrared   SpO2: 98%   Weight: 117 kg (258 lb 6.4 oz)   Height: 172.7 cm (67.99\")   PainSc:   1     Body mass index is 39.3 kg/m².          Physical Exam:   Physical Exam  Vitals and nursing note reviewed. "   Constitutional:       Appearance: Normal appearance.   HENT:      Head: Normocephalic and atraumatic.      Nose: Congestion and rhinorrhea present.      Mouth/Throat:      Mouth: Mucous membranes are moist.      Pharynx: Oropharynx is clear. No posterior oropharyngeal erythema.   Cardiovascular:      Rate and Rhythm: Normal rate and regular rhythm.   Pulmonary:      Effort: Pulmonary effort is normal.      Breath sounds: Rhonchi present. No decreased breath sounds, wheezing or rales.   Musculoskeletal:      Cervical back: Neck supple.      Right lower leg: No edema.      Left lower leg: No edema.   Lymphadenopathy:      Cervical: No cervical adenopathy.   Neurological:      Mental Status: She is alert.   Psychiatric:         Mood and Affect: Mood normal.         Behavior: Behavior normal.          Procedures     Assessment / Plan      Assessment/Plan:   Diagnoses and all orders for this visit:    1. Bronchitis (Primary)  -     cefdinir (OMNICEF) 300 MG capsule; Take 1 capsule by mouth 2 (Two) Times a Day.  Dispense: 20 capsule; Refill: 0    2. Generalized anxiety disorder  -     ALPRAZolam (Xanax) 0.5 MG tablet; Take 1 tablet by mouth Daily As Needed for Anxiety.  Dispense: 30 tablet; Refill: 0  -     sertraline (ZOLOFT) 50 MG tablet; Take 1 tablet by mouth Daily.  Dispense: 90 tablet; Refill: 3    3. High risk medication use  -     Compliance Drug Analysis, Ur - Urine, Clean Catch; Future  -     Compliance Drug Analysis, Ur - Urine, Clean Catch    Other orders  -     fluconazole (Diflucan) 150 MG tablet; Take 1 tablet by mouth 1 (One) Time for 1 dose.  Dispense: 2 tablet; Refill: 0       1. Bronchitis.  I will do cefdinir as directed. Push fluids.    2. Anxiety.  I refilled alprazolam. Update urine drug screen and CSA today, 03/18/2024,.  [facility] reviewed.  Continue Zoloft.    3. Yeast infection.  I refilled Diflucan.    Follow-up  The patient will follow up in 3 months for Medicare wellness with PCP.    Follow  Up:   Return for  with PCP, Medicare Wellness.    Sarai Marinelli PA-C   [facility]            Transcribed from ambient dictation for Sarai Marinelli PA-C by Lorena Fontanez.  03/18/24   12:21 EDT    Patient or patient representative verbalized consent to the visit recording.  I have personally performed the services described in this document as transcribed by the above individual, and it is both accurate and complete.

## 2024-03-26 LAB — DRUGS UR: NORMAL

## 2024-04-17 ENCOUNTER — LAB (OUTPATIENT)
Dept: LAB | Facility: HOSPITAL | Age: 59
End: 2024-04-17
Payer: MEDICARE

## 2024-04-17 ENCOUNTER — TRANSCRIBE ORDERS (OUTPATIENT)
Dept: LAB | Facility: HOSPITAL | Age: 59
End: 2024-04-17
Payer: MEDICARE

## 2024-04-17 DIAGNOSIS — Z12.31 SCREENING MAMMOGRAM FOR HIGH-RISK PATIENT: ICD-10-CM

## 2024-04-17 DIAGNOSIS — N92.6 IRREGULAR MENSTRUAL CYCLE: ICD-10-CM

## 2024-04-17 DIAGNOSIS — Z01.419 ROUTINE GYNECOLOGICAL EXAMINATION: Primary | ICD-10-CM

## 2024-04-17 DIAGNOSIS — Z01.419 ROUTINE GYNECOLOGICAL EXAMINATION: ICD-10-CM

## 2024-04-17 PROCEDURE — 36415 COLL VENOUS BLD VENIPUNCTURE: CPT

## 2024-04-17 PROCEDURE — 83001 ASSAY OF GONADOTROPIN (FSH): CPT

## 2024-04-17 PROCEDURE — 82670 ASSAY OF TOTAL ESTRADIOL: CPT

## 2024-04-18 LAB
ESTRADIOL SERPL HS-MCNC: 9.5 PG/ML
FSH SERPL-ACNC: 39.9 MIU/ML

## 2024-04-27 ENCOUNTER — TELEPHONE (OUTPATIENT)
Dept: FAMILY MEDICINE CLINIC | Facility: CLINIC | Age: 59
End: 2024-04-27
Payer: MEDICARE

## 2024-04-27 NOTE — TELEPHONE ENCOUNTER
Patient is having some dental work done on 5/9 and wanted to see if someone would call in antibiotic for pre-dental work.

## 2024-04-30 DIAGNOSIS — Z29.89 INDICATION PRESENT FOR ENDOCARDITIS PROPHYLAXIS: Primary | ICD-10-CM

## 2024-04-30 RX ORDER — AMOXICILLIN 500 MG/1
2000 CAPSULE ORAL ONCE
Qty: 4 CAPSULE | Refills: 0 | Status: SHIPPED | OUTPATIENT
Start: 2024-04-30 | End: 2024-04-30

## 2024-04-30 NOTE — TELEPHONE ENCOUNTER
HUB TO RELAY    LVM. Dr. Lo would like to know why patient is needing antibiotics prior to dental appointment.

## 2024-04-30 NOTE — TELEPHONE ENCOUNTER
Caller: Brigid Coelho    Relationship: Self    Best call back number: [phone]     SHE IS GETTING A LOT OF INVASIVE DENTAL WORK DONE WITH CROWNS AND GRINDING DOWN TEETH.    THEY ARE DOING 6 OF THEM AT ONCE.  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 TO WHERE SHE USED TO HAVE TO GET ANTIBIOTICS EVEN JUST FOR TEETH CLEANING.    SHE DOES NOT WANT TO RISK ANY KIND OF AN INFECTION.    THE APPOINTMENT IS MAY 9TH    IF DR HAMMOND NEEDS TO CALL THE DENTIST IT IS [facility] -[phone]

## 2024-05-01 ENCOUNTER — TELEMEDICINE (OUTPATIENT)
Dept: FAMILY MEDICINE CLINIC | Facility: TELEHEALTH | Age: 59
End: 2024-05-01
Payer: MEDICARE

## 2024-05-01 ENCOUNTER — TELEPHONE (OUTPATIENT)
Dept: FAMILY MEDICINE CLINIC | Facility: CLINIC | Age: 59
End: 2024-05-01
Payer: MEDICARE

## 2024-05-01 DIAGNOSIS — B85.0 PEDICULOSIS CAPITIS: Primary | ICD-10-CM

## 2024-05-01 PROCEDURE — 99213 OFFICE O/P EST LOW 20 MIN: CPT | Performed by: NURSE PRACTITIONER

## 2024-05-01 RX ORDER — PERMETHRIN 50 MG/G
1 CREAM TOPICAL ONCE
Qty: 1 G | Refills: 0 | Status: SHIPPED | OUTPATIENT
Start: 2024-05-01 | End: 2024-05-01

## 2024-05-01 NOTE — TELEPHONE ENCOUNTER
Caller: Brigid Coelho    Relationship: Self    Best call back number: [phone]     What medication are you requesting: IVERMECTIN (STROMECTOL) TABLET AND LOTION     What are your current symptoms: ITCHING     How long have you been experiencing symptoms: YESTERDAY     Have you had these symptoms before:    [] Yes  [x] No    Have you been treated for these symptoms before:   [] Yes  [x] No    If a prescription is needed, what is your preferred pharmacy and phone number:    [facility] [id number] - [place] [id number] [facility]  AT [facility] [address]  - [phone]  - [phone]     Additional notes: PATIENT BELIEVES SHE HAS CAUGHT HEAD LICE FROM HER GRANDDAUGHTER. PATIENT HAS BEEN ITCHING SINCE YESTERDAY. THIS MORNING SHE WAS SHAKING AND SCRATCHING HER HEAD OVER A WHITE TOWEL AND HAS NOTICED BLACK DOTS ALL OVER THE TOWEL.     PATIENT HAS LOOKED ONLINE AND WOULD LIKE TO REQUEST IVERMECTIN TABLETS AND THE LOTION IF POSSIBLE. PATIENT STATES IF DR HAMMOND KNOWS OF A STRONGER MEDICATION SHE WILL TAKE THAT. SHE PREFERS NOT TO USE THE LICE COMB IF POSSIBLE DUE TO HER THICK  HAIR

## 2024-05-01 NOTE — PROGRESS NOTES
You have chosen to receive care through a telehealth visit.  Do you consent to use a video/audio connection for your medical care today? Yes     CHIEF COMPLAINT  No chief complaint on file.        HPI  Brigid Coelho is a 58 y.o. female  presents with complaint of lice infestation which she contracted from her granddaughter.  Would like treatment.  Has seen lice and nits.     Review of Systems  See HPI    Past Medical History:   Diagnosis Date    Acute bronchitis     Anemia     Anxiety     Arthritis of back     Chest pain     Cholelithiasis     Claustrophobia     Fracture of ankle 10/30/2021    GERD (gastroesophageal reflux disease)     Hashimoto's thyroiditis     Hemorrhoids     Hypothyroidism     Low back pain     Low back strain 10/12/2011    Lumbosacral disc disease 10/12/2011    Metrorrhagia     Palpitations     Polycystic ovary syndrome     Polyp of sigmoid colon     Ulcerative colitis     Upper respiratory infection     UTI (urinary tract infection)        Family History   Problem Relation Age of Onset    Hyperlipidemia Mother     CHAD disease Mother     Cancer Mother     Diabetes Mother     Heart disease Mother     Broken bones Mother         Broke both wrists ...different occasions due to falls    Rheum arthritis Father     Hyperlipidemia Father     Heart attack Father 72    Rheumatologic disease Father         My dad has severe RA    Ovarian cancer Maternal Aunt     Diabetes Paternal Grandmother     Hypertension Paternal Grandmother     Obesity Paternal Grandmother     Colon cancer Other     Arthritis Other     Cancer Other         uncle; liver, lung     Thyroid disease Cousin     Thyroid cancer Cousin     CHAD disease Daughter     Obesity Brother     Sleep apnea Brother        Social History     Socioeconomic History    Marital status:    Tobacco Use    Smoking status: Never     Passive exposure: Never    Smokeless tobacco: Never   Vaping Use    Vaping status: Never Used   Substance and  Sexual Activity    Alcohol use: Never    Drug use: Never    Sexual activity: Not Currently     Partners: Male     Birth control/protection: Surgical, Abstinence, Tubal ligation       Brigid Coelho  reports that she has never smoked. She has never been exposed to tobacco smoke. She has never used smokeless tobacco.               There were no vitals taken for this visit.    PHYSICAL EXAM  Physical Exam   Constitutional: She is oriented to person, place, and time. She appears well-developed and well-nourished. She does not have a sickly appearance. She does not appear ill.   HENT:   Head: Normocephalic and atraumatic.   Actively scratching head   Pulmonary/Chest: Effort normal.  No respiratory distress.  Neurological: She is alert and oriented to person, place, and time.           Diagnoses and all orders for this visit:    1. Pediculosis capitis (Primary)  -     permethrin (ELIMITE) 5 % cream; Apply 1 Application topically to the appropriate area as directed 1 (One) Time for 1 dose.  Dispense: 1 g; Refill: 0    --take medications as prescribed  --increase fluids, rest as needed, tylenol or ibuprofen for pain  --f/u in 5-7 days if no improvement          FOLLOW-UP  As discussed during visit with PCP/[facility] if no improvement or Urgent Care/Emergency Department if worsening of symptoms    Patient verbalizes understanding of medication dosage, comfort measures, instructions for treatment and follow-up.    Janell Hernández, JATIN  05/01/2024  11:10 EDT    The use of a video visit has been reviewed with the patient and verbal informed consent has been obtained. Myself and Brigid Coelho participated in this visit. The patient is located in [address] [place] [address].    I am located in [place], [place]. Mychart and Twilio were utilized. I spent 8 minutes in the patient's chart for this visit.      Note Disclaimer: At [facility], we believe that sharing information builds trust and  better   relationships. You are receiving this note because you recently visited [facility]. It is possible you   will see health information before a provider has talked with you about it. This kind of information can   be easy to misunderstand. To help you fully understand what it means for your health, we urge you to   discuss this note with your provider.

## 2024-05-01 NOTE — TELEPHONE ENCOUNTER
HUB TO RELAY    Please let patient know provider sent Permethrin to Pharmacy. It has a refill and she can repeat in 1 week if needed. He does not recommend ivermectin.

## 2024-05-02 ENCOUNTER — TELEPHONE (OUTPATIENT)
Dept: FAMILY MEDICINE CLINIC | Facility: CLINIC | Age: 59
End: 2024-05-02
Payer: MEDICARE

## 2024-05-03 ENCOUNTER — TELEPHONE (OUTPATIENT)
Dept: FAMILY MEDICINE CLINIC | Facility: CLINIC | Age: 59
End: 2024-05-03
Payer: MEDICARE

## 2024-05-03 DIAGNOSIS — B85.0 PEDICULOSIS CAPITIS: Primary | ICD-10-CM

## 2024-05-03 RX ORDER — MALATHION 0 G/ML
LOTION TOPICAL ONCE
Qty: 59 ML | Refills: 1 | Status: SHIPPED | OUTPATIENT
Start: 2024-05-03 | End: 2024-05-03

## 2024-05-03 NOTE — TELEPHONE ENCOUNTER
Caller: Brigid Coelho    Relationship: Self    Best call back number: [phone]     What medication are you requesting: MEDICATION FOR LICE    What are your current symptoms: GOT LICE FROM GRANDCHILD        If a prescription is needed, what is your preferred pharmacy and phone number: [facility] [id number] - [place], [place] - [address] [facility]  AT [facility] [address]  - [phone]  - [phone] FX     Additional notes:PATIENT STATED SHE HAS GOTTEN LICE FROM HER GRANDCHILD AND SHE HAD A VIRTUAL AND WAS PRESCRIBED permethrin (NIX) 1 % liquid . PATIENT STATED IT HAS NOT HELPED AND SHE IS REQUESTING ANOTHER MEDICATION . SHE IS ASKING FOR MAYBE NATROBA OR MALTHIONE. SOMETHING THAT KILLS THE EGGS ALSO. CALLBACK PATIENT TO DISCUSS

## 2024-05-06 ENCOUNTER — TELEPHONE (OUTPATIENT)
Dept: FAMILY MEDICINE CLINIC | Facility: CLINIC | Age: 59
End: 2024-05-06
Payer: MEDICARE

## 2024-05-17 ENCOUNTER — OFFICE VISIT (OUTPATIENT)
Dept: FAMILY MEDICINE CLINIC | Facility: CLINIC | Age: 59
End: 2024-05-17
Payer: MEDICARE

## 2024-05-17 VITALS
DIASTOLIC BLOOD PRESSURE: 78 MMHG | TEMPERATURE: 98.2 F | WEIGHT: 254.2 LBS | HEART RATE: 75 BPM | BODY MASS INDEX: 38.52 KG/M2 | HEIGHT: 68 IN | OXYGEN SATURATION: 95 % | SYSTOLIC BLOOD PRESSURE: 140 MMHG

## 2024-05-17 DIAGNOSIS — L29.8 PRURITIC DERMATOSIS OF SCALP: Primary | ICD-10-CM

## 2024-05-17 DIAGNOSIS — B85.0 PEDICULOSIS CAPITIS: ICD-10-CM

## 2024-05-18 PROBLEM — G89.29 CHRONIC PAIN OF LEFT ANKLE: Status: RESOLVED | Noted: 2023-07-18 | Resolved: 2024-05-18

## 2024-05-18 PROBLEM — M25.532 LEFT WRIST PAIN: Status: RESOLVED | Noted: 2023-07-18 | Resolved: 2024-05-18

## 2024-05-18 PROBLEM — L29.8 PRURITIC DERMATOSIS OF SCALP: Status: ACTIVE | Noted: 2024-05-18

## 2024-05-18 PROBLEM — B85.0 PEDICULOSIS CAPITIS: Status: ACTIVE | Noted: 2024-05-18

## 2024-05-18 PROBLEM — M25.572 CHRONIC PAIN OF LEFT ANKLE: Status: RESOLVED | Noted: 2023-07-18 | Resolved: 2024-05-18

## 2024-05-18 PROBLEM — R13.10 DYSPHAGIA: Status: RESOLVED | Noted: 2023-03-28 | Resolved: 2024-05-18

## 2024-05-18 PROBLEM — L29.89 PRURITIC DERMATOSIS OF SCALP: Status: ACTIVE | Noted: 2024-05-18

## 2024-05-18 PROBLEM — M25.512 PAIN IN JOINT OF LEFT SHOULDER: Status: ACTIVE | Noted: 2023-10-23

## 2024-05-18 NOTE — ASSESSMENT & PLAN NOTE
Suspect pruritic dermatitis from previous pediculosis. Recommend OTC oral antihistamine such as Zyrtec or Xyzal for itching.  Continue routine medications.

## 2024-05-18 NOTE — PROGRESS NOTES
Follow Up Office Note   Patient Name: Brigid Coelho  : 1965   MRN: [id number]     Chief Complaint:    Chief Complaint   Patient presents with    Hair/Scalp Problem     Black ants in scalp.       History of Present Illness:   Brigid Coelho is a 59 y.o. female who presents today with c/o itching in scalp, possible lice infestation. Patient states that she was recently diagnosed with pediculosis and was treated with course of permethrin and ovide. She states that her scalp and eyebrows still itch and she would like to be checked for lice today.        Subjective   I have reviewed and the following portions of the patient's history were updated as appropriate: past family history, past medical history, past social history, past surgical history and problem list.    Review of Systems:   Review of Systems   Constitutional: Negative.    Respiratory: Negative.     Cardiovascular: Negative.    Skin:         Scalp and eyebrows itching   Neurological: Negative.         Past Medical History:   Past Medical History:   Diagnosis Date    Acute bronchitis     Anemia     Anxiety     Arthritis of back     Chest pain     Cholelithiasis     Claustrophobia     Fracture of ankle 10/30/2021    GERD (gastroesophageal reflux disease)     Hashimoto's thyroiditis     Hemorrhoids     Hypothyroidism     Low back pain     Low back strain 10/12/2011    Lumbosacral disc disease 10/12/2011    Metrorrhagia     Palpitations     Polycystic ovary syndrome     Polyp of sigmoid colon     Ulcerative colitis     Upper respiratory infection     UTI (urinary tract infection)        Medications:     Current Outpatient Medications:     acetaminophen (TYLENOL) 650 MG 8 hr tablet, Take 500 mg by mouth Every 8 (Eight) Hours As Needed for Mild Pain ., Disp: , Rfl:     Alpha-Lipoic Acid (LIPOIC ACID PO), , Disp: , Rfl:     ALPRAZolam (Xanax) 0.5 MG tablet, Take 1 tablet by mouth Daily As Needed for Anxiety., Disp: 30 tablet, Rfl:  0    ascorbic acid (VITAMIN C) 1000 MG tablet, Take 1 tablet by mouth Daily., Disp: , Rfl:     Biotin 1000 MCG chewable tablet, Chew., Disp: , Rfl:     Diclofenac Sodium (VOLTAREN) 1 % gel gel, Apply 4 g topically to the appropriate area as directed 4 (Four) Times a Day As Needed (Pain in hands)., Disp: 50 g, Rfl: 5    famotidine (PEPCID) 20 MG tablet, Take 1 tablet by mouth At Night As Needed for Heartburn., Disp: , Rfl:     ferrous sulfate 140 (45 Fe) MG tablet controlled-release tablet, Take 1 tablet by mouth Daily With Breakfast., Disp: , Rfl:     folic acid (FOLVITE) 400 MCG tablet, Take 1 tablet by mouth Daily., Disp: , Rfl:     Garlic 1000 MG capsule, Take 1 capsule by mouth Daily., Disp: , Rfl:     ibuprofen (ADVIL,MOTRIN) 400 MG tablet, Take 1 tablet by mouth Every 6 (Six) Hours As Needed for Mild Pain., Disp: , Rfl:     levothyroxine (SYNTHROID, LEVOTHROID) 88 MCG tablet, TAKE ONE TABLET BY MOUTH DAILY, Disp: 90 tablet, Rfl: 1    Loratadine 10 MG capsule, Take 1 capsule by mouth Daily., Disp: 30 each, Rfl: 2    Melatonin 2.5 MG capsule, Take  by mouth., Disp: , Rfl:     mesalamine (APRISO) 0.375 g 24 hr capsule, Take 1 capsule by mouth As Needed., Disp: , Rfl:     montelukast (SINGULAIR) 10 MG tablet, TAKE ONE TABLET BY MOUTH EVERY NIGHT AT BEDTIME, Disp: 90 tablet, Rfl: 1    permethrin (ELIMITE) 5 % cream, , Disp: , Rfl:     sertraline (ZOLOFT) 50 MG tablet, Take 1 tablet by mouth Daily., Disp: 90 tablet, Rfl: 3    simethicone (MYLICON) 125 MG chewable tablet, Chew 1 tablet., Disp: , Rfl:     triamcinolone (KENALOG) 0.5 % cream, Apply 1 application topically to the appropriate area as directed 2 (Two) Times a Day., Disp: 30 g, Rfl: 1    Vitamin A 2400 MCG (8000 UT) tablet, Take 1 tablet by mouth Daily., Disp: , Rfl:     Zinc 25 MG tablet, Take 0.5 tablets by mouth Daily., Disp: , Rfl:     Allergies:   Allergies   Allergen Reactions    Adhesive Tape Unknown - Low Severity     Rash      Azithromycin Unknown  "- Low Severity    Clarithromycin Unknown - Low Severity     From Dr Moni Spear's notes provided for services 9/27/23.    Erythromycin Other (See Comments)     Sores in mouth    Nickel Itching    Epinephrine Palpitations    Latex Rash    Nitroglycerin Palpitations         Objective   Physical Exam:  Vital Signs:   Vitals:    05/17/24 1121   BP: 140/78   Pulse: 75   Temp: 98.2 °F (36.8 °C)   TempSrc: Temporal   SpO2: 95%   Weight: 115 kg (254 lb 3.2 oz)   Height: 172.7 cm (67.99\")   PainSc: 0-No pain     Body mass index is 38.66 kg/m².     Physical Exam  Vitals and nursing note reviewed.   Constitutional:       General: She is not in acute distress.     Appearance: Normal appearance. She is well-developed. She is not ill-appearing, toxic-appearing or diaphoretic.   HENT:      Head: Normocephalic and atraumatic.   Cardiovascular:      Rate and Rhythm: Normal rate and regular rhythm.      Heart sounds: Normal heart sounds.   Pulmonary:      Effort: Pulmonary effort is normal. No respiratory distress.      Breath sounds: Normal breath sounds. No stridor. No wheezing.   Skin:     General: Skin is warm and dry.      Comments: Hair, scalp and eyebrows examine with no evidence of living lice or nits.   Neurological:      General: No focal deficit present.      Mental Status: She is alert and oriented to person, place, and time.   Psychiatric:         Mood and Affect: Mood normal.         Behavior: Behavior normal. Behavior is cooperative.         Thought Content: Thought content normal.         Judgment: Judgment normal.         Assessment / Plan    Assessment/Plan:   Diagnoses and all orders for this visit:    1. Pruritic dermatosis of scalp (Primary)  Assessment & Plan:  Suspect pruritic dermatitis from previous pediculosis. Recommend OTC oral antihistamine such as Zyrtec or Xyzal for itching.  Continue routine medications.      2. Pediculosis capitis  Assessment & Plan:  Pediculosis resolved. No further treatment " required.         Discussed the nature of the medical condition(s) risks, complications, implications, management, safe and proper use of medications. Encouraged medication compliance, and keeping scheduled follow up appointments with me and any other providers.      I spent 20 minutes caring for Brigid on this date of service. This time includes time spent by me in the following activities:preparing for the visit, reviewing tests, performing a medically appropriate examination and/or evaluation , counseling and educating the patient/family/caregiver, and documenting information in the medical record.     RTC if symptoms fail to improve, to ER if symptoms worsen.      *Dictated Utilizing Dragon Dictation   Please note that portions of this note were completed with a voice recognition program.   Part of this note may be an electronic transcription/translation of spoken language to printed text using the Dragon Dictation System. Spelling and/or grammatical errors may exist despite efforts at proofreading.      NOTE TO PATIENT: The 21st Century Cures Act makes medical notes like these available to patients in the interest of transparency. However, be advised this is a medical document. It is intended as peer to peer communication. It is written in medical language and may contain abbreviations or verbiage that are unfamiliar. It may appear blunt or direct. Medical documents are intended to carry relevant information, facts as evident, and the clinical opinion of the practitioner.      JATIN Welch  [facility] [place]

## 2024-06-04 ENCOUNTER — OFFICE VISIT (OUTPATIENT)
Dept: FAMILY MEDICINE CLINIC | Facility: CLINIC | Age: 59
End: 2024-06-04
Payer: MEDICARE

## 2024-06-04 VITALS
OXYGEN SATURATION: 97 % | HEIGHT: 68 IN | BODY MASS INDEX: 38.16 KG/M2 | WEIGHT: 251.8 LBS | HEART RATE: 78 BPM | SYSTOLIC BLOOD PRESSURE: 108 MMHG | DIASTOLIC BLOOD PRESSURE: 68 MMHG | TEMPERATURE: 97.8 F

## 2024-06-04 DIAGNOSIS — F41.1 GENERALIZED ANXIETY DISORDER: Chronic | ICD-10-CM

## 2024-06-04 DIAGNOSIS — M25.572 ACUTE LEFT ANKLE PAIN: Primary | ICD-10-CM

## 2024-06-04 DIAGNOSIS — Z51.81 THERAPEUTIC DRUG MONITORING: ICD-10-CM

## 2024-06-04 PROCEDURE — 1125F AMNT PAIN NOTED PAIN PRSNT: CPT | Performed by: FAMILY MEDICINE

## 2024-06-04 PROCEDURE — 99214 OFFICE O/P EST MOD 30 MIN: CPT | Performed by: FAMILY MEDICINE

## 2024-06-04 RX ORDER — ALPRAZOLAM 0.5 MG/1
0.5 TABLET ORAL DAILY PRN
Qty: 30 TABLET | Refills: 0 | Status: SHIPPED | OUTPATIENT
Start: 2024-06-04

## 2024-06-04 NOTE — PROGRESS NOTES
Follow Up Office Visit      Patient Name: Brigid Coelho  : 1965   MRN: [id number]     Chief Complaint:    Chief Complaint   Patient presents with    Ankle Pain     Left ankle     Med Refill       History of Present Illness: Brigid Coelho is a 59 y.o. female who is here today to follow up with chronic anxiety and also an acute left ankle injury.  Says that she stepped down forcefully recently and had some pain.  She also kicked her shoe off recently which caused some pain.  Left ankle has history of fracture and ORIF patient's had the hardware removed.  She is having pain medially and laterally.  She has swelling in her ankle.    Patient also is here for chronic medication refill for anxiety.  Her chronic anxiety is controlled on Xanax.  She takes it appropriately and as prescribed.  No side effects currently.  Drug screen and contract with on follow-up      Physical exam: Tenderness globally around both malleoli on the left foot.  Range of motion is restricted.  Soft tissue edema noted.  Patient had mild ecchymosis on the anterior aspect of her ankle as well.  Patient has antalgic gait      Subjective        I have reviewed and the following portions of the patient's history were updated as appropriate: past family history, past medical history, past social history, past surgical history and problem list.    Medications:     Current Outpatient Medications:     acetaminophen (TYLENOL) 650 MG 8 hr tablet, Take 500 mg by mouth Every 8 (Eight) Hours As Needed for Mild Pain ., Disp: , Rfl:     Alpha-Lipoic Acid (LIPOIC ACID PO), , Disp: , Rfl:     ALPRAZolam (Xanax) 0.5 MG tablet, Take 1 tablet by mouth Daily As Needed for Anxiety., Disp: 30 tablet, Rfl: 0    ascorbic acid (VITAMIN C) 1000 MG tablet, Take 1 tablet by mouth Daily., Disp: , Rfl:     Biotin 1000 MCG chewable tablet, Chew., Disp: , Rfl:     Diclofenac Sodium (VOLTAREN) 1 % gel gel, Apply 4 g topically to the appropriate  area as directed 4 (Four) Times a Day As Needed (Pain in hands)., Disp: 50 g, Rfl: 5    famotidine (PEPCID) 20 MG tablet, Take 1 tablet by mouth At Night As Needed for Heartburn., Disp: , Rfl:     ferrous sulfate 140 (45 Fe) MG tablet controlled-release tablet, Take 1 tablet by mouth Daily With Breakfast., Disp: , Rfl:     folic acid (FOLVITE) 400 MCG tablet, Take 1 tablet by mouth Daily., Disp: , Rfl:     Garlic 1000 MG capsule, Take 1 capsule by mouth Daily., Disp: , Rfl:     ibuprofen (ADVIL,MOTRIN) 400 MG tablet, Take 1 tablet by mouth Every 6 (Six) Hours As Needed for Mild Pain., Disp: , Rfl:     levothyroxine (SYNTHROID, LEVOTHROID) 88 MCG tablet, TAKE ONE TABLET BY MOUTH DAILY, Disp: 90 tablet, Rfl: 1    Loratadine 10 MG capsule, Take 1 capsule by mouth Daily., Disp: 30 each, Rfl: 2    Melatonin 2.5 MG capsule, Take  by mouth., Disp: , Rfl:     mesalamine (APRISO) 0.375 g 24 hr capsule, Take 1 capsule by mouth As Needed., Disp: , Rfl:     mesalamine (PENTASA) 250 MG CR capsule, Mesalamine, Disp: , Rfl:     montelukast (SINGULAIR) 10 MG tablet, TAKE ONE TABLET BY MOUTH EVERY NIGHT AT BEDTIME, Disp: 90 tablet, Rfl: 1    permethrin (ELIMITE) 5 % cream, , Disp: , Rfl:     sertraline (ZOLOFT) 50 MG tablet, Take 1 tablet by mouth Daily., Disp: 90 tablet, Rfl: 3    simethicone (MYLICON) 125 MG chewable tablet, Chew 1 tablet., Disp: , Rfl:     triamcinolone (KENALOG) 0.5 % cream, Apply 1 application topically to the appropriate area as directed 2 (Two) Times a Day., Disp: 30 g, Rfl: 1    Vitamin A 2400 MCG (8000 UT) tablet, Take 1 tablet by mouth Daily., Disp: , Rfl:     Zinc 25 MG tablet, Take 0.5 tablets by mouth Daily., Disp: , Rfl:     Allergies:   Allergies   Allergen Reactions    Adhesive Tape Unknown - Low Severity     Rash      Azithromycin Unknown - Low Severity    Clarithromycin Unknown - Low Severity     From Dr Moni Spear's notes provided for services 9/27/23.    Erythromycin Other (See Comments)      "Sores in mouth    Nickel Itching    Epinephrine Palpitations    Latex Rash    Nitroglycerin Palpitations       Objective     Physical Exam: Please see above  Vital Signs:   Vitals:    06/04/24 1016   BP: 108/68   Pulse: 78   Temp: 97.8 °F (36.6 °C)   TempSrc: Temporal   SpO2: 97%   Weight: 114 kg (251 lb 12.8 oz)   Height: 172.7 cm (68\")   PainSc:   7   PainLoc: Ankle     Body mass index is 38.29 kg/m².          Assessment / Plan      Assessment/Plan:   Diagnoses and all orders for this visit:    1. Acute left ankle pain (Primary)  -     XR Ankle 3+ View Left; Future    2. Therapeutic drug monitoring    3. Generalized anxiety disorder  -     ALPRAZolam (Xanax) 0.5 MG tablet; Take 1 tablet by mouth Daily As Needed for Anxiety.  Dispense: 30 tablet; Refill: 0    Will get an x-ray of her left ankle given history and acute injury.  Rule out any new fractures.  Otherwise patient should use Ace bandage wrap daily and do at home exercises for range of motion and physical therapy.  Reviewed exercises online for physical therapy.  She should do these 3 times per week.  She can call for physical therapy consult or patient may need to go back to follow-up with her orthopedic if it has a fracture    Anxiety well-controlled on Xanax.  Continue current dose of medication.  Refill sent.  Patient to follow-up every 3 months      Today we reviewed and discussed the patient's controlled substance.  We reviewed their [place] report, previous drug screens, and drug contract.  They have a drug contract and drug screen on file that is current.  They are compliant with follow-ups every 3 months, and will continue to be compliant per the drug contract.     Follow Up:   Return in about 3 months (around 9/4/2024) for anxiety.    Demetrio Lo, DO  [facility] [place]   "

## 2024-06-18 DIAGNOSIS — E03.8 HYPOTHYROIDISM DUE TO HASHIMOTO'S THYROIDITIS: ICD-10-CM

## 2024-06-18 DIAGNOSIS — E06.3 HYPOTHYROIDISM DUE TO HASHIMOTO'S THYROIDITIS: ICD-10-CM

## 2024-06-19 RX ORDER — LEVOTHYROXINE SODIUM 88 UG/1
88 TABLET ORAL DAILY
Qty: 90 TABLET | Refills: 1 | Status: SHIPPED | OUTPATIENT
Start: 2024-06-19

## 2024-08-21 ENCOUNTER — HOSPITAL ENCOUNTER (EMERGENCY)
Facility: HOSPITAL | Age: 59
Discharge: HOME | End: 2024-08-22
Attending: EMERGENCY MEDICINE
Payer: MEDICARE

## 2024-08-21 ENCOUNTER — APPOINTMENT (OUTPATIENT)
Dept: CT IMAGING | Facility: HOSPITAL | Age: 59
End: 2024-08-21
Payer: MEDICARE

## 2024-08-21 DIAGNOSIS — S39.011A STRAIN OF ABDOMINAL WALL, INITIAL ENCOUNTER: ICD-10-CM

## 2024-08-21 DIAGNOSIS — R10.31 RIGHT LOWER QUADRANT ABDOMINAL PAIN: Primary | ICD-10-CM

## 2024-08-21 LAB
ALBUMIN SERPL-MCNC: 4.2 G/DL (ref 3.5–5.2)
ALBUMIN/GLOB SERPL: 1.4 G/DL
ALP SERPL-CCNC: 90 U/L (ref 39–117)
ALT SERPL W P-5'-P-CCNC: 23 U/L (ref 1–33)
ANION GAP SERPL CALCULATED.3IONS-SCNC: 11 MMOL/L (ref 5–15)
AST SERPL-CCNC: 23 U/L (ref 1–32)
BACTERIA UR QL AUTO: ABNORMAL /HPF
BASOPHILS # BLD AUTO: 0.09 10*3/MM3 (ref 0–0.2)
BASOPHILS NFR BLD AUTO: 1 % (ref 0–1.5)
BILIRUB SERPL-MCNC: <0.2 MG/DL (ref 0–1.2)
BILIRUB UR QL STRIP: NEGATIVE
BUN SERPL-MCNC: 13 MG/DL (ref 8–23)
BUN/CREAT SERPL: 17.8 (ref 7–25)
CALCIUM SPEC-SCNC: 9.2 MG/DL (ref 8.6–10.5)
CHLORIDE SERPL-SCNC: 103 MMOL/L (ref 98–107)
CLARITY UR: CLEAR
CO2 SERPL-SCNC: 27 MMOL/L (ref 22–29)
COLOR UR: YELLOW
CREAT SERPL-MCNC: 0.73 MG/DL (ref 0.57–1)
D-LACTATE SERPL-SCNC: 1.6 MMOL/L (ref 0.5–2)
DEPRECATED RDW RBC AUTO: 43.8 FL (ref 37–54)
EGFRCR SERPLBLD CKD-EPI 2021: 94.3 ML/MIN/1.73
EOSINOPHIL # BLD AUTO: 0.22 10*3/MM3 (ref 0–0.4)
EOSINOPHIL NFR BLD AUTO: 2.3 % (ref 0.3–6.2)
ERYTHROCYTE [DISTWIDTH] IN BLOOD BY AUTOMATED COUNT: 14.8 % (ref 12.3–15.4)
GLOBULIN UR ELPH-MCNC: 3 GM/DL
GLUCOSE SERPL-MCNC: 97 MG/DL (ref 65–99)
GLUCOSE UR STRIP-MCNC: NEGATIVE MG/DL
HCT VFR BLD AUTO: 36.9 % (ref 34–46.6)
HGB BLD-MCNC: 11.5 G/DL (ref 12–15.9)
HGB UR QL STRIP.AUTO: ABNORMAL
HOLD SPECIMEN: NORMAL
HYALINE CASTS UR QL AUTO: ABNORMAL /LPF
IMM GRANULOCYTES # BLD AUTO: 0.02 10*3/MM3 (ref 0–0.05)
IMM GRANULOCYTES NFR BLD AUTO: 0.2 % (ref 0–0.5)
KETONES UR QL STRIP: ABNORMAL
LEUKOCYTE ESTERASE UR QL STRIP.AUTO: ABNORMAL
LIPASE SERPL-CCNC: 25 U/L (ref 13–60)
LYMPHOCYTES # BLD AUTO: 2.51 10*3/MM3 (ref 0.7–3.1)
LYMPHOCYTES NFR BLD AUTO: 26.6 % (ref 19.6–45.3)
MCH RBC QN AUTO: 25.1 PG (ref 26.6–33)
MCHC RBC AUTO-ENTMCNC: 31.2 G/DL (ref 31.5–35.7)
MCV RBC AUTO: 80.6 FL (ref 79–97)
MONOCYTES # BLD AUTO: 0.6 10*3/MM3 (ref 0.1–0.9)
MONOCYTES NFR BLD AUTO: 6.3 % (ref 5–12)
NEUTROPHILS NFR BLD AUTO: 6.01 10*3/MM3 (ref 1.7–7)
NEUTROPHILS NFR BLD AUTO: 63.6 % (ref 42.7–76)
NITRITE UR QL STRIP: NEGATIVE
NRBC BLD AUTO-RTO: 0 /100 WBC (ref 0–0.2)
PH UR STRIP.AUTO: 5.5 [PH] (ref 5–8)
PLATELET # BLD AUTO: 358 10*3/MM3 (ref 140–450)
PMV BLD AUTO: 10.5 FL (ref 6–12)
POTASSIUM SERPL-SCNC: 3.9 MMOL/L (ref 3.5–5.2)
PROT SERPL-MCNC: 7.2 G/DL (ref 6–8.5)
PROT UR QL STRIP: NEGATIVE
RBC # BLD AUTO: 4.58 10*6/MM3 (ref 3.77–5.28)
RBC # UR STRIP: ABNORMAL /HPF
REF LAB TEST METHOD: ABNORMAL
SODIUM SERPL-SCNC: 141 MMOL/L (ref 136–145)
SP GR UR STRIP: 1.03 (ref 1–1.03)
SQUAMOUS #/AREA URNS HPF: ABNORMAL /HPF
UROBILINOGEN UR QL STRIP: ABNORMAL
WBC # UR STRIP: ABNORMAL /HPF
WBC NRBC COR # BLD AUTO: 9.45 10*3/MM3 (ref 3.4–10.8)
WHOLE BLOOD HOLD COAG: NORMAL
WHOLE BLOOD HOLD SPECIMEN: NORMAL

## 2024-08-21 PROCEDURE — 36415 COLL VENOUS BLD VENIPUNCTURE: CPT

## 2024-08-21 PROCEDURE — 85025 COMPLETE CBC W/AUTO DIFF WBC: CPT | Performed by: EMERGENCY MEDICINE

## 2024-08-21 PROCEDURE — 81001 URINALYSIS AUTO W/SCOPE: CPT | Performed by: EMERGENCY MEDICINE

## 2024-08-21 PROCEDURE — 74176 CT ABD & PELVIS W/O CONTRAST: CPT

## 2024-08-21 PROCEDURE — 96372 THER/PROPH/DIAG INJ SC/IM: CPT

## 2024-08-21 PROCEDURE — 80053 COMPREHEN METABOLIC PANEL: CPT | Performed by: EMERGENCY MEDICINE

## 2024-08-21 PROCEDURE — 25010000002 MORPHINE PER 10 MG: Performed by: EMERGENCY MEDICINE

## 2024-08-21 PROCEDURE — 83605 ASSAY OF LACTIC ACID: CPT | Performed by: EMERGENCY MEDICINE

## 2024-08-21 PROCEDURE — 63710000001 ONDANSETRON ODT 4 MG TABLET DISPERSIBLE: Performed by: EMERGENCY MEDICINE

## 2024-08-21 PROCEDURE — 83690 ASSAY OF LIPASE: CPT | Performed by: EMERGENCY MEDICINE

## 2024-08-21 PROCEDURE — 99284 EMERGENCY DEPT VISIT MOD MDM: CPT

## 2024-08-21 RX ORDER — ONDANSETRON 4 MG/1
4 TABLET, ORALLY DISINTEGRATING ORAL ONCE
Status: COMPLETED | OUTPATIENT
Start: 2024-08-21 | End: 2024-08-21

## 2024-08-21 RX ORDER — MORPHINE SULFATE 4 MG/ML
4 INJECTION, SOLUTION INTRAMUSCULAR; INTRAVENOUS ONCE
Status: COMPLETED | OUTPATIENT
Start: 2024-08-21 | End: 2024-08-21

## 2024-08-21 RX ORDER — SODIUM CHLORIDE 9 MG/ML
10 INJECTION, SOLUTION INTRAMUSCULAR; INTRAVENOUS; SUBCUTANEOUS AS NEEDED
Status: DISCONTINUED | OUTPATIENT
Start: 2024-08-21 | End: 2024-08-22 | Stop reason: HOSPADM

## 2024-08-21 RX ADMIN — MORPHINE SULFATE 4 MG: 4 INJECTION, SOLUTION INTRAMUSCULAR; INTRAVENOUS at 23:02

## 2024-08-21 RX ADMIN — ONDANSETRON 4 MG: 4 TABLET, ORALLY DISINTEGRATING ORAL at 23:01

## 2024-08-22 VITALS
SYSTOLIC BLOOD PRESSURE: 86 MMHG | OXYGEN SATURATION: 92 % | DIASTOLIC BLOOD PRESSURE: 48 MMHG | RESPIRATION RATE: 18 BRPM | HEIGHT: 68 IN | TEMPERATURE: 98.1 F | BODY MASS INDEX: 37.13 KG/M2 | WEIGHT: 245 LBS | HEART RATE: 67 BPM

## 2024-08-22 RX ORDER — METHOCARBAMOL 750 MG/1
750 TABLET, FILM COATED ORAL 3 TIMES DAILY
Qty: 21 TABLET | Refills: 0 | Status: SHIPPED | OUTPATIENT
Start: 2024-08-22 | End: 2024-08-29

## 2024-08-22 NOTE — ED PROVIDER NOTES
Subjective   History of Present Illness  This is a 59-year-old female with a longstanding history of polycystic ovarian syndrome and ulcerative colitis presenting to the emergency department with some right lower abdominal discomfort.  The patient states that she has pain like this chronically from time to time.  However intensified over the last day.  She really noticed the pain about an hour ago.  Is dull and achy.  Nonradiating.  Is been consistent.  She is not having associated hematuria or dysuria.  No vaginal discharge or bleeding.  Denies any fevers or chills.  No headache or change in vision.  No focal weakness.  No chest pain or shortness of breath    History provided by:  Patient   used: No        Review of Systems   Constitutional:  Negative for chills and fever.   HENT:  Negative for congestion, ear pain and sore throat.    Eyes:  Negative for visual disturbance.   Respiratory:  Negative for shortness of breath.    Cardiovascular:  Negative for chest pain.   Gastrointestinal:  Positive for abdominal pain and nausea.   Genitourinary:  Negative for difficulty urinating.   Musculoskeletal:  Negative for arthralgias.   Skin:  Negative for rash.   Neurological:  Negative for dizziness, weakness and numbness.   Psychiatric/Behavioral:  Negative for agitation.        Past Medical History:   Diagnosis Date    Acute bronchitis     Anemia     Anxiety     Arthritis of back     Chest pain     Cholelithiasis     Claustrophobia     Fracture of ankle 10/30/2021    GERD (gastroesophageal reflux disease)     Hashimoto's thyroiditis     Hemorrhoids     Hypothyroidism     Low back pain     Low back strain 10/12/2011    Lumbosacral disc disease 10/12/2011    Metrorrhagia     Palpitations     Polycystic ovary syndrome     Polyp of sigmoid colon     Ulcerative colitis     Upper respiratory infection     UTI (urinary tract infection)        Allergies   Allergen Reactions    Adhesive Tape Unknown - Low  Severity     Rash      Azithromycin Unknown - Low Severity    Clarithromycin Unknown - Low Severity     From Dr Moni Spear's notes provided for services 23.    Erythromycin Other (See Comments)     Sores in mouth    Nickel Itching    Epinephrine Palpitations    Latex Rash    Nitroglycerin Palpitations       Past Surgical History:   Procedure Laterality Date    ANKLE OPEN REDUCTION INTERNAL FIXATION Left 2021    Procedure: TRIMALLEOLAR FRACTURE OPEN REDUCTION INTERNAL FIXATION LEFT;  Surgeon: Giovani Daniels MD;  Location: [facility];  Service: Orthopedics;  Laterality: Left;    ANKLE OPEN REDUCTION INTERNAL FIXATION Left 2021    s/p ORIF L ankle Dr. Daniels [facility]    APPENDECTOMY      CARDIAC CATHETERIZATION       SECTION      x 2    CHOLECYSTECTOMY      COLONOSCOPY      DILATATION AND CURETTAGE      Of Cervical Stump    HARDWARE REMOVAL FOOT / ANKLE Left 2022    syndesmotic screw removal Dr. Daniels    MYOMECTOMY      SMALL INTESTINE SURGERY      TUBAL ABDOMINAL LIGATION         Family History   Problem Relation Age of Onset    Hyperlipidemia Mother     CHAD disease Mother     Cancer Mother     Diabetes Mother     Heart disease Mother     Broken bones Mother         Broke both wrists ...different occasions due to falls    Rheum arthritis Father     Hyperlipidemia Father     Heart attack Father 72    Rheumatologic disease Father         My dad has severe RA    Ovarian cancer Maternal Aunt     Diabetes Paternal Grandmother     Hypertension Paternal Grandmother     Obesity Paternal Grandmother     Colon cancer Other     Arthritis Other     Cancer Other         uncle; liver, lung     Thyroid disease Cousin     Thyroid cancer Cousin     CHAD disease Daughter     Obesity Brother     Sleep apnea Brother        Social History     Socioeconomic History    Marital status:    Tobacco Use    Smoking status: Never     Passive exposure: Never    Smokeless tobacco: Never    Vaping Use    Vaping status: Never Used   Substance and Sexual Activity    Alcohol use: Never    Drug use: Never    Sexual activity: Not Currently     Partners: Male     Birth control/protection: Surgical, Abstinence, Tubal ligation           Objective   Physical Exam  Vitals and nursing note reviewed.   Constitutional:       General: She is not in acute distress.     Appearance: She is not ill-appearing or toxic-appearing.   HENT:      Mouth/Throat:      Pharynx: No posterior oropharyngeal erythema.   Eyes:      Conjunctiva/sclera: Conjunctivae normal.      Pupils: Pupils are equal, round, and reactive to light.   Cardiovascular:      Rate and Rhythm: Normal rate and regular rhythm.   Pulmonary:      Effort: Pulmonary effort is normal. No respiratory distress.   Abdominal:      General: Abdomen is flat. There is no distension.      Palpations: There is no mass.      Tenderness: There is abdominal tenderness in the right lower quadrant. There is no guarding or rebound.   Musculoskeletal:         General: No deformity. Normal range of motion.   Skin:     General: Skin is warm.      Findings: No rash.   Neurological:      General: No focal deficit present.      Mental Status: She is alert and oriented to person, place, and time.      Motor: No weakness.         Procedures           ED Course  ED Course as of 08/22/24 0021   Wed Aug 21, 2024   2341 BP: 129/83 [JK]   2341 Temp: 98.1 °F (36.7 °C) [JK]   2341 Temp src: Oral [JK]   2341 Heart Rate: 82 [JK]   2341 Resp: 18 [JK]   2342 SpO2: 97 % [JK]   2342 Device (Oxygen Therapy): room air  Interpretation:  Patient's repeat vitals, telemetry tracing, and pulse oximetry tracing were directly viewed and interpreted by myself.   O2 sat 97% on room air, interpreted as normal  Telemetry rhythm strip revealed a rate of 82 bpm, interpreted as normal sinus rhythm [JK]   2342 Urinalysis With Microscopic If Indicated (No Culture) - Urine, Clean Catch(!) [JK]   2342 Urinalysis,  Microscopic Only - Urine, Clean Catch(!) [JK]   2342 Lactic Acid, Plasma [JK]   2342 CBC & Differential(!) [JK]   2342 Lipase [JK]   2342 Comprehensive Metabolic Panel  Interpretation:  Laboratory studies were reviewed and interpreted directly by myself.  CMP was unremarkable, lipase normal, CBC normal, lactic normal, urinalysis showed some contamination [JK]   2342 CT Abdomen Pelvis Without Contrast  Interpretation:  Imaging was directly visualized by myself, per my interpretations, CT abdomen pelvis showed some chronic changes.. [JK]   Thu Aug 22, 2024   0019 On reevaluation, the patient states that they are feeling much better.  Patient tolerated by mouth challenge of juice and crackers without difficulty.  They are not complaining of any new abdominal pain.  Repeat examination did not show any significant guarding or rebound.  No evidence of peritonitis.  No acute no tenderness.  Patient was given strict return precautions for acute abdomen.  They need to be reevaluated within 24 hours for acute abdomen by PCP or return to the emergency department for reexamination.   [JK]   0019 I had a discussion with the patient/family regarding diagnosis, diagnostic results, treatment plan, and medications. The patient/family indicated understanding of these instructions. I spent adequate time at the bedside prior to discharge necessary to discuss the aftercare instructions, giving patient education, providing explanations of the results of our evaluations/findings, and my decision making to assure that the patient/family understand the plan of care. Time was allotted to answer questions at that time and throughout the ED course. Patient is required to maintain timely follow up, as discussed. I also discussed the potential for the development of an acute emergent condition requiring further evaluation, return to the ER, admission, or even surgical intervention.  I encouraged the patient to return to the emergency department  immediately for any concerns, worsening symptoms, new complaints, or if symptoms persist and they are unable to seek follow-up in a timely fashion. The patient/family expressed understanding and agreement with this plan    Shared decision making:   After full review of the patient's clinical presentation, review of any work-up including but not limited to laboratory studies and radiology obtained, I had a discussion with the patient.  Treatment options were discussed as well as the risks, benefits and consequences.  I discussed all findings with the patient and family members if available.  During the discussion, treatment goals were understood by all as well as any misconceptions which were addressed with the patient.  Ample time was given for any questions they may have had.  They are in agreement with the treatment plan as well as final disposition. [JK]      ED Course User Index  [JK] Edgar Bateman MD                                             Medical Decision Making  This is a 59-year-old female with a history of ovarian cyst presenting to the emergency department with some right lower abdominal discomfort.  The patient symptoms seem consistent with possible ovarian cyst.  On exam she has no evidence of acute abdomen or peritonitis.  She had a previous appendectomy..  Overall, the patient is nontoxic.  Afebrile.  IV access was established and the patient.  Placed on continuous telemetry monitoring.  Given the patient's presentation, differential is broad and will require further evaluation.  Workup initiated.      Differential diagnosis: Peptic ulcer disease, dyspepsia, GERD, pancreatitis, biliary colic, electrolyte abnormality, acute kidney injury, endometriosis, ovarian cyst, small bowel obstruction      Amount and/or Complexity of Data Reviewed  External Data Reviewed: labs, radiology and notes.     Details: External laboratories, imaging as well as notes were reviewed personally by myself.  All  relevant studies were used to guide decision making.     Date of previous record: 9/7/2023    Source of note: Primary physician    Summary: Patient was seen and evaluated for some endometrial issues.  I did review basic laboratory studies on file as well as previous CT abdomen pelvis.  Records reviewed    Labs: ordered. Decision-making details documented in ED Course.  Radiology: ordered and independent interpretation performed. Decision-making details documented in ED Course.    Risk  Prescription drug management.        Final diagnoses:   Right lower quadrant abdominal pain   Strain of abdominal wall, initial encounter       ED Disposition  ED Disposition       ED Disposition   Discharge    Condition   Stable    Comment   --               Demetrio Lo,   [address] [address]  [phone]    Call in 1 day           Medication List        New Prescriptions      methocarbamol 750 MG tablet  Commonly known as: ROBAXIN  Take 1 tablet by mouth 3 (Three) Times a Day for 7 days.               Where to Get Your Medications        These medications were sent to [facility] [id number] - [place], [place] - [facility] [address] AT [facility] [address] - [phone]  - [phone] FX  [address] [place], [place] [address], [place] [id number]      Phone: [phone]   methocarbamol 750 MG tablet            Edgar Bateman MD  08/22/24 0021

## 2024-08-30 ENCOUNTER — TELEPHONE (OUTPATIENT)
Dept: FAMILY MEDICINE CLINIC | Facility: CLINIC | Age: 59
End: 2024-08-30
Payer: MEDICARE

## 2024-08-30 NOTE — TELEPHONE ENCOUNTER
Called patient to reschedule 9/3 appointment as provider will be out. No answer; left vm.     **HUB OKAY TO RELAY AND LAUREN**

## 2024-09-04 ENCOUNTER — OFFICE VISIT (OUTPATIENT)
Dept: FAMILY MEDICINE CLINIC | Facility: CLINIC | Age: 59
End: 2024-09-04
Payer: MEDICARE

## 2024-09-04 ENCOUNTER — TELEPHONE (OUTPATIENT)
Dept: FAMILY MEDICINE CLINIC | Facility: CLINIC | Age: 59
End: 2024-09-04
Payer: MEDICARE

## 2024-09-04 VITALS
DIASTOLIC BLOOD PRESSURE: 68 MMHG | HEIGHT: 68 IN | WEIGHT: 243 LBS | RESPIRATION RATE: 20 BRPM | TEMPERATURE: 97.8 F | OXYGEN SATURATION: 96 % | SYSTOLIC BLOOD PRESSURE: 118 MMHG | HEART RATE: 75 BPM | BODY MASS INDEX: 36.83 KG/M2

## 2024-09-04 DIAGNOSIS — R52 BODY ACHES: ICD-10-CM

## 2024-09-04 DIAGNOSIS — F41.1 GENERALIZED ANXIETY DISORDER: Chronic | ICD-10-CM

## 2024-09-04 DIAGNOSIS — R05.9 COUGH, UNSPECIFIED TYPE: ICD-10-CM

## 2024-09-04 DIAGNOSIS — J02.9 SORE THROAT: ICD-10-CM

## 2024-09-04 DIAGNOSIS — F41.1 GENERALIZED ANXIETY DISORDER: Primary | Chronic | ICD-10-CM

## 2024-09-04 LAB
EXPIRATION DATE: NORMAL
FLUAV AG NPH QL: NEGATIVE
FLUBV AG NPH QL: NEGATIVE
INTERNAL CONTROL: NORMAL
Lab: NORMAL
S PYO AG THROAT QL: NEGATIVE
SARS-COV-2 AG UPPER RESP QL IA.RAPID: NOT DETECTED

## 2024-09-04 PROCEDURE — 87426 SARSCOV CORONAVIRUS AG IA: CPT | Performed by: NURSE PRACTITIONER

## 2024-09-04 PROCEDURE — 99214 OFFICE O/P EST MOD 30 MIN: CPT | Performed by: NURSE PRACTITIONER

## 2024-09-04 PROCEDURE — 1125F AMNT PAIN NOTED PAIN PRSNT: CPT | Performed by: NURSE PRACTITIONER

## 2024-09-04 PROCEDURE — 87880 STREP A ASSAY W/OPTIC: CPT | Performed by: NURSE PRACTITIONER

## 2024-09-04 PROCEDURE — 1159F MED LIST DOCD IN RCRD: CPT | Performed by: NURSE PRACTITIONER

## 2024-09-04 PROCEDURE — 87804 INFLUENZA ASSAY W/OPTIC: CPT | Performed by: NURSE PRACTITIONER

## 2024-09-04 PROCEDURE — 1160F RVW MEDS BY RX/DR IN RCRD: CPT | Performed by: NURSE PRACTITIONER

## 2024-09-04 RX ORDER — ALPRAZOLAM 0.5 MG
0.5 TABLET ORAL DAILY PRN
Qty: 30 TABLET | Refills: 2 | Status: SHIPPED | OUTPATIENT
Start: 2024-09-04

## 2024-09-04 RX ORDER — ALPRAZOLAM 0.5 MG
0.5 TABLET ORAL DAILY PRN
Qty: 30 TABLET | Refills: 0 | Status: CANCELLED | OUTPATIENT
Start: 2024-09-04

## 2024-09-04 RX ORDER — ALPRAZOLAM 0.5 MG
0.5 TABLET ORAL DAILY PRN
Qty: 30 TABLET | Refills: 0 | Status: SHIPPED | OUTPATIENT
Start: 2024-09-04 | End: 2024-09-04 | Stop reason: SDUPTHER

## 2024-09-04 NOTE — TELEPHONE ENCOUNTER
Caller: Brigid Coelho    Relationship: Self    Best call back number: [phone]     Requested Prescriptions:   Requested Prescriptions     Pending Prescriptions Disp Refills    ALPRAZolam (Xanax) 0.5 MG tablet 30 tablet 0     Sig: Take 1 tablet by mouth Daily As Needed for Anxiety.        Pharmacy where request should be sent: [facility] [id number] [address]  AT [facility] [address] - [phone]  - [phone] FX     Last office visit with prescribing clinician: 6/4/2024   Last telemedicine visit with prescribing clinician: Visit date not found   Next office visit with prescribing clinician: Visit date not found     Additional details provided by patient: PHARMACY DID NOT RECEIVED REFILL FROM [facility]    Does the patient have less than a 3 day supply:  [x] Yes  [] No    Would you like a call back once the refill request has been completed: [] Yes [x] No    If the office needs to give you a call back, can they leave a voicemail: [] Yes [x] No    Jesus Perez Rep   09/04/24 14:30 EDT

## 2024-09-04 NOTE — TELEPHONE ENCOUNTER
Pharmacy Name:  [facility] [id number] - [place] [address] [address] [place]  AT [facility] [address] - [phone]  - [phone]      Pharmacy representative name: MINERVA    Pharmacy representative phone number: [phone]    What question does the pharmacy have: MINERVA CALLED STATING THAT [facility] NUMBER IS INACTIVE.

## 2024-09-04 NOTE — PROGRESS NOTES
Office Note     Name: Brigid Coelho    : 1965     MRN: [id number]     Chief Complaint  Anxiety (Fu on med refill) and URI (Congestion, Sore throat , coughing up phlegm since . )    Subjective     History of Present Illness:  Brigid Coelho is a 59 y.o. female who presents today for routine medication refill. The patient started Xanax three years ago due to anxiety. She is also on Zoloft, 25 mg, for anxiety. She is taking Berberdine, an OTC herbal, which is helping her lose some weight and has mildly improved her anxiety.  She states the herbal can increase serotonin so she decreased her Zoloft to a 25 mg dosage instead of the previously prescribed 50 mg dosing.  She reports increased anxiety due to a pending hysterectomy.  She request a refill of her Xanax.    Additionally, the patient reports recent onset of upper respiratory symptoms. She reports fatigue, headache, head congestion, sore throat, nasal stuffiness, and productive cough.    Review of Systems:   Review of Systems   Constitutional:  Positive for fatigue. Negative for chills, diaphoresis and fever.   HENT:  Positive for congestion and sore throat. Negative for ear pain, postnasal drip, rhinorrhea and sinus pressure.    Respiratory:  Positive for cough. Negative for shortness of breath and wheezing.    Gastrointestinal:  Negative for diarrhea, nausea and vomiting.   Neurological:  Positive for headache.       Past Medical History:   Past Medical History:   Diagnosis Date    Acute bronchitis     Anemia     Anxiety     Arthritis of back     Chest pain     Cholelithiasis     Claustrophobia     Fracture of ankle 10/30/2021    GERD (gastroesophageal reflux disease)     Hashimoto's thyroiditis     Hemorrhoids     Hypothyroidism     Low back pain     Low back strain 10/12/2011    Lumbosacral disc disease 10/12/2011    Metrorrhagia     Palpitations     Polycystic ovary syndrome     Polyp of sigmoid colon     Ulcerative  colitis     Upper respiratory infection     UTI (urinary tract infection)        Past Surgical History:   Past Surgical History:   Procedure Laterality Date    ANKLE OPEN REDUCTION INTERNAL FIXATION Left 2021    Procedure: TRIMALLEOLAR FRACTURE OPEN REDUCTION INTERNAL FIXATION LEFT;  Surgeon: Giovani Daniels MD;  Location: [facility];  Service: Orthopedics;  Laterality: Left;    ANKLE OPEN REDUCTION INTERNAL FIXATION Left 2021    s/p ORIF L ankle Dr. Daniels [facility]    APPENDECTOMY      CARDIAC CATHETERIZATION       SECTION      x 2    CHOLECYSTECTOMY      COLONOSCOPY      DILATATION AND CURETTAGE      Of Cervical Stump    HARDWARE REMOVAL FOOT / ANKLE Left 2022    syndesmotic screw removal Dr. Daniels    MYOMECTOMY      SMALL INTESTINE SURGERY      TUBAL ABDOMINAL LIGATION         Immunizations:   Immunization History   Administered Date(s) Administered    Flu Vaccine Intradermal Quad 18-64YR 10/08/2010, 10/12/2018    Flucelvax Quad Vial =>4yrs 10/12/2018    Fluzone (or Fluarix & Flulaval for VFC) >6mos 2022, 2022    Fluzone Quad >6mos (Multi-dose) 2017    Hepatitis A 07/10/2018    Hepatitis B Adult/Adolescent IM 10/08/2010, 2010    Influenza Seasonal Injectable 10/08/2010    Influenza, Unspecified 2022    Pneumococcal Conjugate 20-Valent (PCV20) 2023    Pneumococcal Polysaccharide (PPSV23) 2022    Td (TDVAX) 2009, 2009, 2017    Td, Unspecified 2009, 2009    Tdap 2014, 2017        Medications:     Current Outpatient Medications:     acetaminophen (TYLENOL) 650 MG 8 hr tablet, Take 500 mg by mouth Every 8 (Eight) Hours As Needed for Mild Pain ., Disp: , Rfl:     Alpha-Lipoic Acid (LIPOIC ACID PO), , Disp: , Rfl:     ALPRAZolam (Xanax) 0.5 MG tablet, Take 1 tablet by mouth Daily As Needed for Anxiety., Disp: 30 tablet, Rfl: 0    ascorbic acid (VITAMIN C) 1000 MG tablet, Take 1 tablet by mouth Daily.,  Disp: , Rfl:     Biotin 1000 MCG chewable tablet, Chew., Disp: , Rfl:     Diclofenac Sodium (VOLTAREN) 1 % gel gel, Apply 4 g topically to the appropriate area as directed 4 (Four) Times a Day As Needed (Pain in hands)., Disp: 50 g, Rfl: 5    famotidine (PEPCID) 20 MG tablet, Take 1 tablet by mouth At Night As Needed for Heartburn., Disp: , Rfl:     ferrous sulfate 140 (45 Fe) MG tablet controlled-release tablet, Take 1 tablet by mouth Daily With Breakfast., Disp: , Rfl:     folic acid (FOLVITE) 400 MCG tablet, Take 1 tablet by mouth Daily., Disp: , Rfl:     Garlic 1000 MG capsule, Take 1 capsule by mouth Daily., Disp: , Rfl:     ibuprofen (ADVIL,MOTRIN) 400 MG tablet, Take 1 tablet by mouth Every 6 (Six) Hours As Needed for Mild Pain., Disp: , Rfl:     levothyroxine (SYNTHROID, LEVOTHROID) 88 MCG tablet, TAKE 1 TABLET BY MOUTH DAILY, Disp: 90 tablet, Rfl: 1    Loratadine 10 MG capsule, Take 1 capsule by mouth Daily., Disp: 30 each, Rfl: 2    Melatonin 2.5 MG capsule, Take  by mouth., Disp: , Rfl:     mesalamine (APRISO) 0.375 g 24 hr capsule, Take 1 capsule by mouth As Needed., Disp: , Rfl:     mesalamine (PENTASA) 250 MG CR capsule, Mesalamine, Disp: , Rfl:     montelukast (SINGULAIR) 10 MG tablet, TAKE ONE TABLET BY MOUTH EVERY NIGHT AT BEDTIME, Disp: 90 tablet, Rfl: 1    permethrin (ELIMITE) 5 % cream, , Disp: , Rfl:     sertraline (ZOLOFT) 50 MG tablet, Take 1 tablet by mouth Daily. (Patient taking differently: Take 0.5 tablets by mouth Daily.), Disp: 90 tablet, Rfl: 3    simethicone (MYLICON) 125 MG chewable tablet, Chew 1 tablet., Disp: , Rfl:     triamcinolone (KENALOG) 0.5 % cream, Apply 1 application topically to the appropriate area as directed 2 (Two) Times a Day., Disp: 30 g, Rfl: 1    Vitamin A 2400 MCG (8000 UT) tablet, Take 1 tablet by mouth Daily., Disp: , Rfl:     Zinc 25 MG tablet, Take 0.5 tablets by mouth Daily., Disp: , Rfl:     Allergies:   Allergies   Allergen Reactions    Adhesive Tape  "Unknown - Low Severity     Rash      Azithromycin Unknown - Low Severity    Clarithromycin Unknown - Low Severity     From Dr Moni Spear's notes provided for services 9/27/23.    Erythromycin Other (See Comments)     Sores in mouth    Nickel Itching    Epinephrine Palpitations    Latex Rash    Nitroglycerin Palpitations       Family History:   Family History   Problem Relation Age of Onset    Hyperlipidemia Mother     CHAD disease Mother     Cancer Mother     Diabetes Mother     Heart disease Mother     Broken bones Mother         Broke both wrists ...different occasions due to falls    Rheum arthritis Father     Hyperlipidemia Father     Heart attack Father 72    Rheumatologic disease Father         My dad has severe RA    Ovarian cancer Maternal Aunt     Diabetes Paternal Grandmother     Hypertension Paternal Grandmother     Obesity Paternal Grandmother     Colon cancer Other     Arthritis Other     Cancer Other         uncle; liver, lung     Thyroid disease Cousin     Thyroid cancer Cousin     CHAD disease Daughter     Obesity Brother     Sleep apnea Brother        Social History:   Social History     Socioeconomic History    Marital status:    Tobacco Use    Smoking status: Never     Passive exposure: Never    Smokeless tobacco: Never   Vaping Use    Vaping status: Never Used   Substance and Sexual Activity    Alcohol use: Never    Drug use: Never    Sexual activity: Not Currently     Partners: Male     Birth control/protection: Surgical, Abstinence, Tubal ligation         Objective     Vital Signs  /68   Pulse 75   Temp 97.8 °F (36.6 °C) (Temporal)   Resp 20   Ht 172.7 cm (67.99\")   Wt 110 kg (243 lb)   SpO2 96%   BMI 36.96 kg/m²   Estimated body mass index is 36.96 kg/m² as calculated from the following:    Height as of this encounter: 172.7 cm (67.99\").    Weight as of this encounter: 110 kg (243 lb).          Physical Exam  Vitals and nursing note reviewed.   Constitutional:       " General: She is not in acute distress.     Appearance: Normal appearance. She is not ill-appearing or toxic-appearing.   HENT:      Head: Normocephalic and atraumatic.      Right Ear: Tympanic membrane, ear canal and external ear normal.      Left Ear: Tympanic membrane, ear canal and external ear normal.      Nose: Nose normal.      Mouth/Throat:      Mouth: Mucous membranes are moist.      Pharynx: Posterior oropharyngeal erythema present. No oropharyngeal exudate.   Eyes:      General: No scleral icterus.        Right eye: No discharge.         Left eye: No discharge.      Conjunctiva/sclera: Conjunctivae normal.   Cardiovascular:      Rate and Rhythm: Normal rate and regular rhythm.      Heart sounds: Normal heart sounds.   Pulmonary:      Effort: Pulmonary effort is normal.      Breath sounds: Normal breath sounds.   Musculoskeletal:         General: Normal range of motion.      Cervical back: Normal range of motion and neck supple. No rigidity.   Lymphadenopathy:      Cervical: No cervical adenopathy.   Skin:     General: Skin is warm.   Neurological:      General: No focal deficit present.      Mental Status: She is alert.   Psychiatric:         Behavior: Behavior normal.         Thought Content: Thought content normal.         Judgment: Judgment normal.      Comments: Anxious appearing          Assessment and Plan     Procedures      Lab Results (last 72 hours)       ** No results found for the last 72 hours. **                  Diagnoses and all orders for this visit:    1. Generalized anxiety disorder (Primary)  -     ALPRAZolam (Xanax) 0.5 MG tablet; Take 1 tablet by mouth Daily As Needed for Anxiety.  Dispense: 30 tablet; Refill: 0    2. Body aches  -     POC Influenza A / B    3. Cough, unspecified type  -     POCT SARS-CoV-2 Antigen  -     POC Rapid Strep A    4. Sore throat    Reviewed exam findings with the patient.  COVID/flu and rapid strep are negative. The patient is scheduled to consult with  OB/GYN later this month for further evaluation. Keep your routine follow up appointment with Dr. Lo.       Follow Up  Return if symptoms worsen or fail to improve.    [facility]  [address] [place] [id number] [phone]

## 2024-09-20 DIAGNOSIS — J30.2 SEASONAL ALLERGIES: ICD-10-CM

## 2024-09-20 RX ORDER — MONTELUKAST SODIUM 10 MG/1
10 TABLET ORAL
Qty: 90 TABLET | Refills: 1 | Status: SHIPPED | OUTPATIENT
Start: 2024-09-20

## 2024-10-02 ENCOUNTER — TELEPHONE (OUTPATIENT)
Dept: FAMILY MEDICINE CLINIC | Facility: CLINIC | Age: 59
End: 2024-10-02

## 2024-10-02 NOTE — TELEPHONE ENCOUNTER
Caller: Brigid Coelho    Relationship to patient: Self    Best call back number: [phone]     Chief complaint: SHORTNESS OF BREATH WHEN RESTING    Patient directed to call 911 or go to their nearest emergency room.     Patient verbalized understanding: [x] Yes  [] No  If no, why?    Additional notes:PATIENT STATED THEY WILL GO TO ER.

## 2024-10-16 ENCOUNTER — TELEPHONE (OUTPATIENT)
Dept: FAMILY MEDICINE CLINIC | Facility: CLINIC | Age: 59
End: 2024-10-16

## 2024-10-16 NOTE — TELEPHONE ENCOUNTER
Caller: Brigid Coelho    Relationship to patient: Self    Best call back number: [phone]       Additional notes:PATIENT WANTED HER WELLNESS VISIT WITH DR. ORTIZ AND SCHEDULED WITH HER INSTEAD OF PCP

## 2024-10-21 ENCOUNTER — LAB (OUTPATIENT)
Dept: LAB | Facility: HOSPITAL | Age: 59
End: 2024-10-21
Payer: MEDICARE

## 2024-10-21 ENCOUNTER — OFFICE VISIT (OUTPATIENT)
Dept: FAMILY MEDICINE CLINIC | Facility: CLINIC | Age: 59
End: 2024-10-21
Payer: MEDICARE

## 2024-10-21 VITALS
HEIGHT: 68 IN | BODY MASS INDEX: 36.65 KG/M2 | SYSTOLIC BLOOD PRESSURE: 116 MMHG | OXYGEN SATURATION: 97 % | DIASTOLIC BLOOD PRESSURE: 64 MMHG | WEIGHT: 241.8 LBS | HEART RATE: 68 BPM | TEMPERATURE: 98.3 F

## 2024-10-21 DIAGNOSIS — E66.812 CLASS 2 OBESITY DUE TO EXCESS CALORIES WITHOUT SERIOUS COMORBIDITY WITH BODY MASS INDEX (BMI) OF 37.0 TO 37.9 IN ADULT: ICD-10-CM

## 2024-10-21 DIAGNOSIS — Z00.00 MEDICARE ANNUAL WELLNESS VISIT, SUBSEQUENT: Primary | ICD-10-CM

## 2024-10-21 DIAGNOSIS — E66.09 CLASS 2 OBESITY DUE TO EXCESS CALORIES WITHOUT SERIOUS COMORBIDITY WITH BODY MASS INDEX (BMI) OF 37.0 TO 37.9 IN ADULT: ICD-10-CM

## 2024-10-21 DIAGNOSIS — Z23 IMMUNIZATION DUE: ICD-10-CM

## 2024-10-21 DIAGNOSIS — E06.3 HYPOTHYROIDISM DUE TO HASHIMOTO'S THYROIDITIS: Chronic | ICD-10-CM

## 2024-10-21 DIAGNOSIS — E78.2 MIXED HYPERLIPIDEMIA: Chronic | ICD-10-CM

## 2024-10-21 DIAGNOSIS — R73.01 IMPAIRED FASTING GLUCOSE: ICD-10-CM

## 2024-10-21 LAB
CHOLEST SERPL-MCNC: 175 MG/DL (ref 0–200)
HDLC SERPL-MCNC: 38 MG/DL (ref 40–60)
LDLC SERPL CALC-MCNC: 102 MG/DL (ref 0–100)
LDLC/HDLC SERPL: 2.53 {RATIO}
T4 FREE SERPL-MCNC: 1.03 NG/DL (ref 0.92–1.68)
TRIGL SERPL-MCNC: 204 MG/DL (ref 0–150)
TSH SERPL DL<=0.05 MIU/L-ACNC: 2.68 UIU/ML (ref 0.27–4.2)
VLDLC SERPL-MCNC: 35 MG/DL (ref 5–40)

## 2024-10-21 PROCEDURE — 1126F AMNT PAIN NOTED NONE PRSNT: CPT | Performed by: PHYSICIAN ASSISTANT

## 2024-10-21 PROCEDURE — 36415 COLL VENOUS BLD VENIPUNCTURE: CPT

## 2024-10-21 PROCEDURE — 90656 IIV3 VACC NO PRSV 0.5 ML IM: CPT | Performed by: PHYSICIAN ASSISTANT

## 2024-10-21 PROCEDURE — G0439 PPPS, SUBSEQ VISIT: HCPCS | Performed by: PHYSICIAN ASSISTANT

## 2024-10-21 PROCEDURE — 84439 ASSAY OF FREE THYROXINE: CPT

## 2024-10-21 PROCEDURE — 80061 LIPID PANEL: CPT

## 2024-10-21 PROCEDURE — 1159F MED LIST DOCD IN RCRD: CPT | Performed by: PHYSICIAN ASSISTANT

## 2024-10-21 PROCEDURE — 83036 HEMOGLOBIN GLYCOSYLATED A1C: CPT

## 2024-10-21 PROCEDURE — 82607 VITAMIN B-12: CPT

## 2024-10-21 PROCEDURE — 1160F RVW MEDS BY RX/DR IN RCRD: CPT | Performed by: PHYSICIAN ASSISTANT

## 2024-10-21 PROCEDURE — 1170F FXNL STATUS ASSESSED: CPT | Performed by: PHYSICIAN ASSISTANT

## 2024-10-21 PROCEDURE — G0008 ADMIN INFLUENZA VIRUS VAC: HCPCS | Performed by: PHYSICIAN ASSISTANT

## 2024-10-21 PROCEDURE — 84443 ASSAY THYROID STIM HORMONE: CPT

## 2024-10-21 NOTE — ASSESSMENT & PLAN NOTE
Patient's (Body mass index is 37.07 kg/m².) indicates that they are obese (BMI >30) with health conditions that include hypertension, dyslipidemias, and osteoarthritis . Weight is improving with lifestyle modifications. BMI  is above average; BMI management plan is completed. We discussed low calorie, low carb based diet program, portion control, and increasing exercise.

## 2024-10-21 NOTE — PROGRESS NOTES
Subjective   The ABCs of the Annual Wellness Visit  Medicare Wellness Visit      Brigid Coelho is a 59 y.o. patient who presents for a Medicare Wellness Visit. She has been doing well except for problems with vaginal bleeding. She is seeing GYN for this and planning for hysterectomy.     The following portions of the patient's history were reviewed and   updated as appropriate: allergies, current medications, past family history, past medical history, past social history, past surgical history, and problem list.    Compared to one year ago, the patient's physical   health is better.  Compared to one year ago, the patient's mental   health is better.    Recent Hospitalizations:  She was not admitted to the hospital during the last year.     Current Medical Providers:  Patient Care Team:  Demetrio Lo DO as PCP - General (Family Medicine)  Rigo Wilder MD as Consulting Physician (Gastroenterology)  Ginna Thompson MD as Consulting Physician (Colon and Rectal Surgery)  Leann Chavez MD as Consulting Physician (Endocrinology)  Ben Hogan DO as Consulting Physician (Pulmonary Disease)    Outpatient Medications Prior to Visit   Medication Sig Dispense Refill    acetaminophen (TYLENOL) 650 MG 8 hr tablet Take 500 mg by mouth Every 8 (Eight) Hours As Needed for Mild Pain .      Alpha-Lipoic Acid (LIPOIC ACID PO)       ALPRAZolam (Xanax) 0.5 MG tablet Take 1 tablet by mouth Daily As Needed for Anxiety. 30 tablet 2    ascorbic acid (VITAMIN C) 1000 MG tablet Take 1 tablet by mouth Daily.      Biotin 1000 MCG chewable tablet Chew.      Diclofenac Sodium (VOLTAREN) 1 % gel gel Apply 4 g topically to the appropriate area as directed 4 (Four) Times a Day As Needed (Pain in hands). 50 g 5    famotidine (PEPCID) 20 MG tablet Take 1 tablet by mouth At Night As Needed for Heartburn.      ferrous sulfate 140 (45 Fe) MG tablet controlled-release tablet Take 1 tablet by mouth Daily With  Breakfast.      folic acid (FOLVITE) 400 MCG tablet Take 1 tablet by mouth Daily.      Garlic 1000 MG capsule Take 1 capsule by mouth Daily.      ibuprofen (ADVIL,MOTRIN) 400 MG tablet Take 1 tablet by mouth Every 6 (Six) Hours As Needed for Mild Pain.      levothyroxine (SYNTHROID, LEVOTHROID) 88 MCG tablet TAKE 1 TABLET BY MOUTH DAILY 90 tablet 1    Loratadine 10 MG capsule Take 1 capsule by mouth Daily. 30 each 2    Melatonin 2.5 MG capsule Take  by mouth.      mesalamine (APRISO) 0.375 g 24 hr capsule Take 1 capsule by mouth As Needed.      montelukast (SINGULAIR) 10 MG tablet TAKE 1 TABLET BY MOUTH EVERY NIGHT AT BEDTIME 90 tablet 1    sertraline (ZOLOFT) 50 MG tablet Take 1 tablet by mouth Daily. (Patient taking differently: Take 0.5 tablets by mouth Daily.) 90 tablet 3    simethicone (MYLICON) 125 MG chewable tablet Chew 1 tablet.      triamcinolone (KENALOG) 0.5 % cream Apply 1 application topically to the appropriate area as directed 2 (Two) Times a Day. 30 g 1    Zinc 25 MG tablet Take 0.5 tablets by mouth Daily.      mesalamine (PENTASA) 250 MG CR capsule Mesalamine      permethrin (ELIMITE) 5 % cream       Vitamin A 2400 MCG (8000 UT) tablet Take 1 tablet by mouth Daily.       No facility-administered medications prior to visit.     No opioid medication identified on active medication list. I have reviewed chart for other potential  high risk medication/s and harmful drug interactions in the elderly.      Aspirin is not on active medication list.  Aspirin use is not indicated based on review of current medical condition/s. Risk of harm outweighs potential benefits.  .    Patient Active Problem List   Diagnosis    Ulcerative colitis without complications    Seasonal allergies    Anemia    Esophageal reflux    Hypothyroidism due to Hashimoto's thyroiditis    Vitamin D deficiency    Fatty liver    Anxiety    Class 3 severe obesity due to excess calories with serious comorbidity and body mass index (BMI) of  "40.0 to 44.9 in adult    DDD (degenerative disc disease), lumbar    Mixed hyperlipidemia    Osteopenia determined by x-ray    Asthma    Encounter for screening colonoscopy    Hashimoto's thyroiditis    Hereditary progressive muscular dystrophy    Palpitations    Anxiety    Obesity    Ulcerative colitis    Hypertrophy of uterus    Pain in joint of left shoulder    Pruritic dermatosis of scalp    Pediculosis capitis     Advance Care Planning Advance Directive is not on file.  ACP discussion was held with the patient during this visit. Patient does not have an advance directive, information provided.            Objective   Vitals:    10/21/24 1137   BP: 116/64   Pulse: 68   Temp: 98.3 °F (36.8 °C)   TempSrc: Temporal   SpO2: 97%   Weight: 110 kg (241 lb 12.8 oz)   Height: 172 cm (67.72\")   PainSc: 0-No pain       Estimated body mass index is 37.07 kg/m² as calculated from the following:    Height as of this encounter: 172 cm (67.72\").    Weight as of this encounter: 110 kg (241 lb 12.8 oz).       Physical Exam  Vitals and nursing note reviewed.   Constitutional:       General: She is not in acute distress.     Appearance: Normal appearance. She is well-developed.   HENT:      Head: Normocephalic and atraumatic.      Right Ear: Tympanic membrane and ear canal normal. There is no impacted cerumen.      Left Ear: Tympanic membrane and ear canal normal. There is no impacted cerumen.      Nose: Nose normal. No congestion or rhinorrhea.      Mouth/Throat:      Mouth: Mucous membranes are moist.      Pharynx: Oropharynx is clear. No oropharyngeal exudate or posterior oropharyngeal erythema.   Eyes:      General: No scleral icterus.        Right eye: No discharge.         Left eye: No discharge.      Extraocular Movements: Extraocular movements intact.      Conjunctiva/sclera: Conjunctivae normal.      Pupils: Pupils are equal, round, and reactive to light.   Neck:      Thyroid: No thyromegaly.      Vascular: No carotid bruit. "   Cardiovascular:      Rate and Rhythm: Normal rate and regular rhythm.      Heart sounds: Normal heart sounds. No murmur heard.  Pulmonary:      Breath sounds: Normal breath sounds. No wheezing, rhonchi or rales.   Abdominal:      General: Bowel sounds are normal. There is no distension.      Palpations: Abdomen is soft. There is no mass.      Tenderness: There is no abdominal tenderness.   Musculoskeletal:         General: No swelling. Normal range of motion.      Cervical back: Normal range of motion and neck supple.      Right lower leg: No edema.      Left lower leg: No edema.   Lymphadenopathy:      Cervical: No cervical adenopathy.   Skin:     General: Skin is warm.      Coloration: Skin is not jaundiced or pale.      Findings: No bruising or rash.   Neurological:      General: No focal deficit present.      Mental Status: She is alert.      Cranial Nerves: No cranial nerve deficit.      Motor: No weakness.      Gait: Gait normal.   Psychiatric:         Mood and Affect: Mood normal.         Behavior: Behavior normal.         Judgment: Judgment normal.           Does the patient have evidence of cognitive impairment? No  Lab Results   Component Value Date    TRIG 204 (H) 10/21/2024    HDL 38 (L) 10/21/2024     (H) 10/21/2024    VLDL 35 10/21/2024    HGBA1C 5.50 10/21/2024                                                                                               Health  Risk Assessment    Smoking Status:  Social History     Tobacco Use   Smoking Status Never    Passive exposure: Never   Smokeless Tobacco Never     Alcohol Consumption:  Social History     Substance and Sexual Activity   Alcohol Use Never       Fall Risk Screen  STEADI Fall Risk Assessment was completed, and patient is at LOW risk for falls.Assessment completed on:10/21/2024    Depression Screening:      10/21/2024    11:41 AM   PHQ-2/PHQ-9 Depression Screening   Little interest or pleasure in doing things Not at all   Feeling down,  depressed, or hopeless Not at all     Health Habits and Functional and Cognitive Screening:      10/21/2024    11:43 AM   Functional & Cognitive Status   Do you have difficulty preparing food and eating? No   Do you have difficulty bathing yourself, getting dressed or grooming yourself? No   Do you have difficulty using the toilet? No   Do you have difficulty moving around from place to place? No   Do you have trouble with steps or getting out of a bed or a chair? No   Current Diet Well Balanced Diet   Dental Exam Up to date   Eye Exam Up to date   Exercise (times per week) 7 times per week   Current Exercises Include Walking   Do you need help using the phone?  No   Are you deaf or do you have serious difficulty hearing?  No   Do you need help to go to places out of walking distance? No   Do you need help shopping? No   Do you need help preparing meals?  No   Do you need help with housework?  No   Do you need help with laundry? No   Do you need help taking your medications? No   Do you need help managing money? No   Do you ever drive or ride in a car without wearing a seat belt? No   Have you felt unusual stress, anger or loneliness in the last month? No   Who do you live with? Child   If you need help, do you have trouble finding someone available to you? No   Have you been bothered in the last four weeks by sexual problems? No   Do you have difficulty concentrating, remembering or making decisions? No           Age-appropriate Screening Schedule:  Refer to the list below for future screening recommendations based on patient's age, sex and/or medical conditions. Orders for these recommended tests are listed in the plan section. The patient has been provided with a written plan.    Health Maintenance List  Health Maintenance   Topic Date Due    DXA SCAN  Never done    ZOSTER VACCINE (1 of 2) Never done    COVID-19 Vaccine (1 - 2023-24 season) 01/10/2025 (Originally 9/1/2024)    ANNUAL WELLNESS VISIT  10/21/2025     LIPID PANEL  10/21/2025    BMI FOLLOWUP  10/21/2025    PAP SMEAR  12/07/2025    MAMMOGRAM  12/28/2025    TDAP/TD VACCINES (7 - Td or Tdap) 09/05/2027    COLORECTAL CANCER SCREENING  03/28/2033    HEPATITIS C SCREENING  Completed    Pneumococcal Vaccine 0-64  Completed    INFLUENZA VACCINE  Completed                                                                                                                                                CMS Preventative Services Quick Reference  Risk Factors Identified During Encounter  Depression/Dysphoria: Current medication is effective, no change recommended  Immunizations Discussed/Encouraged: Influenza    The above risks/problems have been discussed with the patient.  Pertinent information has been shared with the patient in the After Visit Summary.  An After Visit Summary and PPPS were made available to the patient.    Follow Up:   Next Medicare Wellness visit to be scheduled in 1 year.     Assessment & Plan  Immunization due  Flu vaccine today  Hypothyroidism due to Hashimoto's thyroiditis  Labs today  Mixed hyperlipidemia     Class 2 obesity due to excess calories without serious comorbidity with body mass index (BMI) of 37.0 to 37.9 in adult  Patient's (Body mass index is 37.07 kg/m².) indicates that they are obese (BMI >30) with health conditions that include hypertension, dyslipidemias, and osteoarthritis . Weight is improving with lifestyle modifications. BMI  is above average; BMI management plan is completed. We discussed low calorie, low carb based diet program, portion control, and increasing exercise.   Impaired fasting glucose    Medicare annual wellness visit, subsequent      Orders Placed This Encounter   Procedures    Fluzone >6mos (4530-3459)    Hemoglobin A1c    T4, free    TSH    Lipid panel    Vitamin B12             Follow Up:   No follow-ups on file.

## 2024-10-21 NOTE — LETTER
[facility]  Vaccine Consent Form    Patient Name:  Brigid Coelho  Patient :  1965     Vaccine(s) Ordered    Fluzone >6mos (6640-8305)        Screening Checklist  The following questions should be completed prior to vaccination. If you answer “yes” to any question, it does not necessarily mean you should not be vaccinated. It just means we may need to clarify or ask more questions. If a question is unclear, please ask your healthcare provider to explain it.    Yes No   Any fever or moderate to severe illness today (mild illness and/or antibiotic treatment are not contraindications)?     Do you have a history of a serious reaction to any previous vaccinations, such as anaphylaxis, encephalopathy within 7 days, Guillain-Kerens syndrome within 6 weeks, seizure?     Have you received any live vaccine(s) (e.g MMR, JOSUÉ) or any other vaccines in the last month (to ensure duplicate doses aren't given)?     Do you have an anaphylactic allergy to latex (DTaP, DTaP-IPV, Hep A, Hep B, MenB, RV, Td, Tdap), baker’s yeast (Hep B, HPV), polysorbates (RSV, nirsevimab, PCV 20, Rotavirrus, Tdap, Shingrix), or gelatin (JOSUÉ, MMR)?     Do you have an anaphylactic allergy to neomycin (Rabies, JOSUÉ, MMR, IPV, Hep A), polymyxin B (IPV), or streptomycin (IPV)?      Any cancer, leukemia, AIDS, or other immune system disorder? (JOSUÉ, MMR, RV)     Do you have a parent, brother, or sister with an immune system problem (if immune competence of vaccine recipient clinically verified, can proceed)? (MMR, JOSUÉ)     Any recent steroid treatments for >2 weeks, chemotherapy, or radiation treatment? (JOSUÉ, MMR)     Have you received antibody-containing blood transfusions or IVIG in the past 11 months (recommended interval is dependent on product)? (MMR, JOSUÉ)     Have you taken antiviral drugs (acyclovir, famciclovir, valacyclovir for JOSUÉ) in the last 24 or 48 hours, respectively?      Are you pregnant or planning to become pregnant  "within 1 month? (JOSUÉ, MMR, HPV, IPV, MenB, Abrexvy; For Hep B- refer to Engerix-B; For RSV - Abrysvo is indicated for 32-36 weeks of pregnancy from September to January)     For infants, have you ever been told your child has had intussusception or a medical emergency involving obstruction of the intestine (Rotavirus)? If not for an infant, can skip this question.         *Ordering Physicians/APC should be consulted if \"yes\" is checked by the patient or guardian above.  I have received, read, and understand the Vaccine Information Statement (VIS) for each vaccine ordered.  I have considered my or my child's health status as well as the health status of my close contacts.  I have taken the opportunity to discuss my vaccine questions with my or my child's health care provider.   I have requested that the ordered vaccine(s) be given to me or my child.  I understand the benefits and risks of the vaccines.  I understand that I should remain in the clinic for 15 minutes after receiving the vaccine(s).  _________________________________________________________  Signature of Patient or Parent/Legal Guardian ____________________  Date     "

## 2024-10-22 LAB
HBA1C MFR BLD: 5.5 % (ref 4.8–5.6)
VIT B12 BLD-MCNC: 1490 PG/ML (ref 211–946)

## 2024-11-07 PROCEDURE — 87086 URINE CULTURE/COLONY COUNT: CPT | Performed by: FAMILY MEDICINE

## 2024-11-07 PROCEDURE — 87186 SC STD MICRODIL/AGAR DIL: CPT | Performed by: FAMILY MEDICINE

## 2024-11-07 PROCEDURE — 87077 CULTURE AEROBIC IDENTIFY: CPT | Performed by: FAMILY MEDICINE

## 2024-11-08 ENCOUNTER — NURSE TRIAGE (OUTPATIENT)
Dept: CALL CENTER | Facility: HOSPITAL | Age: 59
End: 2024-11-08
Payer: MEDICARE

## 2024-11-08 NOTE — TELEPHONE ENCOUNTER
went to [facility] yesterday, third morning this has happened burning urine coming out vagina per patient was checked for UTI yesterday, states her first morning urines smells like poo.  Also states she dizzy and lightheaded.  Patient somewhat anxious and has not had exam encouraged patient to go to ER per care advice  patient going to have son take her and get evaluated.

## 2024-11-08 NOTE — TELEPHONE ENCOUNTER
went to [facility] yesterday, third morning this has happened burning coming out vagina at [facility] did not have uti per patient just had blood in urine, states her urine smell very bad as well   Reason for Disposition   Patient sounds very sick or weak to the triager    Additional Information   Negative: Shock suspected (e.g., cold/pale/clammy skin, too weak to stand, low BP, rapid pulse)   Negative: Sounds like a life-threatening emergency to the triager   Negative: Followed a female genital area injury (e.g., labia, vagina, vulva)   Negative: Followed a male genital area injury (e.g., penis, scrotum)   Negative: Vaginal discharge   Negative: Pus (white, yellow) or bloody discharge from end of penis   Negative: [1] Taking antibiotic for urinary tract infection (UTI) AND [2] female   Negative: [1] Taking antibiotic for urinary tract infection (UTI) AND [2] male   Negative: [1] Pain or burning with passing urine (urination) AND [2] pregnant   Negative: [1] Pain or burning with passing urine (urination) AND [2] postpartum (from 0 to 6 weeks after delivery)   Negative: [1] Pain or burning with passing urine (urination) AND [2] male   Negative: Pain or itching in the vulvar area   Negative: Pain in scrotum is main symptom   Negative: Blood in the urine is main symptom   Negative: Symptoms arising from use of a urinary catheter (e.g., Coude, Leone)   Negative: [1] Unable to urinate (or only a few drops) > 4 hours AND [2] bladder feels very full (e.g., palpable bladder or strong urge to urinate)   Negative: [1] Decreased urination and [2] drinking very little AND [2] dehydration suspected (e.g., dark urine, no urine > 12 hours, very dry mouth, very lightheaded)   [1] Pain or burning with passing urine (urination) AND [2] female   Negative: Shock suspected (e.g., cold/pale/clammy skin, too weak to stand, low BP, rapid pulse)   Negative: Sounds like a life-threatening emergency to the triager   Negative: Followed a genital area injury  "(e.g., vagina, vulva)   Negative: Taking antibiotic for urinary tract infection (UTI)   Negative: Pregnant   Negative: Postpartum (from 0 to 6 weeks after delivery)   Negative: [1] Unable to urinate (or only a few drops) > 4 hours AND [2] bladder feels very full (e.g., palpable bladder or strong urge to urinate)   Negative: Vomiting   Negative: [1] SEVERE pain with urination (e.g., excruciating) AND [2] not improved after 2 hours of pain medicine and Sitz bath   Negative: Fever > 100.4 F (38.0 C)   Negative: Side (flank) or lower back pain present   Negative: Diabetes mellitus or weak immune system (e.g., HIV positive, cancer chemo, splenectomy, organ transplant, chronic steroids)   Negative: Bedridden (e.g., CVA, chronic illness, recovering from surgery)   Negative: Artificial heart valve or artificial joint   Negative: Unusual vaginal discharge (e.g., bad smelling, yellow, green, or foamy-white)   Negative: Patient is worried they have a sexually transmitted infection (STI)    Answer Assessment - Initial Assessment Questions  1. SYMPTOM: \"What's the main symptom you're concerned about?\" (e.g., frequency, incontinence)      States urine is burning and coming out of vagina  2. ONSET: \"When did the    start?\"      Couple of days ago  3. PAIN: \"Is there any pain?\" If Yes, ask: \"How bad is it?\" (Scale: 1-10; mild, moderate, severe)      moderate  4. CAUSE: \"What do you think is causing the symptoms?\"      unknown  5. OTHER SYMPTOMS: \"Do you have any other symptoms?\" (e.g., blood in urine, fever, flank pain, pain with urination)      Urine smells \"like poo\" per patieint  6. PREGNANCY: \"Is there any chance you are pregnant?\" \"When was your last menstrual period?\"      deneis    Answer Assessment - Initial Assessment Questions  1. SEVERITY: \"How bad is the pain?\"  (e.g., Scale 1-10; mild, moderate, or severe)    - MILD (1-3): complains slightly about urination hurting    - MODERATE (4-7): interferes with normal activities " "     - SEVERE (8-10): excruciating, unwilling or unable to urinate because of the pain       Severe in am  2. FREQUENCY: \"How many times have you had painful urination today?\"       One and yesterday  3. PATTERN: \"Is pain present every time you urinate or just sometimes?\"       Just sometimes  4. ONSET: \"When did the painful urination start?\"       Couple days ago  5. FEVER: \"Do you have a fever?\" If Yes, ask: \"What is your temperature, how was it measured, and when did it start?\"      denies  6. PAST UTI: \"Have you had a urine infection before?\" If Yes, ask: \"When was the last time?\" and \"What happened that time?\"       unknown  7. CAUSE: \"What do you think is causing the painful urination?\"  (e.g., UTI, scratch, Herpes sore)      States urine is coming out of vagina  8. OTHER SYMPTOMS: \"Do you have any other symptoms?\" (e.g., blood in urine, flank pain, genital sores, urgency, vaginal discharge)      Urine smells like poo  9. PREGNANCY: \"Is there any chance you are pregnant?\" \"When was your last menstrual period?\"      denies    Protocols used: Urinary Symptoms-ADULT-AH, Urination Pain - Female-ADULT-AH    "

## 2024-11-11 ENCOUNTER — TELEPHONE (OUTPATIENT)
Dept: FAMILY MEDICINE CLINIC | Facility: CLINIC | Age: 59
End: 2024-11-11
Payer: MEDICARE

## 2024-11-11 NOTE — TELEPHONE ENCOUNTER
Caller: Brigid Coelho    Relationship: Self    Best call back number: [phone]     What is the medical concern/diagnosis: REOCCURRING UTI    What specialty or service is being requested: UROLOGIST    What is the provider, practice or medical service name: DR BRENDA HINKLE    What is the office location: [address] [place] [address]    What is the office phone number: [phone]    Any additional details: CALL PATIENT ONCE SUBMITTED SO SHE CAN CALL TO SCHEDULE ASAP

## 2024-11-12 NOTE — TELEPHONE ENCOUNTER
Name: Brigid Coelho      Relationship: Self      Best Callback Number: [phone]      HUB PROVIDED THE RELAY MESSAGE FROM THE OFFICE      PATIENT: HAS FURTHER QUESTIONS AND WOULD LIKE A CALL BACK AT THE FOLLOWING PHONE [phone]    ADDITIONAL INFORMATION: PATIENT STATED THAT SEVERAL PROVIDERS HAVE STATED SHE NEEDS TO SEE A UROLOGIST. SHE DOES NOT WISH TO ATTEMPT FURTHER CONTROL WITHOUT A SPECIALIST IN CHARGE OF THIS PROCESS. SHE STATES THE ISSUES HAVE BEEN GETTING PROGRESSIVELY WORSE. HER MOST RECENT UTI SPREAD TO AN UPPER RESPIRATORY INFECTION. SHE WAS USING BACTRIM TO CONTROL THOSE SYMPTOMS. PLEASE PROCEED WITH REFERRAL OR RETURN CALL TO DISCUSS IF NECESSARY.     SIX YEARS AGO DR. ALDO COOPER, GASTROENTEROLOGY AT [facility],  STATED THAT SHE WAS HAVING RECTAL PRESSURE AGAINST HER BLADDER, WHICH SHE WAS WORRIED MIGHT BE CONTRIBUTING. SHE WAS TOLD EVENTUALLY THIS WAS GOING TO REQUIRE SURGICAL INTERVENTION.

## 2024-11-12 NOTE — TELEPHONE ENCOUNTER
Okay for the HUB to relay:     If she is having recurrent UTIs, please have her follow-up with myself to discuss her symptoms and treatment.  We can refer to urology if we cannot help control her symptoms with our treatment       I called the pt and LVM advising to call the office.

## 2024-11-13 NOTE — TELEPHONE ENCOUNTER
HUB TO RELAY    LVM. Please let patient know provider said she will have to be seen to discuss in order to place referral. Please assist in scheduling.

## 2024-11-17 PROCEDURE — 87798 DETECT AGENT NOS DNA AMP: CPT

## 2024-11-17 PROCEDURE — 87086 URINE CULTURE/COLONY COUNT: CPT

## 2024-11-17 PROCEDURE — 87801 DETECT AGNT MULT DNA AMPLI: CPT

## 2024-12-16 DIAGNOSIS — E06.3 HYPOTHYROIDISM DUE TO HASHIMOTO'S THYROIDITIS: ICD-10-CM

## 2024-12-16 RX ORDER — LEVOTHYROXINE SODIUM 88 UG/1
88 TABLET ORAL DAILY
Qty: 90 TABLET | Refills: 1 | Status: SHIPPED | OUTPATIENT
Start: 2024-12-16

## 2024-12-17 ENCOUNTER — TELEPHONE (OUTPATIENT)
Dept: FAMILY MEDICINE CLINIC | Facility: CLINIC | Age: 59
End: 2024-12-17

## 2024-12-17 NOTE — TELEPHONE ENCOUNTER
Caller: Brigid Coelho    Relationship to patient: Self    Best call back number: [phone]     Chief complaint: E.R. FOLLOW UP FOR UTI    Type of visit: HOSPITAL    Requested date: 12/23/24-12/27/24     If rescheduling, when is the original appointment: 12/17/24     Additional notes:PATIENT NEEDS TO BE SEEN BY A DOCTOR DUE TO NEEDING FORMS FILLED OUT, CANNOT MAKE HER APPOINTMENT WITH PCP ON 12/17/24.

## 2024-12-17 NOTE — TELEPHONE ENCOUNTER
HUB TO READ    Attempted to call patient but was unable to leave a message. Please let patient know Dr. Lo did not have any availability untl January. If she would like to be seen by another provider, make an appointment for next available provider.

## 2025-01-02 ENCOUNTER — NURSE TRIAGE (OUTPATIENT)
Dept: CALL CENTER | Facility: HOSPITAL | Age: 60
End: 2025-01-02
Payer: MEDICARE

## 2025-01-02 PROCEDURE — 87086 URINE CULTURE/COLONY COUNT: CPT | Performed by: NURSE PRACTITIONER

## 2025-01-02 PROCEDURE — 87186 SC STD MICRODIL/AGAR DIL: CPT | Performed by: NURSE PRACTITIONER

## 2025-01-02 PROCEDURE — 87077 CULTURE AEROBIC IDENTIFY: CPT | Performed by: NURSE PRACTITIONER

## 2025-01-02 NOTE — TELEPHONE ENCOUNTER
[facility] call - Caller with UTI needing same day appointment. Unable to reach PCP office. [id number]    Spoke with caller who reports has frequent UTIs and awoke this a.m. with moderate burning pain with urination. States has low grade fever. Denies flank pain.  Requesting same day appointment or meds.    Caller transferred through to [place] at PCP office after review of above information.    Reason for Disposition   Fever > 100.4 F (38.0 C)    Additional Information   Negative: Shock suspected (e.g., cold/pale/clammy skin, too weak to stand, low BP, rapid pulse)   Negative: Sounds like a life-threatening emergency to the triager   Negative: Followed a female genital area injury (e.g., labia, vagina, vulva)   Negative: Followed a male genital area injury (e.g., penis, scrotum)   Negative: Vaginal discharge   Negative: Pus (white, yellow) or bloody discharge from end of penis   Negative: [1] Taking antibiotic for urinary tract infection (UTI) AND [2] female   Negative: [1] Taking antibiotic for urinary tract infection (UTI) AND [2] male   Negative: [1] Pain or burning with passing urine (urination) AND [2] pregnant   Negative: [1] Pain or burning with passing urine (urination) AND [2] postpartum (from 0 to 6 weeks after delivery)   Negative: [1] Pain or burning with passing urine (urination) AND [2] female   Negative: [1] Pain or burning with passing urine (urination) AND [2] male   Negative: Pain or itching in the vulvar area   Negative: Pain in scrotum is main symptom   Negative: Blood in the urine is main symptom   Negative: Symptoms arising from use of a urinary catheter (e.g., Coude, [place])   Negative: [1] Unable to urinate (or only a few drops) > 4 hours AND [2] bladder feels very full (e.g., palpable bladder or strong urge to urinate)   Negative: [1] Decreased urination and [2] drinking very little AND [2] dehydration suspected (e.g., dark urine, no urine > 12 hours, very dry mouth, very lightheaded)   Negative: Patient  "sounds very sick or weak to the triager    Answer Assessment - Initial Assessment Questions  1. SYMPTOM: \"What's the main symptom you're concerned about?\" (e.g., frequency, incontinence)      Burning pain with urination  2. ONSET: \"When did the    start?\"      This a.m.  3. PAIN: \"Is there any pain?\" If Yes, ask: \"How bad is it?\" (Scale: 1-10; mild, moderate, severe)    Moderate  4. CAUSE: \"What do you think is causing the symptoms?\"      Has been having frequent UTIs, last treated end of November  5. OTHER SYMPTOMS: \"Do you have any other symptoms?\" (e.g., blood in urine, fever, flank pain, pain with urination)      Denies blood in urine, no flank pain  6. PREGNANCY: \"Is there any chance you are pregnant?\" \"When was your last menstrual period?\"      N/A    Protocols used: Urinary Symptoms-ADULT-    "

## 2025-01-14 ENCOUNTER — PATIENT ROUNDING (BHMG ONLY) (OUTPATIENT)
Dept: URGENT CARE | Facility: CLINIC | Age: 60
End: 2025-01-14
Payer: MEDICARE

## 2025-01-14 NOTE — ED NOTES
Thank you for letting us care for you in your recent visit to our urgent care center. We would love to hear about your experience with us. Was this the first time you have visited our location?    We’re always looking for ways to make our patients’ experiences even better. Do you have any recommendations on ways we may improve?     I appreciate you taking the time to respond. Please be on the lookout for a survey about your recent visit from [employer] via text or email. We would greatly appreciate if you could fill that out and turn it back in. We want your voice to be heard and we value your feedback.   Thank you for choosing [facility] for your healthcare needs.

## 2025-01-31 PROCEDURE — 87077 CULTURE AEROBIC IDENTIFY: CPT

## 2025-01-31 PROCEDURE — 87086 URINE CULTURE/COLONY COUNT: CPT

## 2025-01-31 PROCEDURE — 87186 SC STD MICRODIL/AGAR DIL: CPT

## 2025-02-10 ENCOUNTER — OFFICE VISIT (OUTPATIENT)
Dept: FAMILY MEDICINE CLINIC | Facility: CLINIC | Age: 60
End: 2025-02-10
Payer: MEDICARE

## 2025-02-10 VITALS
SYSTOLIC BLOOD PRESSURE: 120 MMHG | BODY MASS INDEX: 36.25 KG/M2 | DIASTOLIC BLOOD PRESSURE: 84 MMHG | WEIGHT: 239.2 LBS | OXYGEN SATURATION: 97 % | HEART RATE: 94 BPM | HEIGHT: 68 IN | TEMPERATURE: 98.1 F

## 2025-02-10 DIAGNOSIS — F41.9 ANXIETY: ICD-10-CM

## 2025-02-10 DIAGNOSIS — M25.532 LEFT WRIST PAIN: ICD-10-CM

## 2025-02-10 DIAGNOSIS — N39.0 RECURRENT UTI: Primary | ICD-10-CM

## 2025-02-10 DIAGNOSIS — J02.9 SORE THROAT: ICD-10-CM

## 2025-02-10 DIAGNOSIS — Z79.899 HIGH RISK MEDICATION USE: ICD-10-CM

## 2025-02-10 LAB
BILIRUB UR QL STRIP: NEGATIVE
CLARITY UR: CLEAR
COLOR UR: YELLOW
EXPIRATION DATE: NORMAL
GLUCOSE UR STRIP-MCNC: NEGATIVE MG/DL
HGB UR QL STRIP.AUTO: NEGATIVE
HOLD SPECIMEN: NORMAL
INTERNAL CONTROL: NORMAL
KETONES UR QL STRIP: NEGATIVE
LEUKOCYTE ESTERASE UR QL STRIP.AUTO: NEGATIVE
Lab: NORMAL
NITRITE UR QL STRIP: NEGATIVE
PH UR STRIP.AUTO: 5.5 [PH] (ref 5–8)
PROT UR QL STRIP: NEGATIVE
S PYO AG THROAT QL: NEGATIVE
SP GR UR STRIP: 1.02 (ref 1–1.03)
UROBILINOGEN UR QL STRIP: NORMAL

## 2025-02-10 PROCEDURE — 99214 OFFICE O/P EST MOD 30 MIN: CPT | Performed by: PHYSICIAN ASSISTANT

## 2025-02-10 PROCEDURE — 1159F MED LIST DOCD IN RCRD: CPT | Performed by: PHYSICIAN ASSISTANT

## 2025-02-10 PROCEDURE — 1160F RVW MEDS BY RX/DR IN RCRD: CPT | Performed by: PHYSICIAN ASSISTANT

## 2025-02-10 PROCEDURE — 87880 STREP A ASSAY W/OPTIC: CPT | Performed by: PHYSICIAN ASSISTANT

## 2025-02-10 PROCEDURE — 81003 URINALYSIS AUTO W/O SCOPE: CPT | Performed by: PHYSICIAN ASSISTANT

## 2025-02-10 PROCEDURE — 1126F AMNT PAIN NOTED NONE PRSNT: CPT | Performed by: PHYSICIAN ASSISTANT

## 2025-02-10 NOTE — PROGRESS NOTES
Follow Up Office Visit    Date: 02/10/2025   Patient Name: Brigid Coelho  : 1965   MRN: [id number]     Chief Complaint:    Chief Complaint   Patient presents with    Urinary Tract Infection     UTI medication is not working for her. Needing Referral     Sinusitis    Emotional Support Animal Paperwork     Wrist Pain       History of Present Illness:   Brigid Coelho is a 59 y.o. female.  History of Present Illness  The patient presents for evaluation of recurrent urinary tract infections, upper respiratory symptoms, wrist pain, and anxiety.    She has been experiencing recurrent UTIs every 2 to 3 weeks, with the most recent episode occurring yesterday. She suspects antibiotic resistance as a potential cause. She reports pain in the lower left quadrant associated with her UTI. She was informed by an urgent care provider that a urologist might prescribe estrogen due to her menopausal status. Despite completing a course of Bactrim last week, she continues to experience dysuria.    She reports a sore throat, runny nose, and cough, which she attributes to recent exposure to family members with similar symptoms. She is not experiencing any fevers but is concerned about potential strep infection and requests a test. She also reports clear sputum production.    She reports severe pain in her wrist, localized around the bone, without any known injury. She notes slight swelling compared to the other wrist and describes the pain as sharp, although she retains the ability to bend the wrist. She suspects possible arthritis or tendinitis. She recalls a previous fall where she used this wrist to break her fall, resulting in a left ankle fracture, and wonders if this could have contributed to her current symptoms.    She is requesting an emotional support letter for her 2 cats, which she finds beneficial for her anxiety disorder. She anticipates needing this letter within the next 3 months due to a  potential move. She is also seeking medical records to support her disability claim with [facility].    Supplemental Information  She has psoriasis on one side and eczema on the other, for which she has been prescribed a topical cream.    MEDICATIONS  Current: Bactrim, Xanax        Subjective    Review of systems:  Review of Systems     I have reviewed and the following portions of the patient's history were updated as appropriate: past family history, past medical history, past social history, past surgical history and problem list.    Medications:     Current Outpatient Medications:     acetaminophen (TYLENOL) 650 MG 8 hr tablet, Take 500 mg by mouth Every 8 (Eight) Hours As Needed for Mild Pain ., Disp: , Rfl:     Alpha-Lipoic Acid (LIPOIC ACID PO), , Disp: , Rfl:     ALPRAZolam (Xanax) 0.5 MG tablet, Take 1 tablet by mouth Daily As Needed for Anxiety., Disp: 30 tablet, Rfl: 2    ascorbic acid (VITAMIN C) 1000 MG tablet, Take 1 tablet by mouth Daily., Disp: , Rfl:     Biotin 1000 MCG chewable tablet, Chew., Disp: , Rfl:     famotidine (PEPCID) 20 MG tablet, Take 1 tablet by mouth At Night As Needed for Heartburn., Disp: , Rfl:     ferrous sulfate 140 (45 Fe) MG tablet controlled-release tablet, Take 1 tablet by mouth Daily With Breakfast., Disp: , Rfl:     fluconazole (Diflucan) 150 MG tablet, 1 tablet when yeast infection symptoms developed and a second dose at the end of antibiotic therapy, Disp: 2 tablet, Rfl: 0    folic acid (FOLVITE) 400 MCG tablet, Take 1 tablet by mouth Daily., Disp: , Rfl:     Garlic 1000 MG capsule, Take 1 capsule by mouth Daily., Disp: , Rfl:     ibuprofen (ADVIL,MOTRIN) 400 MG tablet, Take 1 tablet by mouth Every 6 (Six) Hours As Needed for Mild Pain., Disp: , Rfl:     ipratropium (ATROVENT) 0.06 % nasal spray, Administer 2 sprays into the nostril(s) as directed by provider 4 (Four) Times a Day., Disp: 15 mL, Rfl: 0    levothyroxine (SYNTHROID, LEVOTHROID) 88 MCG tablet, TAKE  "1 TABLET BY MOUTH DAILY, Disp: 90 tablet, Rfl: 1    Loratadine 10 MG capsule, Take 1 capsule by mouth Daily., Disp: 30 each, Rfl: 2    LORazepam (ATIVAN) 1 MG tablet, , Disp: , Rfl:     Melatonin 2.5 MG capsule, Take  by mouth., Disp: , Rfl:     mesalamine (APRISO) 0.375 g 24 hr capsule, Take 1 capsule by mouth As Needed., Disp: , Rfl:     montelukast (SINGULAIR) 10 MG tablet, TAKE 1 TABLET BY MOUTH EVERY NIGHT AT BEDTIME, Disp: 90 tablet, Rfl: 1    sertraline (ZOLOFT) 50 MG tablet, Take 1 tablet by mouth Daily. (Patient taking differently: Take 0.5 tablets by mouth Daily.), Disp: 90 tablet, Rfl: 3    simethicone (MYLICON) 125 MG chewable tablet, Chew 1 tablet., Disp: , Rfl:     triamcinolone (KENALOG) 0.5 % cream, Apply 1 application topically to the appropriate area as directed 2 (Two) Times a Day., Disp: 30 g, Rfl: 1    Zinc 25 MG tablet, Take 0.5 tablets by mouth Daily., Disp: , Rfl:     Allergies:   Allergies   Allergen Reactions    Adhesive Tape Unknown - Low Severity     Rash      Azithromycin Unknown - Low Severity    Clarithromycin Unknown - Low Severity     From Dr Moni Spear's notes provided for services 9/27/23.    Erythromycin Other (See Comments)     Sores in mouth    Nickel Itching    Epinephrine Palpitations    Latex Rash    Nitroglycerin Palpitations       Objective   Vital Signs:   Vitals:    02/10/25 0943   BP: 120/84   Pulse: 94   Temp: 98.1 °F (36.7 °C)   TempSrc: Infrared   SpO2: 97%   Weight: 109 kg (239 lb 3.2 oz)   Height: 172.7 cm (68\")     Body mass index is 36.37 kg/m².          Physical Exam:   Physical Exam  Vitals and nursing note reviewed.   Constitutional:       Appearance: Normal appearance.   HENT:      Head: Normocephalic and atraumatic.      Right Ear: Tympanic membrane and ear canal normal.      Left Ear: Tympanic membrane and ear canal normal.      Nose: Rhinorrhea present. No congestion.      Mouth/Throat:      Mouth: Mucous membranes are moist.      Pharynx: Oropharynx " is clear. No posterior oropharyngeal erythema.   Cardiovascular:      Rate and Rhythm: Normal rate and regular rhythm.   Pulmonary:      Effort: Pulmonary effort is normal.      Breath sounds: Normal breath sounds. No decreased breath sounds, wheezing, rhonchi or rales.   Musculoskeletal:      Left wrist: Tenderness present. No swelling, deformity or crepitus.      Cervical back: Neck supple.      Right lower leg: No edema.      Left lower leg: No edema.   Lymphadenopathy:      Cervical: No cervical adenopathy.   Neurological:      Mental Status: She is alert.          Procedures     Assessment / Plan    Assessment/Plan:   Diagnoses and all orders for this visit:    1. Recurrent UTI (Primary)  -     Ambulatory Referral to Urology  -     Urinalysis With Culture If Indicated -; Future  -     Urinalysis With Culture If Indicated - Urine, Clean Catch  -     [place] Urine Culture Tube - Urine, Clean Catch    2. High risk medication use  -     Compliance Drug Analysis, Ur - Urine, Clean Catch; Future  -     Compliance Drug Analysis, Ur - Urine, Clean Catch    3. Anxiety    4. Sore throat  -     POCT rapid strep A    5. Left wrist pain  -     XR Wrist 3+ View Left; Future       Assessment & Plan  1. Recurrent urinary tract infections (UTIs).  She reports recurrent UTIs occurring every 2 to 3 weeks, with the most recent episode starting yesterday. She recently completed a course of Bactrim but reports worsening symptoms, suggesting possible antibiotic resistance. Urine culture results confirm the presence of Proteus mirabilis. A urinalysis will be conducted today to further evaluate her condition. A referral to a urologist will be made for further management. The urologist may consider prescribing estrogen cream due to her menopausal status.    2. Upper respiratory infection.  She presents with symptoms of a sore throat, runny nose, and cough, with clear sputum production. There is no reported fever. A strep test will be  performed today to rule out streptococcal infection.    3. Wrist pain.  She reports sharp pain in her wrist, which she suspects might be due to a previous injury or tendinitis. There is mild puffiness and soreness upon palpation. An x-ray of the wrist will be ordered to rule out any fractures or arthritis.    4. Anxiety.  She requests an emotional support letter for her 2 cats, which help manage her anxiety. A letter will be provided to her today.    5. Medication management.  She requests a UDS for her Xanax prescription, which was last refilled in January. The UDS will be conducted today to comply with the medication management requirements, and update CSA.. She does not need refill today.       Follow Up:   No follow-ups on file.    Patient or patient representative verbalized consent for the use of Ambient Listening during the visit with  Sarai Marinelli PA-C for chart documentation. 2/15/2025  11:09 EST    Sarai Marinelli PA-C   [facility] [place]

## 2025-02-10 NOTE — LETTER
[facility]  [address] [address]  [phone]  Dept: [phone]    02/10/25    RE:   Patient Name:  Brigid Coelho   :  1965      To Whom It May Concern:    Brigid is my patient, and has been under my care since .  I am familiar with her history and with the functional limitations imposed by her disability.  She meets the definition of disability under the Americans with Disabilities Act, the Fair Housing Act, and the Rehabilitation Act of .     Due to mental illness, Brigid has certain limitations regarding activities of daily living.  In order to help alleviate these difficulties, and to enhance his/her ability to live independently and to fully use and enjoy the dwelling unit you own and/or administer, I am prescribing an emotional support animal that will assist Brigid in coping with her disability.    I would be happy to answer other questions you may have concerning my recommendation that Brigid have an emotional support animal.  Should you have additional questions, please do not hesitate to contact me.    Sincerely,          Sarai Marinelli PA-C

## 2025-02-12 DIAGNOSIS — M25.532 LEFT WRIST PAIN: Primary | ICD-10-CM

## 2025-02-14 LAB — DRUGS UR: NORMAL

## 2025-03-03 ENCOUNTER — TELEPHONE (OUTPATIENT)
Dept: FAMILY MEDICINE CLINIC | Facility: CLINIC | Age: 60
End: 2025-03-03
Payer: MEDICARE

## 2025-03-03 NOTE — TELEPHONE ENCOUNTER
Caller: Brigid Coelho    Relationship: Self    Best call back number: [phone]     What medication are you requesting: TAMIFLU     What are your current symptoms: HEADACHE, DIZZINESS, CONGESTION     How long have you been experiencing symptoms: SINCE 3/2/25     Have you had these symptoms before:    [] Yes  [x] No    Have you been treated for these symptoms before:   [] Yes  [x] No    If a prescription is needed, what is your preferred pharmacy and phone number: [facility] [id number] [address] [place]  AT [facility] [place] [place] [address] [facility] - [phone]  - [phone] FX     Additional notes: PATIENT STATES HER BROTHER HAS BEEN DIAGNOSED WITH THE FLU. SHE WAS IN CONTACT WITH HIM AND WOULD LIKE TO KNOW IF SHE IS NEEDING A PREVENTATIVE MEDICATION.

## 2025-03-03 NOTE — TELEPHONE ENCOUNTER
HUB TO RELAY    LVM. Please advise patient she will need to make an appointment to be seen and tested. Please assist in scheduling.

## 2025-03-04 ENCOUNTER — OFFICE VISIT (OUTPATIENT)
Dept: FAMILY MEDICINE CLINIC | Facility: CLINIC | Age: 60
End: 2025-03-04
Payer: MEDICARE

## 2025-03-04 ENCOUNTER — TELEPHONE (OUTPATIENT)
Dept: FAMILY MEDICINE CLINIC | Facility: CLINIC | Age: 60
End: 2025-03-04
Payer: MEDICARE

## 2025-03-04 VITALS
HEIGHT: 68 IN | HEART RATE: 96 BPM | RESPIRATION RATE: 24 BRPM | TEMPERATURE: 98 F | SYSTOLIC BLOOD PRESSURE: 130 MMHG | BODY MASS INDEX: 36.86 KG/M2 | OXYGEN SATURATION: 97 % | WEIGHT: 243.2 LBS | DIASTOLIC BLOOD PRESSURE: 82 MMHG

## 2025-03-04 DIAGNOSIS — J10.1 INFLUENZA A: Primary | ICD-10-CM

## 2025-03-04 DIAGNOSIS — F41.1 GENERALIZED ANXIETY DISORDER: Chronic | ICD-10-CM

## 2025-03-04 LAB
EXPIRATION DATE: ABNORMAL
EXPIRATION DATE: NORMAL
FLUAV AG NPH QL: POSITIVE
FLUBV AG NPH QL: NEGATIVE
INTERNAL CONTROL: ABNORMAL
INTERNAL CONTROL: NORMAL
Lab: ABNORMAL
Lab: NORMAL
SARS-COV-2 AG UPPER RESP QL IA.RAPID: NORMAL

## 2025-03-04 RX ORDER — OSELTAMIVIR PHOSPHATE 75 MG/1
75 CAPSULE ORAL 2 TIMES DAILY
Qty: 10 CAPSULE | Refills: 0 | Status: SHIPPED | OUTPATIENT
Start: 2025-03-04 | End: 2025-03-09

## 2025-03-04 RX ORDER — ALPRAZOLAM 0.5 MG
0.5 TABLET ORAL DAILY PRN
Qty: 30 TABLET | Refills: 2 | Status: SHIPPED | OUTPATIENT
Start: 2025-03-04

## 2025-03-04 RX ORDER — ALBUTEROL SULFATE 90 UG/1
2 INHALANT RESPIRATORY (INHALATION) EVERY 4 HOURS PRN
Qty: 6.7 G | Refills: 1 | Status: SHIPPED | OUTPATIENT
Start: 2025-03-04

## 2025-03-04 NOTE — TELEPHONE ENCOUNTER
Name: Brigid Coelho      Relationship: Self      Best Callback Number: [phone]       HUB PROVIDED THE RELAY MESSAGE FROM THE OFFICE      PATIENT: SCHEDULED PER NOTE

## 2025-03-04 NOTE — TELEPHONE ENCOUNTER
Caller: Brigid Coelho    Relationship: Self    Best call back number: [phone]     What form or medical record are you requesting: EMOTIONAL SUPPORT ANIMAL FORM    Who is requesting this form or medical record from you: APARTMENT     How would you like to receive the form or medical records (pick-up, mail, fax): MAILED  If mail, what is the address: [address] [place] [id number]     Timeframe paperwork needed: ASAP    Additional notes: PATIENT WOULD LIKE HER ANXIETY DISORDER TO BE SPECIFICALLY MENTIONED INSTEAD OF MENTAL ILLNESS, PATIENT

## 2025-03-04 NOTE — ASSESSMENT & PLAN NOTE
Xanax as needed.  Refill today  Urine drug screen reviewed and appropriate  Controlled substance agreement up-to-date

## 2025-03-04 NOTE — PROGRESS NOTES
Established Patient Office Visit        Subjective      Chief Complaint:  Influenza (Patient exposed to flu, wanting to know if she can get tamiflu for prophylaxis, headache Sunday, clearing throat a lot and having stuffy nose, coughing up clear/white sputum, needing xanax refilled, needing a refill of mesalamine)      History of Present Illness: Brigid Coelho is a 59 y.o. female who presents for cough congestion headache.  Started less than 48 hours ago    She has anxiety she occasionally takes alprazolam      Patient Active Problem List   Diagnosis    Ulcerative colitis without complications    Seasonal allergies    Anemia    Esophageal reflux    Hypothyroidism due to Hashimoto's thyroiditis    Vitamin D deficiency    Fatty liver    Anxiety    Class 3 severe obesity due to excess calories with serious comorbidity and body mass index (BMI) of 40.0 to 44.9 in adult    DDD (degenerative disc disease), lumbar    Mixed hyperlipidemia    Osteopenia determined by x-ray    Asthma    Encounter for screening colonoscopy    Hashimoto's thyroiditis    Hereditary progressive muscular dystrophy    Palpitations    Anxiety    Obesity    Ulcerative colitis    Hypertrophy of uterus    Pain in joint of left shoulder    Pruritic dermatosis of scalp    Pediculosis capitis         Current Outpatient Medications:     acetaminophen (TYLENOL) 650 MG 8 hr tablet, Take 500 mg by mouth Every 8 (Eight) Hours As Needed for Mild Pain ., Disp: , Rfl:     Alpha-Lipoic Acid (LIPOIC ACID PO), , Disp: , Rfl:     ALPRAZolam (Xanax) 0.5 MG tablet, Take 1 tablet by mouth Daily As Needed for Anxiety., Disp: 30 tablet, Rfl: 2    ascorbic acid (VITAMIN C) 1000 MG tablet, Take 1 tablet by mouth Daily., Disp: , Rfl:     Berberine Chloride (BERBERINE HCI PO), Take  by mouth. Otc for weight loss, Disp: , Rfl:     Biotin 1000 MCG chewable tablet, Chew., Disp: , Rfl:     famotidine (PEPCID) 20 MG tablet, Take 1 tablet by mouth At Night As Needed  for Heartburn., Disp: , Rfl:     ferrous sulfate 140 (45 Fe) MG tablet controlled-release tablet, Take 1 tablet by mouth Daily With Breakfast., Disp: , Rfl:     folic acid (FOLVITE) 400 MCG tablet, Take 1 tablet by mouth Daily., Disp: , Rfl:     Garlic 1000 MG capsule, Take 1 capsule by mouth Daily., Disp: , Rfl:     ibuprofen (ADVIL,MOTRIN) 400 MG tablet, Take 1 tablet by mouth Every 6 (Six) Hours As Needed for Mild Pain., Disp: , Rfl:     ipratropium (ATROVENT) 0.06 % nasal spray, Administer 2 sprays into the nostril(s) as directed by provider 4 (Four) Times a Day., Disp: 15 mL, Rfl: 0    levothyroxine (SYNTHROID, LEVOTHROID) 88 MCG tablet, TAKE 1 TABLET BY MOUTH DAILY, Disp: 90 tablet, Rfl: 1    Loratadine 10 MG capsule, Take 1 capsule by mouth Daily., Disp: 30 each, Rfl: 2    Melatonin 2.5 MG capsule, Take  by mouth., Disp: , Rfl:     mesalamine (APRISO) 0.375 g 24 hr capsule, Take 1 capsule by mouth As Needed., Disp: , Rfl:     montelukast (SINGULAIR) 10 MG tablet, TAKE 1 TABLET BY MOUTH EVERY NIGHT AT BEDTIME, Disp: 90 tablet, Rfl: 1    sertraline (ZOLOFT) 50 MG tablet, Take 1 tablet by mouth Daily. (Patient taking differently: Take 0.5 tablets by mouth Daily.), Disp: 90 tablet, Rfl: 3    simethicone (MYLICON) 125 MG chewable tablet, Chew 1 tablet., Disp: , Rfl:     triamcinolone (KENALOG) 0.5 % cream, Apply 1 application topically to the appropriate area as directed 2 (Two) Times a Day., Disp: 30 g, Rfl: 1    Zinc 25 MG tablet, Take 0.5 tablets by mouth Daily., Disp: , Rfl:     albuterol sulfate  (90 Base) MCG/ACT inhaler, Inhale 2 puffs Every 4 (Four) Hours As Needed for Wheezing or Shortness of Air., Disp: 6.7 g, Rfl: 1    fluconazole (Diflucan) 150 MG tablet, 1 tablet when yeast infection symptoms developed and a second dose at the end of antibiotic therapy (Patient not taking: Reported on 3/4/2025), Disp: 2 tablet, Rfl: 0    LORazepam (ATIVAN) 1 MG tablet, , Disp: , Rfl:     oseltamivir (Tamiflu)  "75 MG capsule, Take 1 capsule by mouth 2 (Two) Times a Day for 5 days., Disp: 10 capsule, Rfl: 0       Objective     Physical Exam:   Vital Signs:   /82 (BP Location: Left arm, Patient Position: Sitting, Cuff Size: Adult)   Pulse 96   Temp 98 °F (36.7 °C) (Temporal)   Resp 24   Ht 172.7 cm (68\")   Wt 110 kg (243 lb 3.2 oz)   SpO2 97%   BMI 36.98 kg/m²      Physical Exam  Constitutional:       General: She is not in acute distress.     Appearance: She is not ill-appearing.   Cardiovascular:      Rate and Rhythm: Normal rate and regular rhythm.   Pulmonary:      Effort: Pulmonary effort is normal.      Breath sounds: Normal breath sounds.   Neurological:      Mental Status: She is alert.   Psychiatric:         Thought Content: Thought content normal.            Assessment / Plan      Assessment/Plan:   Diagnoses and all orders for this visit:    1. Influenza A (Primary)  -     POC Influenza A / B  -     POCT SARS-CoV-2 Antigen  -     oseltamivir (Tamiflu) 75 MG capsule; Take 1 capsule by mouth 2 (Two) Times a Day for 5 days.  Dispense: 10 capsule; Refill: 0  -     albuterol sulfate  (90 Base) MCG/ACT inhaler; Inhale 2 puffs Every 4 (Four) Hours As Needed for Wheezing or Shortness of Air.  Dispense: 6.7 g; Refill: 1    2. Generalized anxiety disorder  -     ALPRAZolam (Xanax) 0.5 MG tablet; Take 1 tablet by mouth Daily As Needed for Anxiety.  Dispense: 30 tablet; Refill: 2      3. Anxiety  Assessment & Plan:  Xanax as needed.  Refill today  Urine drug screen reviewed and appropriate  Controlled substance agreement up-to-date  Continue Zoloft         Follow Up:   Return in about 6 months (around 9/4/2025), or if symptoms worsen or fail to improve, for anxiety.        Aleks Pereira MD  Family Medicine - [facility]  "

## 2025-03-05 ENCOUNTER — TELEPHONE (OUTPATIENT)
Dept: FAMILY MEDICINE CLINIC | Facility: CLINIC | Age: 60
End: 2025-03-05
Payer: MEDICARE

## 2025-03-05 NOTE — TELEPHONE ENCOUNTER
Caller: Brigid Coelho    Relationship: Self    Best call back number: [phone]     Which medication are you concerned about: oseltamivir (Tamiflu) 75 MG capsule     Who prescribed you this medication: TERESA    When did you start taking this medication: 3/4/25    What are your concerns: MADE REALLY MANIC, WAS AWAKE ALL NIGHT, DID ALL KINDS OF THINGS AROUND THE HOUSE INSTEAD OF SLEEPING    How long have you had these concerns: PLEASE CALL AND ADVISE IF SHE SHOULD CONTINUE TO TAKE.

## 2025-03-11 ENCOUNTER — TELEPHONE (OUTPATIENT)
Dept: FAMILY MEDICINE CLINIC | Facility: CLINIC | Age: 60
End: 2025-03-11
Payer: MEDICARE

## 2025-03-11 ENCOUNTER — OFFICE VISIT (OUTPATIENT)
Dept: FAMILY MEDICINE CLINIC | Facility: CLINIC | Age: 60
End: 2025-03-11
Payer: MEDICARE

## 2025-03-11 VITALS
OXYGEN SATURATION: 98 % | HEIGHT: 68 IN | WEIGHT: 241 LBS | HEART RATE: 105 BPM | SYSTOLIC BLOOD PRESSURE: 118 MMHG | DIASTOLIC BLOOD PRESSURE: 78 MMHG | TEMPERATURE: 97.3 F | BODY MASS INDEX: 36.53 KG/M2

## 2025-03-11 DIAGNOSIS — R05.9 COUGH, UNSPECIFIED TYPE: ICD-10-CM

## 2025-03-11 DIAGNOSIS — J10.1 INFLUENZA A: ICD-10-CM

## 2025-03-11 DIAGNOSIS — N39.0 RECURRENT UTI: Primary | ICD-10-CM

## 2025-03-11 LAB
BILIRUB BLD-MCNC: NEGATIVE MG/DL
CLARITY, POC: CLEAR
COLOR UR: YELLOW
EXPIRATION DATE: ABNORMAL
EXPIRATION DATE: NORMAL
EXPIRATION DATE: NORMAL
FLUAV AG NPH QL: NEGATIVE
FLUBV AG NPH QL: NEGATIVE
GLUCOSE UR STRIP-MCNC: NEGATIVE MG/DL
INTERNAL CONTROL: NORMAL
INTERNAL CONTROL: NORMAL
KETONES UR QL: NEGATIVE
LEUKOCYTE EST, POC: NEGATIVE
Lab: ABNORMAL
Lab: NORMAL
Lab: NORMAL
NITRITE UR-MCNC: NEGATIVE MG/ML
PH UR: 6 [PH] (ref 5–8)
PROT UR STRIP-MCNC: NEGATIVE MG/DL
RBC # UR STRIP: ABNORMAL /UL
SARS-COV-2 AG UPPER RESP QL IA.RAPID: NOT DETECTED
SP GR UR: 1.03 (ref 1–1.03)
UROBILINOGEN UR QL: ABNORMAL

## 2025-03-11 PROCEDURE — 87086 URINE CULTURE/COLONY COUNT: CPT | Performed by: PHYSICIAN ASSISTANT

## 2025-03-11 RX ORDER — CEFUROXIME AXETIL 250 MG/1
250 TABLET ORAL 2 TIMES DAILY
Qty: 20 TABLET | Refills: 0 | Status: SHIPPED | OUTPATIENT
Start: 2025-03-11

## 2025-03-11 NOTE — TELEPHONE ENCOUNTER
Name: Meliton Brigidmehul Naranjo    Relationship: Self    Best Callback Number: [phone]    HUB PROVIDED THE RELAY MESSAGE FROM THE OFFICE   PATIENT VOICED UNDERSTANDING AND HAS NO FURTHER QUESTIONS AT THIS TIME    ADDITIONAL INFORMATION:

## 2025-03-11 NOTE — TELEPHONE ENCOUNTER
Caller: Meliton Brigid Nicole    Relationship: Self    Best call back number: [phone]     What medication are you requesting: TAMIFLU    What are your current symptoms: COUGH, CONGESTION, PAIN IN UPPER SHOULDERS BETWEEN SHOULDER BLADES    How long have you been experiencing symptoms: ABOUT 3 DAYS PRIOR TO 3.11.25    If a prescription is needed, what is your preferred pharmacy and phone number: [facility] [id number] [address]  AT [facility] [address] [facility] [phone]  - [phone] FX     Additional notes: PATIENT WAS DIAGNOSED WITH FLU A 3.4.25 AND FINISHED HER TAMIFLU COURSE 03.08.25, AS SHE HAS DEVELOPED MORE SYMPTOMS SHE IS WANTING TO KNOW IF SHE CAN RE-PRESCRIBED THIS MEDICATION. THE PEOPLE WHO EXPOSED HER ARE VERY SICK, SO SHE IS CONCERNED ABOUT THE SYMPTOMS GETTING EVEN WORSE.   IF AN APPOINTMENT IS NEEDED, PLEASE CONTACT PATIENT

## 2025-03-11 NOTE — PROGRESS NOTES
Follow Up Office Visit    Date: 2025   Patient Name: Brigid Coelho  : 1965   MRN: [id number]     Chief Complaint:    Chief Complaint   Patient presents with    Influenza     Pt states that she just doesn't feel any better  She states that she wasn't coughing at 1st and now she is    Urinary Tract Infection     Pt states that she is having a lot of frequency again  That is the only symptom right now       History of Present Illness:   Brigid Coelho is a 59 y.o. female.  History of Present Illness  The patient presents for evaluation of influenza, shoulder pain, and urinary frequency.    She is seeking retesting due to the presence of an infant in her household. Despite a recent influenza outbreak within her family, she remained asymptomatic until today. She has been experiencing a persistent cough and increased congestion since Saturday, following the administration of Tamiflu. Accompanying these symptoms are a sore throat and the production of thick white mucus during coughing episodes. She also reported feeling slightly warm the previous night, although she did not measure her temperature. She tested positive for influenza last Monday but remained symptom-free until today. She has been avoiding contact with children since . She reports that her family members are sick with influenza, and her mother is hospitalized. She is concerned about spreading the illness to the infant in her home.    Additionally, she reports bilateral shoulder pain, which she attributes to an incorrect sleeping position. The pain was more severe over the past two days but has shown improvement today.    She has been experiencing urinary frequency and has scheduled an appointment with a urologist in April. She increased her fluid intake last week and believes her anxiety may be contributing to her symptoms.    FAMILY HISTORY  Her mother, who is 80 years old with a few medical issues, has been in  the hospital since last Friday due to the flu and is on oxygen. Her brother, who is 55 years old, is also not getting well from the flu. Her father is finally getting better but is still coughing.    MEDICATIONS  Tamiflu        Subjective    Review of systems:  Review of Systems     I have reviewed and the following portions of the patient's history were updated as appropriate: past family history, past medical history, past social history, past surgical history and problem list.    Medications:     Current Outpatient Medications:     acetaminophen (TYLENOL) 650 MG 8 hr tablet, Take 500 mg by mouth Every 8 (Eight) Hours As Needed for Mild Pain ., Disp: , Rfl:     albuterol sulfate  (90 Base) MCG/ACT inhaler, Inhale 2 puffs Every 4 (Four) Hours As Needed for Wheezing or Shortness of Air., Disp: 6.7 g, Rfl: 1    Alpha-Lipoic Acid (LIPOIC ACID PO), , Disp: , Rfl:     ALPRAZolam (Xanax) 0.5 MG tablet, Take 1 tablet by mouth Daily As Needed for Anxiety., Disp: 30 tablet, Rfl: 2    ascorbic acid (VITAMIN C) 1000 MG tablet, Take 1 tablet by mouth Daily., Disp: , Rfl:     Berberine Chloride (BERBERINE HCI PO), Take  by mouth. Otc for weight loss, Disp: , Rfl:     Biotin 1000 MCG chewable tablet, Chew., Disp: , Rfl:     famotidine (PEPCID) 20 MG tablet, Take 1 tablet by mouth At Night As Needed for Heartburn., Disp: , Rfl:     ferrous sulfate 140 (45 Fe) MG tablet controlled-release tablet, Take 1 tablet by mouth Daily With Breakfast., Disp: , Rfl:     fluconazole (Diflucan) 150 MG tablet, 1 tablet when yeast infection symptoms developed and a second dose at the end of antibiotic therapy, Disp: 2 tablet, Rfl: 0    folic acid (FOLVITE) 400 MCG tablet, Take 1 tablet by mouth Daily., Disp: , Rfl:     Garlic 1000 MG capsule, Take 1 capsule by mouth Daily., Disp: , Rfl:     ibuprofen (ADVIL,MOTRIN) 400 MG tablet, Take 1 tablet by mouth Every 6 (Six) Hours As Needed for Mild Pain., Disp: , Rfl:     ipratropium (ATROVENT)  "0.06 % nasal spray, Administer 2 sprays into the nostril(s) as directed by provider 4 (Four) Times a Day., Disp: 15 mL, Rfl: 0    levothyroxine (SYNTHROID, LEVOTHROID) 88 MCG tablet, TAKE 1 TABLET BY MOUTH DAILY, Disp: 90 tablet, Rfl: 1    Loratadine 10 MG capsule, Take 1 capsule by mouth Daily., Disp: 30 each, Rfl: 2    Melatonin 2.5 MG capsule, Take  by mouth., Disp: , Rfl:     mesalamine (APRISO) 0.375 g 24 hr capsule, Take 1 capsule by mouth As Needed., Disp: , Rfl:     simethicone (MYLICON) 125 MG chewable tablet, Chew 1 tablet., Disp: , Rfl:     triamcinolone (KENALOG) 0.5 % cream, Apply 1 application topically to the appropriate area as directed 2 (Two) Times a Day., Disp: 30 g, Rfl: 1    Zinc 25 MG tablet, Take 0.5 tablets by mouth Daily., Disp: , Rfl:     cefuroxime (CEFTIN) 250 MG tablet, Take 1 tablet by mouth 2 (Two) Times a Day., Disp: 20 tablet, Rfl: 0    montelukast (SINGULAIR) 10 MG tablet, TAKE 1 TABLET BY MOUTH EVERY NIGHT AT BEDTIME, Disp: 90 tablet, Rfl: 0    sertraline (ZOLOFT) 50 MG tablet, TAKE 1 TABLET BY MOUTH DAILY, Disp: 90 tablet, Rfl: 3    Allergies:   Allergies   Allergen Reactions    Adhesive Tape Unknown - Low Severity     Rash      Azithromycin Unknown - Low Severity    Clarithromycin Unknown - Low Severity     From Dr Moni Spear's notes provided for services 9/27/23.    Erythromycin Other (See Comments)     Sores in mouth    Nickel Itching    Epinephrine Palpitations    Latex Rash    Nitroglycerin Palpitations       Objective   Vital Signs:   Vitals:    03/11/25 1610   BP: 118/78   Pulse: 105   Temp: 97.3 °F (36.3 °C)   TempSrc: Infrared   SpO2: 98%   Weight: 109 kg (241 lb)   Height: 172.7 cm (67.99\")   PainSc: 5   Comment: shoulders     Body mass index is 36.65 kg/m².          Physical Exam:   Physical Exam  Vitals and nursing note reviewed.   Constitutional:       Appearance: Normal appearance.   HENT:      Head: Normocephalic and atraumatic.      Right Ear: Tympanic " membrane and ear canal normal.      Left Ear: Tympanic membrane and ear canal normal.      Nose: Congestion and rhinorrhea present.      Mouth/Throat:      Mouth: Mucous membranes are moist.      Pharynx: Oropharynx is clear. Posterior oropharyngeal erythema present.   Cardiovascular:      Rate and Rhythm: Normal rate and regular rhythm.   Pulmonary:      Effort: Pulmonary effort is normal.      Breath sounds: Normal breath sounds. No decreased breath sounds, wheezing, rhonchi or rales.   Musculoskeletal:      Cervical back: Neck supple.      Right lower leg: No edema.      Left lower leg: No edema.   Lymphadenopathy:      Cervical: No cervical adenopathy.   Neurological:      Mental Status: She is alert.          Procedures     Assessment / Plan    Assessment/Plan:   Diagnoses and all orders for this visit:    1. Recurrent UTI (Primary)  -     POC Urinalysis Dipstick, Automated  -     cefuroxime (CEFTIN) 250 MG tablet; Take 1 tablet by mouth 2 (Two) Times a Day.  Dispense: 20 tablet; Refill: 0  -     Urine Culture - Urine, Urine, Clean Catch; Future  -     Urine Culture - Urine, Urine, Clean Catch    2. Cough, unspecified type  -     POC Influenza A / B  -     POCT SARS-CoV-2 Antigen    3. Influenza A       Assessment & Plan  1. Influenza.  She tested positive for influenza last Monday but did not exhibit symptoms until today, including coughing, congestion, and a sore throat. She took Tamiflu on Saturday. Her symptoms may be exacerbated by anxiety due to her family's severe flu cases. A COVID-19 test will be conducted to rule out concurrent infection.    2. Bilateral shoulder pain.  She reports unusual pain in both shoulders, more pronounced at the top and middle areas. The pain has improved today but was worse over the past two days. It is likely due to improper sleeping posture.    3. Urinary frequency.  She reports increased urinary frequency, which may be influenced by higher fluid intake last week and  anxiety. She has a urology appointment scheduled for April to further evaluate this issue.      Follow Up:   No follow-ups on file.    Patient or patient representative verbalized consent for the use of Ambient Listening during the visit with  Sarai Marinelli PA-C for chart documentation. 3/21/2025  09:10 EDT    Sarai Marinelli PA-C   [facility]

## 2025-03-11 NOTE — TELEPHONE ENCOUNTER
Caller: Brigid Coelho    Relationship to patient: Self    Best call back number: [phone]     Chief complaint: FLU CONCERNS, PATIENT HAS AN INFANT LIVING AT HOME WITH HER    Type of visit: OFFICE    Requested date: 3.12.25     If rescheduling, when is the original appointment: 3.14.25

## 2025-03-12 DIAGNOSIS — M54.9 UPPER BACK PAIN: ICD-10-CM

## 2025-03-12 DIAGNOSIS — R05.9 COUGH, UNSPECIFIED TYPE: Primary | ICD-10-CM

## 2025-03-13 LAB — BACTERIA SPEC AEROBE CULT: NO GROWTH

## 2025-03-19 DIAGNOSIS — F41.1 GENERALIZED ANXIETY DISORDER: Chronic | ICD-10-CM

## 2025-03-20 DIAGNOSIS — J30.2 SEASONAL ALLERGIES: ICD-10-CM

## 2025-03-20 RX ORDER — MONTELUKAST SODIUM 10 MG/1
10 TABLET ORAL
Qty: 90 TABLET | Refills: 0 | Status: SHIPPED | OUTPATIENT
Start: 2025-03-20

## 2025-03-31 DIAGNOSIS — N95.0 POSTMENOPAUSAL BLEEDING: Primary | ICD-10-CM

## 2025-04-14 ENCOUNTER — NURSE TRIAGE (OUTPATIENT)
Dept: CALL CENTER | Facility: HOSPITAL | Age: 60
End: 2025-04-14
Payer: MEDICARE

## 2025-04-14 NOTE — TELEPHONE ENCOUNTER
Pt called triage for concerns about recent CP yesterday and was seen [facility] ED. Pt states hx of Anemia, MI many years ago, and anxiety. She stated that she had been lifting furniture a few days ago and then CP began as a cool feeling up and down chest. Intermittant and currently only feels fatigued.  Pt denies all other symptoms. Pro BNP elevated 260 [facility] and was anxious that she needed to be seen before Wednesday as she has an appt with her PCP.  Protocol reviewed with pt and pt will call office tomorrow and see about getting a faster appt than Wed. Pt states she feels tired and no CP today.  Just anxious.

## 2025-04-14 NOTE — TELEPHONE ENCOUNTER
"Reason for Disposition   [1] Chest pain lasts > 5 minutes AND [2] occurred > 3 days ago (72 hours) AND [3] NO chest pain or cardiac symptoms now    Additional Information   Negative: SEVERE difficulty breathing (e.g., struggling for each breath, speaks in single words)   Negative: Difficult to awaken or acting confused (e.g., disoriented, slurred speech)   Negative: Shock suspected (e.g., cold/pale/clammy skin, too weak to stand, low BP, rapid pulse)   Negative: Passed out (i.e., lost consciousness, collapsed and was not responding)   Negative: [1] Chest pain lasts > 5 minutes AND [2] age > 44   Negative: [1] Chest pain lasts > 5 minutes AND [2] age > 30 AND [3] one or more cardiac risk factors (e.g., diabetes, high blood pressure, high cholesterol, smoker, or strong family history of heart disease)   Negative: [1] Chest pain lasts > 5 minutes AND [2] history of heart disease (i.e., angina, heart attack, heart failure, bypass surgery, takes nitroglycerin)   Negative: [1] Chest pain lasts > 5 minutes AND [2] described as crushing, pressure-like, or heavy   Negative: Heart beating < 50 beats per minute OR > 140 beats per minute   Negative: Visible sweat on face or sweat dripping down face   Negative: Sounds like a life-threatening emergency to the triager   Negative: Followed a chest injury   Negative: SEVERE chest pain   Negative: [1] Chest pain (or \"angina\") comes and goes AND [2] is happening more often (increasing in frequency) or getting worse (increasing in severity)  (Exception: Chest pains that last only a few seconds.)   Negative: Pain also in shoulder(s) or arm(s) or jaw  (Exception: Pain is clearly made worse by movement.)   Negative: Difficulty breathing   Negative: Dizziness or lightheadedness   Negative: Coughing up blood   Negative: Cocaine use within last 3 days   Negative: Major surgery in past month   Negative: Hip or leg fracture (broken bone) in past month (or had cast on leg or ankle in past " "month)   Negative: Illness requiring prolonged bedrest in past month (e.g., immobilization, long hospital stay)   Negative: Long-distance travel in past month (e.g., car, bus, train, plane; with trip lasting 6 or more hours)   Negative: History of prior \"blood clot\" in leg or lungs (i.e., deep vein thrombosis, pulmonary embolism)   Negative: History of inherited increased risk of blood clots (e.g., Factor 5 Leiden, Anti-thrombin 3, Protein C or Protein S deficiency, Prothrombin mutation)   Negative: Cancer treatment in past six months (or has cancer now)   Negative: [1] Chest pain lasts > 5 minutes AND [2] occurred in past 3 days (72 hours) (Exception: Feels exactly the same as previously diagnosed heartburn and has accompanying sour taste in mouth.)   Negative: Taking a deep breath makes pain worse   Negative: Patient sounds very sick or weak to the triager    Answer Assessment - Initial Assessment Questions  1. LOCATION: \"Where does it hurt?\"        Chest  2. RADIATION: \"Does the pain go anywhere else?\" (e.g., into neck, jaw, arms, back)      Nothing new  3. ONSET: \"When did the chest pain begin?\" (Minutes, hours or days)       A few days ago after lifting heavy furniture.  4. PATTERN: \"Does the pain come and go, or has it been constant since it started?\"  \"Does it get worse with exertion?\"       Intermittant. Not sure if exertion makes worse.  5. DURATION: \"How long does it last\" (e.g., seconds, minutes, hours)      5-8 mins.  6. SEVERITY: \"How bad is the pain?\"  (e.g., Scale 1-10; mild, moderate, or severe)     - MILD (1-3): doesn't interfere with normal activities      - MODERATE (4-7): interferes with normal activities or awakens from sleep     - SEVERE (8-10): excruciating pain, unable to do any normal activities        0/10 currently, but fatigued.  7. CARDIAC RISK FACTORS: \"Do you have any history of heart problems or risk factors for heart disease?\" (e.g., angina, prior heart attack; diabetes, high blood " "pressure, high cholesterol, smoker, or strong family history of heart disease)      Yes, previous MI and low Iron.  8. PULMONARY RISK FACTORS: \"Do you have any history of lung disease?\"  (e.g., blood clots in lung, asthma, emphysema, birth control pills)      No  9. CAUSE: \"What do you think is causing the chest pain?\"      Lifting  10. OTHER SYMPTOMS: \"Do you have any other symptoms?\" (e.g., dizziness, nausea, vomiting, sweating, fever, difficulty breathing, cough)        Fatigue  11. PREGNANCY: \"Is there any chance you are pregnant?\" \"When was your last menstrual period?\"        No    Protocols used: Chest Pain-ADULT-AH    "

## 2025-04-16 ENCOUNTER — LAB (OUTPATIENT)
Dept: LAB | Facility: HOSPITAL | Age: 60
End: 2025-04-16
Payer: MEDICARE

## 2025-04-16 ENCOUNTER — OFFICE VISIT (OUTPATIENT)
Dept: FAMILY MEDICINE CLINIC | Facility: CLINIC | Age: 60
End: 2025-04-16
Payer: MEDICARE

## 2025-04-16 VITALS
WEIGHT: 244.8 LBS | OXYGEN SATURATION: 98 % | SYSTOLIC BLOOD PRESSURE: 116 MMHG | BODY MASS INDEX: 37.1 KG/M2 | TEMPERATURE: 97.8 F | HEART RATE: 72 BPM | HEIGHT: 68 IN | DIASTOLIC BLOOD PRESSURE: 68 MMHG

## 2025-04-16 DIAGNOSIS — R07.9 CHEST PAIN, UNSPECIFIED TYPE: ICD-10-CM

## 2025-04-16 DIAGNOSIS — R06.09 DYSPNEA ON EXERTION: ICD-10-CM

## 2025-04-16 DIAGNOSIS — R07.9 CHEST PAIN, UNSPECIFIED TYPE: Primary | ICD-10-CM

## 2025-04-16 DIAGNOSIS — D50.9 IRON DEFICIENCY ANEMIA, UNSPECIFIED IRON DEFICIENCY ANEMIA TYPE: ICD-10-CM

## 2025-04-16 DIAGNOSIS — E87.6 HYPOKALEMIA: ICD-10-CM

## 2025-04-16 DIAGNOSIS — F41.9 ANXIETY: ICD-10-CM

## 2025-04-16 LAB — NT-PROBNP SERPL-MCNC: 131 PG/ML (ref 0–900)

## 2025-04-16 PROCEDURE — 83880 ASSAY OF NATRIURETIC PEPTIDE: CPT

## 2025-04-16 RX ORDER — VITAMIN B COMPLEX
1 CAPSULE ORAL DAILY
COMMUNITY

## 2025-04-16 RX ORDER — MULTIVITAMIN WITH IRON
TABLET ORAL DAILY
COMMUNITY
Start: 2025-03-30

## 2025-04-16 RX ORDER — DIOSMIN COMPLEX NO.1 630 MG
1 TABLET ORAL DAILY
COMMUNITY
Start: 2025-03-19

## 2025-04-16 RX ORDER — GINKGO BILOBA LEAF EXTRACT 60 MG
CAPSULE ORAL DAILY
COMMUNITY

## 2025-04-16 RX ORDER — UBIDECARENONE 100 MG
100 CAPSULE ORAL DAILY
COMMUNITY

## 2025-04-16 RX ORDER — CHOLECALCIFEROL (VITAMIN D3) 25 MCG
TABLET ORAL DAILY
COMMUNITY

## 2025-04-16 NOTE — PROGRESS NOTES
Follow Up Office Visit    Date: 2025   Patient Name: Brigid Coelho  : 1965   MRN: [id number]     Chief Complaint:    Chief Complaint   Patient presents with    Hospital Follow Up Visit    Chest Pain     Cold shooting up and down chest        History of Present Illness:   Brigid Coelho is a 59 y.o. female.  History of Present Illness  The patient presents for evaluation of anemia, potassium deficiency, glucose intolerance, anxiety, and cardiac issues.    A slight anemia is reported with a hemoglobin level of 10.9. Iron supplements are taken intermittently, which may contribute to hemorrhoidal bleeding. Spotting is attributed to her menstrual cycle. A hysteroscopy and D & C procedure is scheduled for 2025, but concerns about anesthesia complications are expressed, leading to consideration of postponing the procedure by a month.    Severe spasms are experienced despite regular intake of magnesium and potassium supplements, with potassium levels found to be low.    A glucose level of 112 was noted last month. Weight has fluctuated between 239 and 244 pounds, with a gain of approximately 4 pounds. Bloating and occasional palpitations are reported. Intake of fast food and candy has been reduced, but soda is still consumed with meals. Regular exercise has not been maintained, resulting in shortness of breath when climbing stairs. Berberine is currently taken, which is beneficial in managing blood sugar levels and reducing appetite.    An unusual sensation described as feeling like a cold ice cube in the chest occurred after lifting heavy objects, including a 60-pound grandson. This sensation is new, despite a history of anxiety attacks since the age of 26. Occasional PVCs and fluttering sensations are experienced, but no significant respiratory issues are reported. A history of heart issues, previously attributed to anxiety, was more prominent between the ages of 26 and 34  but seemed to resolve with weight gain. Elevated proBNP and troponin levels were noted during this period, she was evaluated at the ER. A thallium stress test at the age of 28 revealed evidence of a myocardial infarction, which had healed by that time. A subsequent heart catheterization showed clear arteries.    Thyroid levels have not been checked recently, and there is concern about the possibility of thyroid nodules compressing nerves and causing thoracic outlet syndrome. An appointment is scheduled with Dr. Leann Chavez at [facility] in July 2025.    SOCIAL HISTORY  She does not smoke or drink alcohol.    FAMILY HISTORY  Her mother has had cancer that did not spread.  Her father has prostate cancer.  Her aunt had endometrial cancer that eventually spread to her whole body and brain.        Subjective    Review of systems:  Review of Systems     I have reviewed and the following portions of the patient's history were updated as appropriate: past family history, past medical history, past social history, past surgical history and problem list.    Medications:     Current Outpatient Medications:     acetaminophen (TYLENOL) 650 MG 8 hr tablet, Take 500 mg by mouth Every 8 (Eight) Hours As Needed for Mild Pain ., Disp: , Rfl:     albuterol sulfate  (90 Base) MCG/ACT inhaler, Inhale 2 puffs Every 4 (Four) Hours As Needed for Wheezing or Shortness of Air., Disp: 6.7 g, Rfl: 1    Alpha-Lipoic Acid (LIPOIC ACID PO), , Disp: , Rfl:     ALPRAZolam (Xanax) 0.5 MG tablet, Take 1 tablet by mouth Daily As Needed for Anxiety., Disp: 30 tablet, Rfl: 2    ascorbic acid (VITAMIN C) 1000 MG tablet, Take 1 tablet by mouth Daily., Disp: , Rfl:     b complex vitamins capsule, Take 1 capsule by mouth Daily., Disp: , Rfl:     Berberine Chloride (BERBERINE HCI PO), Take  by mouth. Otc for weight loss, Disp: , Rfl:     Biotin 1000 MCG chewable tablet, Chew., Disp: , Rfl:     cefuroxime (CEFTIN) 250 MG tablet, Take 1 tablet by  mouth 2 (Two) Times a Day., Disp: 20 tablet, Rfl: 0    Cholecalciferol 25 MCG (1000 UT) tablet, Daily., Disp: , Rfl:     coenzyme Q10 100 MG capsule, Take 1 capsule by mouth Daily., Disp: , Rfl:     coenzyme Q10 100 MG capsule, Take 1 capsule by mouth Daily., Disp: , Rfl:     Dietary Management Product (Vasculera) tablet, Take 1 each by mouth Daily., Disp: , Rfl:     Dietary Management Product (Vasculera) tablet, Take 1 each by mouth Daily., Disp: , Rfl:     Evening Primrose Oil 500 MG capsule, Daily., Disp: , Rfl:     famotidine (PEPCID) 20 MG tablet, Take 1 tablet by mouth At Night As Needed for Heartburn., Disp: , Rfl:     ferrous sulfate 140 (45 Fe) MG tablet controlled-release tablet, Take 1 tablet by mouth Daily With Breakfast., Disp: , Rfl:     fluconazole (Diflucan) 150 MG tablet, 1 tablet when yeast infection symptoms developed and a second dose at the end of antibiotic therapy, Disp: 2 tablet, Rfl: 0    folic acid (FOLVITE) 400 MCG tablet, Take 1 tablet by mouth Daily., Disp: , Rfl:     Garlic 1000 MG capsule, Take 1 capsule by mouth Daily., Disp: , Rfl:     ibuprofen (ADVIL,MOTRIN) 400 MG tablet, Take 1 tablet by mouth Every 6 (Six) Hours As Needed for Mild Pain., Disp: , Rfl:     ipratropium (ATROVENT) 0.06 % nasal spray, Administer 2 sprays into the nostril(s) as directed by provider 4 (Four) Times a Day., Disp: 15 mL, Rfl: 0    levothyroxine (SYNTHROID, LEVOTHROID) 88 MCG tablet, TAKE 1 TABLET BY MOUTH DAILY, Disp: 90 tablet, Rfl: 1    Loratadine 10 MG capsule, Take 1 capsule by mouth Daily., Disp: 30 each, Rfl: 2    Magnesium Citrate 100 MG chewable tablet, , Disp: , Rfl:     Melatonin 2.5 MG capsule, Take  by mouth., Disp: , Rfl:     mesalamine (APRISO) 0.375 g 24 hr capsule, Take 1 capsule by mouth As Needed., Disp: , Rfl:     metFORMIN (GLUCOPHAGE) 500 MG tablet, Take 1 tablet by mouth., Disp: , Rfl:     montelukast (SINGULAIR) 10 MG tablet, TAKE 1 TABLET BY MOUTH EVERY NIGHT AT BEDTIME, Disp: 90  "tablet, Rfl: 0    polyethylene glycol (GoLYTELY) 236 g solution, Take 4,000 mL by mouth., Disp: , Rfl:     Pomegranate, Punica granatum, (Pomegranate Extract) 250 MG capsule, Daily., Disp: , Rfl:     sertraline (ZOLOFT) 50 MG tablet, TAKE 1 TABLET BY MOUTH DAILY, Disp: 90 tablet, Rfl: 3    simethicone (MYLICON) 125 MG chewable tablet, Chew 1 tablet., Disp: , Rfl:     triamcinolone (KENALOG) 0.5 % cream, Apply 1 application topically to the appropriate area as directed 2 (Two) Times a Day., Disp: 30 g, Rfl: 1    Vitamin A 2400 MCG (8000 UT) capsule, Daily., Disp: , Rfl:     Zinc 25 MG tablet, Take 0.5 tablets by mouth Daily., Disp: , Rfl:     Allergies:   Allergies   Allergen Reactions    Wound Dressing Adhesive Itching and Rash    Azithromycin Unknown - Low Severity    Clarithromycin Unknown - Low Severity     From Dr Moni Spear's notes provided for services 9/27/23.    Erythromycin Other (See Comments)     Sores in mouth    Nickel Itching    Epinephrine Palpitations    Latex Rash    Nitroglycerin Palpitations       Objective   Vital Signs:   Vitals:    04/16/25 1045   BP: 116/68   Pulse: 72   Temp: 97.8 °F (36.6 °C)   TempSrc: Infrared   SpO2: 98%   Weight: 111 kg (244 lb 12.8 oz)   Height: 172.7 cm (68\")     Body mass index is 37.22 kg/m².          Physical Exam:   Physical Exam  Vitals and nursing note reviewed.   Constitutional:       Appearance: Normal appearance.   HENT:      Head: Normocephalic and atraumatic.      Nose: No congestion or rhinorrhea.      Mouth/Throat:      Mouth: Mucous membranes are moist.      Pharynx: Oropharynx is clear. No posterior oropharyngeal erythema.   Cardiovascular:      Rate and Rhythm: Normal rate and regular rhythm.   Pulmonary:      Effort: Pulmonary effort is normal.      Breath sounds: Normal breath sounds. No decreased breath sounds, wheezing, rhonchi or rales.   Musculoskeletal:      Cervical back: Neck supple.      Right lower leg: No edema.      Left lower leg: No " edema.   Lymphadenopathy:      Cervical: No cervical adenopathy.   Neurological:      Mental Status: She is alert.          Procedures     Assessment / Plan    Assessment/Plan:   Diagnoses and all orders for this visit:    1. Chest pain, unspecified type (Primary)  -     proBNP; Future    2. Dyspnea on exertion  -     proBNP; Future    3. Iron deficiency anemia, unspecified iron deficiency anemia type    4. Anxiety    5. Hypokalemia       Assessment & Plan  1. Anemia.  - Hemoglobin level reported at 10.9, indicating slight anemia.  - Iron supplementation has been resumed, but doses are frequently skipped; advised to take iron every other day to minimize gastrointestinal discomfort.  - Hemorrhoidal bleeding and female spotting may be contributing factors; scheduled for hysteroscope and D & C on 04/25/2025 to investigate the cause.  - Encouraged to proceed with preoperative evaluation today.    2. Hypokalemia.  - Reports low potassium levels despite regular intake of magnesium and potassium supplements.  - Experiences frequent spasms, potentially related to hypokalemia.  - Monitoring potassium levels and symptoms to evaluate the effectiveness of current supplementation.    3. Glucose intolerance.  - Glucose level slightly elevated at 112 last month.  - Currently taking berberine, which aids in lowering blood sugar and triglycerides; advised to continue.  - Monitoring blood sugar levels and dietary habits to assess the impact of berberine.    4. Anxiety.  - Long-standing history of anxiety since age 26; reports new sensation of cold feeling in the chest, possibly due to muscle strain from lifting heavy objects.  - Advised to reduce stress and consider quitting caffeine and soda to manage symptoms.  - Monitoring anxiety symptoms and physical sensations to evaluate the need for further intervention.    5. Cardiac issues.  - History of occasional PVCs and fluttering sensations; EKG and chest x-ray results are normal.  -  Referral for cardiology consultation to further evaluate cardiac health.  - Recheck of proBNP levels will be conducted today to monitor cardiac function.      Follow Up:   Return in about 27 weeks (around 10/22/2025) for Medicare Wellness.    Patient or patient representative verbalized consent for the use of Ambient Listening during the visit with  Sarai Marinelli PA-C for chart documentation. 4/29/2025  11:05 EDT    Sarai Marinelli PA-C   [facility] [place]

## 2025-04-30 ENCOUNTER — TELEPHONE (OUTPATIENT)
Dept: FAMILY MEDICINE CLINIC | Facility: CLINIC | Age: 60
End: 2025-04-30
Payer: MEDICARE

## 2025-04-30 ENCOUNTER — OFFICE VISIT (OUTPATIENT)
Dept: FAMILY MEDICINE CLINIC | Facility: CLINIC | Age: 60
End: 2025-04-30
Payer: MEDICARE

## 2025-04-30 ENCOUNTER — HOSPITAL ENCOUNTER (EMERGENCY)
Facility: HOSPITAL | Age: 60
Discharge: HOME | End: 2025-04-30
Attending: EMERGENCY MEDICINE
Payer: MEDICARE

## 2025-04-30 ENCOUNTER — APPOINTMENT (OUTPATIENT)
Dept: GENERAL RADIOLOGY | Facility: HOSPITAL | Age: 60
End: 2025-04-30
Payer: MEDICARE

## 2025-04-30 ENCOUNTER — TELEPHONE (OUTPATIENT)
Dept: FAMILY MEDICINE CLINIC | Facility: CLINIC | Age: 60
End: 2025-04-30

## 2025-04-30 ENCOUNTER — APPOINTMENT (OUTPATIENT)
Dept: CT IMAGING | Facility: HOSPITAL | Age: 60
End: 2025-04-30
Payer: MEDICARE

## 2025-04-30 VITALS
HEART RATE: 70 BPM | DIASTOLIC BLOOD PRESSURE: 86 MMHG | WEIGHT: 240 LBS | TEMPERATURE: 98.5 F | BODY MASS INDEX: 37.67 KG/M2 | RESPIRATION RATE: 18 BRPM | HEIGHT: 67 IN | OXYGEN SATURATION: 98 % | SYSTOLIC BLOOD PRESSURE: 145 MMHG

## 2025-04-30 VITALS
DIASTOLIC BLOOD PRESSURE: 80 MMHG | SYSTOLIC BLOOD PRESSURE: 138 MMHG | HEIGHT: 68 IN | WEIGHT: 244 LBS | HEART RATE: 80 BPM | BODY MASS INDEX: 36.98 KG/M2 | TEMPERATURE: 99.5 F

## 2025-04-30 DIAGNOSIS — F41.9 ANXIETY: ICD-10-CM

## 2025-04-30 DIAGNOSIS — R07.9 CHEST PAIN, UNSPECIFIED TYPE: ICD-10-CM

## 2025-04-30 DIAGNOSIS — N30.00 ACUTE CYSTITIS WITHOUT HEMATURIA: Primary | ICD-10-CM

## 2025-04-30 DIAGNOSIS — R10.9 ABDOMINAL PAIN, UNSPECIFIED ABDOMINAL LOCATION: Primary | ICD-10-CM

## 2025-04-30 DIAGNOSIS — R30.0 BURNING WITH URINATION: ICD-10-CM

## 2025-04-30 DIAGNOSIS — C54.1 ENDOMETRIAL CANCER: ICD-10-CM

## 2025-04-30 LAB
ALBUMIN SERPL-MCNC: 4.2 G/DL (ref 3.5–5.2)
ALBUMIN/GLOB SERPL: 1.4 G/DL
ALP SERPL-CCNC: 99 U/L (ref 39–117)
ALT SERPL W P-5'-P-CCNC: 20 U/L (ref 1–33)
ANION GAP SERPL CALCULATED.3IONS-SCNC: 14 MMOL/L (ref 5–15)
AST SERPL-CCNC: 17 U/L (ref 1–32)
BASOPHILS # BLD AUTO: 0.08 10*3/MM3 (ref 0–0.2)
BASOPHILS NFR BLD AUTO: 0.9 % (ref 0–1.5)
BILIRUB BLD-MCNC: ABNORMAL MG/DL
BILIRUB SERPL-MCNC: <0.2 MG/DL (ref 0–1.2)
BUN SERPL-MCNC: 13 MG/DL (ref 8–23)
BUN/CREAT SERPL: 18.6 (ref 7–25)
CALCIUM SPEC-SCNC: 9 MG/DL (ref 8.6–10.5)
CHLORIDE SERPL-SCNC: 104 MMOL/L (ref 98–107)
CLARITY, POC: ABNORMAL
CO2 SERPL-SCNC: 23 MMOL/L (ref 22–29)
COLOR UR: YELLOW
CREAT SERPL-MCNC: 0.7 MG/DL (ref 0.57–1)
DEPRECATED RDW RBC AUTO: 44.5 FL (ref 37–54)
EGFRCR SERPLBLD CKD-EPI 2021: 99.2 ML/MIN/1.73
EOSINOPHIL # BLD AUTO: 0.3 10*3/MM3 (ref 0–0.4)
EOSINOPHIL NFR BLD AUTO: 3.3 % (ref 0.3–6.2)
ERYTHROCYTE [DISTWIDTH] IN BLOOD BY AUTOMATED COUNT: 15.6 % (ref 12.3–15.4)
EXPIRATION DATE: ABNORMAL
GEN 5 1HR TROPONIN T REFLEX: 9 NG/L
GLOBULIN UR ELPH-MCNC: 3 GM/DL
GLUCOSE SERPL-MCNC: 103 MG/DL (ref 65–99)
GLUCOSE UR STRIP-MCNC: NEGATIVE MG/DL
HCT VFR BLD AUTO: 36.3 % (ref 34–46.6)
HGB BLD-MCNC: 11.2 G/DL (ref 12–15.9)
HOLD SPECIMEN: NORMAL
IMM GRANULOCYTES # BLD AUTO: 0.03 10*3/MM3 (ref 0–0.05)
IMM GRANULOCYTES NFR BLD AUTO: 0.3 % (ref 0–0.5)
KETONES UR QL: ABNORMAL
LEUKOCYTE EST, POC: ABNORMAL
LIPASE SERPL-CCNC: 46 U/L (ref 13–60)
LYMPHOCYTES # BLD AUTO: 2.15 10*3/MM3 (ref 0.7–3.1)
LYMPHOCYTES NFR BLD AUTO: 23.6 % (ref 19.6–45.3)
Lab: ABNORMAL
MCH RBC QN AUTO: 24.3 PG (ref 26.6–33)
MCHC RBC AUTO-ENTMCNC: 30.9 G/DL (ref 31.5–35.7)
MCV RBC AUTO: 78.7 FL (ref 79–97)
MONOCYTES # BLD AUTO: 0.7 10*3/MM3 (ref 0.1–0.9)
MONOCYTES NFR BLD AUTO: 7.7 % (ref 5–12)
NEUTROPHILS NFR BLD AUTO: 5.84 10*3/MM3 (ref 1.7–7)
NEUTROPHILS NFR BLD AUTO: 64.2 % (ref 42.7–76)
NITRITE UR-MCNC: NEGATIVE MG/ML
NRBC BLD AUTO-RTO: 0 /100 WBC (ref 0–0.2)
NT-PROBNP SERPL-MCNC: <36 PG/ML (ref 0–900)
PH UR: 1 [PH] (ref 5–8)
PLATELET # BLD AUTO: 344 10*3/MM3 (ref 140–450)
PMV BLD AUTO: 10.2 FL (ref 6–12)
POTASSIUM SERPL-SCNC: 3.9 MMOL/L (ref 3.5–5.2)
PROT SERPL-MCNC: 7.2 G/DL (ref 6–8.5)
PROT UR STRIP-MCNC: ABNORMAL MG/DL
RBC # BLD AUTO: 4.61 10*6/MM3 (ref 3.77–5.28)
RBC # UR STRIP: ABNORMAL /UL
SODIUM SERPL-SCNC: 141 MMOL/L (ref 136–145)
SP GR UR: 1 (ref 1–1.03)
TROPONIN T NUMERIC DELTA: -2 NG/L
TROPONIN T SERPL HS-MCNC: 11 NG/L
UROBILINOGEN UR QL: NORMAL
WBC NRBC COR # BLD AUTO: 9.1 10*3/MM3 (ref 3.4–10.8)
WHOLE BLOOD HOLD COAG: NORMAL
WHOLE BLOOD HOLD SPECIMEN: NORMAL

## 2025-04-30 PROCEDURE — 74176 CT ABD & PELVIS W/O CONTRAST: CPT

## 2025-04-30 PROCEDURE — 85025 COMPLETE CBC W/AUTO DIFF WBC: CPT | Performed by: EMERGENCY MEDICINE

## 2025-04-30 PROCEDURE — 71045 X-RAY EXAM CHEST 1 VIEW: CPT

## 2025-04-30 PROCEDURE — 83880 ASSAY OF NATRIURETIC PEPTIDE: CPT | Performed by: EMERGENCY MEDICINE

## 2025-04-30 PROCEDURE — 80053 COMPREHEN METABOLIC PANEL: CPT | Performed by: EMERGENCY MEDICINE

## 2025-04-30 PROCEDURE — 99284 EMERGENCY DEPT VISIT MOD MDM: CPT

## 2025-04-30 PROCEDURE — 84484 ASSAY OF TROPONIN QUANT: CPT | Performed by: EMERGENCY MEDICINE

## 2025-04-30 PROCEDURE — 93005 ELECTROCARDIOGRAM TRACING: CPT | Performed by: EMERGENCY MEDICINE

## 2025-04-30 PROCEDURE — 83690 ASSAY OF LIPASE: CPT | Performed by: EMERGENCY MEDICINE

## 2025-04-30 PROCEDURE — 36415 COLL VENOUS BLD VENIPUNCTURE: CPT

## 2025-04-30 RX ORDER — SODIUM CHLORIDE 0.9 % (FLUSH) 0.9 %
10 SYRINGE (ML) INJECTION AS NEEDED
Status: DISCONTINUED | OUTPATIENT
Start: 2025-04-30 | End: 2025-05-01 | Stop reason: HOSPADM

## 2025-04-30 RX ORDER — FLUCONAZOLE 150 MG/1
150 TABLET ORAL ONCE
Qty: 1 TABLET | Refills: 0 | Status: SHIPPED | OUTPATIENT
Start: 2025-04-30 | End: 2025-04-30

## 2025-04-30 RX ORDER — ASPIRIN 81 MG/1
324 TABLET, CHEWABLE ORAL ONCE
Status: COMPLETED | OUTPATIENT
Start: 2025-04-30 | End: 2025-04-30

## 2025-04-30 RX ORDER — LEVOFLOXACIN 750 MG/1
750 TABLET, FILM COATED ORAL DAILY
Qty: 5 TABLET | Refills: 0 | Status: SHIPPED | OUTPATIENT
Start: 2025-04-30

## 2025-04-30 RX ORDER — SULFAMETHOXAZOLE AND TRIMETHOPRIM 800; 160 MG/1; MG/1
1 TABLET ORAL 2 TIMES DAILY
Qty: 20 TABLET | Refills: 0 | Status: SHIPPED | OUTPATIENT
Start: 2025-04-30

## 2025-04-30 RX ADMIN — ASPIRIN 81 MG 162 MG: 81 TABLET ORAL at 19:47

## 2025-04-30 NOTE — TELEPHONE ENCOUNTER
Caller: Brigid Coelho    Relationship: Self    Best call back number: [phone]    Which medication are you concerned about: LEVOFLOXACIN    Who prescribed you this medication: DR RIOS    What are your concerns: PATIENT WENT TO  MEDICATION AND PHARMACY TOLD HER THAT THIS MEDICATION HAS A RISK OF RUPTURING A TENDON SO SHE DID NOT PICK THAT MEDICATION UP AND WILL ONLY BE TAKING THE BACTRIM

## 2025-04-30 NOTE — ASSESSMENT & PLAN NOTE
- Presents with burning on urination and foul-smelling urine, consistent with a UTI.  - Levofloxacin prescribed as an alternative antibiotic due to recurrence of Proteus infections; advised to discontinue Bactrim and start Levofloxacin.

## 2025-04-30 NOTE — PROGRESS NOTES
Follow Up Office Visit      Patient Name: Brigid Coelho  : 1965   MRN: [id number]     Chief Complaint:  Urinary Tract Infection     History of Present Illness: Brigid Coelho is a 59 y.o. female who is here today for acute complaint of burning on urination.      History of Present Illness  The patient is a 59-year-old female who presents for evaluation of burning on urination, endometrial cancer, and cardiac issues.    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 Increased urinary frequency began yesterday, and this morning, a burning sensation during urination, accompanied by an unpleasant odor, was noted. Her temperature was recorded at 99.1 degrees Fahrenheit. Post-D and C bleeding continues, managed with a few pads daily.    Stage 1 endometrial cancer was diagnosed post d and c recently.    Chest Pain: Two weeks ago, a sensation of coldness in her chest prompted an emergency room visit, fearing a heart condition. Troponin levels were normal, but proBNP was elevated at 260. A similar episode occurred the day before last, feeling unwell and requiring Xanax. Referral to a cardiologist has been made but not yet attended. 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 Weighed 120 pounds at that time and underwent a thallium stress test around the age of 28 or 29, indicating a previous myocardial infarction that had completely healed.    Ankle was broken 3 years ago, and running is not allowed.        Subjective     Subjective          The following portions of the patient's history were reviewed and updated as appropriate: allergies, current medications, past family history, past medical history, past social history, past surgical history and problem list.    Allergy:   Allergies   Allergen Reactions    Wound Dressing Adhesive Itching and Rash    Azithromycin Unknown - Low Severity     Clarithromycin Unknown - Low Severity     From Dr Moni Spear's notes provided for services 9/27/23.    Erythromycin Other (See Comments)     Sores in mouth    Nickel Itching    Epinephrine Palpitations    Latex Rash    Nitroglycerin Palpitations        Current Medications:   Current Outpatient Medications   Medication Sig Dispense Refill    acetaminophen (TYLENOL) 650 MG 8 hr tablet Take 500 mg by mouth Every 8 (Eight) Hours As Needed for Mild Pain .      albuterol sulfate  (90 Base) MCG/ACT inhaler Inhale 2 puffs Every 4 (Four) Hours As Needed for Wheezing or Shortness of Air. 6.7 g 1    Alpha-Lipoic Acid (LIPOIC ACID PO)       ALPRAZolam (Xanax) 0.5 MG tablet Take 1 tablet by mouth Daily As Needed for Anxiety. 30 tablet 2    ascorbic acid (VITAMIN C) 1000 MG tablet Take 1 tablet by mouth Daily.      b complex vitamins capsule Take 1 capsule by mouth Daily.      Berberine Chloride (BERBERINE HCI PO) Take  by mouth. Otc for weight loss      Biotin 1000 MCG chewable tablet Chew.      cefuroxime (CEFTIN) 250 MG tablet Take 1 tablet by mouth 2 (Two) Times a Day. 20 tablet 0    Cholecalciferol 25 MCG (1000 UT) tablet Daily.      coenzyme Q10 100 MG capsule Take 1 capsule by mouth Daily.      coenzyme Q10 100 MG capsule Take 1 capsule by mouth Daily.      Dietary Management Product (Vasculera) tablet Take 1 each by mouth Daily.      Dietary Management Product (Vasculera) tablet Take 1 each by mouth Daily.      Evening Primrose Oil 500 MG capsule Daily.      famotidine (PEPCID) 20 MG tablet Take 1 tablet by mouth At Night As Needed for Heartburn.      ferrous sulfate 140 (45 Fe) MG tablet controlled-release tablet Take 1 tablet by mouth Daily With Breakfast.      fluconazole (Diflucan) 150 MG tablet 1 tablet when yeast infection symptoms developed and a second dose at the end of antibiotic therapy 2 tablet 0    fluconazole (Diflucan) 150 MG tablet Take 1 tablet by mouth 1 (One) Time for 1 dose. 1 tablet 0     folic acid (FOLVITE) 400 MCG tablet Take 1 tablet by mouth Daily.      Garlic 1000 MG capsule Take 1 capsule by mouth Daily.      ibuprofen (ADVIL,MOTRIN) 400 MG tablet Take 1 tablet by mouth Every 6 (Six) Hours As Needed for Mild Pain.      ipratropium (ATROVENT) 0.06 % nasal spray Administer 2 sprays into the nostril(s) as directed by provider 4 (Four) Times a Day. 15 mL 0    levoFLOXacin (Levaquin) 750 MG tablet Take 1 tablet by mouth Daily. 5 tablet 0    levothyroxine (SYNTHROID, LEVOTHROID) 88 MCG tablet TAKE 1 TABLET BY MOUTH DAILY 90 tablet 1    Loratadine 10 MG capsule Take 1 capsule by mouth Daily. 30 each 2    Magnesium Citrate 100 MG chewable tablet       Melatonin 2.5 MG capsule Take  by mouth.      mesalamine (APRISO) 0.375 g 24 hr capsule Take 1 capsule by mouth As Needed.      metFORMIN (GLUCOPHAGE) 500 MG tablet Take 1 tablet by mouth.      montelukast (SINGULAIR) 10 MG tablet TAKE 1 TABLET BY MOUTH EVERY NIGHT AT BEDTIME 90 tablet 0    polyethylene glycol (GoLYTELY) 236 g solution Take 4,000 mL by mouth.      Pomegranate, [place] granatum, (Pomegranate Extract) 250 MG capsule Daily.      sertraline (ZOLOFT) 50 MG tablet TAKE 1 TABLET BY MOUTH DAILY 90 tablet 3    simethicone (MYLICON) 125 MG chewable tablet Chew 1 tablet.      sulfamethoxazole-trimethoprim (Bactrim DS) 800-160 MG per tablet Take 1 tablet by mouth 2 (Two) Times a Day. 20 tablet 0    triamcinolone (KENALOG) 0.5 % cream Apply 1 application topically to the appropriate area as directed 2 (Two) Times a Day. 30 g 1    Vitamin A 2400 MCG (8000 UT) capsule Daily.      Zinc 25 MG tablet Take 0.5 tablets by mouth Daily.       No current facility-administered medications for this visit.       Objective     Objective     Physical Exam:  Physical Exam  Vitals and nursing note reviewed.   Constitutional:       Appearance: Normal appearance.   HENT:      Head: Normocephalic and atraumatic.      Mouth/Throat:      Mouth: Mucous membranes are  "moist.   Eyes:      Pupils: Pupils are equal, round, and reactive to light.   Cardiovascular:      Rate and Rhythm: Normal rate and regular rhythm.      Heart sounds: Normal heart sounds.   Pulmonary:      Effort: Pulmonary effort is normal.      Breath sounds: Normal breath sounds.   Abdominal:      General: Bowel sounds are normal.      Palpations: Abdomen is soft.   Musculoskeletal:         General: Normal range of motion.      Cervical back: Normal range of motion.   Skin:     General: Skin is warm and dry.   Neurological:      General: No focal deficit present.      Mental Status: She is alert and oriented to person, place, and time.   Psychiatric:         Mood and Affect: Mood normal.         Behavior: Behavior normal.         Vital Signs:   /80   Pulse 80   Temp 99.5 °F (37.5 °C) (Infrared)   Ht 172.7 cm (67.99\")   Wt 111 kg (244 lb)   BMI 37.11 kg/m²            PHQ-9 Score  PHQ-9 Total Score:      Lab Review  Office Visit on 04/30/2025   Component Date Value Ref Range Status    Color 04/30/2025 Yellow  Yellow, Straw, Dark Yellow, [place] Final    Clarity, UA 04/30/2025 Slightly Cloudy (A)  Clear Final    Specific Gravity  04/30/2025 1.000 (A)  1.005 - 1.030 Final    pH, Urine 04/30/2025 1.0 (A)  5.0 - 8.0 Final    Leukocytes 04/30/2025 Moderate (2+) (A)  Negative Final    Nitrite, UA 04/30/2025 Negative  Negative Final    Protein, POC 04/30/2025 1+ (A)  Negative mg/dL Final    Glucose, UA 04/30/2025 Negative  Negative mg/dL Final    Ketones, UA 04/30/2025 Trace (A)  Negative Final    Urobilinogen, UA 04/30/2025 Normal  Normal, 0.2 E.U./dL Final    Bilirubin 04/30/2025 Small (1+) (A)  Negative Final    Blood, UA 04/30/2025 3+ (A)  Negative Final    Lot Number 04/30/2025 [id number]   Final    Expiration Date 04/30/2025 9/10/26   Final   Lab on 04/16/2025   Component Date Value Ref Range Status    proBNP 04/16/2025 131.0  0.0 - 900.0 pg/mL Final   Office Visit on 03/11/2025   Component Date Value " Ref Range Status    Color 03/11/2025 Yellow  Yellow, Straw, Dark Yellow, [place] Final    Clarity, UA 03/11/2025 Clear  Clear Final    Specific Gravity  03/11/2025 1.030  1.005 - 1.030 Final    pH, Urine 03/11/2025 6.0  5.0 - 8.0 Final    Leukocytes 03/11/2025 Negative  Negative Final    Nitrite, UA 03/11/2025 Negative  Negative Final    Protein, POC 03/11/2025 Negative  Negative mg/dL Final    Glucose, UA 03/11/2025 Negative  Negative mg/dL Final    Ketones, UA 03/11/2025 Negative  Negative Final    Urobilinogen, UA 03/11/2025 0.2 E.U./dL  Normal, 0.2 E.U./dL Final    Bilirubin 03/11/2025 Negative  Negative Final    Blood, UA 03/11/2025 1+ (A)  Negative Final    Lot Number 03/11/2025 [id number]   Final    Expiration Date 03/11/2025 04/10/2026   Final    Rapid Influenza A Ag 03/11/2025 Negative  Negative Final    Rapid Influenza B Ag 03/11/2025 Negative  Negative Final    Internal Control 03/11/2025 Passed  Passed Final    Lot Number 03/11/2025 [id number]   Final    Expiration Date 03/11/2025 12/14/2025   Final    SARS Antigen 03/11/2025 Not Detected  Not Detected, Presumptive Negative Final    Internal Control 03/11/2025 Passed  Passed Final    Lot Number 03/11/2025 [id number]   Final    Expiration Date 03/11/2025 05-   Final    Urine Culture 03/11/2025 No growth   Final   Office Visit on 03/04/2025   Component Date Value Ref Range Status    Rapid Influenza A Ag 03/04/2025 Positive (A)  Negative Final    Rapid Influenza B Ag 03/04/2025 Negative  Negative Final    Internal Control 03/04/2025 Passed  Passed Final    Lot Number 03/04/2025 [id number]   Final    Expiration Date 03/04/2025 10/11/26   Final    SARS Antigen 03/04/2025 Presumptive Negative  Not Detected, Presumptive Negative Final    Internal Control 03/04/2025 Passed  Passed Final    Lot Number 03/04/2025 [id number]   Final    Expiration Date 03/04/2025 6/18/25   Final   Office Visit on 02/10/2025   Component Date Value Ref Range Status    Rapid Strep  A Screen 02/10/2025 Negative  Negative, VALID, INVALID, Not Performed Final    Internal Control 02/10/2025 Passed  Passed Final    Lot Number 02/10/2025 [id number]   Final    Expiration Date 02/10/2025 12/11/2026   Final    Report Summary 02/10/2025 FINAL   Final    Comment: ====================================================================  [facility] COMP DRUG ANALYSIS,UR  ====================================================================  Test                             Result       Flag       Units  Drug Present    Alprazolam                     16                      ng/mg creat    Alpha-hydroxyalprazolam        28                      ng/mg creat     Source of alprazolam is a scheduled prescription medication.     Alpha-hydroxyalprazolam is an expected metabolite of alprazolam.    Sertraline                     PRESENT    Desmethylsertraline            PRESENT     Desmethylsertraline is an expected metabolite of sertraline.  ====================================================================  Test                      Result    Flag   Units      Ref Range    Creatinine              130              mg/dL      >=20  ====================================================================  Declared Medications:   Medication list was not provided.  ==                           ==================================================================  For clinical consultation, please call [phone].  ====================================================================      Color, UA 02/10/2025 Yellow  Yellow, Straw Final    Appearance, UA 02/10/2025 Clear  Clear Final    pH, UA 02/10/2025 5.5  5.0 - 8.0 Final    Specific Gravity, UA 02/10/2025 1.019  1.005 - 1.030 Final    Glucose, UA 02/10/2025 Negative  Negative Final    Ketones, UA 02/10/2025 Negative  Negative Final    Bilirubin, UA 02/10/2025 Negative  Negative Final    Blood, UA 02/10/2025 Negative  Negative Final    Protein, UA 02/10/2025 Negative  Negative  Final    Leuk Esterase, UA 02/10/2025 Negative  Negative Final    Nitrite, UA 02/10/2025 Negative  Negative Final    Urobilinogen, UA 02/10/2025 0.2 E.U./dL  0.2 - 1.0 E.U./dL Final    Extra Tube 02/10/2025 Hold for add-ons.   Final    Auto resulted.   Admission on 01/31/2025, Discharged on 01/31/2025   Component Date Value Ref Range Status    Color 01/31/2025 Yellow   Final    Clarity, UA 01/31/2025 Cloudy (A)   Final    Glucose, UA 01/31/2025 Negative  mg/dL Final    Bilirubin 01/31/2025 Small (1+) (A)   Final    Ketones, UA 01/31/2025 Negative   Final    Specific Gravity  01/31/2025 1.025  1.005 - 1.030 Final    Blood, UA 01/31/2025 1+ (A)   Final    pH, Urine 01/31/2025 5.5  5.0 - 8.0 Final    Protein, POC 01/31/2025 Negative  mg/dL Final    Urobilinogen, UA 01/31/2025 Normal   Final    Nitrite, UA 01/31/2025 Negative   Final    Leukocytes 01/31/2025 Small (1+) (A)   Final    Urine Culture 01/31/2025 >100,000 CFU/mL Proteus mirabilis (A)   Final        Radiology Results  XR Chest 1 View  Result Date: 4/14/2025  Impression: No acute cardiopulmonary process . Extensive technical artifact. Images reviewed, interpreted, and dictated by Dr. Janell Welsh. Transcribed by Jasvir Felix PA-C.    XR Spine Thoracic 2 View (In Office)  Result Date: 4/2/2025  Impression: Impression: No acute cardiopulmonary findings. Mild degenerative disc disease throughout the thoracic spine. Moderate degenerative disc disease in the partially imaged cervical spine. Electronically Signed: Jasvir Pineda MD  4/2/2025 12:29 PM EDT  Workstation ID: [id number]    XR Chest PA & Lateral  Result Date: 4/2/2025  Impression: Impression: No acute cardiopulmonary findings. Mild degenerative disc disease throughout the thoracic spine. Moderate degenerative disc disease in the partially imaged cervical spine. Electronically Signed: Jasvir Pineda MD  4/2/2025 12:29 PM EDT  Workstation ID: [id number]       Assessment / Plan         Assessment and Plan        Diagnoses and all orders for this visit:    1. Acute cystitis without hematuria (Primary)  Assessment & Plan:  - Presents with burning on urination and foul-smelling urine, consistent with a UTI.  - Levofloxacin prescribed as an alternative antibiotic due to recurrence of Proteus infections; advised to discontinue Bactrim and start Levofloxacin.      2. Burning with urination  -     POC Urinalysis Dipstick, Automated    3. Chest pain, unspecified type  Assessment & Plan:  - Recent episode of chest discomfort and elevated proBNP levels, which have since decreased.  -blood pressure varies between arms.  - Echocardiogram and stress test ordered to evaluate cardiac function further.  - Referral to cardiology made for comprehensive cardiac assessment; cardiac clearance needed for upcoming surgery.    Orders:  -     Ambulatory Referral to Cardiology  -     Adult Transthoracic Echo Complete W/ Cont if Necessary Per Protocol; Future  -     Stress test with myocardial perfusion; Future    4. Endometrial cancer  Assessment & Plan:  Following with [facility]      Other orders  -     fluconazole (Diflucan) 150 MG tablet; Take 1 tablet by mouth 1 (One) Time for 1 dose.  Dispense: 1 tablet; Refill: 0  -     levoFLOXacin (Levaquin) 750 MG tablet; Take 1 tablet by mouth Daily.  Dispense: 5 tablet; Refill: 0                Health Maintenance  Health Maintenance: There are no preventive care reminders to display for this patient.     Meds ordered during this visit  New Medications Ordered This Visit   Medications    fluconazole (Diflucan) 150 MG tablet     Sig: Take 1 tablet by mouth 1 (One) Time for 1 dose.     Dispense:  1 tablet     Refill:  0    levoFLOXacin (Levaquin) 750 MG tablet     Sig: Take 1 tablet by mouth Daily.     Dispense:  5 tablet     Refill:  0       Meds stopped during this visit:  There are no discontinued medications.     Patient was given instructions and counseling regarding her condition or  for health maintenance advice. Please see specific information pulled into the AVS if appropriate.     Follow Up   No follow-ups on file.    Valentina Byrd DO  [facility]     Dictated Utilizing Dragon Dictation: Part of this note may be an electronic transcription/translation of spoken language to printed text using the Dragon Dictation System.    Patient or patient representative verbalized consent for the use of Ambient Listening during the visit with  Valentina Byrd DO for chart documentation. 4/30/2025  12:34 EDT      This document has been electronically signed by Valentina Byrd DO   April 30, 2025 14:15 EDT

## 2025-04-30 NOTE — Clinical Note
[facility] EMERGENCY DEPARTMENT  [address]  [facility] [address]  Phone: [phone]    Brigid Coelho was seen and treated in our emergency department on 4/30/2025.  She may return to work on 05/03/2025.         Thank you for choosing [facility].    Maricarmen Brown MD

## 2025-04-30 NOTE — TELEPHONE ENCOUNTER
Caller: Brigid Coelho    Relationship: Self    Best call back number:       [phone] (Mobile)     What medication are you requesting: MEDICATION ANTIBIOTIC FOR UTI    What are your current symptoms: SEVERE BURNING, FOWL ODOR, FREQUENT URINATION STARTED YESTERDAY    How long have you been experiencing symptoms: THIS MORNING 4-30-25    If a prescription is needed, what is your preferred pharmacy and phone number:    [facility]       Additional notes:  PATIENT HAD A HYSTEROSCOPY AND DNC ON 4-25-25 AT [facility] WITH DR ALAINA HUNTER   SHE FOUND OUT SHE HAS STAGE 1 ENDOMETRIAL CANCER   AND WILL GO NEXT WEDNESDAY AT [facility] FOR A CONSULT   AND THEN POSSIBLE A HYSTERECTOMY IN 3 WEEKS

## 2025-04-30 NOTE — ASSESSMENT & PLAN NOTE
- Recent episode of chest discomfort and elevated proBNP levels, which have since decreased.  -blood pressure varies between arms.  - Echocardiogram and stress test ordered to evaluate cardiac function further.  - Referral to cardiology made for comprehensive cardiac assessment; cardiac clearance needed for upcoming surgery.

## 2025-05-01 LAB
QT INTERVAL: 364 MS
QT INTERVAL: 374 MS
QTC INTERVAL: 419 MS
QTC INTERVAL: 434 MS

## 2025-05-01 NOTE — ED PROVIDER NOTES
Subjective   History of Present Illness  59-year-old female who presents for evaluation of several complaints.  The patient reports having shortness of breath chest pain.  She also reports is a very anxious/dancing.  She reports a chronic history of anxiety.  She states that she recent was diagnosed with stage I grade 1 endometrial cancer she states that she was evaluated with a D&C and biopsy last Friday.  She was informed this Monday, 2 days ago that she had endometrial cancer and they plan on hysterectomy.  She reports some mild abdominal pain since that time and has since developed the current chest pain and shortness of breath.  No history of coronary artery disease.  No fever or infectious symptoms.  No sick contacts.  No other acute complaints.  The patient has pressured speech and appears very anxious.      Review of Systems   Constitutional:  Positive for activity change. Negative for chills, fatigue and fever.   HENT:  Negative for congestion, ear pain, postnasal drip, sinus pressure and sore throat.    Eyes:  Negative for pain, redness and visual disturbance.   Respiratory:  Positive for shortness of breath. Negative for cough and chest tightness.    Cardiovascular:  Positive for chest pain. Negative for palpitations and leg swelling.   Gastrointestinal:  Positive for abdominal pain. Negative for anal bleeding, blood in stool, diarrhea, nausea and vomiting.   Endocrine: Negative for polydipsia and polyuria.   Genitourinary:  Negative for difficulty urinating, dysuria, frequency and urgency.   Musculoskeletal:  Negative for arthralgias, back pain and neck pain.   Skin:  Negative for pallor and rash.   Allergic/Immunologic: Negative for environmental allergies and immunocompromised state.   Neurological:  Negative for dizziness, weakness and headaches.   Hematological:  Negative for adenopathy.   Psychiatric/Behavioral:  Negative for confusion, self-injury and suicidal ideas. The patient is nervous/anxious.     All other systems reviewed and are negative.      Past Medical History:   Diagnosis Date    Acute bronchitis     Allergic 2000    Anemia     Anxiety     Arthritis of back     Chest pain     Cholelithiasis     Claustrophobia     Diverticulosis 2004    Fracture of ankle 10/30/2021    GERD (gastroesophageal reflux disease)     Hashimoto's thyroiditis     Hemorrhoids     Hypothyroidism     Low back pain     Low back strain 10/12/2011    Lumbosacral disc disease 10/12/2011    Metrorrhagia     Palpitations     Polycystic ovary syndrome     Polyp of sigmoid colon     Ulcerative colitis     Upper respiratory infection     UTI (urinary tract infection)     Visual impairment 1971    Wear contacts/glasses       Allergies   Allergen Reactions    Wound Dressing Adhesive Itching and Rash    Azithromycin Unknown - Low Severity    Clarithromycin Unknown - Low Severity     From Dr Moni Spear's notes provided for services 23.    Erythromycin Other (See Comments)     Sores in mouth    Nickel Itching    Epinephrine Palpitations    Latex Rash    Nitroglycerin Palpitations       Past Surgical History:   Procedure Laterality Date    ANKLE OPEN REDUCTION INTERNAL FIXATION Left 2021    Procedure: TRIMALLEOLAR FRACTURE OPEN REDUCTION INTERNAL FIXATION LEFT;  Surgeon: Giovani Daniels MD;  [facility];  Service: Orthopedics;  Laterality: Left;    ANKLE OPEN REDUCTION INTERNAL FIXATION Left 2021    s/p ORIF L ankle Dr. Daniels [facility]    APPENDECTOMY      CARDIAC CATHETERIZATION       SECTION      x 2    CHOLECYSTECTOMY      COLONOSCOPY      DILATATION AND CURETTAGE      Of Cervical Stump    ENDOMETRIAL ABLATION  2013    FRACTURE SURGERY      Broke left ankle    HARDWARE REMOVAL FOOT / ANKLE Left 2022    syndesmotic screw removal Dr. Daniels    MYOMECTOMY      SMALL INTESTINE SURGERY      TUBAL ABDOMINAL LIGATION         Family History   Problem Relation Age of Onset    Hyperlipidemia  Mother     CHAD disease Mother     Cancer Mother     Diabetes Mother     Heart disease Mother     Broken bones Mother         Broke both wrists ...different occasions due to falls    Stroke Mother     Rheum arthritis Father     Hyperlipidemia Father     Heart attack Father 72    Rheumatologic disease Father         My dad has severe RA    Arthritis Father         RA    Ovarian cancer Maternal Aunt     Diabetes Paternal Grandmother     Hypertension Paternal Grandmother     Obesity Paternal Grandmother     Thyroid disease Paternal Grandmother     Colon cancer Other     Arthritis Other     Cancer Other         uncle; liver, lung     Thyroid disease Cousin     Thyroid cancer Cousin     CHAD disease Daughter     Obesity Brother     Sleep apnea Brother     Depression Brother     Diabetes Brother     Hypertension Brother     Anxiety disorder Son     Arthritis Son         RA    Mental illness Son     Anxiety disorder Maternal Aunt     Cancer Maternal Aunt         Passed away 2024 due to ovarian cancer    Anxiety disorder Daughter     Arthritis Daughter         RA    Miscarriages / Stillbirths Daughter         Had miscarriage 2010       Social History     Socioeconomic History    Marital status:    Tobacco Use    Smoking status: Never     Passive exposure: Never    Smokeless tobacco: Never   Vaping Use    Vaping status: Never Used   Substance and Sexual Activity    Alcohol use: Never    Drug use: Never    Sexual activity: Not Currently     Partners: Male     Birth control/protection: Abstinence, Post-menopausal, Tubal ligation           Objective   Physical Exam  Vitals and nursing note reviewed.   Constitutional:       General: She is not in acute distress.     Appearance: Normal appearance. She is well-developed. She is not toxic-appearing or diaphoretic.   HENT:      Head: Normocephalic and atraumatic.      Right Ear: External ear normal.      Left Ear: External ear normal.      Nose: Nose normal.   Eyes:       General: Lids are normal.      Pupils: Pupils are equal, round, and reactive to light.   Neck:      Trachea: No tracheal deviation.   Cardiovascular:      Rate and Rhythm: Normal rate and regular rhythm.      Pulses: No decreased pulses.      Heart sounds: Normal heart sounds. No murmur heard.     No friction rub. No gallop.   Pulmonary:      Effort: Pulmonary effort is normal. No respiratory distress.      Breath sounds: Normal breath sounds. No decreased breath sounds, wheezing, rhonchi or rales.   Abdominal:      General: Bowel sounds are normal.      Palpations: Abdomen is soft.      Tenderness: There is no abdominal tenderness. There is no guarding or rebound.   Musculoskeletal:         General: No deformity. Normal range of motion.      Cervical back: Normal range of motion and neck supple.   Lymphadenopathy:      Cervical: No cervical adenopathy.   Skin:     General: Skin is warm and dry.      Findings: No rash.   Neurological:      Mental Status: She is alert and oriented to person, place, and time.      Cranial Nerves: No cranial nerve deficit.      Sensory: No sensory deficit.   Psychiatric:         Mood and Affect: Mood is anxious.         Speech: Speech normal.         Behavior: Behavior normal.         Thought Content: Thought content normal.         Judgment: Judgment normal.         Procedures           ED Course                                                       Medical Decision Making  Differential includes anxiety, bowel perforation, acute coronary syndrome, pneumonia, chest wall pain, other unspecified etiology.    Troponin x 2 is normal.    BNP is normal.    Normal white count, normal H&H, normal kidney function electrolytes.    Chest x-ray interpreted by me shows normal heart size and clear lungs.    CT scan abdomen pelvis without contrast interpreted by me shows no acute abnormalities.    The patient will be discharged with the advised to keep outpatient follow-up as scheduled.    Problems  Addressed:  Abdominal pain, unspecified abdominal location: complicated acute illness or injury with systemic symptoms  Anxiety: chronic illness or injury with exacerbation, progression, or side effects of treatment that poses a threat to life or bodily functions  Chest pain, unspecified type: complicated acute illness or injury with systemic symptoms    Amount and/or Complexity of Data Reviewed  External Data Reviewed: labs, radiology and ECG.  Labs: ordered. Decision-making details documented in ED Course.  Radiology: ordered and independent interpretation performed. Decision-making details documented in ED Course.  ECG/medicine tests: ordered and independent interpretation performed. Decision-making details documented in ED Course.     Details: EKG performed 4/3/2025 at 1914 interpreted by me shows sinus rhythm, normal QTc, normal QRS, no acute ischemic changes.    Risk  OTC drugs.  Prescription drug management.        Final diagnoses:   Abdominal pain, unspecified abdominal location   Chest pain, unspecified type   Anxiety       ED Disposition  ED Disposition       ED Disposition   Discharge    Condition   Stable    Comment   --               No follow-up provider specified.       Medication List      No changes were made to your prescriptions during this visit.            Maricarmen Brown MD  04/30/25 7158

## 2025-05-11 LAB
QT INTERVAL: 364 MS
QT INTERVAL: 374 MS
QTC INTERVAL: 419 MS
QTC INTERVAL: 434 MS

## 2025-05-12 DIAGNOSIS — N64.9 DISORDER OF BREAST, UNSPECIFIED: ICD-10-CM

## 2025-05-12 DIAGNOSIS — Z12.31 ENCOUNTER FOR SCREENING MAMMOGRAM FOR MALIGNANT NEOPLASM OF BREAST: ICD-10-CM

## 2025-05-12 DIAGNOSIS — R92.30 DENSE BREAST TISSUE: Primary | ICD-10-CM

## 2025-05-13 ENCOUNTER — TELEPHONE (OUTPATIENT)
Dept: FAMILY MEDICINE CLINIC | Facility: CLINIC | Age: 60
End: 2025-05-13
Payer: MEDICARE

## 2025-05-13 NOTE — TELEPHONE ENCOUNTER
HUB TO RELAY    LVM. Per Sarai:   I can give her a valium for the test as long as it is cleared with cardiology for her to take this and do test.

## 2025-05-13 NOTE — TELEPHONE ENCOUNTER
I can give her a valium for the test as long as it is cleared with cardiology for her to take this and do test.

## 2025-05-13 NOTE — TELEPHONE ENCOUNTER
Caller: Brigid Coelho    Relationship: Self    Best call back number: [phone]     What medication are you requesting: SOMETHING OR ANXIETY/VERY CLAUSTROPHOBIC        If a prescription is needed, what is your preferred pharmacy and phone number: [facility] [id number] - [facility] [address]  [address] [facility] [address]  - [phone]  - [phone] FX     Additional notes:PATIENT HAS AN APPT FOR A STRESS TEST NUCLEAR ON 6/2/25. PATIENT STATED SHE CAN NOT TAKE HYDROXYZINE OR ATIVAN. SHE TAKES .25 XANAX BUT SHE SAYS THAT WILL NOT BE ENOUGH AND WILL NEED SOMETHING STRONGER . CALL PATIENT TO DISCUSS PLEASE

## 2025-05-16 ENCOUNTER — TELEPHONE (OUTPATIENT)
Dept: FAMILY MEDICINE CLINIC | Facility: CLINIC | Age: 60
End: 2025-05-16

## 2025-05-16 NOTE — TELEPHONE ENCOUNTER
She only needs diagnostic scan if she is having any breast symptoms such as pain, nodule, mass, etc. Otherwise just a screening mammogram. I was hoping to get he US for her dense breast tissue, not because she had an issue.

## 2025-05-16 NOTE — TELEPHONE ENCOUNTER
Caller: Brigid Coelho    Relationship: Self    Best call back number: [phone]     What orders are you requesting (i.e. lab or imaging): DIAGNOSTIC SCAN OF BREAST    In what timeframe would the patient need to come in: ASAP    Where will you receive your lab/imaging services: AT [facility]    Additional notes: PATIENT WAS REFUSED THE ULTRASOUND OF HER BREAST AND WAS TOLD SHE NEEDS A DIAGNOSTIC SCAN

## 2025-05-19 DIAGNOSIS — F40.240 CLAUSTROPHOBIA: Primary | ICD-10-CM

## 2025-05-19 DIAGNOSIS — J30.2 SEASONAL ALLERGIES: ICD-10-CM

## 2025-05-19 DIAGNOSIS — E06.3 HYPOTHYROIDISM DUE TO HASHIMOTO'S THYROIDITIS: ICD-10-CM

## 2025-05-19 DIAGNOSIS — R92.30 DENSE BREAST TISSUE: Primary | ICD-10-CM

## 2025-05-19 RX ORDER — MONTELUKAST SODIUM 10 MG/1
10 TABLET ORAL
Qty: 90 TABLET | Refills: 0 | Status: SHIPPED | OUTPATIENT
Start: 2025-05-19

## 2025-05-19 RX ORDER — DIAZEPAM 10 MG/1
TABLET ORAL
Qty: 2 TABLET | Refills: 0 | Status: SHIPPED | OUTPATIENT
Start: 2025-05-19

## 2025-05-19 RX ORDER — LEVOTHYROXINE SODIUM 88 UG/1
88 TABLET ORAL DAILY
Qty: 90 TABLET | Refills: 1 | Status: SHIPPED | OUTPATIENT
Start: 2025-05-19

## 2025-05-19 NOTE — TELEPHONE ENCOUNTER
I tried to order the US but Medicare will not approve even at [facility]. I will send in valium.

## 2025-05-19 NOTE — TELEPHONE ENCOUNTER
Pt states that she does have nodules. [employer] will not approve the US. States that she has a screening mammogram this Friday at [place], wants to know if the order can be sent to [facility] and see if they will approve it. Also requesting 2 valium when she goes to have the scan done due to being claustrophobic

## 2025-05-19 NOTE — TELEPHONE ENCOUNTER
Name: Brigid Coelho    Relationship: Self    Best Callback Number:      [phone] (Mobile)     HUB PROVIDED THE RELAY MESSAGE FROM THE OFFICE   PATIENT     VOICED UNDERSTANDING AND HAS NO FURTHER QUESTIONS AT THIS TIME

## 2025-05-19 NOTE — TELEPHONE ENCOUNTER
"HUB TO RELAY:    Sarai Marinelli PA-C:  \"I tried to order the US but Medicare will not approve even at [facility]. I will send in valium.\"        "

## 2025-06-05 ENCOUNTER — HOSPITAL ENCOUNTER (OUTPATIENT)
Facility: HOSPITAL | Age: 60
Discharge: HOME | End: 2025-06-05
Admitting: HOSPITALIST
Payer: MEDICARE

## 2025-06-05 DIAGNOSIS — R07.9 CHEST PAIN, UNSPECIFIED TYPE: ICD-10-CM

## 2025-06-05 LAB
AORTIC DIMENSIONLESS INDEX: 0.63 (DI)
AV MEAN PRESS GRAD SYS DOP V1V2: 3 MMHG
AV VMAX SYS DOP: 123 CM/SEC
BH CV ECHO MEAS - AO MAX PG: 6.1 MMHG
BH CV ECHO MEAS - AO ROOT DIAM: 2.6 CM
BH CV ECHO MEAS - AO V2 VTI: 25.4 CM
BH CV ECHO MEAS - AVA(I,D): 1.99 CM2
BH CV ECHO MEAS - EDV(CUBED): 125 ML
BH CV ECHO MEAS - EDV(MOD-SP2): 61.4 ML
BH CV ECHO MEAS - EDV(MOD-SP4): 117 ML
BH CV ECHO MEAS - EF(MOD-SP2): 71.7 %
BH CV ECHO MEAS - EF(MOD-SP4): 70.9 %
BH CV ECHO MEAS - ESV(CUBED): 39.3 ML
BH CV ECHO MEAS - ESV(MOD-SP2): 17.4 ML
BH CV ECHO MEAS - ESV(MOD-SP4): 34 ML
BH CV ECHO MEAS - FS: 32 %
BH CV ECHO MEAS - IVS/LVPW: 1 CM
BH CV ECHO MEAS - IVSD: 1 CM
BH CV ECHO MEAS - LA DIMENSION: 3.6 CM
BH CV ECHO MEAS - LAT PEAK E' VEL: 9.8 CM/SEC
BH CV ECHO MEAS - LV DIASTOLIC VOL/BSA (35-75): 53.5 CM2
BH CV ECHO MEAS - LV MASS(C)D: 182 GRAMS
BH CV ECHO MEAS - LV MAX PG: 2.47 MMHG
BH CV ECHO MEAS - LV MEAN PG: 1 MMHG
BH CV ECHO MEAS - LV SYSTOLIC VOL/BSA (12-30): 15.6 CM2
BH CV ECHO MEAS - LV V1 MAX: 78.6 CM/SEC
BH CV ECHO MEAS - LV V1 VTI: 16.1 CM
BH CV ECHO MEAS - LVIDD: 5 CM
BH CV ECHO MEAS - LVIDS: 3.4 CM
BH CV ECHO MEAS - LVOT AREA: 3.1 CM2
BH CV ECHO MEAS - LVOT DIAM: 2 CM
BH CV ECHO MEAS - LVPWD: 1 CM
BH CV ECHO MEAS - MED PEAK E' VEL: 11.5 CM/SEC
BH CV ECHO MEAS - MV A MAX VEL: 76 CM/SEC
BH CV ECHO MEAS - MV DEC SLOPE: 475 CM/SEC2
BH CV ECHO MEAS - MV DEC TIME: 0.2 SEC
BH CV ECHO MEAS - MV E MAX VEL: 58.2 CM/SEC
BH CV ECHO MEAS - MV E/A: 0.77
BH CV ECHO MEAS - MV MAX PG: 2.9 MMHG
BH CV ECHO MEAS - MV MEAN PG: 2 MMHG
BH CV ECHO MEAS - MV P1/2T: 54.5 MSEC
BH CV ECHO MEAS - MV V2 VTI: 18.7 CM
BH CV ECHO MEAS - MVA(P1/2T): 4 CM2
BH CV ECHO MEAS - MVA(VTI): 2.7 CM2
BH CV ECHO MEAS - PA ACC TIME: 0.11 SEC
BH CV ECHO MEAS - PI END-D VEL: 118 CM/SEC
BH CV ECHO MEAS - RAP SYSTOLE: 3 MMHG
BH CV ECHO MEAS - RVSP: 15.1 MMHG
BH CV ECHO MEAS - SV(LVOT): 50.6 ML
BH CV ECHO MEAS - SV(MOD-SP2): 44 ML
BH CV ECHO MEAS - SV(MOD-SP4): 83 ML
BH CV ECHO MEAS - SVI(LVOT): 23.1 ML/M2
BH CV ECHO MEAS - SVI(MOD-SP2): 20.1 ML/M2
BH CV ECHO MEAS - SVI(MOD-SP4): 38 ML/M2
BH CV ECHO MEAS - TAPSE (>1.6): 2.31 CM
BH CV ECHO MEAS - TR MAX PG: 12.1 MMHG
BH CV ECHO MEAS - TR MAX VEL: 174 CM/SEC
BH CV ECHO MEASUREMENTS AVERAGE E/E' RATIO: 5.46
BH CV XLRA - RV BASE: 3.7 CM
BH CV XLRA - RV LENGTH: 6.9 CM
BH CV XLRA - RV MID: 3.1 CM
BH CV XLRA - TDI S': 14.3 CM/SEC
LEFT ATRIUM VOLUME INDEX: 13.9 ML/M2
LV EF 2D ECHO EST: 68 %
LV EF BIPLANE MOD: 70 %

## 2025-06-05 PROCEDURE — 93306 TTE W/DOPPLER COMPLETE: CPT

## 2025-06-05 PROCEDURE — 93306 TTE W/DOPPLER COMPLETE: CPT | Performed by: INTERNAL MEDICINE

## 2025-06-10 ENCOUNTER — PATIENT ROUNDING (BHMG ONLY) (OUTPATIENT)
Dept: URGENT CARE | Facility: CLINIC | Age: 60
End: 2025-06-10
Payer: MEDICARE

## 2025-06-10 NOTE — ED NOTES
Thank you for letting us care for you in your recent visit to our urgent care center. We would love to hear about your experience with us. Was this the first time you have visited our location?    We’re always looking for ways to make our patients’ experiences even better. Do you have any recommendations on ways we may improve?     I appreciate you taking the time to respond. Please be on the lookout for a survey about your recent visit from [employer] via text or email. We would greatly appreciate if you could fill that out and turn it back in. We want your voice to be heard and we value your feedback.   Thank you for choosing [facility] for your healthcare needs.

## 2025-06-16 DIAGNOSIS — R07.9 CHEST PAIN, UNSPECIFIED TYPE: Primary | ICD-10-CM

## 2025-06-17 ENCOUNTER — HOSPITAL ENCOUNTER (OUTPATIENT)
Dept: CARDIOLOGY | Facility: HOSPITAL | Age: 60
Discharge: HOME | End: 2025-06-17
Payer: MEDICARE

## 2025-06-17 VITALS
SYSTOLIC BLOOD PRESSURE: 136 MMHG | HEIGHT: 67 IN | WEIGHT: 240.3 LBS | DIASTOLIC BLOOD PRESSURE: 86 MMHG | BODY MASS INDEX: 37.72 KG/M2 | HEART RATE: 65 BPM

## 2025-06-17 DIAGNOSIS — R07.9 CHEST PAIN, UNSPECIFIED TYPE: ICD-10-CM

## 2025-06-17 LAB
BH CV REST NUCLEAR ISOTOPE DOSE: 9.6 MCI
BH CV STRESS BP STAGE 1: NORMAL
BH CV STRESS DURATION MIN STAGE 1: 3
BH CV STRESS DURATION MIN STAGE 2: 2
BH CV STRESS DURATION SEC STAGE 1: 0
BH CV STRESS DURATION SEC STAGE 2: 51
BH CV STRESS GRADE STAGE 1: 10
BH CV STRESS GRADE STAGE 2: 12
BH CV STRESS HR STAGE 1: 120
BH CV STRESS HR STAGE 2: 141
BH CV STRESS METS STAGE 1: 5
BH CV STRESS METS STAGE 2: 7.5
BH CV STRESS NUCLEAR ISOTOPE DOSE: 31 MCI
BH CV STRESS O2 STAGE 1: 97
BH CV STRESS O2 STAGE 2: 96
BH CV STRESS PROTOCOL 1: NORMAL
BH CV STRESS RECOVERY BP: NORMAL MMHG
BH CV STRESS RECOVERY HR: 76 BPM
BH CV STRESS RECOVERY O2: 99 %
BH CV STRESS SPEED STAGE 1: 1.7
BH CV STRESS SPEED STAGE 2: 2.5
BH CV STRESS STAGE 1: 1
BH CV STRESS STAGE 2: 2
MAXIMAL PREDICTED HEART RATE: 159 BPM
PERCENT MAX PREDICTED HR: 94.34 %
SPECT HRT GATED+EF W RNC IV: 68 %
STRESS BASELINE BP: NORMAL MMHG
STRESS BASELINE HR: 65 BPM
STRESS O2 SAT REST: 98 %
STRESS PERCENT HR: 111 %
STRESS POST ESTIMATED WORKLOAD: 7 METS
STRESS POST EXERCISE DUR MIN: 5 MIN
STRESS POST EXERCISE DUR SEC: 51 SEC
STRESS POST O2 SAT PEAK: 96 %
STRESS POST PEAK BP: NORMAL MMHG
STRESS POST PEAK HR: 150 BPM
STRESS TARGET HR: 135 BPM

## 2025-06-17 PROCEDURE — 78452 HT MUSCLE IMAGE SPECT MULT: CPT

## 2025-06-17 PROCEDURE — 34310000005 TECHNETIUM SESTAMIBI: Performed by: HOSPITALIST

## 2025-06-17 PROCEDURE — 93016 CV STRESS TEST SUPVJ ONLY: CPT | Performed by: INTERNAL MEDICINE

## 2025-06-17 PROCEDURE — 93017 CV STRESS TEST TRACING ONLY: CPT

## 2025-06-17 PROCEDURE — A9500 TC99M SESTAMIBI: HCPCS | Performed by: HOSPITALIST

## 2025-06-17 PROCEDURE — 93018 CV STRESS TEST I&R ONLY: CPT | Performed by: INTERNAL MEDICINE

## 2025-06-17 PROCEDURE — 78452 HT MUSCLE IMAGE SPECT MULT: CPT | Performed by: INTERNAL MEDICINE

## 2025-06-17 RX ADMIN — TECHNETIUM TC 99M SESTAMIBI 1 DOSE: 1 INJECTION INTRAVENOUS at 11:21

## 2025-06-17 RX ADMIN — TECHNETIUM TC 99M SESTAMIBI 1 DOSE: 1 INJECTION INTRAVENOUS at 13:45

## 2025-06-19 ENCOUNTER — TELEPHONE (OUTPATIENT)
Dept: FAMILY MEDICINE CLINIC | Facility: CLINIC | Age: 60
End: 2025-06-19
Payer: MEDICARE

## 2025-06-19 NOTE — TELEPHONE ENCOUNTER
Caller: Brigid Coelho    Relationship: Self    Best call back number:  [phone]    What orders are you requesting (i.e. lab or imaging): XRAY FOR LEFT ANKLE    In what timeframe would the patient need to come in: AS SOON AS POSSIBLE     Where will you receive your lab/imaging services: [address]     Additional notes: IT IS PAINFUL AND SWELLING   SHE BROKE THIS ANKLE A COUPLE OF YEARS AGO AND HAS HAD 4 SURGERIES ON IT     SHE HAS A HYSTERECTOMY SCHEDULED NEXT WEEK AND WANTS TO HAVE HER ANKLE X-RAY BEFORE THEM

## 2025-06-20 DIAGNOSIS — M25.572 ACUTE LEFT ANKLE PAIN: Primary | ICD-10-CM

## 2025-06-30 ENCOUNTER — HOSPITAL ENCOUNTER (EMERGENCY)
Facility: HOSPITAL | Age: 60
Discharge: HOME | End: 2025-06-30
Attending: EMERGENCY MEDICINE | Admitting: EMERGENCY MEDICINE
Payer: MEDICARE

## 2025-06-30 ENCOUNTER — APPOINTMENT (OUTPATIENT)
Dept: GENERAL RADIOLOGY | Facility: HOSPITAL | Age: 60
End: 2025-06-30
Payer: MEDICARE

## 2025-06-30 VITALS
TEMPERATURE: 98.4 F | WEIGHT: 243 LBS | BODY MASS INDEX: 38.14 KG/M2 | OXYGEN SATURATION: 96 % | HEART RATE: 87 BPM | SYSTOLIC BLOOD PRESSURE: 108 MMHG | RESPIRATION RATE: 18 BRPM | DIASTOLIC BLOOD PRESSURE: 91 MMHG | HEIGHT: 67 IN

## 2025-06-30 DIAGNOSIS — R00.2 PALPITATIONS: Primary | ICD-10-CM

## 2025-06-30 DIAGNOSIS — F41.9 ANXIETY: ICD-10-CM

## 2025-06-30 LAB
ALBUMIN SERPL-MCNC: 4.1 G/DL (ref 3.5–5.2)
ALBUMIN/GLOB SERPL: 1.3 G/DL
ALP SERPL-CCNC: 99 U/L (ref 39–117)
ALT SERPL W P-5'-P-CCNC: 15 U/L (ref 1–33)
ANION GAP SERPL CALCULATED.3IONS-SCNC: 11 MMOL/L (ref 5–15)
AST SERPL-CCNC: 17 U/L (ref 1–32)
BASOPHILS # BLD AUTO: 0.05 10*3/MM3 (ref 0–0.2)
BASOPHILS NFR BLD AUTO: 0.5 % (ref 0–1.5)
BILIRUB SERPL-MCNC: 0.2 MG/DL (ref 0–1.2)
BUN SERPL-MCNC: 8 MG/DL (ref 8–23)
BUN/CREAT SERPL: 12.5 (ref 7–25)
CALCIUM SPEC-SCNC: 9.3 MG/DL (ref 8.6–10.5)
CHLORIDE SERPL-SCNC: 100 MMOL/L (ref 98–107)
CO2 SERPL-SCNC: 26 MMOL/L (ref 22–29)
CREAT SERPL-MCNC: 0.64 MG/DL (ref 0.57–1)
DEPRECATED RDW RBC AUTO: 47.2 FL (ref 37–54)
EGFRCR SERPLBLD CKD-EPI 2021: 100.7 ML/MIN/1.73
EOSINOPHIL # BLD AUTO: 0.84 10*3/MM3 (ref 0–0.4)
EOSINOPHIL NFR BLD AUTO: 8.1 % (ref 0.3–6.2)
ERYTHROCYTE [DISTWIDTH] IN BLOOD BY AUTOMATED COUNT: 16.3 % (ref 12.3–15.4)
GLOBULIN UR ELPH-MCNC: 3.1 GM/DL
GLUCOSE SERPL-MCNC: 90 MG/DL (ref 65–99)
HCT VFR BLD AUTO: 36.6 % (ref 34–46.6)
HGB BLD-MCNC: 11 G/DL (ref 12–15.9)
HOLD SPECIMEN: NORMAL
IMM GRANULOCYTES # BLD AUTO: 0.07 10*3/MM3 (ref 0–0.05)
IMM GRANULOCYTES NFR BLD AUTO: 0.7 % (ref 0–0.5)
LYMPHOCYTES # BLD AUTO: 1.66 10*3/MM3 (ref 0.7–3.1)
LYMPHOCYTES NFR BLD AUTO: 16 % (ref 19.6–45.3)
MAGNESIUM SERPL-MCNC: 2 MG/DL (ref 1.6–2.4)
MCH RBC QN AUTO: 24 PG (ref 26.6–33)
MCHC RBC AUTO-ENTMCNC: 30.1 G/DL (ref 31.5–35.7)
MCV RBC AUTO: 79.9 FL (ref 79–97)
MONOCYTES # BLD AUTO: 0.78 10*3/MM3 (ref 0.1–0.9)
MONOCYTES NFR BLD AUTO: 7.5 % (ref 5–12)
NEUTROPHILS NFR BLD AUTO: 6.95 10*3/MM3 (ref 1.7–7)
NEUTROPHILS NFR BLD AUTO: 67.2 % (ref 42.7–76)
NRBC BLD AUTO-RTO: 0 /100 WBC (ref 0–0.2)
NT-PROBNP SERPL-MCNC: 191.1 PG/ML (ref 0–900)
PLATELET # BLD AUTO: 334 10*3/MM3 (ref 140–450)
PMV BLD AUTO: 10.2 FL (ref 6–12)
POTASSIUM SERPL-SCNC: 3.7 MMOL/L (ref 3.5–5.2)
PROT SERPL-MCNC: 7.2 G/DL (ref 6–8.5)
RBC # BLD AUTO: 4.58 10*6/MM3 (ref 3.77–5.28)
SODIUM SERPL-SCNC: 137 MMOL/L (ref 136–145)
T4 FREE SERPL-MCNC: 0.98 NG/DL (ref 0.92–1.68)
TSH SERPL DL<=0.05 MIU/L-ACNC: 5.59 UIU/ML (ref 0.27–4.2)
WBC NRBC COR # BLD AUTO: 10.35 10*3/MM3 (ref 3.4–10.8)
WHOLE BLOOD HOLD COAG: NORMAL
WHOLE BLOOD HOLD SPECIMEN: NORMAL

## 2025-06-30 PROCEDURE — 83735 ASSAY OF MAGNESIUM: CPT | Performed by: EMERGENCY MEDICINE

## 2025-06-30 PROCEDURE — 84439 ASSAY OF FREE THYROXINE: CPT | Performed by: EMERGENCY MEDICINE

## 2025-06-30 PROCEDURE — 85025 COMPLETE CBC W/AUTO DIFF WBC: CPT | Performed by: EMERGENCY MEDICINE

## 2025-06-30 PROCEDURE — 84443 ASSAY THYROID STIM HORMONE: CPT | Performed by: EMERGENCY MEDICINE

## 2025-06-30 PROCEDURE — 93005 ELECTROCARDIOGRAM TRACING: CPT | Performed by: EMERGENCY MEDICINE

## 2025-06-30 PROCEDURE — 71045 X-RAY EXAM CHEST 1 VIEW: CPT

## 2025-06-30 PROCEDURE — 36415 COLL VENOUS BLD VENIPUNCTURE: CPT

## 2025-06-30 PROCEDURE — 83880 ASSAY OF NATRIURETIC PEPTIDE: CPT | Performed by: EMERGENCY MEDICINE

## 2025-06-30 PROCEDURE — 80053 COMPREHEN METABOLIC PANEL: CPT | Performed by: EMERGENCY MEDICINE

## 2025-06-30 PROCEDURE — 93005 ELECTROCARDIOGRAM TRACING: CPT

## 2025-06-30 PROCEDURE — 25810000003 SODIUM CHLORIDE 0.9 % SOLUTION: Performed by: EMERGENCY MEDICINE

## 2025-06-30 PROCEDURE — 99284 EMERGENCY DEPT VISIT MOD MDM: CPT

## 2025-06-30 RX ORDER — ALPRAZOLAM 0.25 MG
0.5 TABLET ORAL ONCE
Status: COMPLETED | OUTPATIENT
Start: 2025-06-30 | End: 2025-06-30

## 2025-06-30 RX ORDER — SODIUM CHLORIDE 0.9 % (FLUSH) 0.9 %
10 SYRINGE (ML) INJECTION AS NEEDED
Status: DISCONTINUED | OUTPATIENT
Start: 2025-06-30 | End: 2025-06-30 | Stop reason: HOSPADM

## 2025-06-30 RX ADMIN — SODIUM CHLORIDE 1000 ML: 9 INJECTION, SOLUTION INTRAVENOUS at 16:03

## 2025-06-30 RX ADMIN — ALPRAZOLAM 0.5 MG: 0.25 TABLET ORAL at 16:58

## 2025-06-30 NOTE — DISCHARGE INSTRUCTIONS
Drink appropriate amount of fluids.     Follow-up with heart and valve clinic for Holter monitor evaluation.    Follow-up with primary care physician as needed for any further concern.

## 2025-06-30 NOTE — ED PROVIDER NOTES
Subjective   History of Present Illness  60-year-old female who presents for evaluation of palpitations elevated heart rate.  The patient reports that she has been recording her heart rate up to 110 at home today.  This was concerning to her.  She had a hysterectomy performed 3 days ago.  She denies significant vaginal bleeding.  She denies fever or other infectious symptoms.  She reports good oral fluid intake without nausea or vomiting.  She does admit that in association with this current surgery and the possibility that she may have cancer she has had increased anxiety which could be contributing to the elevated heart rate.  She denies a history of abnormal heart rhythm.  No history of DVT or PE.  She is awake and alert and nontoxic.  She does have slightly pressured speech and appears anxious.  Abdominal wounds are clean dry and intact.  No illicit drug use.  No medications.  No other acute complaints.      Review of Systems   Constitutional:  Negative for chills, fatigue and fever.   HENT:  Negative for congestion, ear pain, postnasal drip, sinus pressure and sore throat.    Eyes:  Negative for pain, redness and visual disturbance.   Respiratory:  Negative for cough, chest tightness and shortness of breath.    Cardiovascular:  Positive for palpitations. Negative for chest pain and leg swelling.   Gastrointestinal:  Negative for abdominal pain, anal bleeding, blood in stool, diarrhea, nausea and vomiting.   Endocrine: Negative for polydipsia and polyuria.   Genitourinary:  Negative for difficulty urinating, dysuria, frequency and urgency.   Musculoskeletal:  Negative for arthralgias, back pain and neck pain.   Skin:  Negative for pallor and rash.   Allergic/Immunologic: Negative for environmental allergies and immunocompromised state.   Neurological:  Negative for dizziness, weakness and headaches.   Hematological:  Negative for adenopathy.   Psychiatric/Behavioral:  Negative for confusion, self-injury and  suicidal ideas. The patient is nervous/anxious.    All other systems reviewed and are negative.      Past Medical History:   Diagnosis Date    Acute bronchitis     Allergic     Anemia     Anxiety     Arthritis of back     Cancer     Chest pain     Cholelithiasis     Claustrophobia     Diverticulosis     Endometrial cancer determined by uterine biopsy     Fracture of ankle 10/30/2021    GERD (gastroesophageal reflux disease)     Hashimoto's thyroiditis     Hemorrhoids     Hypothyroidism     Low back pain     Low back strain 10/12/2011    Lumbosacral disc disease 10/12/2011    Metrorrhagia     Palpitations     Polycystic ovary syndrome     Polyp of sigmoid colon     Ulcerative colitis     Upper respiratory infection     UTI (urinary tract infection)     Visual impairment 1971    Wear contacts/glasses       Allergies   Allergen Reactions    Azithromycin Unknown - Low Severity    Clarithromycin Unknown - Low Severity     From Dr Moni Spear's notes provided for services 23.    Epinephrine Palpitations    Erythromycin Other (See Comments)     Sores in mouth    Latex Rash    Nickel Itching    Nitroglycerin Palpitations    Wound Dressing Adhesive Itching and Rash       Past Surgical History:   Procedure Laterality Date    ANKLE OPEN REDUCTION INTERNAL FIXATION Left 2021    Procedure: TRIMALLEOLAR FRACTURE OPEN REDUCTION INTERNAL FIXATION LEFT;  Surgeon: Giovani Daniels MD;  Location: [facility];  Service: Orthopedics;  Laterality: Left;    ANKLE OPEN REDUCTION INTERNAL FIXATION Left 2021    [facility]    APPENDECTOMY      CARDIAC CATHETERIZATION       SECTION      x 2    CHOLECYSTECTOMY      COLONOSCOPY      DILATATION AND CURETTAGE      Of Cervical Stump    ENDOMETRIAL ABLATION  2013    FRACTURE SURGERY      Broke left ankle    HARDWARE REMOVAL FOOT / ANKLE Left 2022    syndesmotic screw removal Dr. Daniels    MYOMECTOMY      SMALL INTESTINE  SURGERY      TUBAL ABDOMINAL LIGATION         Family History   Problem Relation Age of Onset    Hyperlipidemia Mother     CHAD disease Mother     Cancer Mother     Diabetes Mother     Heart disease Mother     Broken bones Mother         Broke both wrists ...different occasions due to falls    Stroke Mother     Rheum arthritis Father     Hyperlipidemia Father     Heart attack Father 72    Rheumatologic disease Father         My dad has severe RA    Arthritis Father         RA    Ovarian cancer Maternal Aunt     Diabetes Paternal Grandmother     Hypertension Paternal Grandmother     Obesity Paternal Grandmother     Thyroid disease Paternal Grandmother     Colon cancer Other     Arthritis Other     Cancer Other         uncle; liver, lung     Thyroid disease Cousin     Thyroid cancer Cousin     CHAD disease Daughter     Obesity Brother     Sleep apnea Brother     Depression Brother     Diabetes Brother     Hypertension Brother     Anxiety disorder Son     Arthritis Son         RA    Mental illness Son     Anxiety disorder Maternal Aunt     Cancer Maternal Aunt         Passed away 2024 due to ovarian cancer    Anxiety disorder Daughter     Arthritis Daughter         RA    Miscarriages / Stillbirths Daughter         Had miscarriage 2010       Social History     Socioeconomic History    Marital status:    Tobacco Use    Smoking status: Never     Passive exposure: Never    Smokeless tobacco: Never   Vaping Use    Vaping status: Never Used   Substance and Sexual Activity    Alcohol use: Never    Drug use: Never    Sexual activity: Not Currently     Partners: Male     Birth control/protection: Abstinence, Post-menopausal, Tubal ligation           Objective   Physical Exam  Vitals and nursing note reviewed.   Constitutional:       General: She is not in acute distress.     Appearance: Normal appearance. She is well-developed. She is not toxic-appearing or diaphoretic.   HENT:      Head: Normocephalic and atraumatic.       Right Ear: External ear normal.      Left Ear: External ear normal.      Nose: Nose normal.   Eyes:      General: Lids are normal.      Pupils: Pupils are equal, round, and reactive to light.   Neck:      Trachea: No tracheal deviation.   Cardiovascular:      Rate and Rhythm: Normal rate and regular rhythm.      Pulses: No decreased pulses.      Heart sounds: Normal heart sounds. No murmur heard.     No friction rub. No gallop.   Pulmonary:      Effort: Pulmonary effort is normal. No respiratory distress.      Breath sounds: Normal breath sounds. No decreased breath sounds, wheezing, rhonchi or rales.   Abdominal:      General: Bowel sounds are normal.      Palpations: Abdomen is soft.      Tenderness: There is no abdominal tenderness. There is no guarding or rebound.   Musculoskeletal:         General: No deformity. Normal range of motion.      Cervical back: Normal range of motion and neck supple.   Lymphadenopathy:      Cervical: No cervical adenopathy.   Skin:     General: Skin is warm and dry.      Findings: No rash.   Neurological:      Mental Status: She is alert and oriented to person, place, and time.      Cranial Nerves: No cranial nerve deficit.      Sensory: No sensory deficit.   Psychiatric:         Speech: Speech normal.         Behavior: Behavior normal.         Thought Content: Thought content normal.         Judgment: Judgment normal.         Procedures           ED Course                                               ES reviewed by Maricarmen Brown MD       Medical Decision Making  Differential includes postoperative pain, anxiety, pneumonia, other unspecified etiology.    Chest x-ray inter by me shows normal heart size and clear lungs.    Labs show normal white count, normal kidney function, no significant electrolyte abnormalities.    H&H is stable with previous baseline.    The patient has remained anxious throughout the ER course.  IV fluids were administered and a dose of Xanax  consistent with the patient's home dose was administered.    She will be discharged with the advised to follow-up outpatient.    Problems Addressed:  Anxiety: complicated acute illness or injury with systemic symptoms  Palpitations: complicated acute illness or injury with systemic symptoms    Amount and/or Complexity of Data Reviewed  External Data Reviewed: labs, radiology and ECG.  Labs: ordered. Decision-making details documented in ED Course.  Radiology: ordered and independent interpretation performed. Decision-making details documented in ED Course.  ECG/medicine tests: ordered and independent interpretation performed. Decision-making details documented in ED Course.     Details: EKG performed 6/3/2025 at 1518 inter by me shows sinus rhythm, normal QTc, normal QRS, no acute ischemic changes.    Risk  Prescription drug management.        Final diagnoses:   Palpitations   Anxiety       ED Disposition  ED Disposition       ED Disposition   Discharge    Condition   Stable    Comment   --               ARYA ANDREWS [place]  [address]  [facility] [id number] [phone]             Medication List      No changes were made to your prescriptions during this visit.            Maricarmen Brown MD  07/01/25 4700

## 2025-06-30 NOTE — Clinical Note
[facility] EMERGENCY DEPARTMENT  [address]  [facility] [address]  Phone: [phone]    Brigid Coelho was seen and treated in our emergency department on 6/30/2025.  She may return to work on 07/02/2025.         Thank you for choosing [facility].    Maricarmen Brown MD

## 2025-07-01 ENCOUNTER — OFFICE VISIT (OUTPATIENT)
Dept: FAMILY MEDICINE CLINIC | Facility: CLINIC | Age: 60
End: 2025-07-01
Payer: MEDICARE

## 2025-07-01 ENCOUNTER — TELEPHONE (OUTPATIENT)
Dept: FAMILY MEDICINE CLINIC | Facility: CLINIC | Age: 60
End: 2025-07-01

## 2025-07-01 ENCOUNTER — TELEPHONE (OUTPATIENT)
Dept: ENDOCRINOLOGY | Facility: CLINIC | Age: 60
End: 2025-07-01
Payer: MEDICARE

## 2025-07-01 VITALS
HEART RATE: 72 BPM | BODY MASS INDEX: 38.27 KG/M2 | OXYGEN SATURATION: 98 % | SYSTOLIC BLOOD PRESSURE: 120 MMHG | WEIGHT: 243.8 LBS | RESPIRATION RATE: 20 BRPM | HEIGHT: 67 IN | DIASTOLIC BLOOD PRESSURE: 70 MMHG | TEMPERATURE: 98.6 F

## 2025-07-01 DIAGNOSIS — Z09 HOSPITAL DISCHARGE FOLLOW-UP: ICD-10-CM

## 2025-07-01 DIAGNOSIS — E06.3 HYPOTHYROIDISM DUE TO HASHIMOTO'S THYROIDITIS: ICD-10-CM

## 2025-07-01 DIAGNOSIS — Z90.710 S/P LAPAROSCOPIC HYSTERECTOMY: ICD-10-CM

## 2025-07-01 DIAGNOSIS — E06.3 HYPOTHYROIDISM DUE TO HASHIMOTO'S THYROIDITIS: Primary | ICD-10-CM

## 2025-07-01 LAB
QT INTERVAL: 382 MS
QTC INTERVAL: 440 MS

## 2025-07-01 PROCEDURE — 99214 OFFICE O/P EST MOD 30 MIN: CPT | Performed by: FAMILY MEDICINE

## 2025-07-01 PROCEDURE — 1159F MED LIST DOCD IN RCRD: CPT | Performed by: FAMILY MEDICINE

## 2025-07-01 PROCEDURE — 1126F AMNT PAIN NOTED NONE PRSNT: CPT | Performed by: FAMILY MEDICINE

## 2025-07-01 PROCEDURE — 1160F RVW MEDS BY RX/DR IN RCRD: CPT | Performed by: FAMILY MEDICINE

## 2025-07-01 NOTE — TELEPHONE ENCOUNTER
Provider: [facility]    Caller: Brigid Coelho    Relationship to Patient: Self    Reason for Call: PATIENT STATES THAT DR PEOPLES (REFERRING) HAS SENT OVER AN URGENT REFERRAL TO TRY AND OBTAIN A SOONER APPT. PLEASE CALL PATIENT TO TRY AND GET SCHEDULED

## 2025-07-01 NOTE — TELEPHONE ENCOUNTER
Caller: Brigid Coelho    Relationship: Self    Best call back number: [phone]     What is the medical concern/diagnosis: THYROID NOT WORKING    What specialty or service is being requested: ENDOCRINOLOGY     What is the provider, practice or medical service name: [facility]     What is the office location: [address] [place] [address]     What is the office phone number: [phone]    Any additional details: REQUESTING A REFERRAL BE ORDER ASAP - PATIENT IS WAITING FOR AN APPOINTMENT WITH ENDOCRINOLOGY

## 2025-07-01 NOTE — TELEPHONE ENCOUNTER
Hub staff attempted to follow warm transfer process and was unsuccessful     Caller: Brigid Coelho    Relationship to patient: Self    Best call back number: [phone]    Patient is needing: PT HAS A NEW PT APPT PM 9/25 BUT PT CANNOT WAIT TILL 9/25. PT HAD A WAS HAVING HEART TROUBLES AND WENT TO THE ER. PT TSH WAS 5500 AND STATES HER THYROID IS NOT WORKING. PT HAD A HYSTERECTOMY ON 6/27 PT  IS TAKING levothyroxine (SYNTHROID, LEVOTHROID) 88 MCG tablet  AND ASKED IF SHE WOULD NEED TO INCREASE HER MEDICATION PT ALSO STATED SHE CANNOT WAIT TILL 9/25 TO BE SEEN NEEDS TO BE SEEN ASAP

## 2025-07-01 NOTE — PROGRESS NOTES
Follow Up Office Visit      Date: 2025   Patient Name: Brigid Coelho  : 1965   MRN: [id number]     Chief Complaint:    Chief Complaint   Patient presents with    Hospital Follow Up Visit     ER Thyroid issue       History of Present Illness: Brigid Coelho is a 60 y.o. female who presents today   For ER follow-up.      Patient was seen in the ER on 2025  Sees PABLO Hernandez for PCP      Patient had laparoscopic total hysterectomy with tube/ovary on 2025-4 days ago.    TSH was 5.590 and   FT4 was in the normal range.  To see [facility].      It is unclear if patient needs referral to endocrinology as it appears patient has an appointment scheduled.  After discussing with patient, she may need referral.  Referral placed.          Subjective      Review of Systems:   Review of Systems   Constitutional:  Negative for chills and fever.   Cardiovascular:  Negative for chest pain.   Gastrointestinal:  Negative for nausea and vomiting.       I have reviewed the patients family history, social history, past medical history, past surgical history and have updated it as appropriate.     Medications:     Current Outpatient Medications:     albuterol sulfate  (90 Base) MCG/ACT inhaler, Inhale 2 puffs Every 4 (Four) Hours As Needed for Wheezing or Shortness of Air., Disp: 6.7 g, Rfl: 1    ALPRAZolam (Xanax) 0.5 MG tablet, Take 1 tablet by mouth Daily As Needed for Anxiety., Disp: 30 tablet, Rfl: 2    ascorbic acid (VITAMIN C) 1000 MG tablet, Take 1 tablet by mouth Daily., Disp: , Rfl:     levothyroxine (SYNTHROID, LEVOTHROID) 88 MCG tablet, TAKE 1 TABLET BY MOUTH DAILY, Disp: 90 tablet, Rfl: 1    Loratadine 10 MG capsule, Take 1 capsule by mouth Daily., Disp: 30 each, Rfl: 2    Magnesium Citrate 100 MG chewable tablet, , Disp: , Rfl:     mesalamine (APRISO) 0.375 g 24 hr capsule, Take 1 capsule by mouth As Needed., Disp: , Rfl:     montelukast  "(SINGULAIR) 10 MG tablet, TAKE 1 TABLET BY MOUTH EVERY NIGHT AT BEDTIME, Disp: 90 tablet, Rfl: 0    mupirocin (BACTROBAN) 2 % ointment, Apply 1 Application topically to the appropriate area as directed 2 (Two) Times a Day., Disp: 22 g, Rfl: 0    pantoprazole (PROTONIX) 40 MG EC tablet, Take 1 tablet by mouth., Disp: , Rfl:     sertraline (ZOLOFT) 50 MG tablet, TAKE 1 TABLET BY MOUTH DAILY, Disp: 90 tablet, Rfl: 3    amoxicillin-clavulanate (AUGMENTIN) 875-125 MG per tablet, Take 1 tablet by mouth 2 (Two) Times a Day. (Patient not taking: Reported on 7/1/2025), Disp: 10 tablet, Rfl: 0    ipratropium (ATROVENT) 0.06 % nasal spray, Administer 2 sprays into the nostril(s) as directed by provider 4 (Four) Times a Day. (Patient not taking: Reported on 7/1/2025), Disp: 15 mL, Rfl: 0    Melatonin 2.5 MG capsule, Take  by mouth. (Patient not taking: Reported on 7/1/2025), Disp: , Rfl:     Pomegranate, Punica granatum, (Pomegranate Extract) 250 MG capsule, Daily., Disp: , Rfl:     Allergies:   Allergies   Allergen Reactions    Azithromycin Unknown - Low Severity    Clarithromycin Unknown - Low Severity     From Dr Moni Spear's notes provided for services 9/27/23.    Epinephrine Palpitations    Erythromycin Other (See Comments)     Sores in mouth    Latex Rash    Nickel Itching    Nitroglycerin Palpitations    Wound Dressing Adhesive Itching and Rash       Objective     Physical Exam: Please see above  Vital Signs:   Vitals:    07/01/25 1551   BP: 120/70   BP Location: Right arm   Patient Position: Sitting   Cuff Size: Adult   Pulse: 72   Resp: 20   Temp: 98.6 °F (37 °C)   TempSrc: Temporal   SpO2: 98%   Weight: 111 kg (243 lb 12.8 oz)   Height: 170.2 cm (67.01\")   PainSc: 0-No pain     Body mass index is 38.18 kg/m².          Physical Exam  Vitals and nursing note reviewed.   Constitutional:       Appearance: Normal appearance.   HENT:      Head: Normocephalic and atraumatic.   Neck:      Vascular: No carotid bruit. "   Cardiovascular:      Rate and Rhythm: Normal rate and regular rhythm.      Heart sounds: Normal heart sounds. No murmur heard.  Pulmonary:      Effort: Pulmonary effort is normal.      Breath sounds: Normal breath sounds.   Abdominal:      General: Bowel sounds are normal.      Palpations: Abdomen is soft. There is no mass.      Tenderness: There is no abdominal tenderness.   Musculoskeletal:      Right lower leg: No edema.      Left lower leg: No edema.   Skin:     Coloration: Skin is not jaundiced or pale.      Findings: No erythema.   Neurological:      Mental Status: She is alert. Mental status is at baseline.   Psychiatric:         Mood and Affect: Mood normal.         Behavior: Behavior normal.         Procedures    Results:   Labs:   Hemoglobin A1C   Date Value Ref Range Status   05/08/2025 5.9 (H) <5.7 % Final     TSH   Date Value Ref Range Status   06/30/2025 5.590 (H) 0.270 - 4.200 uIU/mL Final        POCT Results (if applicable):   Results for orders placed or performed during the hospital encounter of 06/30/25   ECG 12 Lead QT Measurement    Collection Time: 06/30/25  3:18 PM   Result Value Ref Range    QT Interval 382 ms    QTC Interval 440 ms   Comprehensive Metabolic Panel    Collection Time: 06/30/25  3:24 PM    Specimen: Arm, Left; Blood   Result Value Ref Range    Glucose 90 65 - 99 mg/dL    BUN 8.0 8.0 - 23.0 mg/dL    Creatinine 0.64 0.57 - 1.00 mg/dL    Sodium 137 136 - 145 mmol/L    Potassium 3.7 3.5 - 5.2 mmol/L    Chloride 100 98 - 107 mmol/L    CO2 26.0 22.0 - 29.0 mmol/L    Calcium 9.3 8.6 - 10.5 mg/dL    Total Protein 7.2 6.0 - 8.5 g/dL    Albumin 4.1 3.5 - 5.2 g/dL    ALT (SGPT) 15 1 - 33 U/L    AST (SGOT) 17 1 - 32 U/L    Alkaline Phosphatase 99 39 - 117 U/L    Total Bilirubin 0.2 0.0 - 1.2 mg/dL    Globulin 3.1 gm/dL    A/G Ratio 1.3 g/dL    BUN/Creatinine Ratio 12.5 7.0 - 25.0    Anion Gap 11.0 5.0 - 15.0 mmol/L    eGFR 100.7 >60.0 mL/min/1.73   Magnesium    Collection Time: 06/30/25   3:24 PM    Specimen: Arm, Left; Blood   Result Value Ref Range    Magnesium 2.0 1.6 - 2.4 mg/dL   TSH Rfx On Abnormal To Free T4    Collection Time: 06/30/25  3:24 PM    Specimen: Arm, Left; Blood   Result Value Ref Range    TSH 5.590 (H) 0.270 - 4.200 uIU/mL   BNP    Collection Time: 06/30/25  3:24 PM    Specimen: Arm, Left; Blood   Result Value Ref Range    proBNP 191.1 0.0 - 900.0 pg/mL   CBC Auto Differential    Collection Time: 06/30/25  3:24 PM    Specimen: Arm, Left; Blood   Result Value Ref Range    WBC 10.35 3.40 - 10.80 10*3/mm3    RBC 4.58 3.77 - 5.28 10*6/mm3    Hemoglobin 11.0 (L) 12.0 - 15.9 g/dL    Hematocrit 36.6 34.0 - 46.6 %    MCV 79.9 79.0 - 97.0 fL    MCH 24.0 (L) 26.6 - 33.0 pg    MCHC 30.1 (L) 31.5 - 35.7 g/dL    RDW 16.3 (H) 12.3 - 15.4 %    RDW-SD 47.2 37.0 - 54.0 fl    MPV 10.2 6.0 - 12.0 fL    Platelets 334 140 - 450 10*3/mm3    Neutrophil % 67.2 42.7 - 76.0 %    Lymphocyte % 16.0 (L) 19.6 - 45.3 %    Monocyte % 7.5 5.0 - 12.0 %    Eosinophil % 8.1 (H) 0.3 - 6.2 %    Basophil % 0.5 0.0 - 1.5 %    Immature Grans % 0.7 (H) 0.0 - 0.5 %    Neutrophils, Absolute 6.95 1.70 - 7.00 10*3/mm3    Lymphocytes, Absolute 1.66 0.70 - 3.10 10*3/mm3    Monocytes, Absolute 0.78 0.10 - 0.90 10*3/mm3    Eosinophils, Absolute 0.84 (H) 0.00 - 0.40 10*3/mm3    Basophils, Absolute 0.05 0.00 - 0.20 10*3/mm3    Immature Grans, Absolute 0.07 (H) 0.00 - 0.05 10*3/mm3    nRBC 0.0 0.0 - 0.2 /100 WBC   T4, Free    Collection Time: 06/30/25  3:24 PM    Specimen: Arm, Left; Blood   Result Value Ref Range    Free T4 0.98 0.92 - 1.68 ng/dL   Green Top (Gel)    Collection Time: 06/30/25  3:24 PM   Result Value Ref Range    Extra Tube Hold for add-ons.    Lavender Top    Collection Time: 06/30/25  3:24 PM   Result Value Ref Range    Extra Tube hold for add-on    Gold Top - SST    Collection Time: 06/30/25  3:24 PM   Result Value Ref Range    Extra Tube Hold for add-ons.    Gray Top    Collection Time: 06/30/25  3:24 PM    Result Value Ref Range    Extra Tube Hold for add-ons.    Light Blue Top    Collection Time: 06/30/25  3:24 PM   Result Value Ref Range    Extra Tube Hold for add-ons.      *Note: Due to a large number of results and/or encounters for the requested time period, some results have not been displayed. A complete set of results can be found in Results Review.       PHQ-9: PHQ-9 Total Score:       Imaging:   No valid procedures specified.     Measures:   Advanced Care Planning:   Patient does not have an advance directive, declines further assistance.    Smoking Cessation:   Does not smoke    Assessment / Plan      Assessment/Plan:   Patient's TSH was 5.590, and free T4 was 0.98 on 6/30/2025.  Patient had recent hysterectomy and feel this could affect the results.  Will not make any changes to her Synthroid today, as patient's TSH is only slightly elevated and FT4 is in the normal range.  Patient did have recent surgery.  Will refer patient to endocrinology.    Patient also encouraged to keep her appointment with cardiology, as well as surgery.  Will keep Synthroid at current dosing for now      Medical records reviewed from [facility] ER visit and surgery at [facility] [place] Everywhere.      1. ER follow-up      2. Hypothyroidism due to Hashimoto's thyroiditis    - Ambulatory Referral to Endocrinology    3. S/P laparoscopic hysterectomy        37 minutes were spent on this patient encounter which included history taking, physical exam, answering patient questions, counseling, discussing treatment plan, placing orders, and documentation.        Part of this note may be an electronic transcription/translation of spoken language to printed text using the Dragon Dictation System.          Vaccine Counseling:      Follow Up:   Return in about 3 weeks (around 7/22/2025) for Follow Up with Sarai..        MARK Mandujano

## 2025-07-04 RX ORDER — LEVOTHYROXINE SODIUM 100 UG/1
100 TABLET ORAL DAILY
Qty: 30 TABLET | Refills: 1 | Status: SHIPPED | OUTPATIENT
Start: 2025-07-04

## 2025-07-05 NOTE — TELEPHONE ENCOUNTER
I have increased her dose to 100 mcg and can see her in 6 weeks to follow on the results. Please move her appt to August 21 at 1:30 pm .

## 2025-07-07 ENCOUNTER — LAB (OUTPATIENT)
Dept: LAB | Facility: HOSPITAL | Age: 60
End: 2025-07-07
Payer: MEDICARE

## 2025-07-07 ENCOUNTER — OFFICE VISIT (OUTPATIENT)
Dept: FAMILY MEDICINE CLINIC | Facility: CLINIC | Age: 60
End: 2025-07-07
Payer: MEDICARE

## 2025-07-07 VITALS
DIASTOLIC BLOOD PRESSURE: 64 MMHG | BODY MASS INDEX: 37.83 KG/M2 | TEMPERATURE: 98.6 F | HEIGHT: 67 IN | OXYGEN SATURATION: 98 % | WEIGHT: 241 LBS | HEART RATE: 70 BPM | SYSTOLIC BLOOD PRESSURE: 124 MMHG

## 2025-07-07 DIAGNOSIS — R30.0 DYSURIA: ICD-10-CM

## 2025-07-07 DIAGNOSIS — E06.3 HYPOTHYROIDISM DUE TO HASHIMOTO'S THYROIDITIS: ICD-10-CM

## 2025-07-07 DIAGNOSIS — N30.01 ACUTE CYSTITIS WITH HEMATURIA: ICD-10-CM

## 2025-07-07 DIAGNOSIS — R30.0 BURNING WITH URINATION: Primary | ICD-10-CM

## 2025-07-07 LAB
ANION GAP SERPL CALCULATED.3IONS-SCNC: 12 MMOL/L (ref 5–15)
BILIRUB BLD-MCNC: NEGATIVE MG/DL
BUN SERPL-MCNC: 8 MG/DL (ref 8–23)
BUN/CREAT SERPL: 10.8 (ref 7–25)
CALCIUM SPEC-SCNC: 9.5 MG/DL (ref 8.6–10.5)
CHLORIDE SERPL-SCNC: 103 MMOL/L (ref 98–107)
CLARITY, POC: CLEAR
CO2 SERPL-SCNC: 24 MMOL/L (ref 22–29)
COLOR UR: YELLOW
CREAT SERPL-MCNC: 0.74 MG/DL (ref 0.57–1)
DEPRECATED RDW RBC AUTO: 42.7 FL (ref 37–54)
EGFRCR SERPLBLD CKD-EPI 2021: 92.2 ML/MIN/1.73
ERYTHROCYTE [DISTWIDTH] IN BLOOD BY AUTOMATED COUNT: 15.7 % (ref 12.3–15.4)
EXPIRATION DATE: ABNORMAL
GLUCOSE SERPL-MCNC: 80 MG/DL (ref 65–99)
GLUCOSE UR STRIP-MCNC: NEGATIVE MG/DL
HCT VFR BLD AUTO: 35.5 % (ref 34–46.6)
HGB BLD-MCNC: 11.4 G/DL (ref 12–15.9)
KETONES UR QL: NEGATIVE
LEUKOCYTE EST, POC: NEGATIVE
Lab: ABNORMAL
MCH RBC QN AUTO: 24.8 PG (ref 26.6–33)
MCHC RBC AUTO-ENTMCNC: 32.1 G/DL (ref 31.5–35.7)
MCV RBC AUTO: 77.2 FL (ref 79–97)
NITRITE UR-MCNC: NEGATIVE MG/ML
PH UR: 6 [PH] (ref 5–8)
PLATELET # BLD AUTO: 388 10*3/MM3 (ref 140–450)
PMV BLD AUTO: 10.4 FL (ref 6–12)
POTASSIUM SERPL-SCNC: 4.3 MMOL/L (ref 3.5–5.2)
PROT UR STRIP-MCNC: NEGATIVE MG/DL
RBC # BLD AUTO: 4.6 10*6/MM3 (ref 3.77–5.28)
RBC # UR STRIP: ABNORMAL /UL
SODIUM SERPL-SCNC: 139 MMOL/L (ref 136–145)
SP GR UR: 1.02 (ref 1–1.03)
T4 FREE SERPL-MCNC: 1.07 NG/DL (ref 0.92–1.68)
TSH SERPL DL<=0.05 MIU/L-ACNC: 2.67 UIU/ML (ref 0.27–4.2)
UROBILINOGEN UR QL: NORMAL
WBC NRBC COR # BLD AUTO: 8.16 10*3/MM3 (ref 3.4–10.8)

## 2025-07-07 PROCEDURE — 1159F MED LIST DOCD IN RCRD: CPT | Performed by: PHYSICIAN ASSISTANT

## 2025-07-07 PROCEDURE — 36415 COLL VENOUS BLD VENIPUNCTURE: CPT

## 2025-07-07 PROCEDURE — 84443 ASSAY THYROID STIM HORMONE: CPT

## 2025-07-07 PROCEDURE — 84439 ASSAY OF FREE THYROXINE: CPT

## 2025-07-07 PROCEDURE — 80048 BASIC METABOLIC PNL TOTAL CA: CPT

## 2025-07-07 PROCEDURE — 81003 URINALYSIS AUTO W/O SCOPE: CPT | Performed by: PHYSICIAN ASSISTANT

## 2025-07-07 PROCEDURE — 1125F AMNT PAIN NOTED PAIN PRSNT: CPT | Performed by: PHYSICIAN ASSISTANT

## 2025-07-07 PROCEDURE — 85027 COMPLETE CBC AUTOMATED: CPT

## 2025-07-07 PROCEDURE — 1160F RVW MEDS BY RX/DR IN RCRD: CPT | Performed by: PHYSICIAN ASSISTANT

## 2025-07-07 PROCEDURE — 99214 OFFICE O/P EST MOD 30 MIN: CPT | Performed by: PHYSICIAN ASSISTANT

## 2025-07-07 RX ORDER — FLUCONAZOLE 150 MG/1
150 TABLET ORAL ONCE
Qty: 1 TABLET | Refills: 0 | Status: SHIPPED | OUTPATIENT
Start: 2025-07-07 | End: 2025-07-07

## 2025-07-07 RX ORDER — CEFUROXIME AXETIL 250 MG/1
250 TABLET ORAL 2 TIMES DAILY
Qty: 20 TABLET | Refills: 0 | Status: SHIPPED | OUTPATIENT
Start: 2025-07-07

## 2025-07-07 RX ORDER — SENNOSIDES 8.6 MG/1
1 TABLET ORAL NIGHTLY
COMMUNITY
Start: 2025-06-27

## 2025-07-11 ENCOUNTER — TELEPHONE (OUTPATIENT)
Dept: FAMILY MEDICINE CLINIC | Facility: CLINIC | Age: 60
End: 2025-07-11
Payer: MEDICARE

## 2025-07-11 DIAGNOSIS — R00.2 PALPITATIONS: ICD-10-CM

## 2025-07-11 DIAGNOSIS — R07.9 CHEST PAIN, UNSPECIFIED TYPE: Primary | ICD-10-CM

## 2025-07-11 RX ORDER — FLUCONAZOLE 150 MG/1
150 TABLET ORAL ONCE
Qty: 1 TABLET | Refills: 0 | Status: SHIPPED | OUTPATIENT
Start: 2025-07-11 | End: 2025-07-11

## 2025-07-11 NOTE — TELEPHONE ENCOUNTER
Caller: Brigid Coelho    Relationship to patient: Self    Best call back number: [phone]     Chief complaint: TIGHTNESS AND CHEST PAIN, SHORTNESS OF BREATH    Patient directed to call 911 or go to their nearest emergency room.     Patient verbalized understanding: [x] Yes  [] No  If no, why?    Additional notes:PATIENT WILL GO TO ER

## 2025-07-15 NOTE — PROGRESS NOTES
Follow Up Office Visit    Date: 2025   Patient Name: Brigid Coelho  : 1965   MRN: [id number]     Chief Complaint:    Chief Complaint   Patient presents with    Hypothyroidism    Urinary Tract Infection    Vaginitis       History of Present Illness:   Brigid Coelho is a 60 y.o. female.  History of Present Illness  The patient presents for evaluation of endometrial cancer, thyroid disorder, anemia, and urinary tract infection.    She has been diagnosed with stage 1B endometrial cancer, which involves 60% of her endometrium. She underwent surgery on 2025, during which she experienced significant bruising at the injection site following two injections of heparin. She reports that her BNP levels increased to 191, and her blood pressure spiked to 166/98, which is unusual for her. She also experienced atrial fibrillation, with her heart rate rising from 82 to 110, significantly higher than her usual resting rate of 68 to 70. She has discontinued the use of heparin and reports feeling well since then. She believes her endometrial cancer was caused by estrogen dominance and late menopause at age 56. She is considering hormone replacement therapy to prevent osteopenia and heart disease but is concerned about the potential for cancer recurrence. She is open to the possibility of progestin or estrogen creams. She is also considering radiation therapy, specifically pellets, due to her sensitive skin. She is not comfortable with chemotherapy and would prefer oral medication if necessary. She has been advised to avoid soy milk until she has fully recovered from her surgery. She has reduced her intake of high-fat foods, junk food, and sugar, and has eliminated soda from her diet. She is drinking alkaline water to prevent cancer recurrence. She continues to take senna and Colace to prevent constipation. She has lost weight, dropping from 246 pounds on the day of her surgery to 241  pounds today. She weighed 239 pounds at home without clothes.    Her thyroid medication has been adjusted to 100 mcg, up from 88 mcg, by her endocrinologist. She is scheduled for a blood test in 2 weeks to monitor her levels.    She has a history of anemia, with a hemoglobin level of 11, and is considering resuming iron supplements post-surgery.    She suspects she may have a urinary tract infection (UTI) due to a yeast-like odor in her urine. She reports no pain but mentions a laceration that was stitched up. She has been experiencing bloating but reports no weight gain. She has been experiencing a cough, which she attributes to allergies, and has switched to Allegra without relief.    PAST SURGICAL HISTORY:  She has undergone three robotic surgeries, including an ablation procedure.    FAMILY HISTORY  Her mother had liposarcoma and developed atrial fibrillation after radiation treatment, which also caused damage to her colon and kidney. Her grandmother had endometrial cancer and lived until 85, eventually passing away from pneumonia.        Subjective    Review of systems:  Review of Systems     I have reviewed and the following portions of the patient's history were updated as appropriate: past family history, past medical history, past social history, past surgical history and problem list.    Medications:     Current Outpatient Medications:     ALPRAZolam (Xanax) 0.5 MG tablet, Take 1 tablet by mouth Daily As Needed for Anxiety., Disp: 30 tablet, Rfl: 2    ascorbic acid (VITAMIN C) 1000 MG tablet, Take 1 tablet by mouth Daily., Disp: , Rfl:     Berberine Chloride 500 MG capsule, , Disp: , Rfl:     levothyroxine (SYNTHROID, LEVOTHROID) 100 MCG tablet, Take 1 tablet by mouth Daily., Disp: 30 tablet, Rfl: 1    Loratadine 10 MG capsule, Take 1 capsule by mouth Daily., Disp: 30 each, Rfl: 2    Magnesium Citrate 100 MG chewable tablet, , Disp: , Rfl:     mesalamine (APRISO) 0.375 g 24 hr capsule, Take 1 capsule by  "mouth As Needed., Disp: , Rfl:     montelukast (SINGULAIR) 10 MG tablet, TAKE 1 TABLET BY MOUTH EVERY NIGHT AT BEDTIME, Disp: 90 tablet, Rfl: 0    pantoprazole (PROTONIX) 40 MG EC tablet, Take 1 tablet by mouth., Disp: , Rfl:     senna (SENOKOT) 8.6 MG tablet, Take 1 tablet by mouth Every Night., Disp: , Rfl:     sertraline (ZOLOFT) 50 MG tablet, TAKE 1 TABLET BY MOUTH DAILY, Disp: 90 tablet, Rfl: 3    cefuroxime (CEFTIN) 250 MG tablet, Take 1 tablet by mouth 2 (Two) Times a Day., Disp: 20 tablet, Rfl: 0    Allergies:   Allergies   Allergen Reactions    Azithromycin Unknown - Low Severity    Clarithromycin Unknown - Low Severity     From Dr Moni Spear's notes provided for services 9/27/23.    Epinephrine Palpitations    Erythromycin Other (See Comments)     Sores in mouth    Latex Rash    Nickel Itching    Nitroglycerin Palpitations    Wound Dressing Adhesive Itching and Rash       Objective   Vital Signs:   Vitals:    07/07/25 1044   BP: 124/64   Pulse: 70   Temp: 98.6 °F (37 °C)   TempSrc: Infrared   SpO2: 98%   Weight: 109 kg (241 lb)   Height: 170.2 cm (67.01\")   PainSc: 3      Body mass index is 37.74 kg/m².          Physical Exam:   Physical Exam  Vitals and nursing note reviewed.   Constitutional:       Appearance: Normal appearance.   HENT:      Head: Normocephalic and atraumatic.      Nose: No congestion or rhinorrhea.      Mouth/Throat:      Mouth: Mucous membranes are moist.      Pharynx: Oropharynx is clear. No posterior oropharyngeal erythema.   Cardiovascular:      Rate and Rhythm: Normal rate and regular rhythm.   Pulmonary:      Effort: Pulmonary effort is normal.      Breath sounds: Normal breath sounds. No decreased breath sounds, wheezing, rhonchi or rales.   Musculoskeletal:      Cervical back: Neck supple.      Right lower leg: No edema.      Left lower leg: No edema.   Lymphadenopathy:      Cervical: No cervical adenopathy.   Neurological:      Mental Status: She is alert.      "     Procedures     Assessment / Plan    Assessment/Plan:   Diagnoses and all orders for this visit:    1. Burning with urination (Primary)  -     POC Urinalysis Dipstick, Automated    2. Hypothyroidism due to Hashimoto's thyroiditis  -     T4, free; Future  -     TSH; Future  -     CBC No Differential; Future  -     Basic metabolic panel; Future    3. Dysuria    4. Acute cystitis with hematuria  -     fluconazole (Diflucan) 150 MG tablet; Take 1 tablet by mouth 1 (One) Time for 1 dose.  Dispense: 1 tablet; Refill: 0  -     cefuroxime (CEFTIN) 250 MG tablet; Take 1 tablet by mouth 2 (Two) Times a Day.  Dispense: 20 tablet; Refill: 0       Assessment & Plan  1. Endometrial cancer.  - Pathology report indicates stage 1B endometrial cancer with dim positive staining for both estrogen and progesterone receptors.  - Patient prefers pellet radiation over beams due to past family experiences with radiation side effects.  - Advised to discuss further treatment options with oncologist.  - Comprehensive labs ordered today, including thyroid function tests, hemoglobin levels, blood glucose, kidney function, and liver function tests.    2. Thyroid disorder.  - Endocrinologist increased thyroid medication dosage to 100 mcg.  - Patient concerned about potential anxiety from increased dosage.  - Thyroid function tests to be conducted today to monitor adjustment.  - Dosage may need re-evaluation if symptoms of anxiety or other side effects occur.    3. Anemia.  - History of anemia and reports feeling more pale than usual.  - Hemoglobin levels to be checked today to assess for any changes post-surgery.  - Iron supplementation may be considered if levels are low.    4. Urinary tract infection (UTI).  - Reports symptoms suggestive of a UTI, including yeast-like smell and discomfort when wiping.  - Urinalysis to be performed today to confirm diagnosis.  - Appropriate antibiotics will be prescribed if UTI is confirmed.      Follow Up:    No follow-ups on file.    Patient or patient representative verbalized consent for the use of Ambient Listening during the visit with  Sarai Marinelli PA-C for chart documentation. 7/15/2025  09:52 EDT    Sarai Marinelli PA-C   [facility] [place]

## 2025-07-16 ENCOUNTER — HOSPITAL ENCOUNTER (OUTPATIENT)
Dept: CARDIOLOGY | Facility: HOSPITAL | Age: 60
Discharge: HOME | End: 2025-07-16
Payer: MEDICARE

## 2025-07-16 ENCOUNTER — OFFICE VISIT (OUTPATIENT)
Dept: CARDIOLOGY | Facility: HOSPITAL | Age: 60
End: 2025-07-16
Payer: MEDICARE

## 2025-07-16 VITALS
BODY MASS INDEX: 38.17 KG/M2 | HEART RATE: 74 BPM | HEIGHT: 67 IN | SYSTOLIC BLOOD PRESSURE: 133 MMHG | RESPIRATION RATE: 20 BRPM | WEIGHT: 243.2 LBS | OXYGEN SATURATION: 96 % | DIASTOLIC BLOOD PRESSURE: 75 MMHG

## 2025-07-16 DIAGNOSIS — R55 NEAR SYNCOPE: ICD-10-CM

## 2025-07-16 DIAGNOSIS — F41.9 ANXIETY: ICD-10-CM

## 2025-07-16 DIAGNOSIS — R00.2 PALPITATIONS: Primary | ICD-10-CM

## 2025-07-16 DIAGNOSIS — R00.2 PALPITATIONS: ICD-10-CM

## 2025-07-16 PROCEDURE — 93246 EXT ECG>7D<15D RECORDING: CPT

## 2025-07-16 NOTE — PROGRESS NOTES
[facility] Heart Monitor Documentation    Brigid Coelho  1965  [id number]  07/16/25      [] ZIO XT Patch  Model [id number] Prescribed for  Days    Serial Number: (N + 9 Digits) N   Apply-By Date on Box:   USPS Tracking Number:   USPS Tracking        [] [employer] MINI PLUS Mobile Cardiac Telemetry  Model BGMINIPLUS Prescribed for  Days    Serial Number: (BGM + 7 Digits) BGM  Shipped-By Date on Box:   UPS Tracking Number: [id number]  UPS Tracking      [] [employer] MINI Holter Monitor  Model BGMINIEL Prescribed for 14 Days    Serial Number: (7 Digits) [id number]  Shipped-By Date on Box: 06/23/2025  UPS Tracking Number: [id number]  UPS Tracking        This monitor was applied to the patient's chest and checked for proper functioning.  Ms. Brigid Coelho was instructed in the proper use of this monitor.  She was given the opportunity to ask questions and left the office with the device 's instruction manual.    Jessa Lees CMA, 13:12 EDT, 07/16/25                  [facility]

## 2025-07-16 NOTE — PROGRESS NOTES
"[facility], [facility]    Chief Complaint  Palpitations    Subjective    History of Present Illness {CC  Problem List  Visit  Diagnosis   Encounters  Notes  Medications  Labs  Result Review Imaging  Media :23}     Brigid Coelho presents to [facility] for   History of Present Illness     60-year-old female With history of palpitations, GERD, obesity, anxiety, asthma, ADHD    Patient presented to [facility] ED on 6/30/2025 with complaints of palpitations and elevated heart rate. Associated with near syncope. Noted to have had a hysterectomy 3 days prior.     Reports no hormone replace at this time. Thyroid labs off currently synthroid.     Pt reports her palpitations have improved.  One day noted chest discomfort.  Noted possible musculoskeletal tenderness.  No dyspnea, dizziness, near syncope since ED.    Walking regularly.      Notes diet change with goal of weight loss    Decrease in caffeine at well.      Plans to f/u with Dr. Domínguez for continued management. F/u scheduled in 2 weeks per patient report.     Objective     Vital Signs:   Vitals:    07/16/25 1252 07/16/25 1254   BP: 127/67 133/75   BP Location: Left arm Left arm   Patient Position: Sitting Standing   Cuff Size: Adult Adult   Pulse: 69 74   Resp: 20    SpO2: 96% 96%   Weight: 110 kg (243 lb 3.2 oz)    Height: 170.2 cm (67\")      Body mass index is 38.09 kg/m².  Physical Exam  Vitals reviewed.   Constitutional:       General: She is not in acute distress.  Cardiovascular:      Rate and Rhythm: Normal rate and regular rhythm.      Heart sounds: No murmur heard.  Pulmonary:      Effort: Pulmonary effort is normal.      Breath sounds: Normal breath sounds.   Musculoskeletal:      Right lower leg: No edema.      Left lower leg: No edema.   Skin:     Coloration: Skin is not pale.   Neurological:      Mental Status: She is alert.   Psychiatric:         Mood " and Affect: Mood normal.         Behavior: Behavior normal. Behavior is cooperative.              Result Review  Data Reviewed:{ Labs  Result Review  Imaging  Med Tab  Media :23}   EKG 7/1/2025: Normal sinus rhythm 80 bpm    Myocardial perfusion stress test 6/17/2025: No ischemia, no coronary calcifications noted    Echocardiogram 6/5/2025: EF 68%, grade 1 diastolic dysfunction, no significant valvular disease    Lab Results   Component Value Date    WBC 8.16 07/07/2025    HGB 11.4 (L) 07/07/2025    HCT 35.5 07/07/2025    MCV 77.2 (L) 07/07/2025     07/07/2025     Lab Results   Component Value Date    GLUCOSE 80 07/07/2025    BUN 8.0 07/07/2025    CREATININE 0.74 07/07/2025     07/07/2025    K 4.3 07/07/2025     07/07/2025    CALCIUM 9.5 07/07/2025    PROTEINTOT 7.2 06/30/2025    ALBUMIN 4.1 06/30/2025    ALT 15 06/30/2025    AST 17 06/30/2025    ALKPHOS 99 06/30/2025    BILITOT 0.2 06/30/2025    GLOB 3.1 06/30/2025    AGRATIO 1.3 06/30/2025    BCR 10.8 07/07/2025    ANIONGAP 12.0 07/07/2025    EGFR 92.2 07/07/2025     Lab Results   Component Value Date    TSH 2.670 07/07/2025     Lab Results   Component Value Date    CHOL 175 10/21/2024    CHLPL 182 07/07/2021    CHLPL 179 09/24/2018    CHLPL 187 12/15/2017     Lab Results   Component Value Date    TRIG 204 (H) 10/21/2024    TRIG 283 (H) 07/07/2021    TRIG 178 (H) 09/24/2018     Lab Results   Component Value Date    HDL 38 (L) 10/21/2024    HDL 37 (L) 07/07/2021    HDL 42 09/24/2018     Lab Results   Component Value Date     (H) 10/21/2024    LDL 97 07/07/2021     (H) 09/24/2018                   Assessment and Plan {CC Problem List  Visit Diagnosis  ROS  Review (Popup)  Health Maintenance  Quality  BestPractice  Medications  SmartSets  SnapShot Encounters  Media :23}   1. Palpitations    - Holter Monitor - 72 Hour Up To 15 Days; Future    2. Near syncope  Normal echo and stress in 06/2024    - Holter Monitor - 72  Hour Up To 15 Days; Future    3. Anxiety  Worsening stress with recent sx  Long hx of anxiety with panick attacks  Recent change in hormones without replacement  Continued on Zoloft and xanax PRN    Pt will f/u with PCP and cardiology as scheduled.    [facility]u [facility] as needed.       Follow Up {Instructions Charge Capture  Follow-up Communications :23}   Return if symptoms worsen or fail to improve.    Patient was given instructions and counseling regarding her condition or for health maintenance advice. Please see specific information pulled into the AVS if appropriate.  Patient was instructed to call the [facility] with any questions, concerns, or worsening symptoms.

## 2025-08-13 DIAGNOSIS — R91.1 PULMONARY NODULE LESS THAN 6 MM DETERMINED BY COMPUTED TOMOGRAPHY OF LUNG: Primary | ICD-10-CM

## 2025-08-13 LAB
CV ZIO BASELINE AVG BPM: 77
CV ZIO BASELINE BPM HIGH: 164
CV ZIO BASELINE BPM LOW: 44

## 2025-08-25 ENCOUNTER — TELEPHONE (OUTPATIENT)
Dept: FAMILY MEDICINE CLINIC | Facility: CLINIC | Age: 60
End: 2025-08-25
Payer: MEDICARE

## 2025-08-25 DIAGNOSIS — N64.4 PAINFUL LUMPY LEFT BREAST: Primary | ICD-10-CM

## 2025-08-25 DIAGNOSIS — N63.20 PAINFUL LUMPY LEFT BREAST: Primary | ICD-10-CM

## 2025-08-28 ENCOUNTER — OFFICE VISIT (OUTPATIENT)
Dept: ENDOCRINOLOGY | Facility: CLINIC | Age: 60
End: 2025-08-28
Payer: MEDICARE

## 2025-08-28 VITALS
DIASTOLIC BLOOD PRESSURE: 72 MMHG | HEART RATE: 76 BPM | SYSTOLIC BLOOD PRESSURE: 120 MMHG | BODY MASS INDEX: 38.45 KG/M2 | OXYGEN SATURATION: 96 % | WEIGHT: 245 LBS | HEIGHT: 67 IN

## 2025-08-28 DIAGNOSIS — E06.3 HYPOTHYROIDISM DUE TO HASHIMOTO'S THYROIDITIS: Primary | Chronic | ICD-10-CM

## 2025-08-28 DIAGNOSIS — E66.813 CLASS 3 SEVERE OBESITY DUE TO EXCESS CALORIES WITH SERIOUS COMORBIDITY AND BODY MASS INDEX (BMI) OF 40.0 TO 44.9 IN ADULT: ICD-10-CM

## 2025-08-28 PROCEDURE — 84439 ASSAY OF FREE THYROXINE: CPT | Performed by: INTERNAL MEDICINE

## 2025-08-28 PROCEDURE — 83036 HEMOGLOBIN GLYCOSYLATED A1C: CPT | Performed by: INTERNAL MEDICINE

## 2025-08-28 PROCEDURE — 84443 ASSAY THYROID STIM HORMONE: CPT | Performed by: INTERNAL MEDICINE

## 2025-08-28 PROCEDURE — 99204 OFFICE O/P NEW MOD 45 MIN: CPT | Performed by: INTERNAL MEDICINE

## 2025-08-28 PROCEDURE — 36415 COLL VENOUS BLD VENIPUNCTURE: CPT | Performed by: INTERNAL MEDICINE

## 2025-08-28 RX ORDER — LEVOTHYROXINE SODIUM 88 UG/1
88 TABLET ORAL DAILY
Qty: 90 TABLET | Refills: 3 | Status: SHIPPED | OUTPATIENT
Start: 2025-08-28

## 2025-08-28 RX ORDER — SODIUM, POTASSIUM,MAG SULFATES 17.5-3.13G
177 SOLUTION, RECONSTITUTED, ORAL ORAL
COMMUNITY
Start: 2025-08-25

## 2025-08-29 LAB
HBA1C MFR BLD: 6.1 % (ref 4.8–5.6)
T4 FREE SERPL-MCNC: 0.97 NG/DL (ref 0.92–1.68)
TSH SERPL DL<=0.05 MIU/L-ACNC: 2.28 UIU/ML (ref 0.27–4.2)

## (undated) DEVICE — 1010 S-DRAPE TOWEL DRAPE 10/BX: Brand: STERI-DRAPE™

## (undated) DEVICE — BONE SCREW, FULLY THREADED,T10
Type: IMPLANTABLE DEVICE | Site: ANKLE | Status: NON-FUNCTIONAL
Brand: VARIAX
Removed: 2021-11-03

## (undated) DEVICE — ANTIBACTERIAL UNDYED BRAIDED (POLYGLACTIN 910), SYNTHETIC ABSORBABLE SUTURE: Brand: COATED VICRYL

## (undated) DEVICE — BNDG ELAS W/CLIP 6IN 10YD LF STRL

## (undated) DEVICE — CANNULATED DRILL

## (undated) DEVICE — PK EXTREM LOWR 10

## (undated) DEVICE — PUMP PAIN AUTOFUSER AUTO SELCT NOBOLUS 1TO14ML/HR 550ML DISP

## (undated) DEVICE — SPEEDGUIDE FOR 3.5MM T10: Brand: VARIAX

## (undated) DEVICE — SPNG GZ WOVN 4X4IN 12PLY 10/BX STRL

## (undated) DEVICE — SCREWDRIVER BLADE T10 AO, SELF RETAINING: Brand: VARIAX

## (undated) DEVICE — SPEEDGUIDE DRILL AO: Brand: VARIAX

## (undated) DEVICE — PATIENT RETURN ELECTRODE, SINGLE-USE, CONTACT QUALITY MONITORING, ADULT, WITH 9FT CORD, FOR PATIENTS WEIGING OVER 33LBS. (15KG): Brand: MEGADYNE

## (undated) DEVICE — DRILL BIT, AO DIA2.0MM X 135MM, SCALED: Brand: VARIAX

## (undated) DEVICE — SNAP KOVER: Brand: UNBRANDED

## (undated) DEVICE — DRILL BIT, AO DIA2.6MM X 135MM, SCALED: Brand: VARIAX

## (undated) DEVICE — GOWN,REINF,POLY,ECL,PP SLV,XXL: Brand: MEDLINE

## (undated) DEVICE — BNDG ELAS CO-FLEX SLF ADHR 4IN5YD LF STRL

## (undated) DEVICE — PENCL ROCKRSWCH MEGADYNE W/HOLSTR 10FT SS

## (undated) DEVICE — SUT ETHLN 3/0 PC5 18IN 1893G

## (undated) DEVICE — DRSNG GZ PETROLTM XEROFORM CURAD 4X4IN STRL

## (undated) DEVICE — GLV SURG PREMIERPRO MIC LTX PF SZ8 BRN

## (undated) DEVICE — THREADED GUIDE WIRE

## (undated) DEVICE — UNDERCAST PADDING: Brand: DEROYAL

## (undated) DEVICE — NON-OCCLUSIVE GAUZE STRIP OVERWRAP,3% BISMUTH TRIBROMOPHENATE IN OIL EMULSION: Brand: XEROFORM

## (undated) DEVICE — OVERDRILL AO, DIA2.7MM X 122MM: Brand: VARIAX